# Patient Record
Sex: MALE | Race: WHITE | Employment: OTHER | URBAN - NONMETROPOLITAN AREA
[De-identification: names, ages, dates, MRNs, and addresses within clinical notes are randomized per-mention and may not be internally consistent; named-entity substitution may affect disease eponyms.]

---

## 2017-01-13 RX ORDER — HYDROCODONE BITARTRATE AND ACETAMINOPHEN 7.5; 325 MG/1; MG/1
1 TABLET ORAL
Qty: 150 TABLET | Refills: 0 | Status: SHIPPED | OUTPATIENT
Start: 2017-01-13 | End: 2017-02-08 | Stop reason: SDUPTHER

## 2017-02-10 RX ORDER — HYDROCODONE BITARTRATE AND ACETAMINOPHEN 7.5; 325 MG/1; MG/1
1 TABLET ORAL
Qty: 150 TABLET | Refills: 0 | Status: SHIPPED | OUTPATIENT
Start: 2017-02-12 | End: 2017-03-13 | Stop reason: ALTCHOICE

## 2017-02-12 RX ORDER — MELOXICAM 15 MG/1
TABLET ORAL
Qty: 30 TABLET | Refills: 0 | Status: SHIPPED | OUTPATIENT
Start: 2017-02-12 | End: 2017-03-14 | Stop reason: SDUPTHER

## 2017-02-28 RX ORDER — GABAPENTIN 600 MG/1
TABLET ORAL
Qty: 90 TABLET | Refills: 0 | Status: SHIPPED | OUTPATIENT
Start: 2017-02-28 | End: 2017-04-04 | Stop reason: SDUPTHER

## 2017-03-13 ENCOUNTER — OFFICE VISIT (OUTPATIENT)
Dept: PRIMARY CARE CLINIC | Age: 52
End: 2017-03-13
Payer: MEDICAID

## 2017-03-13 VITALS
BODY MASS INDEX: 29.48 KG/M2 | HEART RATE: 93 BPM | OXYGEN SATURATION: 94 % | HEIGHT: 71 IN | WEIGHT: 210.6 LBS | SYSTOLIC BLOOD PRESSURE: 126 MMHG | DIASTOLIC BLOOD PRESSURE: 78 MMHG

## 2017-03-13 DIAGNOSIS — B18.2 CHRONIC HEPATITIS C WITHOUT HEPATIC COMA (HCC): ICD-10-CM

## 2017-03-13 DIAGNOSIS — G71.00 MD (MUSCULAR DYSTROPHY) (HCC): Primary | ICD-10-CM

## 2017-03-13 DIAGNOSIS — M25.571 ACUTE RIGHT ANKLE PAIN: ICD-10-CM

## 2017-03-13 DIAGNOSIS — M51.37 DDD (DEGENERATIVE DISC DISEASE), LUMBOSACRAL: ICD-10-CM

## 2017-03-13 PROCEDURE — 99214 OFFICE O/P EST MOD 30 MIN: CPT | Performed by: FAMILY MEDICINE

## 2017-03-13 RX ORDER — HYDROCODONE BITARTRATE AND ACETAMINOPHEN 7.5; 325 MG/1; MG/1
1 TABLET ORAL
Qty: 150 TABLET | Refills: 0 | Status: CANCELLED | OUTPATIENT
Start: 2017-03-13

## 2017-03-13 RX ORDER — HYDROCODONE BITARTRATE AND ACETAMINOPHEN 10; 325 MG/1; MG/1
1 TABLET ORAL EVERY 6 HOURS PRN
Qty: 120 TABLET | Refills: 0 | Status: SHIPPED | OUTPATIENT
Start: 2017-03-13 | End: 2017-04-12 | Stop reason: SDUPTHER

## 2017-03-13 ASSESSMENT — ENCOUNTER SYMPTOMS
COUGH: 0
SHORTNESS OF BREATH: 0
BACK PAIN: 1

## 2017-03-15 LAB
EER HCV RNA QNT PCR: NORMAL
HCV RNA QNT REAL-TIME PCR INTERP: NOT DETECTED
HCV RNA, QUANTITATIVE REAL TIME PCR: <1.2 LOG IU
HEPATITIS C RNA PCR QUANT: <15 IU/ML

## 2017-03-21 ENCOUNTER — OFFICE VISIT (OUTPATIENT)
Dept: GASTROENTEROLOGY | Age: 52
End: 2017-03-21
Payer: MEDICAID

## 2017-03-21 ENCOUNTER — TELEPHONE (OUTPATIENT)
Dept: PRIMARY CARE CLINIC | Age: 52
End: 2017-03-21

## 2017-03-21 VITALS
OXYGEN SATURATION: 95 % | BODY MASS INDEX: 30.35 KG/M2 | SYSTOLIC BLOOD PRESSURE: 139 MMHG | HEIGHT: 71 IN | RESPIRATION RATE: 20 BRPM | WEIGHT: 216.8 LBS | DIASTOLIC BLOOD PRESSURE: 70 MMHG | HEART RATE: 87 BPM

## 2017-03-21 DIAGNOSIS — Z86.19 HISTORY OF HEPATITIS C VIRUS INFECTION: Primary | ICD-10-CM

## 2017-03-21 PROCEDURE — 99213 OFFICE O/P EST LOW 20 MIN: CPT | Performed by: NURSE PRACTITIONER

## 2017-03-21 ASSESSMENT — ENCOUNTER SYMPTOMS
SHORTNESS OF BREATH: 1
EYE DISCHARGE: 0
CONSTIPATION: 0
BACK PAIN: 1
VOMITING: 0
ANAL BLEEDING: 0
BLOOD IN STOOL: 0
ABDOMINAL PAIN: 0
ABDOMINAL DISTENTION: 0
WHEEZING: 0
COUGH: 0
SORE THROAT: 0
DIARRHEA: 0
RECTAL PAIN: 0
ALLERGIC/IMMUNOLOGIC NEGATIVE: 1
TROUBLE SWALLOWING: 0
NAUSEA: 0
RHINORRHEA: 0

## 2017-04-05 RX ORDER — GABAPENTIN 600 MG/1
TABLET ORAL
Qty: 90 TABLET | Refills: 0 | Status: SHIPPED | OUTPATIENT
Start: 2017-04-05 | End: 2017-05-10 | Stop reason: SDUPTHER

## 2017-04-12 DIAGNOSIS — M51.37 DDD (DEGENERATIVE DISC DISEASE), LUMBOSACRAL: ICD-10-CM

## 2017-04-17 RX ORDER — HYDROCODONE BITARTRATE AND ACETAMINOPHEN 10; 325 MG/1; MG/1
1 TABLET ORAL EVERY 6 HOURS PRN
Qty: 120 TABLET | Refills: 0 | Status: SHIPPED | OUTPATIENT
Start: 2017-04-17 | End: 2017-05-15 | Stop reason: SDUPTHER

## 2017-05-15 DIAGNOSIS — M51.37 DDD (DEGENERATIVE DISC DISEASE), LUMBOSACRAL: ICD-10-CM

## 2017-05-16 RX ORDER — HYDROCODONE BITARTRATE AND ACETAMINOPHEN 10; 325 MG/1; MG/1
1 TABLET ORAL EVERY 6 HOURS PRN
Qty: 120 TABLET | Refills: 0 | Status: SHIPPED | OUTPATIENT
Start: 2017-05-16 | End: 2017-06-12 | Stop reason: SDUPTHER

## 2017-06-12 DIAGNOSIS — M51.37 DDD (DEGENERATIVE DISC DISEASE), LUMBOSACRAL: ICD-10-CM

## 2017-06-15 RX ORDER — PAROXETINE HYDROCHLORIDE 40 MG/1
TABLET, FILM COATED ORAL
Qty: 30 TABLET | Refills: 10 | Status: SHIPPED | OUTPATIENT
Start: 2017-06-15 | End: 2017-08-15 | Stop reason: ALTCHOICE

## 2017-06-15 RX ORDER — HYDROCODONE BITARTRATE AND ACETAMINOPHEN 10; 325 MG/1; MG/1
1 TABLET ORAL EVERY 6 HOURS PRN
Qty: 120 TABLET | Refills: 0 | Status: SHIPPED | OUTPATIENT
Start: 2017-06-15 | End: 2017-07-11 | Stop reason: SDUPTHER

## 2017-07-11 DIAGNOSIS — M51.37 DDD (DEGENERATIVE DISC DISEASE), LUMBOSACRAL: ICD-10-CM

## 2017-07-13 RX ORDER — HYDROCODONE BITARTRATE AND ACETAMINOPHEN 10; 325 MG/1; MG/1
1 TABLET ORAL EVERY 6 HOURS PRN
Qty: 120 TABLET | Refills: 0 | Status: SHIPPED | OUTPATIENT
Start: 2017-07-14 | End: 2017-08-10 | Stop reason: SDUPTHER

## 2017-08-10 DIAGNOSIS — M51.37 DDD (DEGENERATIVE DISC DISEASE), LUMBOSACRAL: ICD-10-CM

## 2017-08-11 RX ORDER — HYDROCODONE BITARTRATE AND ACETAMINOPHEN 10; 325 MG/1; MG/1
1 TABLET ORAL EVERY 6 HOURS PRN
Qty: 120 TABLET | Refills: 0 | Status: SHIPPED | OUTPATIENT
Start: 2017-08-13 | End: 2017-09-11 | Stop reason: SDUPTHER

## 2017-08-15 ENCOUNTER — OFFICE VISIT (OUTPATIENT)
Dept: PRIMARY CARE CLINIC | Age: 52
End: 2017-08-15
Payer: MEDICARE

## 2017-08-15 VITALS
OXYGEN SATURATION: 95 % | TEMPERATURE: 96.4 F | SYSTOLIC BLOOD PRESSURE: 122 MMHG | HEART RATE: 104 BPM | DIASTOLIC BLOOD PRESSURE: 84 MMHG | HEIGHT: 71 IN | WEIGHT: 207 LBS | BODY MASS INDEX: 28.98 KG/M2

## 2017-08-15 DIAGNOSIS — Z13.220 LIPID SCREENING: ICD-10-CM

## 2017-08-15 DIAGNOSIS — F32.A DEPRESSION, UNSPECIFIED DEPRESSION TYPE: ICD-10-CM

## 2017-08-15 DIAGNOSIS — M51.37 DDD (DEGENERATIVE DISC DISEASE), LUMBOSACRAL: Primary | ICD-10-CM

## 2017-08-15 DIAGNOSIS — G71.00 MD (MUSCULAR DYSTROPHY) (HCC): ICD-10-CM

## 2017-08-15 LAB
ALBUMIN SERPL-MCNC: 4.2 G/DL (ref 3.5–5.2)
ALP BLD-CCNC: 88 U/L (ref 40–130)
ALT SERPL-CCNC: 19 U/L (ref 5–41)
AMPHETAMINE SCREEN, URINE: NORMAL
ANION GAP SERPL CALCULATED.3IONS-SCNC: 15 MMOL/L (ref 7–19)
AST SERPL-CCNC: 19 U/L (ref 5–40)
BARBITURATE SCREEN, URINE: NORMAL
BENZODIAZEPINE SCREEN, URINE: NORMAL
BILIRUB SERPL-MCNC: <0.2 MG/DL (ref 0.2–1.2)
BUN BLDV-MCNC: 20 MG/DL (ref 6–20)
CALCIUM SERPL-MCNC: 9.4 MG/DL (ref 8.6–10)
CHLORIDE BLD-SCNC: 100 MMOL/L (ref 98–111)
CHOLESTEROL, TOTAL: 167 MG/DL (ref 160–199)
CO2: 25 MMOL/L (ref 22–29)
COCAINE METABOLITE SCREEN URINE: NORMAL
CREAT SERPL-MCNC: 0.4 MG/DL (ref 0.5–1.2)
GFR NON-AFRICAN AMERICAN: >60
GLUCOSE BLD-MCNC: 78 MG/DL (ref 74–109)
HDLC SERPL-MCNC: 39 MG/DL (ref 55–121)
LDL CHOLESTEROL CALCULATED: ABNORMAL MG/DL
LDL CHOLESTEROL DIRECT: 92 MG/DL
MDMA URINE: NORMAL
METHADONE SCREEN, URINE: NORMAL
METHAMPHETAMINE, URINE: NORMAL
OPIATE SCREEN URINE: NORMAL
OXYCODONE SCREEN URINE: NORMAL
PHENCYCLIDINE SCREEN URINE: NORMAL
POTASSIUM SERPL-SCNC: 4 MMOL/L (ref 3.5–5)
PROPOXYPHENE SCREEN, URINE: NORMAL
SODIUM BLD-SCNC: 140 MMOL/L (ref 136–145)
THC: NORMAL
TOTAL PROTEIN: 7.6 G/DL (ref 6.6–8.7)
TRICYCLIC ANTIDEPRESSANTS, UR: NORMAL
TRIGL SERPL-MCNC: 481 MG/DL (ref 150–199)

## 2017-08-15 PROCEDURE — 99214 OFFICE O/P EST MOD 30 MIN: CPT | Performed by: FAMILY MEDICINE

## 2017-08-15 PROCEDURE — G8427 DOCREV CUR MEDS BY ELIG CLIN: HCPCS | Performed by: FAMILY MEDICINE

## 2017-08-15 PROCEDURE — 3017F COLORECTAL CA SCREEN DOC REV: CPT | Performed by: FAMILY MEDICINE

## 2017-08-15 PROCEDURE — 1036F TOBACCO NON-USER: CPT | Performed by: FAMILY MEDICINE

## 2017-08-15 PROCEDURE — G8417 CALC BMI ABV UP PARAM F/U: HCPCS | Performed by: FAMILY MEDICINE

## 2017-08-15 PROCEDURE — 80305 DRUG TEST PRSMV DIR OPT OBS: CPT | Performed by: FAMILY MEDICINE

## 2017-08-15 RX ORDER — FLUOXETINE 20 MG/1
20 TABLET, FILM COATED ORAL DAILY
Qty: 30 TABLET | Refills: 11 | Status: SHIPPED | OUTPATIENT
Start: 2017-08-15 | End: 2017-12-28 | Stop reason: DRUGHIGH

## 2017-08-15 ASSESSMENT — ENCOUNTER SYMPTOMS
COUGH: 0
BACK PAIN: 1
SHORTNESS OF BREATH: 0

## 2017-08-21 ENCOUNTER — TELEPHONE (OUTPATIENT)
Dept: PRIMARY CARE CLINIC | Age: 52
End: 2017-08-21

## 2017-08-21 DIAGNOSIS — E78.2 ELEVATED TRIGLYCERIDES WITH HIGH CHOLESTEROL: Primary | ICD-10-CM

## 2017-09-11 DIAGNOSIS — M51.37 DDD (DEGENERATIVE DISC DISEASE), LUMBOSACRAL: ICD-10-CM

## 2017-09-12 RX ORDER — HYDROCODONE BITARTRATE AND ACETAMINOPHEN 10; 325 MG/1; MG/1
1 TABLET ORAL EVERY 6 HOURS PRN
Qty: 100 TABLET | Refills: 0 | Status: SHIPPED | OUTPATIENT
Start: 2017-10-11 | End: 2017-11-09 | Stop reason: SDUPTHER

## 2017-09-12 RX ORDER — HYDROCODONE BITARTRATE AND ACETAMINOPHEN 10; 325 MG/1; MG/1
1 TABLET ORAL EVERY 6 HOURS PRN
Qty: 110 TABLET | Refills: 0 | Status: SHIPPED | OUTPATIENT
Start: 2017-09-12 | End: 2017-10-12

## 2017-10-05 RX ORDER — AMITRIPTYLINE HYDROCHLORIDE 50 MG/1
TABLET, FILM COATED ORAL
Qty: 30 TABLET | Refills: 11 | Status: SHIPPED | OUTPATIENT
Start: 2017-10-05 | End: 2018-10-18 | Stop reason: SDUPTHER

## 2017-10-24 DIAGNOSIS — M54.42 LEFT-SIDED LOW BACK PAIN WITH LEFT-SIDED SCIATICA: ICD-10-CM

## 2017-10-25 RX ORDER — TIZANIDINE 4 MG/1
TABLET ORAL
Qty: 90 TABLET | Refills: 11 | Status: SHIPPED | OUTPATIENT
Start: 2017-10-25 | End: 2018-12-17 | Stop reason: SDUPTHER

## 2017-11-09 NOTE — TELEPHONE ENCOUNTER
patient called left message with request for refill on norco. Last office visit 8-15 with next scheduled appointment 12-12  Dose verified.    Last UDS  8-15    Last fill per chart 10-11  Component Results     Component Collected Lab   Amphetamine Screen, Urine 08/15/2017  1:54 PM Unknown   neg    Comment:   MOP/Norco:Positive   Barbiturate Screen, Urine 08/15/2017  1:54 PM Unknown   neg    Benzodiazepine Screen, Urine 08/15/2017  1:54 PM Unknown   neg    COCAINE METABOLITE SCREEN URINE 08/15/2017  1:54 PM Unknown   neg    THC 08/15/2017  1:54 PM Unknown   neg    MDMA URINE 08/15/2017  1:54 PM Unknown   neg    Methadone Screen, Urine 08/15/2017  1:54 PM Unknown   neg    Opiate Scrn, Ur 08/15/2017  1:54 PM Unknown   neg    Oxycodone Screen, Ur 08/15/2017  1:54 PM Unknown   neg    PCP Scrn, Ur 08/15/2017  1:54 PM Unknown   neg    Propoxyphene Screen, Urine 08/15/2017  1:54 PM Unknown   neg    Tricyclic Antidepressants, Ur 08/15/2017  1:54 PM Unknown   neg    Methamphetamine, Urine 08/15/2017  1:54 PM Unknown   neg    Lab and Collection     POCT Rapid Drug Screen on 8/15/2017

## 2017-11-10 RX ORDER — HYDROCODONE BITARTRATE AND ACETAMINOPHEN 10; 325 MG/1; MG/1
1 TABLET ORAL EVERY 6 HOURS PRN
Qty: 100 TABLET | Refills: 0 | Status: SHIPPED | OUTPATIENT
Start: 2017-11-10 | End: 2017-12-10

## 2017-11-10 NOTE — TELEPHONE ENCOUNTER
ALEJANDRA was reviewed today per office protocol. Report shows No discrepancies. Fill pattern is consistent from single provider(s) at single pharmacy(s). Prescription escribed.  Patient is aware to check within the pharmacy

## 2017-12-12 ENCOUNTER — OFFICE VISIT (OUTPATIENT)
Dept: PRIMARY CARE CLINIC | Age: 52
End: 2017-12-12
Payer: MEDICARE

## 2017-12-12 VITALS
OXYGEN SATURATION: 98 % | HEIGHT: 71 IN | HEART RATE: 93 BPM | TEMPERATURE: 98.7 F | SYSTOLIC BLOOD PRESSURE: 124 MMHG | DIASTOLIC BLOOD PRESSURE: 70 MMHG | BODY MASS INDEX: 29.48 KG/M2 | WEIGHT: 210.6 LBS

## 2017-12-12 DIAGNOSIS — G89.29 CHRONIC LEFT-SIDED LOW BACK PAIN WITH LEFT-SIDED SCIATICA: ICD-10-CM

## 2017-12-12 DIAGNOSIS — Z23 NEED FOR 23-POLYVALENT PNEUMOCOCCAL POLYSACCHARIDE VACCINE: ICD-10-CM

## 2017-12-12 DIAGNOSIS — G89.4 CHRONIC PAIN SYNDROME: Primary | ICD-10-CM

## 2017-12-12 DIAGNOSIS — G71.00 MD (MUSCULAR DYSTROPHY) (HCC): ICD-10-CM

## 2017-12-12 DIAGNOSIS — M54.42 CHRONIC LEFT-SIDED LOW BACK PAIN WITH LEFT-SIDED SCIATICA: ICD-10-CM

## 2017-12-12 DIAGNOSIS — F32.A DEPRESSION, UNSPECIFIED DEPRESSION TYPE: ICD-10-CM

## 2017-12-12 DIAGNOSIS — M51.37 DDD (DEGENERATIVE DISC DISEASE), LUMBOSACRAL: ICD-10-CM

## 2017-12-12 LAB
AMPHETAMINE SCREEN, URINE: NORMAL
BARBITURATE SCREEN, URINE: NORMAL
BENZODIAZEPINE SCREEN, URINE: NORMAL
COCAINE METABOLITE SCREEN URINE: NORMAL
MDMA URINE: NORMAL
METHADONE SCREEN, URINE: NORMAL
METHAMPHETAMINE, URINE: NORMAL
OPIATE SCREEN URINE: NORMAL
OXYCODONE SCREEN URINE: NORMAL
PHENCYCLIDINE SCREEN URINE: NORMAL
PROPOXYPHENE SCREEN, URINE: NORMAL
THC: NORMAL
TRICYCLIC ANTIDEPRESSANTS, UR: NORMAL

## 2017-12-12 PROCEDURE — 3017F COLORECTAL CA SCREEN DOC REV: CPT | Performed by: FAMILY MEDICINE

## 2017-12-12 PROCEDURE — 99213 OFFICE O/P EST LOW 20 MIN: CPT | Performed by: FAMILY MEDICINE

## 2017-12-12 PROCEDURE — G8427 DOCREV CUR MEDS BY ELIG CLIN: HCPCS | Performed by: FAMILY MEDICINE

## 2017-12-12 PROCEDURE — G8484 FLU IMMUNIZE NO ADMIN: HCPCS | Performed by: FAMILY MEDICINE

## 2017-12-12 PROCEDURE — G8417 CALC BMI ABV UP PARAM F/U: HCPCS | Performed by: FAMILY MEDICINE

## 2017-12-12 PROCEDURE — 80305 DRUG TEST PRSMV DIR OPT OBS: CPT | Performed by: FAMILY MEDICINE

## 2017-12-12 PROCEDURE — 1036F TOBACCO NON-USER: CPT | Performed by: FAMILY MEDICINE

## 2017-12-12 PROCEDURE — 90732 PPSV23 VACC 2 YRS+ SUBQ/IM: CPT | Performed by: FAMILY MEDICINE

## 2017-12-12 PROCEDURE — G0009 ADMIN PNEUMOCOCCAL VACCINE: HCPCS | Performed by: FAMILY MEDICINE

## 2017-12-12 RX ORDER — HYDROCODONE BITARTRATE AND ACETAMINOPHEN 10; 325 MG/1; MG/1
1 TABLET ORAL EVERY 6 HOURS PRN
COMMUNITY
End: 2017-12-12 | Stop reason: SDUPTHER

## 2017-12-12 RX ORDER — HYDROCODONE BITARTRATE AND ACETAMINOPHEN 10; 325 MG/1; MG/1
1 TABLET ORAL EVERY 8 HOURS PRN
Qty: 90 TABLET | Refills: 0 | Status: SHIPPED | OUTPATIENT
Start: 2017-12-12 | End: 2018-01-08 | Stop reason: SDUPTHER

## 2017-12-19 RX ORDER — GABAPENTIN 600 MG/1
TABLET ORAL
Qty: 90 TABLET | Refills: 2 | Status: SHIPPED | OUTPATIENT
Start: 2017-12-19 | End: 2018-04-09 | Stop reason: SDUPTHER

## 2017-12-19 NOTE — TELEPHONE ENCOUNTER
Milton sent request for refill on gabapentin-.  Last office visit 12-12 with next scheduled appointment 3-18    Last UDS 12-12  Last fill 5-11

## 2017-12-27 ASSESSMENT — ENCOUNTER SYMPTOMS
COUGH: 0
BACK PAIN: 1
SHORTNESS OF BREATH: 0

## 2017-12-28 DIAGNOSIS — F32.A DEPRESSION, UNSPECIFIED DEPRESSION TYPE: ICD-10-CM

## 2017-12-28 RX ORDER — FLUOXETINE HYDROCHLORIDE 40 MG/1
40 CAPSULE ORAL DAILY
Qty: 30 CAPSULE | Refills: 11 | Status: SHIPPED | OUTPATIENT
Start: 2017-12-28 | End: 2018-06-13 | Stop reason: ALTCHOICE

## 2017-12-28 NOTE — TELEPHONE ENCOUNTER
Patient called today stating that he was told by Dr. Sabrina Henry to double up on his Prozac to make it 40 mg QD and a script would be sent in. Patient went to  script today and it was not there.  E-script for the 40 mg Prozac has been sent to [facility]

## 2018-01-08 DIAGNOSIS — G71.00 MD (MUSCULAR DYSTROPHY) (HCC): ICD-10-CM

## 2018-01-08 DIAGNOSIS — G89.4 CHRONIC PAIN SYNDROME: ICD-10-CM

## 2018-01-08 DIAGNOSIS — M54.42 CHRONIC LEFT-SIDED LOW BACK PAIN WITH LEFT-SIDED SCIATICA: ICD-10-CM

## 2018-01-08 DIAGNOSIS — M51.37 DDD (DEGENERATIVE DISC DISEASE), LUMBOSACRAL: ICD-10-CM

## 2018-01-08 DIAGNOSIS — G89.29 CHRONIC LEFT-SIDED LOW BACK PAIN WITH LEFT-SIDED SCIATICA: ICD-10-CM

## 2018-01-11 RX ORDER — HYDROCODONE BITARTRATE AND ACETAMINOPHEN 10; 325 MG/1; MG/1
1 TABLET ORAL EVERY 8 HOURS PRN
Qty: 90 TABLET | Refills: 0 | Status: SHIPPED | OUTPATIENT
Start: 2018-01-11 | End: 2018-02-09 | Stop reason: SDUPTHER

## 2018-01-11 NOTE — TELEPHONE ENCOUNTER
Can we please pull up at [facility] as his last UDS was negative? If you notice that the patient's UDS is negative and she be positive or there is something positive on the UDS that does not look like should be in her chart, if possible please pull up at [facility]. I know this is a lot of work. If we can just do this for even a quarter of these; I think we can help continue to nail down to just compliant patient's only with the substances    You all l have been awesome  In doing extra work for this. I do appreciate it. I think we've come a long way and in the next year can get down to a respectable clinic when it comes to controlled substances.   When we started this, we were [facility] [place]

## 2018-01-11 NOTE — TELEPHONE ENCOUNTER
ALEJANDRA was reviewed today per office protocol. Report shows No discrepancies. Fill pattern is consistent from single provider(s) at single pharmacy(s).     Presciption Escribed    Review of [facility] shows patient ran out of medications approximately 2-3 days prior to urine drug screen, will continue refill but we will need to do another urine drug screen as patient is at goal of 90 tablets per month

## 2018-02-09 DIAGNOSIS — G89.29 CHRONIC LEFT-SIDED LOW BACK PAIN WITH LEFT-SIDED SCIATICA: ICD-10-CM

## 2018-02-09 DIAGNOSIS — G89.4 CHRONIC PAIN SYNDROME: ICD-10-CM

## 2018-02-09 DIAGNOSIS — M51.37 DDD (DEGENERATIVE DISC DISEASE), LUMBOSACRAL: ICD-10-CM

## 2018-02-09 DIAGNOSIS — M54.42 CHRONIC LEFT-SIDED LOW BACK PAIN WITH LEFT-SIDED SCIATICA: ICD-10-CM

## 2018-02-09 DIAGNOSIS — G71.00 MD (MUSCULAR DYSTROPHY) (HCC): ICD-10-CM

## 2018-02-09 NOTE — TELEPHONE ENCOUNTER
Patient called today for refill on [place]. Last office visit 12/12/17. Next appointment 03/13/18.    Last UDS: 12/12/2017 10:48 AM - Jono Porter MA     Component Results     Component Collected Lab   Amphetamine Screen, Urine 12/12/2017 10:47 AM Unknown   neg    Barbiturate Screen, Urine 12/12/2017 10:47 AM Unknown   neg    Benzodiazepine Screen, Urine 12/12/2017 10:47 AM Unknown   neg    COCAINE METABOLITE SCREEN URINE 12/12/2017 10:47 AM Unknown   neg    THC 12/12/2017 10:47 AM Unknown   neg    MDMA URINE 12/12/2017 10:47 AM Unknown   neg    Methadone Screen, Urine 12/12/2017 10:47 AM Unknown   neg    Opiate Scrn, Ur 12/12/2017 10:47 AM Unknown   neg    Oxycodone Screen, Ur 12/12/2017 10:47 AM Unknown   neg    PCP Scrn, Ur 12/12/2017 10:47 AM Unknown   neg    Propoxyphene Screen, Urine 12/12/2017 10:47 AM Unknown   neg    Tricyclic Antidepressants, Ur 12/12/2017 10:47 AM Unknown   neg    Methamphetamine, Urine 12/12/2017 10:47 AM Unknown   neg

## 2018-02-12 RX ORDER — HYDROCODONE BITARTRATE AND ACETAMINOPHEN 10; 325 MG/1; MG/1
1 TABLET ORAL EVERY 8 HOURS PRN
Qty: 90 TABLET | Refills: 0 | Status: SHIPPED | OUTPATIENT
Start: 2018-02-12 | End: 2018-03-06 | Stop reason: SDUPTHER

## 2018-03-06 DIAGNOSIS — G71.00 MD (MUSCULAR DYSTROPHY) (HCC): ICD-10-CM

## 2018-03-06 DIAGNOSIS — M54.42 CHRONIC LEFT-SIDED LOW BACK PAIN WITH LEFT-SIDED SCIATICA: ICD-10-CM

## 2018-03-06 DIAGNOSIS — G89.4 CHRONIC PAIN SYNDROME: ICD-10-CM

## 2018-03-06 DIAGNOSIS — G89.29 CHRONIC LEFT-SIDED LOW BACK PAIN WITH LEFT-SIDED SCIATICA: ICD-10-CM

## 2018-03-06 DIAGNOSIS — M51.37 DDD (DEGENERATIVE DISC DISEASE), LUMBOSACRAL: ICD-10-CM

## 2018-03-06 NOTE — TELEPHONE ENCOUNTER
patient called left message with request for refill on norco. Last office visit 12-12 with next scheduled appointment 3-13  Dose verified.    Last UDS  12-12    Last fill per 2-12  Component Results     Component Collected Lab   Amphetamine Screen, Urine 12/12/2017 10:47 AM Unknown   neg    Barbiturate Screen, Urine 12/12/2017 10:47 AM Unknown   neg    Benzodiazepine Screen, Urine 12/12/2017 10:47 AM Unknown   neg    COCAINE METABOLITE SCREEN URINE 12/12/2017 10:47 AM Unknown   neg    THC 12/12/2017 10:47 AM Unknown   neg    MDMA URINE 12/12/2017 10:47 AM Unknown   neg    Methadone Screen, Urine 12/12/2017 10:47 AM Unknown   neg    Opiate Scrn, Ur 12/12/2017 10:47 AM Unknown   neg    Oxycodone Screen, Ur 12/12/2017 10:47 AM Unknown   neg    PCP Scrn, Ur 12/12/2017 10:47 AM Unknown   neg    Propoxyphene Screen, Urine 12/12/2017 10:47 AM Unknown   neg    Tricyclic Antidepressants, Ur 12/12/2017 10:47 AM Unknown   neg    Methamphetamine, Urine 12/12/2017 10:47 AM Unknown   neg    Lab and Collection     POCT Rapid Drug Screen on 12/12/2017

## 2018-03-07 RX ORDER — HYDROCODONE BITARTRATE AND ACETAMINOPHEN 10; 325 MG/1; MG/1
1 TABLET ORAL EVERY 8 HOURS PRN
Qty: 90 TABLET | Refills: 0 | Status: SHIPPED | OUTPATIENT
Start: 2018-03-14 | End: 2018-04-10 | Stop reason: SDUPTHER

## 2018-03-13 ENCOUNTER — TELEPHONE (OUTPATIENT)
Dept: PRIMARY CARE CLINIC | Age: 53
End: 2018-03-13

## 2018-03-13 ENCOUNTER — OFFICE VISIT (OUTPATIENT)
Dept: PRIMARY CARE CLINIC | Age: 53
End: 2018-03-13
Payer: MEDICARE

## 2018-03-13 VITALS
SYSTOLIC BLOOD PRESSURE: 128 MMHG | HEART RATE: 99 BPM | WEIGHT: 208.4 LBS | DIASTOLIC BLOOD PRESSURE: 78 MMHG | HEIGHT: 71 IN | TEMPERATURE: 97.8 F | BODY MASS INDEX: 29.18 KG/M2 | OXYGEN SATURATION: 96 %

## 2018-03-13 DIAGNOSIS — G89.29 CHRONIC LEFT-SIDED LOW BACK PAIN WITH LEFT-SIDED SCIATICA: ICD-10-CM

## 2018-03-13 DIAGNOSIS — G89.29 OTHER CHRONIC PAIN: Primary | ICD-10-CM

## 2018-03-13 DIAGNOSIS — B18.2 CHRONIC HEPATITIS C WITHOUT HEPATIC COMA (HCC): ICD-10-CM

## 2018-03-13 DIAGNOSIS — M54.42 CHRONIC LEFT-SIDED LOW BACK PAIN WITH LEFT-SIDED SCIATICA: ICD-10-CM

## 2018-03-13 DIAGNOSIS — G71.00 MD (MUSCULAR DYSTROPHY) (HCC): ICD-10-CM

## 2018-03-13 DIAGNOSIS — M51.37 DDD (DEGENERATIVE DISC DISEASE), LUMBOSACRAL: ICD-10-CM

## 2018-03-13 PROCEDURE — G8417 CALC BMI ABV UP PARAM F/U: HCPCS | Performed by: FAMILY MEDICINE

## 2018-03-13 PROCEDURE — 1036F TOBACCO NON-USER: CPT | Performed by: FAMILY MEDICINE

## 2018-03-13 PROCEDURE — 99214 OFFICE O/P EST MOD 30 MIN: CPT | Performed by: FAMILY MEDICINE

## 2018-03-13 PROCEDURE — 80305 DRUG TEST PRSMV DIR OPT OBS: CPT | Performed by: FAMILY MEDICINE

## 2018-03-13 PROCEDURE — G8427 DOCREV CUR MEDS BY ELIG CLIN: HCPCS | Performed by: FAMILY MEDICINE

## 2018-03-13 PROCEDURE — G0444 DEPRESSION SCREEN ANNUAL: HCPCS | Performed by: FAMILY MEDICINE

## 2018-03-13 PROCEDURE — G8484 FLU IMMUNIZE NO ADMIN: HCPCS | Performed by: FAMILY MEDICINE

## 2018-03-13 PROCEDURE — 3017F COLORECTAL CA SCREEN DOC REV: CPT | Performed by: FAMILY MEDICINE

## 2018-03-13 ASSESSMENT — PATIENT HEALTH QUESTIONNAIRE - PHQ9
5. POOR APPETITE OR OVEREATING: 2
10. IF YOU CHECKED OFF ANY PROBLEMS, HOW DIFFICULT HAVE THESE PROBLEMS MADE IT FOR YOU TO DO YOUR WORK, TAKE CARE OF THINGS AT HOME, OR GET ALONG WITH OTHER PEOPLE: 1
8. MOVING OR SPEAKING SO SLOWLY THAT OTHER PEOPLE COULD HAVE NOTICED. OR THE OPPOSITE, BEING SO FIGETY OR RESTLESS THAT YOU HAVE BEEN MOVING AROUND A LOT MORE THAN USUAL: 0
SUM OF ALL RESPONSES TO PHQ9 QUESTIONS 1 & 2: 5
1. LITTLE INTEREST OR PLEASURE IN DOING THINGS: 2
9. THOUGHTS THAT YOU WOULD BE BETTER OFF DEAD, OR OF HURTING YOURSELF: 0
SUM OF ALL RESPONSES TO PHQ QUESTIONS 1-9: 13
3. TROUBLE FALLING OR STAYING ASLEEP: 3
4. FEELING TIRED OR HAVING LITTLE ENERGY: 3
7. TROUBLE CONCENTRATING ON THINGS, SUCH AS READING THE NEWSPAPER OR WATCHING TELEVISION: 0
2. FEELING DOWN, DEPRESSED OR HOPELESS: 3
6. FEELING BAD ABOUT YOURSELF - OR THAT YOU ARE A FAILURE OR HAVE LET YOURSELF OR YOUR FAMILY DOWN: 0

## 2018-03-13 ASSESSMENT — ENCOUNTER SYMPTOMS
SHORTNESS OF BREATH: 0
BACK PAIN: 1
COUGH: 0

## 2018-03-20 ENCOUNTER — HOSPITAL ENCOUNTER (OUTPATIENT)
Dept: MRI IMAGING | Age: 53
Discharge: HOME | End: 2018-03-20
Payer: MEDICARE

## 2018-03-20 DIAGNOSIS — M54.42 CHRONIC LEFT-SIDED LOW BACK PAIN WITH LEFT-SIDED SCIATICA: ICD-10-CM

## 2018-03-20 DIAGNOSIS — G89.29 OTHER CHRONIC PAIN: ICD-10-CM

## 2018-03-20 DIAGNOSIS — G89.29 CHRONIC LEFT-SIDED LOW BACK PAIN WITH LEFT-SIDED SCIATICA: ICD-10-CM

## 2018-03-20 DIAGNOSIS — M51.37 DDD (DEGENERATIVE DISC DISEASE), LUMBOSACRAL: ICD-10-CM

## 2018-03-20 PROCEDURE — 72148 MRI LUMBAR SPINE W/O DYE: CPT

## 2018-03-27 ENCOUNTER — TELEPHONE (OUTPATIENT)
Dept: PRIMARY CARE CLINIC | Age: 53
End: 2018-03-27

## 2018-03-27 DIAGNOSIS — R93.89 ABNORMAL MRI: Primary | ICD-10-CM

## 2018-03-27 NOTE — TELEPHONE ENCOUNTER
This [facility] called and gave the patients their MRI results. Patient stated understanding. Referral was made.

## 2018-03-28 ENCOUNTER — TELEPHONE (OUTPATIENT)
Dept: NEUROSURGERY | Age: 53
End: 2018-03-28

## 2018-03-29 ENCOUNTER — TELEPHONE (OUTPATIENT)
Dept: NEUROSURGERY | Age: 53
End: 2018-03-29

## 2018-03-29 NOTE — TELEPHONE ENCOUNTER
Neurosurgical pre apt questionnaire     Referring physician? Zeny Mireles    Who is completing questionnaire? PATIENT     Has the pt had any previous spinal/brain surgeries? NO    If yes, Where was the surgery preformed? What was the surgery? Who was the surgeon? When was this surgery? Why is the pt not following up with previous surgeon? Is this a second opinion? Was a MRI preformed? YES    If not, Is there any reason a MRI cannot be performed? Is there metal anywhere in the body? If yes,  Where was the MRI preformed? [facility]   What part of the body? LUMBAR   When was it preformed?     03/20/18      Note:If  was not the facility that performed the study,    the disc will need to be brought appoint. Physical Therapy? YES   If yes, where was PT completed? What is the duration of therapy? Pain Management? NO   If yes, where was pain mgt therapy? Is the patient still under contract with pain mgt? Who is the pain mgt physician? Is the pt currently taking anything for pain control? [address]     Employment Status ? DISABLED   What type of employment? Has the patient missed work due to pain? If unemployed, how long? Are you on disability? Symptoms?         LEFT LOWER SIDE PAIN WHEN WALKING A LOT, CAN MOVE TO RIGHT SIDE

## 2018-04-10 DIAGNOSIS — M54.42 CHRONIC LEFT-SIDED LOW BACK PAIN WITH LEFT-SIDED SCIATICA: ICD-10-CM

## 2018-04-10 DIAGNOSIS — G89.4 CHRONIC PAIN SYNDROME: ICD-10-CM

## 2018-04-10 DIAGNOSIS — M51.37 DDD (DEGENERATIVE DISC DISEASE), LUMBOSACRAL: ICD-10-CM

## 2018-04-10 DIAGNOSIS — G89.29 CHRONIC LEFT-SIDED LOW BACK PAIN WITH LEFT-SIDED SCIATICA: ICD-10-CM

## 2018-04-10 DIAGNOSIS — G71.00 MD (MUSCULAR DYSTROPHY) (HCC): ICD-10-CM

## 2018-04-10 RX ORDER — GABAPENTIN 600 MG/1
TABLET ORAL
Qty: 90 TABLET | Refills: 5 | OUTPATIENT
Start: 2018-04-10 | End: 2018-10-18 | Stop reason: SDUPTHER

## 2018-04-10 RX ORDER — HYDROCODONE BITARTRATE AND ACETAMINOPHEN 10; 325 MG/1; MG/1
1 TABLET ORAL EVERY 8 HOURS PRN
Qty: 90 TABLET | Refills: 0 | Status: SHIPPED | OUTPATIENT
Start: 2018-04-13 | End: 2018-05-10 | Stop reason: SDUPTHER

## 2018-04-11 ENCOUNTER — HOSPITAL ENCOUNTER (OUTPATIENT)
Dept: GENERAL RADIOLOGY | Age: 53
Discharge: HOME | End: 2018-04-11
Payer: MEDICARE

## 2018-04-11 ENCOUNTER — OFFICE VISIT (OUTPATIENT)
Dept: NEUROSURGERY | Age: 53
End: 2018-04-11
Payer: MEDICARE

## 2018-04-11 VITALS
WEIGHT: 210 LBS | BODY MASS INDEX: 29.4 KG/M2 | SYSTOLIC BLOOD PRESSURE: 138 MMHG | DIASTOLIC BLOOD PRESSURE: 87 MMHG | OXYGEN SATURATION: 99 % | HEART RATE: 98 BPM | HEIGHT: 71 IN

## 2018-04-11 DIAGNOSIS — M54.50 CHRONIC LEFT-SIDED LOW BACK PAIN WITHOUT SCIATICA: ICD-10-CM

## 2018-04-11 DIAGNOSIS — M51.36 DDD (DEGENERATIVE DISC DISEASE), LUMBAR: ICD-10-CM

## 2018-04-11 DIAGNOSIS — G89.29 CHRONIC LEFT-SIDED LOW BACK PAIN WITHOUT SCIATICA: Primary | ICD-10-CM

## 2018-04-11 DIAGNOSIS — M54.50 CHRONIC LEFT-SIDED LOW BACK PAIN WITHOUT SCIATICA: Primary | ICD-10-CM

## 2018-04-11 DIAGNOSIS — M48.061 LUMBAR FORAMINAL STENOSIS: ICD-10-CM

## 2018-04-11 DIAGNOSIS — M79.18 LEFT BUTTOCK PAIN: ICD-10-CM

## 2018-04-11 DIAGNOSIS — G89.29 CHRONIC LEFT-SIDED LOW BACK PAIN WITHOUT SCIATICA: ICD-10-CM

## 2018-04-11 PROCEDURE — 1036F TOBACCO NON-USER: CPT | Performed by: NURSE PRACTITIONER

## 2018-04-11 PROCEDURE — G8427 DOCREV CUR MEDS BY ELIG CLIN: HCPCS | Performed by: NURSE PRACTITIONER

## 2018-04-11 PROCEDURE — G8417 CALC BMI ABV UP PARAM F/U: HCPCS | Performed by: NURSE PRACTITIONER

## 2018-04-11 PROCEDURE — 99204 OFFICE O/P NEW MOD 45 MIN: CPT | Performed by: NURSE PRACTITIONER

## 2018-04-11 PROCEDURE — 3017F COLORECTAL CA SCREEN DOC REV: CPT | Performed by: NURSE PRACTITIONER

## 2018-04-11 PROCEDURE — 72110 X-RAY EXAM L-2 SPINE 4/>VWS: CPT

## 2018-04-11 RX ORDER — PAROXETINE HYDROCHLORIDE 40 MG/1
TABLET, FILM COATED ORAL
COMMUNITY
Start: 2018-04-09 | End: 2018-06-13 | Stop reason: ALTCHOICE

## 2018-04-11 ASSESSMENT — ENCOUNTER SYMPTOMS
RESPIRATORY NEGATIVE: 1
EYES NEGATIVE: 1
BACK PAIN: 1
GASTROINTESTINAL NEGATIVE: 1

## 2018-04-13 RX ORDER — MELOXICAM 15 MG/1
TABLET ORAL
Qty: 30 TABLET | Refills: 11 | Status: SHIPPED | OUTPATIENT
Start: 2018-04-13 | End: 2019-04-24 | Stop reason: SDUPTHER

## 2018-05-10 ENCOUNTER — OFFICE VISIT (OUTPATIENT)
Dept: NEUROSURGERY | Age: 53
End: 2018-05-10
Payer: MEDICARE

## 2018-05-10 VITALS
BODY MASS INDEX: 29.12 KG/M2 | SYSTOLIC BLOOD PRESSURE: 132 MMHG | OXYGEN SATURATION: 93 % | HEIGHT: 71 IN | WEIGHT: 208 LBS | DIASTOLIC BLOOD PRESSURE: 85 MMHG | HEART RATE: 81 BPM

## 2018-05-10 DIAGNOSIS — G89.29 CHRONIC LEFT-SIDED LOW BACK PAIN WITH LEFT-SIDED SCIATICA: ICD-10-CM

## 2018-05-10 DIAGNOSIS — M51.37 DDD (DEGENERATIVE DISC DISEASE), LUMBOSACRAL: ICD-10-CM

## 2018-05-10 DIAGNOSIS — M54.42 CHRONIC LEFT-SIDED LOW BACK PAIN WITH LEFT-SIDED SCIATICA: ICD-10-CM

## 2018-05-10 DIAGNOSIS — M51.36 DDD (DEGENERATIVE DISC DISEASE), LUMBAR: ICD-10-CM

## 2018-05-10 DIAGNOSIS — G89.29 CHRONIC LEFT-SIDED LOW BACK PAIN WITHOUT SCIATICA: Primary | ICD-10-CM

## 2018-05-10 DIAGNOSIS — M54.50 CHRONIC LEFT-SIDED LOW BACK PAIN WITHOUT SCIATICA: Primary | ICD-10-CM

## 2018-05-10 DIAGNOSIS — G71.00 MD (MUSCULAR DYSTROPHY) (HCC): ICD-10-CM

## 2018-05-10 DIAGNOSIS — G89.4 CHRONIC PAIN SYNDROME: ICD-10-CM

## 2018-05-10 DIAGNOSIS — M48.061 LUMBAR FORAMINAL STENOSIS: ICD-10-CM

## 2018-05-10 PROCEDURE — G8417 CALC BMI ABV UP PARAM F/U: HCPCS | Performed by: NURSE PRACTITIONER

## 2018-05-10 PROCEDURE — 3017F COLORECTAL CA SCREEN DOC REV: CPT | Performed by: NURSE PRACTITIONER

## 2018-05-10 PROCEDURE — 1036F TOBACCO NON-USER: CPT | Performed by: NURSE PRACTITIONER

## 2018-05-10 PROCEDURE — 99213 OFFICE O/P EST LOW 20 MIN: CPT | Performed by: NURSE PRACTITIONER

## 2018-05-10 PROCEDURE — G8427 DOCREV CUR MEDS BY ELIG CLIN: HCPCS | Performed by: NURSE PRACTITIONER

## 2018-05-11 RX ORDER — HYDROCODONE BITARTRATE AND ACETAMINOPHEN 10; 325 MG/1; MG/1
1 TABLET ORAL EVERY 8 HOURS PRN
Qty: 90 TABLET | Refills: 0 | Status: SHIPPED | OUTPATIENT
Start: 2018-05-13 | End: 2018-06-11 | Stop reason: SDUPTHER

## 2018-06-11 DIAGNOSIS — G89.29 CHRONIC LEFT-SIDED LOW BACK PAIN WITH LEFT-SIDED SCIATICA: ICD-10-CM

## 2018-06-11 DIAGNOSIS — G89.4 CHRONIC PAIN SYNDROME: ICD-10-CM

## 2018-06-11 DIAGNOSIS — G71.00 MD (MUSCULAR DYSTROPHY) (HCC): ICD-10-CM

## 2018-06-11 DIAGNOSIS — M51.37 DDD (DEGENERATIVE DISC DISEASE), LUMBOSACRAL: ICD-10-CM

## 2018-06-11 DIAGNOSIS — M54.42 CHRONIC LEFT-SIDED LOW BACK PAIN WITH LEFT-SIDED SCIATICA: ICD-10-CM

## 2018-06-13 ENCOUNTER — OFFICE VISIT (OUTPATIENT)
Dept: PRIMARY CARE CLINIC | Age: 53
End: 2018-06-13
Payer: MEDICARE

## 2018-06-13 VITALS
BODY MASS INDEX: 29.66 KG/M2 | SYSTOLIC BLOOD PRESSURE: 118 MMHG | OXYGEN SATURATION: 97 % | TEMPERATURE: 98 F | HEIGHT: 70 IN | WEIGHT: 207.2 LBS | HEART RATE: 84 BPM | DIASTOLIC BLOOD PRESSURE: 82 MMHG

## 2018-06-13 DIAGNOSIS — Z23 NEED FOR SHINGLES VACCINE: ICD-10-CM

## 2018-06-13 DIAGNOSIS — R53.83 FATIGUE, UNSPECIFIED TYPE: ICD-10-CM

## 2018-06-13 DIAGNOSIS — G89.4 CHRONIC PAIN SYNDROME: Primary | ICD-10-CM

## 2018-06-13 DIAGNOSIS — M51.37 DDD (DEGENERATIVE DISC DISEASE), LUMBOSACRAL: ICD-10-CM

## 2018-06-13 DIAGNOSIS — F32.A DEPRESSION, UNSPECIFIED DEPRESSION TYPE: ICD-10-CM

## 2018-06-13 DIAGNOSIS — G71.00 MD (MUSCULAR DYSTROPHY) (HCC): ICD-10-CM

## 2018-06-13 DIAGNOSIS — G89.29 CHRONIC LEFT-SIDED LOW BACK PAIN WITH LEFT-SIDED SCIATICA: ICD-10-CM

## 2018-06-13 DIAGNOSIS — M54.42 CHRONIC LEFT-SIDED LOW BACK PAIN WITH LEFT-SIDED SCIATICA: ICD-10-CM

## 2018-06-13 LAB
ALBUMIN SERPL-MCNC: 4.3 G/DL (ref 3.5–5.2)
ALP BLD-CCNC: 102 U/L (ref 40–130)
ALT SERPL-CCNC: 22 U/L (ref 5–41)
AMPHETAMINE SCREEN, URINE: NORMAL
ANION GAP SERPL CALCULATED.3IONS-SCNC: 17 MMOL/L (ref 7–19)
AST SERPL-CCNC: 21 U/L (ref 5–40)
BARBITURATE SCREEN, URINE: NORMAL
BENZODIAZEPINE SCREEN, URINE: NORMAL
BILIRUB SERPL-MCNC: 0.3 MG/DL (ref 0.2–1.2)
BUN BLDV-MCNC: 22 MG/DL (ref 6–20)
CALCIUM SERPL-MCNC: 9.4 MG/DL (ref 8.6–10)
CHLORIDE BLD-SCNC: 99 MMOL/L (ref 98–111)
CO2: 23 MMOL/L (ref 22–29)
COCAINE METABOLITE SCREEN URINE: NORMAL
CREAT SERPL-MCNC: <0.5 MG/DL (ref 0.5–1.2)
GFR NON-AFRICAN AMERICAN: >60
GLUCOSE BLD-MCNC: 90 MG/DL (ref 74–109)
MDMA URINE: NORMAL
METHADONE SCREEN, URINE: NORMAL
METHAMPHETAMINE, URINE: NORMAL
OPIATE SCREEN URINE: NORMAL
OXYCODONE SCREEN URINE: POSITIVE
PHENCYCLIDINE SCREEN URINE: NORMAL
POTASSIUM SERPL-SCNC: 4.4 MMOL/L (ref 3.5–5)
PROPOXYPHENE SCREEN, URINE: NORMAL
SODIUM BLD-SCNC: 139 MMOL/L (ref 136–145)
TESTOSTERONE TOTAL: 297.2 NG/DL (ref 193–740)
THC: NORMAL
TOTAL PROTEIN: 7.6 G/DL (ref 6.6–8.7)
TRICYCLIC ANTIDEPRESSANTS, UR: POSITIVE
TSH SERPL DL<=0.05 MIU/L-ACNC: 2.02 UIU/ML (ref 0.27–4.2)

## 2018-06-13 PROCEDURE — 3017F COLORECTAL CA SCREEN DOC REV: CPT | Performed by: FAMILY MEDICINE

## 2018-06-13 PROCEDURE — 1036F TOBACCO NON-USER: CPT | Performed by: FAMILY MEDICINE

## 2018-06-13 PROCEDURE — G8417 CALC BMI ABV UP PARAM F/U: HCPCS | Performed by: FAMILY MEDICINE

## 2018-06-13 PROCEDURE — 99214 OFFICE O/P EST MOD 30 MIN: CPT | Performed by: FAMILY MEDICINE

## 2018-06-13 PROCEDURE — 80305 DRUG TEST PRSMV DIR OPT OBS: CPT | Performed by: FAMILY MEDICINE

## 2018-06-13 PROCEDURE — G8427 DOCREV CUR MEDS BY ELIG CLIN: HCPCS | Performed by: FAMILY MEDICINE

## 2018-06-13 RX ORDER — HYDROCODONE BITARTRATE AND ACETAMINOPHEN 10; 325 MG/1; MG/1
1 TABLET ORAL EVERY 8 HOURS PRN
Qty: 90 TABLET | Refills: 0 | Status: SHIPPED | OUTPATIENT
Start: 2018-06-13 | End: 2018-07-11 | Stop reason: SDUPTHER

## 2018-06-13 RX ORDER — FLUOXETINE HYDROCHLORIDE 40 MG/1
40 CAPSULE ORAL DAILY
Qty: 30 CAPSULE | Refills: 11 | Status: SHIPPED | OUTPATIENT
Start: 2018-06-13 | End: 2018-12-20

## 2018-06-13 ASSESSMENT — ENCOUNTER SYMPTOMS
SHORTNESS OF BREATH: 0
BACK PAIN: 1
COUGH: 0

## 2018-06-14 ENCOUNTER — TELEPHONE (OUTPATIENT)
Dept: PRIMARY CARE CLINIC | Age: 53
End: 2018-06-14

## 2018-07-11 DIAGNOSIS — G89.29 CHRONIC LEFT-SIDED LOW BACK PAIN WITH LEFT-SIDED SCIATICA: ICD-10-CM

## 2018-07-11 DIAGNOSIS — M54.42 CHRONIC LEFT-SIDED LOW BACK PAIN WITH LEFT-SIDED SCIATICA: ICD-10-CM

## 2018-07-11 DIAGNOSIS — G89.4 CHRONIC PAIN SYNDROME: ICD-10-CM

## 2018-07-11 DIAGNOSIS — M51.37 DDD (DEGENERATIVE DISC DISEASE), LUMBOSACRAL: ICD-10-CM

## 2018-07-11 DIAGNOSIS — G71.00 MD (MUSCULAR DYSTROPHY) (HCC): ICD-10-CM

## 2018-07-11 NOTE — TELEPHONE ENCOUNTER
Alicia Peoples called 07/11/18 with request for refill on Norco. Last office visit 06/13/18/ with next scheduled appointment 09/13/18  Dose verified.    Last UDS  06/13/18    Last fill 06/13/18  Stephanie Sa Report 09/11/18 pending     6/13/2018 11:19 AM - Kim Silver MA     Component Results     Component Collected Lab   Amphetamine Screen, Urine 06/13/2018 11:18 AM Unknown   neg    Comment:   MOP/Norco: Positive   Barbiturate Screen, Urine 06/13/2018 11:18 AM Unknown   neg    Benzodiazepine Screen, Urine 06/13/2018 11:18 AM Unknown   neg    COCAINE METABOLITE SCREEN URINE 06/13/2018 11:18 AM Unknown   neg    THC 06/13/2018 11:18 AM Unknown   neg    MDMA URINE 06/13/2018 11:18 AM Unknown   neg    Methadone Screen, Urine 06/13/2018 11:18 AM Unknown   neg    Opiate Scrn, Ur 06/13/2018 11:18 AM Unknown   neg    Oxycodone Screen, Ur 06/13/2018 11:18 AM Unknown   positive    PCP Scrn, Ur 06/13/2018 11:18 AM Unknown   neg    Propoxyphene Screen, Urine 06/13/2018 11:18 AM Unknown   neg    Tricyclic Antidepressants, Ur 06/13/2018 11:18 AM Unknown   positive    Methamphetamine, Urine 06/13/2018 11:18 AM Unknown   neg    Lab and Collection     POCT Rapid Drug Screen on 6/13/2018

## 2018-07-12 RX ORDER — HYDROCODONE BITARTRATE AND ACETAMINOPHEN 10; 325 MG/1; MG/1
1 TABLET ORAL EVERY 8 HOURS PRN
Qty: 90 TABLET | Refills: 0 | Status: SHIPPED | OUTPATIENT
Start: 2018-07-13 | End: 2018-08-10 | Stop reason: SDUPTHER

## 2018-08-10 DIAGNOSIS — G71.00 MD (MUSCULAR DYSTROPHY) (HCC): ICD-10-CM

## 2018-08-10 DIAGNOSIS — M51.37 DDD (DEGENERATIVE DISC DISEASE), LUMBOSACRAL: ICD-10-CM

## 2018-08-10 DIAGNOSIS — G89.29 CHRONIC LEFT-SIDED LOW BACK PAIN WITH LEFT-SIDED SCIATICA: ICD-10-CM

## 2018-08-10 DIAGNOSIS — G89.4 CHRONIC PAIN SYNDROME: ICD-10-CM

## 2018-08-10 DIAGNOSIS — M54.42 CHRONIC LEFT-SIDED LOW BACK PAIN WITH LEFT-SIDED SCIATICA: ICD-10-CM

## 2018-08-10 RX ORDER — HYDROCODONE BITARTRATE AND ACETAMINOPHEN 10; 325 MG/1; MG/1
1 TABLET ORAL EVERY 8 HOURS PRN
Qty: 90 TABLET | Refills: 0 | Status: SHIPPED | OUTPATIENT
Start: 2018-08-13 | End: 2018-09-12 | Stop reason: SDUPTHER

## 2018-09-12 DIAGNOSIS — G89.4 CHRONIC PAIN SYNDROME: ICD-10-CM

## 2018-09-12 DIAGNOSIS — G71.00 MD (MUSCULAR DYSTROPHY) (HCC): ICD-10-CM

## 2018-09-12 DIAGNOSIS — M51.37 DDD (DEGENERATIVE DISC DISEASE), LUMBOSACRAL: ICD-10-CM

## 2018-09-12 DIAGNOSIS — G89.29 CHRONIC LEFT-SIDED LOW BACK PAIN WITH LEFT-SIDED SCIATICA: ICD-10-CM

## 2018-09-12 DIAGNOSIS — M54.42 CHRONIC LEFT-SIDED LOW BACK PAIN WITH LEFT-SIDED SCIATICA: ICD-10-CM

## 2018-09-13 RX ORDER — HYDROCODONE BITARTRATE AND ACETAMINOPHEN 10; 325 MG/1; MG/1
1 TABLET ORAL EVERY 8 HOURS PRN
Qty: 90 TABLET | Refills: 0 | Status: SHIPPED | OUTPATIENT
Start: 2018-09-13 | End: 2018-10-11 | Stop reason: SDUPTHER

## 2018-09-20 ENCOUNTER — OFFICE VISIT (OUTPATIENT)
Dept: PRIMARY CARE CLINIC | Age: 53
End: 2018-09-20
Payer: MEDICARE

## 2018-09-20 VITALS
WEIGHT: 208 LBS | HEART RATE: 95 BPM | SYSTOLIC BLOOD PRESSURE: 114 MMHG | HEIGHT: 71 IN | DIASTOLIC BLOOD PRESSURE: 78 MMHG | BODY MASS INDEX: 29.12 KG/M2 | OXYGEN SATURATION: 95 % | RESPIRATION RATE: 18 BRPM | TEMPERATURE: 96.8 F

## 2018-09-20 DIAGNOSIS — G71.00 MD (MUSCULAR DYSTROPHY) (HCC): ICD-10-CM

## 2018-09-20 DIAGNOSIS — G89.4 CHRONIC PAIN SYNDROME: Primary | ICD-10-CM

## 2018-09-20 DIAGNOSIS — Z79.899 DRUG THERAPY: ICD-10-CM

## 2018-09-20 DIAGNOSIS — Z86.19 HISTORY OF HEPATITIS C: ICD-10-CM

## 2018-09-20 DIAGNOSIS — M54.50 CHRONIC BILATERAL LOW BACK PAIN WITHOUT SCIATICA: ICD-10-CM

## 2018-09-20 DIAGNOSIS — G89.29 CHRONIC BILATERAL LOW BACK PAIN WITHOUT SCIATICA: ICD-10-CM

## 2018-09-20 DIAGNOSIS — Z23 NEEDS FLU SHOT: ICD-10-CM

## 2018-09-20 DIAGNOSIS — M54.12 CERVICAL RADICULOPATHY: ICD-10-CM

## 2018-09-20 DIAGNOSIS — F32.5 MAJOR DEPRESSIVE DISORDER WITH SINGLE EPISODE, IN FULL REMISSION (HCC): ICD-10-CM

## 2018-09-20 LAB
AMPHETAMINE SCREEN, URINE: NORMAL
BARBITURATE SCREEN, URINE: NORMAL
BENZODIAZEPINE SCREEN, URINE: NORMAL
BUPRENORPHINE URINE: NORMAL
COCAINE METABOLITE SCREEN URINE: NORMAL
GABAPENTIN SCREEN, URINE: NORMAL
METHADONE SCREEN, URINE: NORMAL
METHAMPHETAMINE, URINE: NORMAL
OPIATE SCREEN URINE: POSITIVE
OXYCODONE SCREEN URINE: NORMAL
PHENCYCLIDINE SCREEN URINE: NORMAL
PROPOXYPHENE SCREEN, URINE: NORMAL
THC SCREEN, URINE: NORMAL
TRICYCLIC ANTIDEPRESSANTS, UR: NORMAL

## 2018-09-20 PROCEDURE — 80305 DRUG TEST PRSMV DIR OPT OBS: CPT | Performed by: NURSE PRACTITIONER

## 2018-09-20 PROCEDURE — 1036F TOBACCO NON-USER: CPT | Performed by: NURSE PRACTITIONER

## 2018-09-20 PROCEDURE — G8427 DOCREV CUR MEDS BY ELIG CLIN: HCPCS | Performed by: NURSE PRACTITIONER

## 2018-09-20 PROCEDURE — G8417 CALC BMI ABV UP PARAM F/U: HCPCS | Performed by: NURSE PRACTITIONER

## 2018-09-20 PROCEDURE — 99214 OFFICE O/P EST MOD 30 MIN: CPT | Performed by: NURSE PRACTITIONER

## 2018-09-20 PROCEDURE — 3017F COLORECTAL CA SCREEN DOC REV: CPT | Performed by: NURSE PRACTITIONER

## 2018-09-20 ASSESSMENT — ENCOUNTER SYMPTOMS
RESPIRATORY NEGATIVE: 1
EYES NEGATIVE: 1
BACK PAIN: 1
GASTROINTESTINAL NEGATIVE: 1

## 2018-09-20 NOTE — PROGRESS NOTES
[facility]  [facility]  [address] [place] [address]  Dept: [phone]  Dept Fax: [phone]  Loc: [phone]    Tapan Joshi is a 46 y.o. male who presents today for his medical conditions/complaints as noted below. Tapan Joshi is c/o of Back Pain (chronic)      Chief Complaint   Patient presents with    Back Pain     chronic       HPI:     HPI  Patient here for routine follow up on chronic conditions including chronic back pain, depression, and hx of muscular dystrophy. He is on Norco 10 TID and reports doing well on this dose, but it does not completely alleviate all of his pain. He has been using a back brace from his brother in law and states that it is helping. He would like to have one of his own. Past Medical History:   Diagnosis Date    Anxiety     Chronic back pain     Depression     MD [facility]muscular dyst[facility]) ([facility] [address]     Scoliosis         Past Surgical History:   Procedure Laterality Date    COLONOSCOPY  1/8/16    Dr Rufus Hooker, normal, 10 yr recall    FOOT SURGERY Left        Social History   Substance Use Topics    Smoking status: Never Smoker    Smokeless tobacco: Never Used    Alcohol use No      Comment:  Last drink was 08/07/2015        Current Outpatient Prescriptions   Medication Sig Dispense Refill    Elastic Bandages & Supports (LUMBAR BACK BRACE/SUPPORT PAD) MISC 1 each by Does not apply route daily 1 each 0    HYDROcodone-acetaminophen (NORCO)  MG per tablet Take 1 tablet by mouth every 8 hours as needed for Pain for up to 30 days. . 90 tablet 0    FLUoxetine (PROZAC) 40 MG capsule Take 1 capsule by mouth daily 30 capsule 11    meloxicam (MOBIC) 15 MG tablet TAKE ONE TABLET BY MOUTH ONCE DAILY 30 tablet 11    gabapentin (NEURONTIN) 600 MG tablet TAKE ONE TABLET BY MOUTH THREE TIMES DAILY 90 tablet 5    tiZANidine (ZANAFLEX) 4 MG tablet TAKE ONE TABLET BY MOUTH THREE TIMES DAILY 90 tablet 11    amitriptyline (ELAVIL) 50 MG tablet TAKE ONE TABLET BY MOUTH IN THE EVENING 30 tablet 11     No current facility-administered medications for this visit. Allergies   Allergen Reactions    Shellfish-Derived Products Anaphylaxis       Family History   Problem Relation Age of Onset    Diabetes Mother     Colon Cancer Neg Hx     Colon Polyps Neg Hx     Esophageal Cancer Neg Hx     Liver Cancer Neg Hx     Liver Disease Neg Hx     Rectal Cancer Neg Hx     Stomach Cancer Neg Hx        Subjective:      Review of Systems   Constitutional: Negative. HENT: Negative. Eyes: Negative. Respiratory: Negative. Cardiovascular: Negative. Gastrointestinal: Negative. Endocrine: Negative. Genitourinary: Negative. Musculoskeletal: Positive for back pain (chronic). Skin: Negative. Neurological: Negative. Hematological: Negative. Psychiatric/Behavioral: Negative. Objective:     Physical Exam   Constitutional: He is oriented to person, place, and time. He appears well-developed and well-nourished. No distress. HENT:   Head: Normocephalic and atraumatic. Nose: Nose normal.   Eyes: Conjunctivae are normal. No scleral icterus. Neck: Normal range of motion. Neck supple. Cardiovascular: Normal rate, regular rhythm, normal heart sounds and intact distal pulses. Pulmonary/Chest: Effort normal and breath sounds normal. No respiratory distress. He has no wheezes. He has no rales. He exhibits no tenderness. Abdominal: Soft. He exhibits no distension. There is no tenderness. There is no rebound and no guarding. Musculoskeletal:        Thoracic back: He exhibits decreased range of motion. Lumbar back: He exhibits decreased range of motion. Neurological: He is alert and oriented to person, place, and time. He has normal reflexes. He exhibits abnormal muscle tone. Gait abnormal. Coordination normal.   Decreased strength on right. Right foot drop.   Brace on right leg   Skin: Skin is warm and dry. No rash noted. Psychiatric: He has a normal mood and affect. His behavior is normal. Judgment and thought content normal.   Nursing note and vitals reviewed. /78   Pulse 95   Temp 96.8 °F (36 °C) (Temporal)   Resp 18   Ht 5' 11\" (1.803 m)   Wt 208 lb (94.3 kg)   SpO2 95%   BMI 29.01 kg/m²     Assessment:      Diagnosis Orders   1. Chronic pain syndrome     2. Drug therapy  POCT Rapid Drug Screen   3. Needs flu shot     4. MD (muscular dystrophy) ([facility] [address]  Elastic Bandages & Supports (LUMBAR BACK BRACE/SUPPORT PAD) MISC   5. History of hepatitis C     6. Major depressive disorder with single episode, in full remission ([facility] [address]     7. Chronic bilateral low back pain without sciatica  Elastic Bandages & Supports (LUMBAR BACK BRACE/SUPPORT PAD) MISC   8. Cervical radiculopathy         Results for orders placed or performed in visit on 18   POCT Rapid Drug Screen   Result Value Ref Range    Amphetamine Screen, Urine neg     Barbiturate Screen, Urine neg     Benzodiazepine Screen, Urine neg     Buprenorphine Urine neg     Cocaine Metabolite Screen, Urine neg     Gabapentin Screen, Urine neg     Methamphetamine, Urine neg     Methadone Screen, Urine neg     Opiate Scrn, Ur positive     Oxycodone Screen, Ur neg     PCP Screen, Urine neg     Propoxyphene Screen, Urine neg     THC Screen, Urine neg     Tricyclic Antidepressants, Urine positve        Plan:     I am providing back brace to see if this does help his back pain. UDS was appropriate. Will continue current regimen of Norco.   Depression is well controlled, will continue current regimen. Return in about 3 months (around 2018) for Refill with UDS.     Orders Placed This Encounter   Procedures    POCT Rapid Drug Screen       Orders Placed This Encounter   Medications    Elastic Bandages & Supports (LUMBAR BACK BRACE/SUPPORT PAD) MISC     Si each by Does not apply route daily     Dispense:  1 each Refill:  0        Patient given educational materials - see patient instructions. Discussed use, benefit, and side effects of prescribed medications. All patient questions answered. Pt voiced understanding. Reviewed health maintenance. Instructed to continue current medications, diet and exercise. Patient agreed with treatment plan. Follow up as directed.            Electronically signed by ANTHONY Naqvi on 9/20/2018 at 1:24 PM

## 2018-10-11 DIAGNOSIS — G89.4 CHRONIC PAIN SYNDROME: ICD-10-CM

## 2018-10-11 DIAGNOSIS — M54.42 CHRONIC LEFT-SIDED LOW BACK PAIN WITH LEFT-SIDED SCIATICA: ICD-10-CM

## 2018-10-11 DIAGNOSIS — M51.37 DDD (DEGENERATIVE DISC DISEASE), LUMBOSACRAL: ICD-10-CM

## 2018-10-11 DIAGNOSIS — G89.29 CHRONIC LEFT-SIDED LOW BACK PAIN WITH LEFT-SIDED SCIATICA: ICD-10-CM

## 2018-10-11 DIAGNOSIS — G71.00 MD (MUSCULAR DYSTROPHY) (HCC): ICD-10-CM

## 2018-10-12 RX ORDER — HYDROCODONE BITARTRATE AND ACETAMINOPHEN 10; 325 MG/1; MG/1
1 TABLET ORAL EVERY 8 HOURS PRN
Qty: 90 TABLET | Refills: 0 | Status: SHIPPED | OUTPATIENT
Start: 2018-10-12 | End: 2018-11-14 | Stop reason: SDUPTHER

## 2018-10-18 DIAGNOSIS — G62.9 NEUROPATHY: Primary | ICD-10-CM

## 2018-10-19 RX ORDER — AMITRIPTYLINE HYDROCHLORIDE 50 MG/1
TABLET, FILM COATED ORAL
Qty: 30 TABLET | Refills: 11 | Status: SHIPPED | OUTPATIENT
Start: 2018-10-19

## 2018-10-19 RX ORDER — GABAPENTIN 600 MG/1
TABLET ORAL
Qty: 90 TABLET | Refills: 5 | Status: SHIPPED | OUTPATIENT
Start: 2018-10-19 | End: 2019-04-24 | Stop reason: SDUPTHER

## 2018-11-14 DIAGNOSIS — G71.00 MD (MUSCULAR DYSTROPHY) (HCC): ICD-10-CM

## 2018-11-14 DIAGNOSIS — M51.37 DDD (DEGENERATIVE DISC DISEASE), LUMBOSACRAL: ICD-10-CM

## 2018-11-14 DIAGNOSIS — G89.4 CHRONIC PAIN SYNDROME: ICD-10-CM

## 2018-11-14 DIAGNOSIS — M54.42 CHRONIC LEFT-SIDED LOW BACK PAIN WITH LEFT-SIDED SCIATICA: ICD-10-CM

## 2018-11-14 DIAGNOSIS — G89.29 CHRONIC LEFT-SIDED LOW BACK PAIN WITH LEFT-SIDED SCIATICA: ICD-10-CM

## 2018-11-15 RX ORDER — HYDROCODONE BITARTRATE AND ACETAMINOPHEN 10; 325 MG/1; MG/1
1 TABLET ORAL EVERY 8 HOURS PRN
Qty: 90 TABLET | Refills: 0 | Status: SHIPPED | OUTPATIENT
Start: 2018-11-15 | End: 2018-12-10 | Stop reason: SDUPTHER

## 2018-12-10 DIAGNOSIS — G89.29 CHRONIC LEFT-SIDED LOW BACK PAIN WITH LEFT-SIDED SCIATICA: ICD-10-CM

## 2018-12-10 DIAGNOSIS — G89.4 CHRONIC PAIN SYNDROME: ICD-10-CM

## 2018-12-10 DIAGNOSIS — M54.42 CHRONIC LEFT-SIDED LOW BACK PAIN WITH LEFT-SIDED SCIATICA: ICD-10-CM

## 2018-12-10 DIAGNOSIS — M51.37 DDD (DEGENERATIVE DISC DISEASE), LUMBOSACRAL: ICD-10-CM

## 2018-12-10 DIAGNOSIS — G71.00 MD (MUSCULAR DYSTROPHY) (HCC): ICD-10-CM

## 2018-12-14 RX ORDER — HYDROCODONE BITARTRATE AND ACETAMINOPHEN 10; 325 MG/1; MG/1
1 TABLET ORAL EVERY 8 HOURS PRN
Qty: 90 TABLET | Refills: 0 | Status: SHIPPED | OUTPATIENT
Start: 2018-12-14 | End: 2019-01-13

## 2018-12-17 DIAGNOSIS — M54.42 LEFT-SIDED LOW BACK PAIN WITH LEFT-SIDED SCIATICA, UNSPECIFIED CHRONICITY: ICD-10-CM

## 2018-12-17 RX ORDER — TIZANIDINE 4 MG/1
TABLET ORAL
Qty: 90 TABLET | Refills: 0 | Status: SHIPPED | OUTPATIENT
Start: 2018-12-17 | End: 2019-01-20 | Stop reason: SDUPTHER

## 2018-12-20 ENCOUNTER — OFFICE VISIT (OUTPATIENT)
Dept: PRIMARY CARE CLINIC | Age: 53
End: 2018-12-20
Payer: MEDICARE

## 2018-12-20 VITALS
RESPIRATION RATE: 14 BRPM | SYSTOLIC BLOOD PRESSURE: 132 MMHG | OXYGEN SATURATION: 93 % | HEIGHT: 71 IN | WEIGHT: 215.4 LBS | TEMPERATURE: 97.1 F | BODY MASS INDEX: 30.15 KG/M2 | DIASTOLIC BLOOD PRESSURE: 64 MMHG | HEART RATE: 83 BPM

## 2018-12-20 DIAGNOSIS — F32.A ANXIETY AND DEPRESSION: ICD-10-CM

## 2018-12-20 DIAGNOSIS — Z79.899 DRUG THERAPY: ICD-10-CM

## 2018-12-20 DIAGNOSIS — G89.4 CHRONIC PAIN SYNDROME: Primary | ICD-10-CM

## 2018-12-20 DIAGNOSIS — F41.9 ANXIETY AND DEPRESSION: ICD-10-CM

## 2018-12-20 LAB
AMPHETAMINE SCREEN, URINE: NORMAL
BARBITURATE SCREEN, URINE: NORMAL
BENZODIAZEPINE SCREEN, URINE: NORMAL
BUPRENORPHINE URINE: NORMAL
COCAINE METABOLITE SCREEN URINE: NORMAL
GABAPENTIN SCREEN, URINE: NORMAL
MDMA URINE: NORMAL
METHADONE SCREEN, URINE: NORMAL
METHAMPHETAMINE, URINE: NORMAL
OPIATE SCREEN URINE: NORMAL
OXYCODONE SCREEN URINE: NORMAL
PHENCYCLIDINE SCREEN URINE: NORMAL
PROPOXYPHENE SCREEN, URINE: NORMAL
THC SCREEN, URINE: NORMAL
TRICYCLIC ANTIDEPRESSANTS, UR: NORMAL

## 2018-12-20 PROCEDURE — G8427 DOCREV CUR MEDS BY ELIG CLIN: HCPCS | Performed by: NURSE PRACTITIONER

## 2018-12-20 PROCEDURE — 80305 DRUG TEST PRSMV DIR OPT OBS: CPT | Performed by: NURSE PRACTITIONER

## 2018-12-20 PROCEDURE — G8417 CALC BMI ABV UP PARAM F/U: HCPCS | Performed by: NURSE PRACTITIONER

## 2018-12-20 PROCEDURE — 99213 OFFICE O/P EST LOW 20 MIN: CPT | Performed by: NURSE PRACTITIONER

## 2018-12-20 PROCEDURE — G8484 FLU IMMUNIZE NO ADMIN: HCPCS | Performed by: NURSE PRACTITIONER

## 2018-12-20 PROCEDURE — 3017F COLORECTAL CA SCREEN DOC REV: CPT | Performed by: NURSE PRACTITIONER

## 2018-12-20 PROCEDURE — 1036F TOBACCO NON-USER: CPT | Performed by: NURSE PRACTITIONER

## 2018-12-20 ASSESSMENT — ENCOUNTER SYMPTOMS: BACK PAIN: 1

## 2018-12-20 NOTE — PROGRESS NOTES
[facility]  [facility]  [address] [place], [place] [address] [address]  Dept: [phone]  Dept Fax: [phone]  Loc: [phone]    Debria Dakins is a 46 y.o. male who presents today for his medical conditions/complaints as noted below. Debria Dakins is c/o of Back Pain (3 month FU)      Chief Complaint   Patient presents with    Back Pain     3 month FU       HPI:     HPI  Patient here for follow up on chronic pain. Patient was last seen 9/20 for chronic back pain. Patient states that the pain has been tolerable, but he has not been out of the house much. He has not been using the back brace that was ordered last visit. He actually has not picked it up. Past Medical History:   Diagnosis Date    Anxiety     Chronic back pain     Depression     MD (muscular dystrophy)     Scoliosis         Past Surgical History:   Procedure Laterality Date    COLONOSCOPY  1/8/16    Dr Adolph Longoria, normal, 10 yr recall    FOOT SURGERY Left        Social History   Substance Use Topics    Smoking status: Never Smoker    Smokeless tobacco: Never Used    Alcohol use No      Comment:  Last drink was 08/07/2015        Current Outpatient Prescriptions   Medication Sig Dispense Refill    tiZANidine (ZANAFLEX) 4 MG tablet TAKE ONE TABLET BY MOUTH THREE TIMES DAILY 90 tablet 0    HYDROcodone-acetaminophen (NORCO)  MG per tablet Take 1 tablet by mouth every 8 hours as needed for Pain for up to 30 days. . 90 tablet 0    gabapentin (NEURONTIN) 600 MG tablet TAKE 1 TABLET BY MOUTH THREE TIMES DAILY 90 tablet 5    amitriptyline (ELAVIL) 50 MG tablet TAKE ONE TABLET BY MOUTH IN THE EVENING 30 tablet 11    Elastic Bandages & Supports (LUMBAR BACK BRACE/SUPPORT PAD) MISC 1 each by Does not apply route daily 1 each 0    meloxicam (MOBIC) 15 MG tablet TAKE ONE TABLET BY MOUTH ONCE DAILY 30 tablet 11     No current facility-administered medications for this visit.         Allergies Allergen Reactions    Shellfish-Derived Products Anaphylaxis       Family History   Problem Relation Age of Onset    Diabetes Mother     Colon Cancer Neg Hx     Colon Polyps Neg Hx     Esophageal Cancer Neg Hx     Liver Cancer Neg Hx     Liver Disease Neg Hx     Rectal Cancer Neg Hx     Stomach Cancer Neg Hx                Subjective:      Review of Systems   Constitutional: Negative. HENT: Negative. Eyes: Negative. Respiratory: Negative. Cardiovascular: Negative. Gastrointestinal: Negative. Endocrine: Negative. Genitourinary: Negative. Musculoskeletal: Positive for back pain (chronic). Skin: Negative. Neurological: Negative. Hematological: Negative. Psychiatric/Behavioral: Negative. Objective:     Physical Exam   Constitutional: He is oriented to person, place, and time. He appears well-developed and well-nourished. No distress. HENT:   Head: Normocephalic and atraumatic. Nose: Nose normal.   Eyes: Conjunctivae are normal. No scleral icterus. Neck: Normal range of motion. Neck supple. Cardiovascular: Normal rate, regular rhythm, normal heart sounds and intact distal pulses. Pulmonary/Chest: Effort normal and breath sounds normal. No respiratory distress. He has no wheezes. He has no rales. He exhibits no tenderness. Abdominal: Soft. He exhibits no distension. There is no tenderness. There is no rebound and no guarding. Musculoskeletal:        Thoracic back: He exhibits decreased range of motion. Lumbar back: He exhibits decreased range of motion. Neurological: He is alert and oriented to person, place, and time. He has normal reflexes. He exhibits abnormal muscle tone. Gait abnormal. Coordination normal.   Decreased strength on right. Right foot drop. Brace on right leg   Skin: Skin is warm and dry. No rash noted. Psychiatric: He has a normal mood and affect.  His behavior is normal. Judgment and thought content normal.   Nursing note

## 2018-12-21 ASSESSMENT — ENCOUNTER SYMPTOMS
GASTROINTESTINAL NEGATIVE: 1
EYES NEGATIVE: 1
RESPIRATORY NEGATIVE: 1

## 2019-01-14 ENCOUNTER — TELEPHONE (OUTPATIENT)
Dept: PRIMARY CARE CLINIC | Age: 54
End: 2019-01-14

## 2019-01-14 DIAGNOSIS — G89.4 CHRONIC PAIN SYNDROME: Primary | ICD-10-CM

## 2019-01-14 DIAGNOSIS — G71.00 MUSCULAR DYSTROPHY (HCC): ICD-10-CM

## 2019-01-17 RX ORDER — HYDROCODONE BITARTRATE AND ACETAMINOPHEN 10; 325 MG/1; MG/1
1 TABLET ORAL EVERY 8 HOURS PRN
Qty: 90 TABLET | Refills: 0 | Status: SHIPPED | OUTPATIENT
Start: 2019-01-17 | End: 2019-02-11 | Stop reason: SDUPTHER

## 2019-01-20 DIAGNOSIS — M54.42 LEFT-SIDED LOW BACK PAIN WITH LEFT-SIDED SCIATICA, UNSPECIFIED CHRONICITY: ICD-10-CM

## 2019-01-21 RX ORDER — TIZANIDINE 4 MG/1
TABLET ORAL
Qty: 90 TABLET | Refills: 0 | Status: SHIPPED | OUTPATIENT
Start: 2019-01-21 | End: 2019-02-21 | Stop reason: SDUPTHER

## 2019-02-11 DIAGNOSIS — G71.00 MUSCULAR DYSTROPHY (HCC): ICD-10-CM

## 2019-02-11 DIAGNOSIS — G89.4 CHRONIC PAIN SYNDROME: ICD-10-CM

## 2019-02-13 RX ORDER — HYDROCODONE BITARTRATE AND ACETAMINOPHEN 10; 325 MG/1; MG/1
1 TABLET ORAL EVERY 8 HOURS PRN
Qty: 90 TABLET | Refills: 0 | Status: SHIPPED | OUTPATIENT
Start: 2019-02-13 | End: 2019-03-14 | Stop reason: SDUPTHER

## 2019-02-21 DIAGNOSIS — M54.42 LEFT-SIDED LOW BACK PAIN WITH LEFT-SIDED SCIATICA, UNSPECIFIED CHRONICITY: ICD-10-CM

## 2019-02-21 RX ORDER — TIZANIDINE 4 MG/1
TABLET ORAL
Qty: 90 TABLET | Refills: 0 | Status: SHIPPED | OUTPATIENT
Start: 2019-02-21 | End: 2019-03-26 | Stop reason: SDUPTHER

## 2019-03-14 DIAGNOSIS — G71.00 MUSCULAR DYSTROPHY (HCC): ICD-10-CM

## 2019-03-14 DIAGNOSIS — G89.4 CHRONIC PAIN SYNDROME: ICD-10-CM

## 2019-03-14 RX ORDER — HYDROCODONE BITARTRATE AND ACETAMINOPHEN 10; 325 MG/1; MG/1
1 TABLET ORAL EVERY 8 HOURS PRN
Qty: 90 TABLET | Refills: 0 | Status: SHIPPED | OUTPATIENT
Start: 2019-03-14 | End: 2019-04-15 | Stop reason: SDUPTHER

## 2019-03-26 DIAGNOSIS — M54.42 LEFT-SIDED LOW BACK PAIN WITH LEFT-SIDED SCIATICA, UNSPECIFIED CHRONICITY: ICD-10-CM

## 2019-03-26 RX ORDER — TIZANIDINE 4 MG/1
TABLET ORAL
Qty: 90 TABLET | Refills: 0 | Status: SHIPPED | OUTPATIENT
Start: 2019-03-26 | End: 2019-04-24 | Stop reason: SDUPTHER

## 2019-04-02 ENCOUNTER — OFFICE VISIT (OUTPATIENT)
Dept: PRIMARY CARE CLINIC | Age: 54
End: 2019-04-02
Payer: MEDICARE

## 2019-04-02 VITALS
OXYGEN SATURATION: 96 % | HEART RATE: 64 BPM | TEMPERATURE: 97.6 F | BODY MASS INDEX: 28.98 KG/M2 | SYSTOLIC BLOOD PRESSURE: 130 MMHG | RESPIRATION RATE: 18 BRPM | WEIGHT: 207 LBS | DIASTOLIC BLOOD PRESSURE: 90 MMHG | HEIGHT: 71 IN

## 2019-04-02 DIAGNOSIS — Z71.89 ADVANCE CARE PLANNING: ICD-10-CM

## 2019-04-02 DIAGNOSIS — Z79.899 ENCOUNTER FOR DRUG THERAPY: ICD-10-CM

## 2019-04-02 DIAGNOSIS — Z00.00 ROUTINE GENERAL MEDICAL EXAMINATION AT A HEALTH CARE FACILITY: Primary | ICD-10-CM

## 2019-04-02 PROCEDURE — 99497 ADVNCD CARE PLAN 30 MIN: CPT | Performed by: NURSE PRACTITIONER

## 2019-04-02 PROCEDURE — G0438 PPPS, INITIAL VISIT: HCPCS | Performed by: NURSE PRACTITIONER

## 2019-04-02 PROCEDURE — 80305 DRUG TEST PRSMV DIR OPT OBS: CPT | Performed by: NURSE PRACTITIONER

## 2019-04-02 ASSESSMENT — ANXIETY QUESTIONNAIRES: GAD7 TOTAL SCORE: 0

## 2019-04-02 ASSESSMENT — LIFESTYLE VARIABLES: HOW OFTEN DO YOU HAVE A DRINK CONTAINING ALCOHOL: 0

## 2019-04-02 ASSESSMENT — PATIENT HEALTH QUESTIONNAIRE - PHQ9
SUM OF ALL RESPONSES TO PHQ QUESTIONS 1-9: 0
SUM OF ALL RESPONSES TO PHQ QUESTIONS 1-9: 0

## 2019-04-02 NOTE — PROGRESS NOTES
CareTeam (Including outside providers/suppliers regularly involved in providing care):   Patient Care Team:  Kathy Rodgers MD as PCP - General (Family Medicine)  Kathy Rodgers MD as PCP - S Attributed Provider  ANTHONY De Anda as Advanced Practice Nurse (Family Nurse Practitioner)    Wt Readings from Last 3 Encounters:   04/02/19 207 lb (93.9 kg)   12/20/18 215 lb 6.4 oz (97.7 kg)   09/20/18 208 lb (94.3 kg)     Vitals:    04/02/19 1301 04/02/19 1313   BP: (!) 146/80 (!) 130/90   Pulse: 64    Resp: 18    Temp: 97.6 °F (36.4 °C)    TempSrc: Temporal    SpO2: 96%    Weight: 207 lb (93.9 kg)    Height: 5' 11\" (1.803 m)      Body mass index is 28.87 kg/m². Based upon direct observation of the patient, evaluation of cognition reveals {MEMORY:71756}. {OPTIONAL FOR THIS VISIT TYPE - GENERAL PHYSICAL EXAM (THIS WILL AUTO-DELETE IF NOT HPEA):098827549}    Patient's complete Health Risk Assessment and screening values have been reviewed and are found in Flowsheets. The following problems were reviewed today and where indicated follow up appointments were made and/or referrals ordered. Positive Risk Factor Screenings with Interventions:     Health Habits/Nutrition:  Health Habits/Nutrition  Do you exercise for at least 20 minutes 2-3 times per week?: (!) No  Have you lost any weight without trying in the past 3 months?: No  Do you eat fewer than 2 meals per day?: No  Have you seen a dentist within the past year?: (!) No  Body mass index is 28.87 kg/m².   Health Habits/Nutrition Interventions:  · {Medicare AWV Health Habits/Nutrition Interventions:657523326}    Hearing/Vision:  Hearing/Vision  Do you or your family notice any trouble with your hearing?: No  Do you have difficulty driving, watching TV, or doing any of your daily activities because of your eyesight?: No  Have you had an eye exam within the past year?: (!) No  Hearing/Vision Interventions:  · {Medicare AWV Hearing/Vision Interventions:193548027}    Safety:  Safety  Do you have working smoke detectors?: Yes  Have all throw rugs been removed or fastened?: Yes  Do you have non-slip mats in all bathtubs?: (!) No  Do all of your stairways have a railing or banister?: Yes  Are your doorways, halls and stairs free of clutter?: Yes  Do you always fasten your seatbelt when you are in a car?: Yes  Safety Interventions:  · {Medicare AWV Safety Interventions:594396516}    Personalized Preventive Plan   Current Health Maintenance Status  Immunization History   Administered Date(s) Administered    Hepatitis A 12/09/2015, 06/01/2016    Hepatitis B (Engerix-B) 12/09/2015, 01/09/2016, 06/01/2016    Influenza Vaccine, unspecified formulation 11/28/2017    Pneumococcal Polysaccharide ([id number]) 12/12/2017    Tdap (Boostrix, Adacel) 03/13/2012        Health Maintenance   Topic Date Due    Hepatitis B Vaccine (1 of 3 - Risk 3-dose series) 01/09/1983    Shingles Vaccine (1 of 2) 01/09/2014    Hepatitis A vaccine (3 of 3 - Hep A Twinrix risk 3-dose series) 11/01/2016    Flu vaccine (Season Ended) 09/01/2019    DTaP/Tdap/Td vaccine (2 - Td) 03/13/2022    Lipid screen  08/15/2022    Colon cancer screen colonoscopy  01/08/2026    Pneumococcal 0-64 years at Risk Vaccine  Completed    HIV screen  Addressed     Recommendations for Preventive Services Due: see orders and patient instructions/AVS.  . Recommended screening schedule for the next 5-10 years is provided to the patient in written form: see Patient Instructions/AVS.      Advanced Care Planning: Discussed the patients choices for care and treatment in case of a health event that adversely affects decision-making abilities. Also discussed the patients long-term treatment options. Reviewed the process of designating a Health Care Surrogate as defined by the [facility].  Reviewed the [facility] Will Directive process and the kinds of life-sustaining treatments the patient would like to receive should they become terminally ill or permanently unconscious. Explained the state requirement to complete the forms in the presence of two eligible witnesses OR in the presence of a . Patient was asked to provide a copy of the signed forms to the practice office.   Time spent (minutes): ***

## 2019-04-02 NOTE — PROGRESS NOTES
Medicare Annual Wellness Visit  Name: Tom Mackenzie Date: 2019   MRN: [id number] Sex: Male   Age: 53 y.o. Ethnicity: Non-/Non    : 1965 Race: Millicent Lenz is here for Medicare AWV and Drug / Alcohol Assessment    Screenings for behavioral, psychosocial and functional/safety risks, and cognitive dysfunction are all negative except as indicated below. These results, as well as other patient data from the [address] form, are documented in Flowsheets linked to this Encounter. Allergies   Allergen Reactions    Shellfish-Derived Products Anaphylaxis       Prior to Visit Medications    Medication Sig Taking? Authorizing Provider   tiZANidine (ZANAFLEX) 4 MG tablet TAKE 1 TABLET BY MOUTH THREE TIMES DAILY Yes Cherry Taylor MD   HYDROcodone-acetaminophen (NORCO)  MG per tablet Take 1 tablet by mouth every 8 hours as needed for Pain for up to 30 days.  Intended supply: 30 days Yes ANTHONY Campoverde   gabapentin (NEURONTIN) 600 MG tablet TAKE 1 TABLET BY MOUTH THREE TIMES DAILY Yes Cherry Taylor MD   amitriptyline (ELAVIL) 50 MG tablet TAKE ONE TABLET BY MOUTH IN THE EVENING Yes Cherry Taylor MD   Elastic Bandages & Supports (LUMBAR BACK BRACE/SUPPORT PAD) MISC 1 each by Does not apply route daily Yes ANTHONY Campoverde   meloxicam (MOBIC) 15 MG tablet TAKE ONE TABLET BY MOUTH ONCE DAILY Yes Cherry Taylor MD       Past Medical History:   Diagnosis Date    Anxiety     Chronic back pain     Depression     MD (muscular dystrophy)     Scoliosis      Past Surgical History:   Procedure Laterality Date    COLONOSCOPY  16    Dr Thakur Spine, normal, 10 yr recall    FOOT SURGERY Left        Family History   Problem Relation Age of Onset    Diabetes Mother     Colon Cancer Neg Hx     Colon Polyps Neg Hx     Esophageal Cancer Neg Hx     Liver Cancer Neg Hx     Liver Disease Neg Hx     Rectal Cancer Neg Hx     Stomach Cancer Neg Hx CareTeam (Including outside providers/suppliers regularly involved in providing care):   Patient Care Team:  Ramiro Corrales MD as PCP - General (Family Medicine)  Ramiro Corrales MD as PCP - S Attributed Provider  ANTHONY Gerard as Advanced Practice Nurse (Family Nurse Practitioner)    Wt Readings from Last 3 Encounters:   04/02/19 207 lb (93.9 kg)   12/20/18 215 lb 6.4 oz (97.7 kg)   09/20/18 208 lb (94.3 kg)     Vitals:    04/02/19 1301 04/02/19 1313   BP: (!) 146/80 (!) 130/90   Pulse: 64    Resp: 18    Temp: 97.6 °F (36.4 °C)    TempSrc: Temporal    SpO2: 96%    Weight: 207 lb (93.9 kg)    Height: 5' 11\" (1.803 m)      Body mass index is 28.87 kg/m². Based upon direct observation of the patient, evaluation of cognition reveals recent and remote memory intact. Patient's complete Health Risk Assessment and screening values have been reviewed and are found in Flowsheets. The following problems were reviewed today and where indicated follow up appointments were made and/or referrals ordered. Positive Risk Factor Screenings with Interventions:     Health Habits/Nutrition:  Health Habits/Nutrition  Do you exercise for at least 20 minutes 2-3 times per week?: (!) No  Have you lost any weight without trying in the past 3 months?: No  Do you eat fewer than 2 meals per day?: No  Have you seen a dentist within the past year?: (!) No  Body mass index is 28.87 kg/m².   Health Habits/Nutrition Interventions:  · Inadequate physical activity:  educational materials provided to promote increased physical activity    Hearing/Vision:  Hearing/Vision  Do you or your family notice any trouble with your hearing?: No  Do you have difficulty driving, watching TV, or doing any of your daily activities because of your eyesight?: No  Have you had an eye exam within the past year?: (!) No  Hearing/Vision Interventions:  · Vision concerns:  patient encouraged to make appointment with his/her eye specialist, he has noticed some changes - he is to make appointment as discussed    Safety:  Safety  Do you have working smoke detectors?: Yes  Have all throw rugs been removed or fastened?: Yes  Do you have non-slip mats in all bathtubs?: (!) No  Do all of your stairways have a railing or banister?: Yes  Are your doorways, halls and stairs free of clutter?: Yes  Do you always fasten your seatbelt when you are in a car?: Yes  Safety Interventions:  · Home safety tips provided    Personalized Preventive Plan   Current Health Maintenance Status  Immunization History   Administered Date(s) Administered    Hepatitis A 12/09/2015, 06/01/2016    Hepatitis B (Engerix-B) 12/09/2015, 01/09/2016, 06/01/2016    Influenza Vaccine, unspecified formulation 11/28/2017    Pneumococcal Polysaccharide ([id number]) 12/12/2017    Tdap (Boostrix, Adacel) 03/13/2012        Health Maintenance   Topic Date Due    Hepatitis B Vaccine (1 of 3 - Risk 3-dose series) 01/09/1983    Shingles Vaccine (1 of 2) 01/09/2014    Hepatitis A vaccine (3 of 3 - Hep A Twinrix risk 3-dose series) 11/01/2016    Flu vaccine (Season Ended) 09/01/2019    DTaP/Tdap/Td vaccine (2 - Td) 03/13/2022    Lipid screen  08/15/2022    Colon cancer screen colonoscopy  01/08/2026    Pneumococcal 0-64 years at Risk Vaccine  Completed    HIV screen  Addressed     Recommendations for Preventive Services Due: see orders and patient instructions/AVS.  . Recommended screening schedule for the next 5-10 years is provided to the patient in written form: see Patient Instructions/AVS.      Advanced Care Planning: Discussed the patients choices for care and treatment in case of a health event that adversely affects decision-making abilities. Also discussed the patients long-term treatment options. Reviewed the process of designating a Health Care Surrogate as defined by the [facility].  Reviewed the [facility] Will Directive process and the kinds of life-sustaining treatments the patient would like to receive should they become terminally ill or permanently unconscious. Explained the state requirement to complete the forms in the presence of two eligible witnesses OR in the presence of a . Patient was asked to provide a copy of the signed forms to the practice office.   Time spent (minutes): 15

## 2019-04-12 DIAGNOSIS — G89.4 CHRONIC PAIN SYNDROME: ICD-10-CM

## 2019-04-12 DIAGNOSIS — G71.00 MUSCULAR DYSTROPHY (HCC): ICD-10-CM

## 2019-04-12 NOTE — TELEPHONE ENCOUNTER
Karina Him called requesting a refill of the below medication which has been pended for you:     Requested Prescriptions     Pending Prescriptions Disp Refills    HYDROcodone-acetaminophen (NORCO)  MG per tablet 90 tablet 0     Sig: Take 1 tablet by mouth every 8 hours as needed for Pain for up to 30 days.  Intended supply: 30 days       Last Appointment Date: 4/2/2019  Next Appointment Date: 7/2/2019    Allergies   Allergen Reactions    Shellfish-Derived Products Anaphylaxis         Amphetamine Screen, Urine 04/02/2019  2:11 PM Unknown   Neg    Comment:   MOP - Norco POS   Barbiturate Screen, Urine 04/02/2019  2:11 PM Unknown   Neg    Benzodiazepine Screen, Urine 04/02/2019  2:11 PM Unknown   Neg    Buprenorphine Urine 04/02/2019  2:11 PM Unknown   Neg    Cocaine Metabolite Screen, Urine 04/02/2019  2:11 PM Unknown   Neg    Gabapentin Screen, Urine 04/02/2019  2:11 PM Unknown   Neg    MDMA, Urine 04/02/2019  2:11 PM Unknown   Neg    Methamphetamine, Urine 04/02/2019  2:11 PM Unknown   Neg    Methadone Screen, Urine 04/02/2019  2:11 PM Unknown   Neg    Opiate Scrn, Ur 04/02/2019  2:11 PM Unknown   Neg    Oxycodone Screen, Ur 04/02/2019  2:11 PM Unknown   Neg    PCP Screen, Urine 04/02/2019  2:11 PM Unknown   Neg    Propoxyphene Screen, Urine 04/02/2019  2:11 PM Unknown   Neg    THC Screen, Urine 04/02/2019  2:11 PM Unknown   Neg    Tricyclic Antidepressants, Urine 04/02/2019  2:11 PM Unknown   Neg    Lab and Collection

## 2019-04-15 RX ORDER — HYDROCODONE BITARTRATE AND ACETAMINOPHEN 10; 325 MG/1; MG/1
1 TABLET ORAL EVERY 8 HOURS PRN
Qty: 90 TABLET | Refills: 0 | Status: SHIPPED | OUTPATIENT
Start: 2019-04-15 | End: 2019-05-13 | Stop reason: SDUPTHER

## 2019-05-13 DIAGNOSIS — G89.4 CHRONIC PAIN SYNDROME: ICD-10-CM

## 2019-05-13 DIAGNOSIS — G71.00 MUSCULAR DYSTROPHY (HCC): ICD-10-CM

## 2019-05-14 RX ORDER — HYDROCODONE BITARTRATE AND ACETAMINOPHEN 10; 325 MG/1; MG/1
1 TABLET ORAL EVERY 8 HOURS PRN
Qty: 90 TABLET | Refills: 0 | Status: SHIPPED | OUTPATIENT
Start: 2019-05-16 | End: 2019-06-14 | Stop reason: SDUPTHER

## 2019-06-13 DIAGNOSIS — G71.00 MUSCULAR DYSTROPHY (HCC): Primary | ICD-10-CM

## 2019-06-13 DIAGNOSIS — G89.4 CHRONIC PAIN SYNDROME: ICD-10-CM

## 2019-06-13 NOTE — TELEPHONE ENCOUNTER
Beverley Houston called to request a refill on his medication. Last office visit : 4/2/2019   Next office visit : 7/2/2019     Last UDS:   Amphetamines   Date Value Ref Range Status   06/12/2013 NEGATIVE  Final     Amphetamine Screen, Urine   Date Value Ref Range Status   04/02/2019 Neg  Final     Comment:     MOP - Norco POS     Barbiturates   Date Value Ref Range Status   06/12/2013 NEGATIVE  Final     Barbiturate Screen, Urine   Date Value Ref Range Status   04/02/2019 Neg  Final     Benzodiazepines   Date Value Ref Range Status   06/12/2013 NEGATIVE  Final     Benzodiazepine Screen, Urine   Date Value Ref Range Status   04/02/2019 Neg  Final     Buprenorphine Urine   Date Value Ref Range Status   04/02/2019 Neg  Final     Cocaine Metabolite Screen, Urine   Date Value Ref Range Status   04/02/2019 Neg  Final     Gabapentin Screen, Urine   Date Value Ref Range Status   04/02/2019 Neg  Final     MDMA, Urine   Date Value Ref Range Status   04/02/2019 Neg  Final     Methamphetamine, Urine   Date Value Ref Range Status   04/02/2019 Neg  Final     Opiate Scrn, Ur   Date Value Ref Range Status   04/02/2019 Neg  Final     Oxycodone Screen, Ur   Date Value Ref Range Status   04/02/2019 Neg  Final     PCP Screen, Urine   Date Value Ref Range Status   04/02/2019 Neg  Final     Propoxyphene Screen, Urine   Date Value Ref Range Status   04/02/2019 Neg  Final     THC Screen, Urine   Date Value Ref Range Status   04/02/2019 Neg  Final     Tricyclic Antidepressants, Urine   Date Value Ref Range Status   04/02/2019 Neg  Final       Last Riley:2/11/2019               Medication Contract: not on file   Last Fill: 05/15/2019    Requested Prescriptions     Pending Prescriptions Disp Refills    HYDROcodone-acetaminophen (NORCO)  MG per tablet 90 tablet 0     Sig: Take 1 tablet by mouth every 8 hours as needed for Pain for up to 30 days. Intended supply: 30 days         Please approve or refuse this medication.    [facility]

## 2019-06-14 RX ORDER — HYDROCODONE BITARTRATE AND ACETAMINOPHEN 10; 325 MG/1; MG/1
1 TABLET ORAL EVERY 8 HOURS PRN
Qty: 90 TABLET | Refills: 0 | Status: SHIPPED | OUTPATIENT
Start: 2019-06-14 | End: 2019-07-14

## 2019-07-01 DIAGNOSIS — F32.A DEPRESSION, UNSPECIFIED DEPRESSION TYPE: ICD-10-CM

## 2019-07-02 RX ORDER — FLUOXETINE HYDROCHLORIDE 40 MG/1
CAPSULE ORAL
Qty: 30 CAPSULE | Refills: 11 | Status: SHIPPED | OUTPATIENT
Start: 2019-07-02 | End: 2019-11-13 | Stop reason: SDUPTHER

## 2019-07-09 ENCOUNTER — OFFICE VISIT (OUTPATIENT)
Dept: PRIMARY CARE CLINIC | Age: 54
End: 2019-07-09
Payer: MEDICARE

## 2019-07-09 VITALS
HEART RATE: 90 BPM | SYSTOLIC BLOOD PRESSURE: 128 MMHG | BODY MASS INDEX: 30.24 KG/M2 | DIASTOLIC BLOOD PRESSURE: 88 MMHG | OXYGEN SATURATION: 98 % | HEIGHT: 71 IN | TEMPERATURE: 98 F | WEIGHT: 216 LBS

## 2019-07-09 DIAGNOSIS — G62.9 NEUROPATHY: ICD-10-CM

## 2019-07-09 DIAGNOSIS — F32.5 MAJOR DEPRESSIVE DISORDER WITH SINGLE EPISODE, IN FULL REMISSION (HCC): ICD-10-CM

## 2019-07-09 DIAGNOSIS — M54.42 LEFT-SIDED LOW BACK PAIN WITH LEFT-SIDED SCIATICA, UNSPECIFIED CHRONICITY: ICD-10-CM

## 2019-07-09 DIAGNOSIS — Z79.899 DRUG THERAPY: ICD-10-CM

## 2019-07-09 DIAGNOSIS — G71.00 MUSCULAR DYSTROPHY (HCC): ICD-10-CM

## 2019-07-09 DIAGNOSIS — B18.2 CHRONIC HEPATITIS C WITHOUT HEPATIC COMA (HCC): ICD-10-CM

## 2019-07-09 DIAGNOSIS — K21.9 GERD WITHOUT ESOPHAGITIS: ICD-10-CM

## 2019-07-09 DIAGNOSIS — G89.4 CHRONIC PAIN SYNDROME: Primary | ICD-10-CM

## 2019-07-09 PROCEDURE — 99214 OFFICE O/P EST MOD 30 MIN: CPT | Performed by: NURSE PRACTITIONER

## 2019-07-09 PROCEDURE — 80305 DRUG TEST PRSMV DIR OPT OBS: CPT | Performed by: NURSE PRACTITIONER

## 2019-07-09 PROCEDURE — G8427 DOCREV CUR MEDS BY ELIG CLIN: HCPCS | Performed by: NURSE PRACTITIONER

## 2019-07-09 PROCEDURE — G8417 CALC BMI ABV UP PARAM F/U: HCPCS | Performed by: NURSE PRACTITIONER

## 2019-07-09 PROCEDURE — 3017F COLORECTAL CA SCREEN DOC REV: CPT | Performed by: NURSE PRACTITIONER

## 2019-07-09 PROCEDURE — 1036F TOBACCO NON-USER: CPT | Performed by: NURSE PRACTITIONER

## 2019-07-09 NOTE — PROGRESS NOTES
[facility] PRIMARY CARE  [address]  [address] [place] [address]  Dept: [phone]  Dept Fax: [phone]  Loc: [phone]    Nhi Desir is a 53 y.o. male who presents today for his medical conditions/complaints as noted below. hNi Desir is c/o of Chest Pain (happens most often at night when going to bed - bought acid reducer thinking it could be heart burn but does not seem to help ); 3 Month Follow-Up; and Cough (Pt. reports dry cough for last few months )      Chief Complaint   Patient presents with    Chest Pain     happens most often at night when going to bed - bought acid reducer thinking it could be heart burn but does not seem to help     3 Month Follow-Up    Cough     Pt. reports dry cough for last few months        HPI:     HPI   Patient here for follow up on chronic conditions including chronic pain, depression, and hx of muscular dystrophy. Patient was last seen in in April for routine follow up. He has been having increased chest discomfort that has been intermittent. He thinks that he chest pain can be related to heart burn. He does report that the symptoms do go up into the throat. Patient is also having a chronic cough that has been ongoing for the last few months. Patient has recently seen Neurosurgery and pain management in [place]. He did have lumbar injection while at [place]. He has been sleeping in the recliner and reports that it has helped some as well.       Past Medical History:   Diagnosis Date    Anxiety     Chronic back pain     Depression     MD (muscular dystrophy) ([facility] [address]     Scoliosis         Past Surgical History:   Procedure Laterality Date    COLONOSCOPY  1/8/16    Dr Grayson Barton, normal, 10 yr recall    FOOT SURGERY Left        Social History     Tobacco Use    Smoking status: Never Smoker    Smokeless tobacco: Never Used   Substance Use Topics    Alcohol use: No     Comment:  Last drink was 08/07/2015

## 2019-07-11 ASSESSMENT — ENCOUNTER SYMPTOMS
COUGH: 1
EYES NEGATIVE: 1
GASTROINTESTINAL NEGATIVE: 1

## 2019-07-15 DIAGNOSIS — G71.00 MD (MUSCULAR DYSTROPHY) (HCC): Primary | ICD-10-CM

## 2019-07-15 RX ORDER — HYDROCODONE BITARTRATE AND ACETAMINOPHEN 10; 325 MG/1; MG/1
TABLET ORAL
COMMUNITY
Start: 2019-06-16 | End: 2019-07-15 | Stop reason: SDUPTHER

## 2019-07-15 NOTE — TELEPHONE ENCOUNTER
Kolton Lane called to request a refill on his medication.       Last office visit : Visit date not found   Next office visit : Visit date not found     Last UDS:   Amphetamines   Date Value Ref Range Status   06/12/2013 NEGATIVE  Final     Amphetamine Screen, Urine   Date Value Ref Range Status   07/09/2019 neg  Final     Barbiturates   Date Value Ref Range Status   06/12/2013 NEGATIVE  Final     Barbiturate Screen, Urine   Date Value Ref Range Status   07/09/2019 neg  Final     Benzodiazepines   Date Value Ref Range Status   06/12/2013 NEGATIVE  Final     Benzodiazepine Screen, Urine   Date Value Ref Range Status   07/09/2019 neg  Final     Buprenorphine Urine   Date Value Ref Range Status   07/09/2019 neg  Final     Cocaine Metabolite Screen, Urine   Date Value Ref Range Status   07/09/2019 neg  Final     Gabapentin Screen, Urine   Date Value Ref Range Status   07/09/2019 neg  Final     MDMA, Urine   Date Value Ref Range Status   07/09/2019 neg  Final     Methamphetamine, Urine   Date Value Ref Range Status   07/09/2019 neg  Final     Opiate Scrn, Ur   Date Value Ref Range Status   07/09/2019 pos  Final     Comment:     appropriate     Oxycodone Screen, Ur   Date Value Ref Range Status   07/09/2019 neg  Final     PCP Screen, Urine   Date Value Ref Range Status   07/09/2019 neg  Final     Propoxyphene Screen, Urine   Date Value Ref Range Status   07/09/2019 neg  Final     THC Screen, Urine   Date Value Ref Range Status   07/09/2019 neg  Final     Tricyclic Antidepressants, Urine   Date Value Ref Range Status   07/09/2019 neg  Final       Last Ayan Alvarado: 02/11/2019  Medication Contract: 07/09/2019  Last Fill: 06/16/2019    Requested Prescriptions     Pending Prescriptions Disp Refills    HYDROcodone-acetaminophen (NORCO)  MG per tablet 90 tablet 0     Sig: TAKE 1 TABLET BY MOUTH EVERY 8 HOURS AS NEEDED FOR PAIN FOR UP TO 30 DAYS REDUCE DOSES TAKEN AS PAIN BECOMES MANAGEABLE         Please approve or refuse

## 2019-07-16 DIAGNOSIS — F32.A DEPRESSION, UNSPECIFIED DEPRESSION TYPE: ICD-10-CM

## 2019-07-16 RX ORDER — HYDROCODONE BITARTRATE AND ACETAMINOPHEN 10; 325 MG/1; MG/1
TABLET ORAL
Qty: 90 TABLET | Refills: 0 | Status: SHIPPED | OUTPATIENT
Start: 2019-07-16 | End: 2019-08-15 | Stop reason: SDUPTHER

## 2019-08-15 DIAGNOSIS — G71.00 MD (MUSCULAR DYSTROPHY) (HCC): ICD-10-CM

## 2019-08-15 RX ORDER — HYDROCODONE BITARTRATE AND ACETAMINOPHEN 10; 325 MG/1; MG/1
TABLET ORAL
Qty: 90 TABLET | Refills: 0 | Status: SHIPPED | OUTPATIENT
Start: 2019-08-15 | End: 2019-09-17 | Stop reason: SDUPTHER

## 2019-09-16 DIAGNOSIS — G71.00 MD (MUSCULAR DYSTROPHY) (HCC): ICD-10-CM

## 2019-09-17 RX ORDER — HYDROCODONE BITARTRATE AND ACETAMINOPHEN 10; 325 MG/1; MG/1
TABLET ORAL
Qty: 90 TABLET | Refills: 0 | Status: SHIPPED | OUTPATIENT
Start: 2019-09-17 | End: 2019-10-15 | Stop reason: SDUPTHER

## 2019-10-15 DIAGNOSIS — G71.00 MD (MUSCULAR DYSTROPHY) (HCC): ICD-10-CM

## 2019-10-15 RX ORDER — HYDROCODONE BITARTRATE AND ACETAMINOPHEN 10; 325 MG/1; MG/1
TABLET ORAL
Qty: 90 TABLET | Refills: 0 | Status: SHIPPED | OUTPATIENT
Start: 2019-10-15 | End: 2019-11-13 | Stop reason: SDUPTHER

## 2019-11-13 ENCOUNTER — OFFICE VISIT (OUTPATIENT)
Dept: PRIMARY CARE CLINIC | Age: 54
End: 2019-11-13
Payer: MEDICARE

## 2019-11-13 VITALS
OXYGEN SATURATION: 98 % | SYSTOLIC BLOOD PRESSURE: 136 MMHG | BODY MASS INDEX: 26.74 KG/M2 | HEIGHT: 71 IN | HEART RATE: 68 BPM | WEIGHT: 191 LBS | DIASTOLIC BLOOD PRESSURE: 84 MMHG | RESPIRATION RATE: 20 BRPM | TEMPERATURE: 98.7 F

## 2019-11-13 DIAGNOSIS — Z13.29 SCREENING FOR THYROID DISORDER: ICD-10-CM

## 2019-11-13 DIAGNOSIS — F32.A DEPRESSION, UNSPECIFIED DEPRESSION TYPE: ICD-10-CM

## 2019-11-13 DIAGNOSIS — Z86.19 HISTORY OF HEPATITIS C: ICD-10-CM

## 2019-11-13 DIAGNOSIS — Z79.899 MEDICATION MANAGEMENT: Primary | ICD-10-CM

## 2019-11-13 DIAGNOSIS — Z13.220 SCREENING FOR CHOLESTEROL LEVEL: ICD-10-CM

## 2019-11-13 DIAGNOSIS — G71.00 MD (MUSCULAR DYSTROPHY) (HCC): ICD-10-CM

## 2019-11-13 LAB
AMPHETAMINE SCREEN, URINE: NORMAL
BARBITURATE SCREEN, URINE: NORMAL
BENZODIAZEPINE SCREEN, URINE: NORMAL
BUPRENORPHINE URINE: NORMAL
COCAINE METABOLITE SCREEN URINE: NORMAL
GABAPENTIN SCREEN, URINE: NORMAL
MDMA URINE: NORMAL
METHADONE SCREEN, URINE: NORMAL
METHAMPHETAMINE, URINE: NORMAL
OPIATE SCREEN URINE: POSITIVE
OXYCODONE SCREEN URINE: NORMAL
PHENCYCLIDINE SCREEN URINE: NORMAL
PROPOXYPHENE SCREEN, URINE: NORMAL
THC SCREEN, URINE: NORMAL
TRICYCLIC ANTIDEPRESSANTS, UR: NORMAL

## 2019-11-13 PROCEDURE — 80305 DRUG TEST PRSMV DIR OPT OBS: CPT | Performed by: NURSE PRACTITIONER

## 2019-11-13 PROCEDURE — 99213 OFFICE O/P EST LOW 20 MIN: CPT | Performed by: NURSE PRACTITIONER

## 2019-11-13 RX ORDER — HYDROCODONE BITARTRATE AND ACETAMINOPHEN 10; 325 MG/1; MG/1
TABLET ORAL
Qty: 90 TABLET | Refills: 0 | Status: SHIPPED | OUTPATIENT
Start: 2019-11-13 | End: 2019-12-11 | Stop reason: SDUPTHER

## 2019-11-13 RX ORDER — FLUOXETINE HYDROCHLORIDE 40 MG/1
CAPSULE ORAL
Qty: 30 CAPSULE | Refills: 11 | Status: SHIPPED | OUTPATIENT
Start: 2019-11-13 | End: 2020-08-10 | Stop reason: SDUPTHER

## 2019-11-13 RX ORDER — HYDROCODONE BITARTRATE AND ACETAMINOPHEN 10; 325 MG/1; MG/1
TABLET ORAL
Qty: 90 TABLET | Refills: 0 | Status: SHIPPED | OUTPATIENT
Start: 2019-11-13 | End: 2019-11-13

## 2019-11-13 ASSESSMENT — ENCOUNTER SYMPTOMS
COUGH: 0
TROUBLE SWALLOWING: 0
NAUSEA: 0
GASTROINTESTINAL NEGATIVE: 1
SINUS PRESSURE: 0
ALLERGIC/IMMUNOLOGIC NEGATIVE: 1
SORE THROAT: 0
BLOOD IN STOOL: 0
EYES NEGATIVE: 1
WHEEZING: 0
SHORTNESS OF BREATH: 0
DIARRHEA: 0
EYE DISCHARGE: 0
VOMITING: 0
RESPIRATORY NEGATIVE: 1

## 2019-12-11 DIAGNOSIS — G71.00 MD (MUSCULAR DYSTROPHY) (HCC): ICD-10-CM

## 2019-12-11 RX ORDER — HYDROCODONE BITARTRATE AND ACETAMINOPHEN 10; 325 MG/1; MG/1
TABLET ORAL
Qty: 90 TABLET | Refills: 0 | Status: SHIPPED | OUTPATIENT
Start: 2019-12-11 | End: 2020-01-14 | Stop reason: SDUPTHER

## 2020-01-13 NOTE — TELEPHONE ENCOUNTER
Robbi Greenberg called to request a refill on his medication. Last office visit : 11/13/2019   Next office visit : 2/18/2020     Last UDS:   Amphetamines   Date Value Ref Range Status   06/12/2013 NEGATIVE  Final     Amphetamine Screen, Urine   Date Value Ref Range Status   11/13/2019 -  Final     Barbiturates   Date Value Ref Range Status   06/12/2013 NEGATIVE  Final     Barbiturate Screen, Urine   Date Value Ref Range Status   11/13/2019 -  Final     Benzodiazepines   Date Value Ref Range Status   06/12/2013 NEGATIVE  Final     Benzodiazepine Screen, Urine   Date Value Ref Range Status   11/13/2019 -  Final     Buprenorphine Urine   Date Value Ref Range Status   11/13/2019 -  Final     Cocaine Metabolite Screen, Urine   Date Value Ref Range Status   11/13/2019 -  Final     Gabapentin Screen, Urine   Date Value Ref Range Status   11/13/2019 na  Final     MDMA, Urine   Date Value Ref Range Status   11/13/2019 -  Final     Methamphetamine, Urine   Date Value Ref Range Status   11/13/2019 -  Final     Opiate Scrn, Ur   Date Value Ref Range Status   11/13/2019 positive  Final     Oxycodone Screen, Ur   Date Value Ref Range Status   11/13/2019 -  Final     PCP Screen, Urine   Date Value Ref Range Status   11/13/2019 --  Final     Propoxyphene Screen, Urine   Date Value Ref Range Status   11/13/2019 -  Final     THC Screen, Urine   Date Value Ref Range Status   11/13/2019 -  Final     Tricyclic Antidepressants, Urine   Date Value Ref Range Status   11/13/2019 -  Final       Last [place]: 12-11  Medication Contract: 7-9-2019   Last Fill: 12-13    Requested Prescriptions     Pending Prescriptions Disp Refills    HYDROcodone-acetaminophen (NORCO)  MG per tablet 90 tablet 0     Sig: TAKE 1 TABLET BY MOUTH EVERY 8 HOURS AS NEEDED FOR PAIN FOR UP TO 30 DAYS REDUCE DOSES TAKEN AS PAIN BECOMES MANAGEABLE                 Please approve or refuse this medication.    Rosa Gregory LPN

## 2020-01-14 RX ORDER — HYDROCODONE BITARTRATE AND ACETAMINOPHEN 10; 325 MG/1; MG/1
TABLET ORAL
Qty: 90 TABLET | Refills: 0 | Status: SHIPPED | OUTPATIENT
Start: 2020-01-14 | End: 2020-02-13 | Stop reason: SDUPTHER

## 2020-02-13 RX ORDER — HYDROCODONE BITARTRATE AND ACETAMINOPHEN 10; 325 MG/1; MG/1
TABLET ORAL
Qty: 90 TABLET | Refills: 0 | Status: SHIPPED | OUTPATIENT
Start: 2020-02-13 | End: 2020-03-12 | Stop reason: SDUPTHER

## 2020-02-13 NOTE — TELEPHONE ENCOUNTER
Bud Akins called to request a refill on his medication. Last office visit : 11/13/2019   Next office visit : 2/18/2020     Last UDS:   Amphetamines   Date Value Ref Range Status   06/12/2013 NEGATIVE  Final     Amphetamine Screen, Urine   Date Value Ref Range Status   11/13/2019 -  Final     Barbiturates   Date Value Ref Range Status   06/12/2013 NEGATIVE  Final     Barbiturate Screen, Urine   Date Value Ref Range Status   11/13/2019 -  Final     Benzodiazepines   Date Value Ref Range Status   06/12/2013 NEGATIVE  Final     Benzodiazepine Screen, Urine   Date Value Ref Range Status   11/13/2019 -  Final     Buprenorphine Urine   Date Value Ref Range Status   11/13/2019 -  Final     Cocaine Metabolite Screen, Urine   Date Value Ref Range Status   11/13/2019 -  Final     Gabapentin Screen, Urine   Date Value Ref Range Status   11/13/2019 na  Final     MDMA, Urine   Date Value Ref Range Status   11/13/2019 -  Final     Methamphetamine, Urine   Date Value Ref Range Status   11/13/2019 -  Final     Opiate Scrn, Ur   Date Value Ref Range Status   11/13/2019 positive  Final     Oxycodone Screen, Ur   Date Value Ref Range Status   11/13/2019 -  Final     PCP Screen, Urine   Date Value Ref Range Status   11/13/2019 --  Final     Propoxyphene Screen, Urine   Date Value Ref Range Status   11/13/2019 -  Final     THC Screen, Urine   Date Value Ref Range Status   11/13/2019 -  Final     Tricyclic Antidepressants, Urine   Date Value Ref Range Status   11/13/2019 -  Final       Last Kamila Hollingsworthfrances: 12/11/2019  Medication Contract: 07/09/2019   Last Fill: 01/14/2020     Requested Prescriptions     Pending Prescriptions Disp Refills    HYDROcodone-acetaminophen (NORCO)  MG per tablet 90 tablet 0     Sig: TAKE 1 TABLET BY MOUTH EVERY 8 HOURS AS NEEDED FOR PAIN FOR UP TO 30 DAYS REDUCE DOSES TAKEN AS PAIN BECOMES MANAGEABLE         Please approve or refuse this medication.    Olena Ramirez I do not know this patient,  I have not seen him

## 2020-02-18 ENCOUNTER — OFFICE VISIT (OUTPATIENT)
Dept: PRIMARY CARE CLINIC | Age: 55
End: 2020-02-18
Payer: MEDICARE

## 2020-02-18 VITALS
WEIGHT: 183 LBS | BODY MASS INDEX: 25.62 KG/M2 | SYSTOLIC BLOOD PRESSURE: 132 MMHG | OXYGEN SATURATION: 97 % | DIASTOLIC BLOOD PRESSURE: 84 MMHG | HEART RATE: 81 BPM | RESPIRATION RATE: 18 BRPM | HEIGHT: 71 IN | TEMPERATURE: 98.3 F

## 2020-02-18 PROCEDURE — G0439 PPPS, SUBSEQ VISIT: HCPCS | Performed by: NURSE PRACTITIONER

## 2020-02-18 PROCEDURE — 3017F COLORECTAL CA SCREEN DOC REV: CPT | Performed by: NURSE PRACTITIONER

## 2020-02-18 PROCEDURE — 80305 DRUG TEST PRSMV DIR OPT OBS: CPT | Performed by: NURSE PRACTITIONER

## 2020-02-18 PROCEDURE — G8484 FLU IMMUNIZE NO ADMIN: HCPCS | Performed by: NURSE PRACTITIONER

## 2020-02-18 PROCEDURE — 99497 ADVNCD CARE PLAN 30 MIN: CPT | Performed by: NURSE PRACTITIONER

## 2020-02-18 RX ORDER — KETOCONAZOLE 20 MG/ML
SHAMPOO TOPICAL
Qty: 120 ML | Refills: 1 | Status: SHIPPED | OUTPATIENT
Start: 2020-02-18 | End: 2021-09-23

## 2020-02-18 RX ORDER — GABAPENTIN 600 MG/1
TABLET ORAL
Qty: 90 TABLET | Refills: 0 | Status: SHIPPED | OUTPATIENT
Start: 2020-02-18 | End: 2020-04-16

## 2020-02-18 RX ORDER — TIZANIDINE 4 MG/1
TABLET ORAL
Qty: 90 TABLET | Refills: 3 | Status: SHIPPED | OUTPATIENT
Start: 2020-02-18 | End: 2020-08-14

## 2020-02-18 ASSESSMENT — PATIENT HEALTH QUESTIONNAIRE - PHQ9
SUM OF ALL RESPONSES TO PHQ QUESTIONS 1-9: 0
SUM OF ALL RESPONSES TO PHQ QUESTIONS 1-9: 0

## 2020-02-18 NOTE — PATIENT INSTRUCTIONS
We are committed to providing you with the best care possible. In order to help us achieve these goals please remember to bring all medications, herbal products, and over the counter supplements with you to each visit. If your provider has ordered testing for you, please be sure to follow up with our office if you have not received results within 7 days after the testing took place. *If you receive a survey after visiting one of our offices, please take time to share your experience concerning your physician office visit. These surveys are confidential and no health information about you is shared. We are eager to improve for you and we are counting on your feedback to help make that happen. Advance Directives: Care Instructions  Your Care Instructions  An advance directive is a legal way to state your wishes at the end of your life. It tells your family and your doctor what to do if you can no longer say what you want. There are two main types of advance directives. You can change them any time that your wishes change. · A living will tells your family and your doctor your wishes about life support and other treatment. · A durable power of  for health care lets you name a person to make treatment decisions for you when you can't speak for yourself. This person is called a health care agent. If you do not have an advance directive, decisions about your medical care may be made by a doctor or a  who doesn't know you. It may help to think of an advance directive as a gift to the people who care for you. If you have one, they won't have to make tough decisions by themselves. Follow-up care is a key part of your treatment and safety. Be sure to make and go to all appointments, and call your doctor if you are having problems. It's also a good idea to know your test results and keep a list of the medicines you take. How can you care for yourself at home?   · Discuss your wishes with your loved ones and your doctor. This way, there are no surprises. · Many states have a unique form. Or you might use a universal form that has been approved by many states. This kind of form can sometimes be completed and stored online. Your electronic copy will then be available wherever you have a connection to the Internet. In most cases, doctors will respect your wishes even if you have a form from a different state. · You don't need a  to do an advance directive. But you may want to get legal advice. · Think about these questions when you prepare an advance directive:  ? Who do you want to make decisions about your medical care if you are not able to? Many people choose a family member or close friend. ? Do you know enough about life support methods that might be used? If not, talk to your doctor so you understand. ? What are you most afraid of that might happen? You might be afraid of having pain, losing your independence, or being kept alive by machines. ? Where would you prefer to die? Choices include your home, a hospital, or a nursing home. ? Would you like to have information about hospice care to support you and your family? ? Do you want to donate organs when you die? ? Do you want certain Christianity practices performed before you die? If so, put your wishes in the advance directive. · Read your advance directive every year, and make changes as needed. When should you call for help? Be sure to contact your doctor if you have any questions. Where can you learn more? Go to [email or web address] and sign in to your Qwick[employer] account. Enter R264 in the [facility] box to learn more about \"Advance Directives: Care Instructions. \"     If you do not have an account, please click on the \"Sign Up Now\" link. Current as of: April 1, 2019  Content Version: 12.3  © 7373-4526 Healthwise, Incorporated. Care instructions adapted under license by [facility] ([facility]).  If you have questions about a medical condition or this instruction, always ask your healthcare professional. [address] any warranty or liability for your use of this information. Learning About Living Perroy  What is a living will? A living will is a legal form you use to write down the kind of care you want at the end of your life. It is used by the health professionals who will treat you if you aren't able to decide for yourself. If you put your wishes in writing, your loved ones and others will know what kind of care you want. They won't need to guess. This can ease your mind and be helpful to others. A living will is not the same as an estate or property will. An estate will explains what you want to happen with your money and property after you die. Is a living will a legal document? A living will is a legal document. Each state has its own laws about living stewart. If you move to another state, make sure that your living will is legal in the state where you now live. Or you might use a universal form that has been approved by many states. This kind of form can sometimes be completed and stored online. Your electronic copy will then be available wherever you have a connection to the Internet. In most cases, doctors will respect your wishes even if you have a form from a different state. · You don't need an  to complete a living will. But legal advice can be helpful if your state's laws are unclear, your health history is complicated, or your family can't agree on what should be in your living will. · You can change your living will at any time. Some people find that their wishes about end-of-life care change as their health changes. · In addition to making a living will, think about completing a medical power of  form. This form lets you name the person you want to make end-of-life treatment decisions for you (your \"health care agent\") if you're not able to.

## 2020-02-18 NOTE — PROGRESS NOTES
Medicare Annual Wellness Visit  Name: Adriana Wilburn Date: 2020   MRN: [id number] Sex: Male   Age: 63 y.o. Ethnicity: Non-/Non    : 1965 Race: Zak Parra is here for Medicare AWV (pt c/o neck pain when trying ROM manuvers states feels grinding and can hear it in his head. Pt has not been getting muscle relaxers or gabapentin for the last few months so this could be contributing to the pain. ) and Skin Lesion (Pt has been getting red marks on face started 2 yeras ago thought it was caused by shaving however, they are popping up and staying there whether he shaves or not, no changes to anything he washes with. )    Screenings for behavioral, psychosocial and functional/safety risks, and cognitive dysfunction are all negative except as indicated below. These results, as well as other patient data from the [address] form, are documented in Flowsheets linked to this Encounter. Allergies   Allergen Reactions    Shellfish-Derived Products Anaphylaxis         Prior to Visit Medications    Medication Sig Taking? Authorizing Provider   tiZANidine (ZANAFLEX) 4 MG tablet TAKE 1 TABLET BY MOUTH THREE TIMES DAILY Yes Rachel Foil, APRN   gabapentin (NEURONTIN) 600 MG tablet TAKE 1 TABLET BY MOUTH THREE TIMES DAILY Yes Rachel Foil APRN   ketoconazole (NIZORAL) 2 % shampoo Apply topically daily as needed.  Yes Rachel Foil, APRN   HYDROcodone-acetaminophen (NORCO)  MG per tablet TAKE 1 TABLET BY MOUTH EVERY 8 HOURS AS NEEDED FOR PAIN FOR UP TO 30 DAYS REDUCE DOSES TAKEN AS PAIN BECOMES MANAGEABLE Yes Rachel Foil, APRN   FLUoxetine (PROZAC) 40 MG capsule TAKE 1 CAPSULE BY MOUTH ONCE DAILY Yes ANTHONY Mayes - NP   meloxicam (MOBIC) 15 MG tablet TAKE 1 TABLET BY MOUTH ONCE DAILY Yes Paul Mario MD   amitriptyline (ELAVIL) 50 MG tablet TAKE ONE TABLET BY MOUTH IN THE EVENING Yes Paul Mario MD   Elastic Bandages & Supports (LUMBAR BACK activity    Hearing/Vision:  No exam data present  Hearing/Vision  Do you or your family notice any trouble with your hearing?: No  Do you have difficulty driving, watching TV, or doing any of your daily activities because of your eyesight?: (!) Yes(blurred vision )  Have you had an eye exam within the past year?: (!) No  Hearing/Vision Interventions:  · Vision concerns:  patient encouraged to make appointment with his/her eye specialist    Personalized Preventive Plan   Current Health Maintenance Status  Immunization History   Administered Date(s) Administered    Hepatitis A 12/09/2015, 06/01/2016    Hepatitis B (Engerix-B) 12/09/2015, 01/09/2016, 06/01/2016    Influenza Vaccine, unspecified formulation 11/28/2017    Pneumococcal Polysaccharide (Ejzfopedl75) 12/12/2017    Tdap (Boostrix, Adacel) 03/13/2012        Health Maintenance   Topic Date Due    Shingles Vaccine (1 of 2) 01/09/2014    Hepatitis A vaccine (3 of 3 - Hep A Twinrix risk 3-dose series) 11/01/2016    Flu vaccine (1) 09/01/2019    DTaP/Tdap/Td vaccine (2 - Td) 03/13/2022    Lipid screen  08/15/2022    Colon cancer screen colonoscopy  01/08/2026    Hepatitis B vaccine  Completed    Pneumococcal 0-64 years Vaccine  Completed    HIV screen  Addressed    Hib vaccine  Aged Out    Meningococcal (ACWY) vaccine  Aged Out     Recommendations for [place] Due: see orders and patient instructions/AVS.  . Recommended screening schedule for the next 5-10 years is provided to the patient in written form: see Patient Instructions/AVS.    Ching Rouse was seen today for medicare awv and skin lesion.     Diagnoses and all orders for this visit:    Routine general medical examination at a health care facility    Left-sided low back pain with left-sided sciatica, unspecified chronicity  -     tiZANidine (ZANAFLEX) 4 MG tablet; TAKE 1 TABLET BY MOUTH THREE TIMES DAILY    Neuropathy  -     gabapentin (NEURONTIN) 600 MG tablet; TAKE 1 TABLET BY MOUTH

## 2020-03-12 RX ORDER — HYDROCODONE BITARTRATE AND ACETAMINOPHEN 10; 325 MG/1; MG/1
TABLET ORAL
Qty: 90 TABLET | Refills: 0 | Status: SHIPPED | OUTPATIENT
Start: 2020-03-12 | End: 2020-04-10 | Stop reason: SDUPTHER

## 2020-03-12 NOTE — TELEPHONE ENCOUNTER
Adilson Alegre called to request a refill on his medication. Last office visit : 2/18/2020   Next office visit : 6/18/2020     Last UDS:   Amphetamines   Date Value Ref Range Status   06/12/2013 NEGATIVE  Final     Amphetamine Screen, Urine   Date Value Ref Range Status   02/18/2020 -  Final     Barbiturates   Date Value Ref Range Status   06/12/2013 NEGATIVE  Final     Barbiturate Screen, Urine   Date Value Ref Range Status   02/18/2020 -  Final     Benzodiazepines   Date Value Ref Range Status   06/12/2013 NEGATIVE  Final     Benzodiazepine Screen, Urine   Date Value Ref Range Status   02/18/2020 -  Final     Buprenorphine Urine   Date Value Ref Range Status   02/18/2020 -  Final     Cocaine Metabolite Screen, Urine   Date Value Ref Range Status   02/18/2020 -  Final     Gabapentin Screen, Urine   Date Value Ref Range Status   02/18/2020 -  Final     MDMA, Urine   Date Value Ref Range Status   02/18/2020 -  Final     Methamphetamine, Urine   Date Value Ref Range Status   02/18/2020 -  Final     Opiate Scrn, Ur   Date Value Ref Range Status   02/18/2020 positive  Final     Comment:     Norco      Oxycodone Screen, Ur   Date Value Ref Range Status   02/18/2020 -  Final     PCP Screen, Urine   Date Value Ref Range Status   02/18/2020 -  Final     Propoxyphene Screen, Urine   Date Value Ref Range Status   02/18/2020 -  Final     THC Screen, Urine   Date Value Ref Range Status   02/18/2020 -  Final     Tricyclic Antidepressants, Urine   Date Value Ref Range Status   02/18/2020 -  Final       Last Marlise Callander: 12-11-19  Medication Contract: 07-09-19  Last Fill: 02-13-20    Requested Prescriptions     Pending Prescriptions Disp Refills    HYDROcodone-acetaminophen (NORCO)  MG per tablet 90 tablet 0     Sig: TAKE 1 TABLET BY MOUTH EVERY 8 HOURS AS NEEDED FOR PAIN FOR UP TO 30 DAYS REDUCE DOSES TAKEN AS PAIN BECOMES MANAGEABLE         Please approve or refuse this medication.    Jeremiah José MA

## 2020-04-10 RX ORDER — HYDROCODONE BITARTRATE AND ACETAMINOPHEN 10; 325 MG/1; MG/1
TABLET ORAL
Qty: 90 TABLET | Refills: 0 | Status: SHIPPED | OUTPATIENT
Start: 2020-04-10 | End: 2020-05-12 | Stop reason: SDUPTHER

## 2020-04-16 NOTE — TELEPHONE ENCOUNTER
Christi Man called to request a refill on his medication. Last office visit : 2/18/2020   Next office visit : 6/18/2020     Last UDS:   Amphetamines   Date Value Ref Range Status   06/12/2013 NEGATIVE  Final     Amphetamine Screen, Urine   Date Value Ref Range Status   02/18/2020 -  Final     Barbiturates   Date Value Ref Range Status   06/12/2013 NEGATIVE  Final     Barbiturate Screen, Urine   Date Value Ref Range Status   02/18/2020 -  Final     Benzodiazepines   Date Value Ref Range Status   06/12/2013 NEGATIVE  Final     Benzodiazepine Screen, Urine   Date Value Ref Range Status   02/18/2020 -  Final     Buprenorphine Urine   Date Value Ref Range Status   02/18/2020 -  Final     Cocaine Metabolite Screen, Urine   Date Value Ref Range Status   02/18/2020 -  Final     Gabapentin Screen, Urine   Date Value Ref Range Status   02/18/2020 -  Final     MDMA, Urine   Date Value Ref Range Status   02/18/2020 -  Final     Methamphetamine, Urine   Date Value Ref Range Status   02/18/2020 -  Final     Opiate Scrn, Ur   Date Value Ref Range Status   02/18/2020 positive  Final     Comment:     Norco      Oxycodone Screen, Ur   Date Value Ref Range Status   02/18/2020 -  Final     PCP Screen, Urine   Date Value Ref Range Status   02/18/2020 -  Final     Propoxyphene Screen, Urine   Date Value Ref Range Status   02/18/2020 -  Final     THC Screen, Urine   Date Value Ref Range Status   02/18/2020 -  Final     Tricyclic Antidepressants, Urine   Date Value Ref Range Status   02/18/2020 -  Final       Last Render Viktoria: 12-11  Medication Contract: 7-2019   Last Fill: 2-18    Requested Prescriptions     Pending Prescriptions Disp Refills    gabapentin (NEURONTIN) 600 MG tablet [Pharmacy Med Name: Gabapentin 600 MG Oral Tablet] 90 tablet 0     Sig: TAKE 1 TABLET BY MOUTH THREE TIMES DAILY                 Please approve or refuse this medication.    Carl Rodriguez LPN

## 2020-04-17 RX ORDER — GABAPENTIN 600 MG/1
TABLET ORAL
Qty: 90 TABLET | Refills: 1 | OUTPATIENT
Start: 2020-04-17 | End: 2020-07-08 | Stop reason: SDUPTHER

## 2020-05-12 RX ORDER — HYDROCODONE BITARTRATE AND ACETAMINOPHEN 10; 325 MG/1; MG/1
TABLET ORAL
Qty: 90 TABLET | Refills: 0 | Status: SHIPPED | OUTPATIENT
Start: 2020-05-12 | End: 2020-06-10 | Stop reason: SDUPTHER

## 2020-06-03 ENCOUNTER — TELEPHONE (OUTPATIENT)
Dept: PRIMARY CARE CLINIC | Age: 55
End: 2020-06-03

## 2020-06-03 NOTE — TELEPHONE ENCOUNTER
Pt doesn't have internet  Wants to come in  Already has an appt  And wants to keep it.   Please call pt back

## 2020-06-05 RX ORDER — MELOXICAM 15 MG/1
TABLET ORAL
Qty: 30 TABLET | Refills: 11 | Status: SHIPPED | OUTPATIENT
Start: 2020-06-05 | End: 2021-06-15

## 2020-06-10 RX ORDER — HYDROCODONE BITARTRATE AND ACETAMINOPHEN 10; 325 MG/1; MG/1
TABLET ORAL
Qty: 90 TABLET | Refills: 0 | Status: SHIPPED | OUTPATIENT
Start: 2020-06-10 | End: 2020-07-08

## 2020-06-10 NOTE — TELEPHONE ENCOUNTER
Jacinta Quispe called to request a refill on his medication. Last office visit : 2/18/2020   Next office visit : 6/18/2020     Last UDS:   Amphetamines   Date Value Ref Range Status   06/12/2013 NEGATIVE  Final     Amphetamine Screen, Urine   Date Value Ref Range Status   02/18/2020 -  Final     Barbiturates   Date Value Ref Range Status   06/12/2013 NEGATIVE  Final     Barbiturate Screen, Urine   Date Value Ref Range Status   02/18/2020 -  Final     Benzodiazepines   Date Value Ref Range Status   06/12/2013 NEGATIVE  Final     Benzodiazepine Screen, Urine   Date Value Ref Range Status   02/18/2020 -  Final     Buprenorphine Urine   Date Value Ref Range Status   02/18/2020 -  Final     Cocaine Metabolite Screen, Urine   Date Value Ref Range Status   02/18/2020 -  Final     Gabapentin Screen, Urine   Date Value Ref Range Status   02/18/2020 -  Final     MDMA, Urine   Date Value Ref Range Status   02/18/2020 -  Final     Methamphetamine, Urine   Date Value Ref Range Status   02/18/2020 -  Final     Opiate Scrn, Ur   Date Value Ref Range Status   02/18/2020 positive  Final     Comment:     Norco      Oxycodone Screen, Ur   Date Value Ref Range Status   02/18/2020 -  Final     PCP Screen, Urine   Date Value Ref Range Status   02/18/2020 -  Final     Propoxyphene Screen, Urine   Date Value Ref Range Status   02/18/2020 -  Final     THC Screen, Urine   Date Value Ref Range Status   02/18/2020 -  Final     Tricyclic Antidepressants, Urine   Date Value Ref Range Status   02/18/2020 -  Final       Last Estefanitimi Carrel: 05-11-20  Medication Contract: 07-19-20   Last Fill: 05-12-20    Requested Prescriptions     Pending Prescriptions Disp Refills    HYDROcodone-acetaminophen (NORCO)  MG per tablet 90 tablet 0     Sig: TAKE 1 TABLET BY MOUTH EVERY 8 HOURS AS NEEDED FOR PAIN FOR UP TO 30 DAYS REDUCE DOSES TAKEN AS PAIN BECOMES MANAGEABLE         Please approve or refuse this medication.    Orlando Thorpe MA

## 2020-06-18 ENCOUNTER — VIRTUAL VISIT (OUTPATIENT)
Dept: PRIMARY CARE CLINIC | Age: 55
End: 2020-06-18
Payer: MEDICARE

## 2020-06-18 PROCEDURE — 99443 PR PHYS/QHP TELEPHONE EVALUATION 21-30 MIN: CPT | Performed by: NURSE PRACTITIONER

## 2020-06-18 RX ORDER — PREDNISONE 10 MG/1
10 TABLET ORAL DAILY
Qty: 7 TABLET | Refills: 0 | Status: SHIPPED | OUTPATIENT
Start: 2020-06-18 | End: 2020-07-20 | Stop reason: SDUPTHER

## 2020-06-18 ASSESSMENT — ENCOUNTER SYMPTOMS
EYES NEGATIVE: 1
GASTROINTESTINAL NEGATIVE: 1
RESPIRATORY NEGATIVE: 1
BACK PAIN: 1

## 2020-06-18 NOTE — PROGRESS NOTES
LEFT (3 VIEWS); Future    5. Counseling, unspecified      6. Back pain of lumbar region with sciatica    - diclofenac sodium (VOLTAREN) 1 % GEL; Apply 2 g topically 4 times daily  Dispense: 2 Tube; Refill: 1  - predniSONE (DELTASONE) 10 MG tablet; Take 1 tablet by mouth daily for 7 days  Dispense: 7 tablet; Refill: 0         I am checking xrays. Will treat based on results. Sending in prednisone for sciatic pain. Diclofenac gel as well for the pain    I affirm this is a Patient Initiated Episode with an Established Patient who has not had a related appointment within my department in the past 7 days or scheduled within the next 24 hours. Total Time: minutes: 21-30 minutes      Note: not billable if this call serves to triage the patient into an appointment for the relevant concern      Jay Storey to the emergency declaration under the [facility] waiver authority and the [employer] Act, this Virtual  Visit was conducted, with patient's consent, to reduce the patient's risk of exposure to COVID-19 and provide continuity of care for an established patient. Services were provided through a telephone discussion  to substitute for in-person clinic visit. Parties involved during telephone call include myself, ANTHONY Harrington and patient listed.

## 2020-06-19 ENCOUNTER — HOSPITAL ENCOUNTER (OUTPATIENT)
Dept: GENERAL RADIOLOGY | Age: 55
Discharge: HOME | End: 2020-06-19
Payer: MEDICARE

## 2020-06-19 DIAGNOSIS — R73.09 ELEVATED GLUCOSE: ICD-10-CM

## 2020-06-19 DIAGNOSIS — E78.2 MIXED HYPERLIPIDEMIA: ICD-10-CM

## 2020-06-19 DIAGNOSIS — Z13.29 SCREENING FOR THYROID DISORDER: ICD-10-CM

## 2020-06-19 DIAGNOSIS — G71.00 MUSCULAR DYSTROPHY (HCC): ICD-10-CM

## 2020-06-19 LAB
ALBUMIN SERPL-MCNC: 4.6 G/DL (ref 3.5–5.2)
ALP BLD-CCNC: 88 U/L (ref 40–130)
ALT SERPL-CCNC: 18 U/L (ref 5–41)
ANION GAP SERPL CALCULATED.3IONS-SCNC: 12 MMOL/L (ref 7–19)
AST SERPL-CCNC: 18 U/L (ref 5–40)
BASOPHILS ABSOLUTE: 0 K/UL (ref 0–0.2)
BASOPHILS RELATIVE PERCENT: 0.5 % (ref 0–1)
BILIRUB SERPL-MCNC: 0.3 MG/DL (ref 0.2–1.2)
BUN BLDV-MCNC: 12 MG/DL (ref 6–20)
CALCIUM SERPL-MCNC: 9.2 MG/DL (ref 8.6–10)
CHLORIDE BLD-SCNC: 99 MMOL/L (ref 98–111)
CHOLESTEROL, FASTING: 173 MG/DL (ref 160–199)
CO2: 28 MMOL/L (ref 22–29)
CREAT SERPL-MCNC: <0.5 MG/DL (ref 0.5–1.2)
EOSINOPHILS ABSOLUTE: 0.1 K/UL (ref 0–0.6)
EOSINOPHILS RELATIVE PERCENT: 1 % (ref 0–5)
GFR NON-AFRICAN AMERICAN: >60
GLUCOSE BLD-MCNC: 77 MG/DL (ref 74–109)
HBA1C MFR BLD: 5.4 % (ref 4–6)
HCT VFR BLD CALC: 43.9 % (ref 42–52)
HDLC SERPL-MCNC: 59 MG/DL (ref 55–121)
HEMOGLOBIN: 14.7 G/DL (ref 14–18)
IMMATURE GRANULOCYTES #: 0 K/UL
LDL CHOLESTEROL CALCULATED: 99 MG/DL
LYMPHOCYTES ABSOLUTE: 3.3 K/UL (ref 1.1–4.5)
LYMPHOCYTES RELATIVE PERCENT: 38.5 % (ref 20–40)
MCH RBC QN AUTO: 32.1 PG (ref 27–31)
MCHC RBC AUTO-ENTMCNC: 33.5 G/DL (ref 33–37)
MCV RBC AUTO: 95.9 FL (ref 80–94)
MONOCYTES ABSOLUTE: 0.8 K/UL (ref 0–0.9)
MONOCYTES RELATIVE PERCENT: 9.2 % (ref 0–10)
NEUTROPHILS ABSOLUTE: 4.4 K/UL (ref 1.5–7.5)
NEUTROPHILS RELATIVE PERCENT: 50.6 % (ref 50–65)
PDW BLD-RTO: 12 % (ref 11.5–14.5)
PLATELET # BLD: 303 K/UL (ref 130–400)
PMV BLD AUTO: 9.3 FL (ref 9.4–12.4)
POTASSIUM SERPL-SCNC: 4 MMOL/L (ref 3.5–5)
RBC # BLD: 4.58 M/UL (ref 4.7–6.1)
SODIUM BLD-SCNC: 139 MMOL/L (ref 136–145)
TOTAL PROTEIN: 7.1 G/DL (ref 6.6–8.7)
TRIGLYCERIDE, FASTING: 75 MG/DL (ref 0–149)
TSH REFLEX FT4: 1.2 UIU/ML (ref 0.35–5.5)
WBC # BLD: 8.6 K/UL (ref 4.8–10.8)

## 2020-06-19 PROCEDURE — 73130 X-RAY EXAM OF HAND: CPT

## 2020-06-19 PROCEDURE — 73562 X-RAY EXAM OF KNEE 3: CPT

## 2020-06-23 ENCOUNTER — TELEPHONE (OUTPATIENT)
Dept: PRIMARY CARE CLINIC | Age: 55
End: 2020-06-23

## 2020-07-08 RX ORDER — GABAPENTIN 600 MG/1
600 TABLET ORAL 3 TIMES DAILY
Qty: 90 TABLET | Refills: 1 | Status: SHIPPED | OUTPATIENT
Start: 2020-07-08 | End: 2020-08-11 | Stop reason: SDUPTHER

## 2020-07-08 RX ORDER — HYDROCODONE BITARTRATE AND ACETAMINOPHEN 10; 325 MG/1; MG/1
TABLET ORAL
Qty: 90 TABLET | Refills: 0 | Status: SHIPPED | OUTPATIENT
Start: 2020-07-10 | End: 2020-08-11 | Stop reason: SDUPTHER

## 2020-07-08 NOTE — TELEPHONE ENCOUNTER
Alie Yip called to request a refill on his medication.       Last office visit : 6/18/2020   Next office visit : 9/24/2020     Last Andres President: 07/08/2020  Medication Contract: 2019  Last Fill: 06/11/2020 (norco)                  05/28/2020 (gabapentin)    Last UDS:   Amphetamines   Date Value Ref Range Status   06/12/2013 NEGATIVE  Final     Amphetamine Screen, Urine   Date Value Ref Range Status   02/18/2020 -  Final     Barbiturates   Date Value Ref Range Status   06/12/2013 NEGATIVE  Final     Barbiturate Screen, Urine   Date Value Ref Range Status   02/18/2020 -  Final     Benzodiazepines   Date Value Ref Range Status   06/12/2013 NEGATIVE  Final     Benzodiazepine Screen, Urine   Date Value Ref Range Status   02/18/2020 -  Final     Buprenorphine Urine   Date Value Ref Range Status   02/18/2020 -  Final     Cocaine Metabolite Screen, Urine   Date Value Ref Range Status   02/18/2020 -  Final     Gabapentin Screen, Urine   Date Value Ref Range Status   02/18/2020 -  Final     MDMA, Urine   Date Value Ref Range Status   02/18/2020 -  Final     Methamphetamine, Urine   Date Value Ref Range Status   02/18/2020 -  Final     Opiate Scrn, Ur   Date Value Ref Range Status   02/18/2020 positive  Final     Comment:     Norco      Oxycodone Screen, Ur   Date Value Ref Range Status   02/18/2020 -  Final     PCP Screen, Urine   Date Value Ref Range Status   02/18/2020 -  Final     Propoxyphene Screen, Urine   Date Value Ref Range Status   02/18/2020 -  Final     THC Screen, Urine   Date Value Ref Range Status   02/18/2020 -  Final     Tricyclic Antidepressants, Urine   Date Value Ref Range Status   02/18/2020 -  Final                   Requested Prescriptions     Pending Prescriptions Disp Refills    HYDROcodone-acetaminophen (NORCO)  MG per tablet 90 tablet 0     Sig: TAKE 1 TABLET BY MOUTH EVERY 8 HOURS AS NEEDED FOR PAIN FOR UP TO 30 DAYS REDUCE DOSES TAKEN AS PAIN BECOMES MANAGEABLE    gabapentin (NEURONTIN)

## 2020-07-17 NOTE — TELEPHONE ENCOUNTER
Patient called with request for refill on prednisone.    Patient just seen 6-18  Has MD and chronic pain

## 2020-07-20 RX ORDER — PREDNISONE 10 MG/1
10 TABLET ORAL DAILY
Qty: 7 TABLET | Refills: 0 | Status: SHIPPED | OUTPATIENT
Start: 2020-07-20 | End: 2020-09-15 | Stop reason: SDUPTHER

## 2020-08-10 RX ORDER — FLUOXETINE HYDROCHLORIDE 40 MG/1
CAPSULE ORAL
Qty: 90 CAPSULE | Refills: 1 | Status: SHIPPED | OUTPATIENT
Start: 2020-08-10 | End: 2021-02-09 | Stop reason: SDUPTHER

## 2020-08-10 NOTE — TELEPHONE ENCOUNTER
Janie Dias called to request a refill on his medication.       Last office visit : 6/18/2020   Next office visit : 9/24/2020     Last UDS:   Amphetamines   Date Value Ref Range Status   06/12/2013 NEGATIVE  Final     Amphetamine Screen, Urine   Date Value Ref Range Status   02/18/2020 -  Final     Barbiturates   Date Value Ref Range Status   06/12/2013 NEGATIVE  Final     Barbiturate Screen, Urine   Date Value Ref Range Status   02/18/2020 -  Final     Benzodiazepines   Date Value Ref Range Status   06/12/2013 NEGATIVE  Final     Benzodiazepine Screen, Urine   Date Value Ref Range Status   02/18/2020 -  Final     Buprenorphine Urine   Date Value Ref Range Status   02/18/2020 -  Final     Cocaine Metabolite Screen, Urine   Date Value Ref Range Status   02/18/2020 -  Final     Gabapentin Screen, Urine   Date Value Ref Range Status   02/18/2020 -  Final     MDMA, Urine   Date Value Ref Range Status   02/18/2020 -  Final     Methamphetamine, Urine   Date Value Ref Range Status   02/18/2020 -  Final     Opiate Scrn, Ur   Date Value Ref Range Status   02/18/2020 positive  Final     Comment:     Norco      Oxycodone Screen, Ur   Date Value Ref Range Status   02/18/2020 -  Final     PCP Screen, Urine   Date Value Ref Range Status   02/18/2020 -  Final     Propoxyphene Screen, Urine   Date Value Ref Range Status   02/18/2020 -  Final     THC Screen, Urine   Date Value Ref Range Status   02/18/2020 -  Final     Tricyclic Antidepressants, Urine   Date Value Ref Range Status   02/18/2020 -  Final       Last Marco A Connor: 7-8  Medication Contract: 7-2019   Last Fill: 7-10    Requested Prescriptions     Pending Prescriptions Disp Refills    HYDROcodone-acetaminophen (NORCO)  MG per tablet 90 tablet 0     Sig: TAKE 1 TABLET BY MOUTH EVERY 8 HOURS AS NEEDED FOR PAIN FOR UP TO 30 DAYS REDUCE DOSES TAKEN AS PAIN BECOMES MANAGEABLE     Signed Prescriptions Disp Refills    FLUoxetine (PROZAC) 40 MG capsule 90 capsule 1     Sig: TAKE 1 CAPSULE BY MOUTH ONCE DAILY     Authorizing Provider: Maikel Pettit     Ordering User: Jim Barthel                 Please approve or refuse this medication.    Byron Roman LPN

## 2020-08-11 RX ORDER — GABAPENTIN 600 MG/1
600 TABLET ORAL 3 TIMES DAILY
Qty: 90 TABLET | Refills: 1 | Status: SHIPPED | OUTPATIENT
Start: 2020-08-11 | End: 2020-09-15 | Stop reason: SDUPTHER

## 2020-08-11 RX ORDER — HYDROCODONE BITARTRATE AND ACETAMINOPHEN 10; 325 MG/1; MG/1
TABLET ORAL
Qty: 90 TABLET | Refills: 0 | Status: SHIPPED | OUTPATIENT
Start: 2020-08-11 | End: 2020-09-09 | Stop reason: SDUPTHER

## 2020-08-11 NOTE — TELEPHONE ENCOUNTER
Reagan Tierneybens called to request a refill on his medication. Last office visit : 6/18/2020   Next office visit : 9/24/2020     Last UDS:   Amphetamines   Date Value Ref Range Status   06/12/2013 NEGATIVE  Final     Amphetamine Screen, Urine   Date Value Ref Range Status   02/18/2020 -  Final     Barbiturates   Date Value Ref Range Status   06/12/2013 NEGATIVE  Final     Barbiturate Screen, Urine   Date Value Ref Range Status   02/18/2020 -  Final     Benzodiazepines   Date Value Ref Range Status   06/12/2013 NEGATIVE  Final     Benzodiazepine Screen, Urine   Date Value Ref Range Status   02/18/2020 -  Final     Buprenorphine Urine   Date Value Ref Range Status   02/18/2020 -  Final     Cocaine Metabolite Screen, Urine   Date Value Ref Range Status   02/18/2020 -  Final     Gabapentin Screen, Urine   Date Value Ref Range Status   02/18/2020 -  Final     MDMA, Urine   Date Value Ref Range Status   02/18/2020 -  Final     Methamphetamine, Urine   Date Value Ref Range Status   02/18/2020 -  Final     Opiate Scrn, Ur   Date Value Ref Range Status   02/18/2020 positive  Final     Comment:     Norco      Oxycodone Screen, Ur   Date Value Ref Range Status   02/18/2020 -  Final     PCP Screen, Urine   Date Value Ref Range Status   02/18/2020 -  Final     Propoxyphene Screen, Urine   Date Value Ref Range Status   02/18/2020 -  Final     THC Screen, Urine   Date Value Ref Range Status   02/18/2020 -  Final     Tricyclic Antidepressants, Urine   Date Value Ref Range Status   02/18/2020 -  Final       Last Cuauhtemoc Benites: 07-08-20  Medication Contract: 07-09-19   Last Fill: 07-08-20      Requested Prescriptions     Pending Prescriptions Disp Refills    gabapentin (NEURONTIN) 600 MG tablet 90 tablet 1     Sig: Take 1 tablet by mouth 3 times daily for 30 days. Please approve or refuse this medication.    Marcelle Gongora MA

## 2020-08-14 RX ORDER — TIZANIDINE 4 MG/1
TABLET ORAL
Qty: 90 TABLET | Refills: 0 | Status: SHIPPED | OUTPATIENT
Start: 2020-08-14 | End: 2020-09-15 | Stop reason: SDUPTHER

## 2020-09-09 RX ORDER — HYDROCODONE BITARTRATE AND ACETAMINOPHEN 10; 325 MG/1; MG/1
TABLET ORAL
Qty: 90 TABLET | Refills: 0 | Status: SHIPPED | OUTPATIENT
Start: 2020-09-10 | End: 2020-10-08 | Stop reason: SDUPTHER

## 2020-09-09 NOTE — TELEPHONE ENCOUNTER
Reagan Walker called to request a refill on his medication. Last office visit : 6/18/2020   Next office visit : 9/24/2020     Last UDS:   Amphetamines   Date Value Ref Range Status   06/12/2013 NEGATIVE  Final     Amphetamine Screen, Urine   Date Value Ref Range Status   02/18/2020 -  Final     Barbiturates   Date Value Ref Range Status   06/12/2013 NEGATIVE  Final     Barbiturate Screen, Urine   Date Value Ref Range Status   02/18/2020 -  Final     Benzodiazepines   Date Value Ref Range Status   06/12/2013 NEGATIVE  Final     Benzodiazepine Screen, Urine   Date Value Ref Range Status   02/18/2020 -  Final     Buprenorphine Urine   Date Value Ref Range Status   02/18/2020 -  Final     Cocaine Metabolite Screen, Urine   Date Value Ref Range Status   02/18/2020 -  Final     Gabapentin Screen, Urine   Date Value Ref Range Status   02/18/2020 -  Final     MDMA, Urine   Date Value Ref Range Status   02/18/2020 -  Final     Methamphetamine, Urine   Date Value Ref Range Status   02/18/2020 -  Final     Opiate Scrn, Ur   Date Value Ref Range Status   02/18/2020 positive  Final     Comment:     Norco      Oxycodone Screen, Ur   Date Value Ref Range Status   02/18/2020 -  Final     PCP Screen, Urine   Date Value Ref Range Status   02/18/2020 -  Final     Propoxyphene Screen, Urine   Date Value Ref Range Status   02/18/2020 -  Final     THC Screen, Urine   Date Value Ref Range Status   02/18/2020 -  Final     Tricyclic Antidepressants, Urine   Date Value Ref Range Status   02/18/2020 -  Final       Last Cuauhtemoc Benites: 7-8  Medication Contract: 7-2019   Last Fill: 8-11    Requested Prescriptions     Pending Prescriptions Disp Refills    HYDROcodone-acetaminophen (NORCO)  MG per tablet 90 tablet 0     Sig: TAKE 1 TABLET BY MOUTH EVERY 8 HOURS AS NEEDED FOR PAIN FOR UP TO 30 DAYS REDUCE DOSES TAKEN AS PAIN BECOMES MANAGEABLE                 Please approve or refuse this medication.    Freda Linares LPN

## 2020-09-15 RX ORDER — PREDNISONE 10 MG/1
10 TABLET ORAL DAILY
Qty: 7 TABLET | Refills: 0 | Status: SHIPPED | OUTPATIENT
Start: 2020-09-15 | End: 2020-11-05 | Stop reason: SDUPTHER

## 2020-09-15 RX ORDER — TIZANIDINE 4 MG/1
TABLET ORAL
Qty: 90 TABLET | Refills: 0 | Status: SHIPPED | OUTPATIENT
Start: 2020-09-15 | End: 2020-11-01

## 2020-09-15 RX ORDER — GABAPENTIN 600 MG/1
600 TABLET ORAL 3 TIMES DAILY
Qty: 90 TABLET | Refills: 1 | Status: SHIPPED | OUTPATIENT
Start: 2020-09-15 | End: 2020-11-24 | Stop reason: SDUPTHER

## 2020-09-15 NOTE — TELEPHONE ENCOUNTER
Abram Abbasi called to request a refill on his medication.       Last office visit : 6/18/2020   Next office visit : 9/24/2020     Last UDS:   Amphetamines   Date Value Ref Range Status   06/12/2013 NEGATIVE  Final     Amphetamine Screen, Urine   Date Value Ref Range Status   02/18/2020 -  Final     Barbiturates   Date Value Ref Range Status   06/12/2013 NEGATIVE  Final     Barbiturate Screen, Urine   Date Value Ref Range Status   02/18/2020 -  Final     Benzodiazepines   Date Value Ref Range Status   06/12/2013 NEGATIVE  Final     Benzodiazepine Screen, Urine   Date Value Ref Range Status   02/18/2020 -  Final     Buprenorphine Urine   Date Value Ref Range Status   02/18/2020 -  Final     Cocaine Metabolite Screen, Urine   Date Value Ref Range Status   02/18/2020 -  Final     Gabapentin Screen, Urine   Date Value Ref Range Status   02/18/2020 -  Final     MDMA, Urine   Date Value Ref Range Status   02/18/2020 -  Final     Methamphetamine, Urine   Date Value Ref Range Status   02/18/2020 -  Final     Opiate Scrn, Ur   Date Value Ref Range Status   02/18/2020 positive  Final     Comment:     Norco      Oxycodone Screen, Ur   Date Value Ref Range Status   02/18/2020 -  Final     PCP Screen, Urine   Date Value Ref Range Status   02/18/2020 -  Final     Propoxyphene Screen, Urine   Date Value Ref Range Status   02/18/2020 -  Final     THC Screen, Urine   Date Value Ref Range Status   02/18/2020 -  Final     Tricyclic Antidepressants, Urine   Date Value Ref Range Status   02/18/2020 -  Final       Last Kary Tim: 07-  Medication Contract: 07-09-19   Last Fill: 08-11-20    Requested Prescriptions     Pending Prescriptions Disp Refills    tiZANidine (ZANAFLEX) 4 MG tablet 90 tablet 0     Sig: TAKE 1 TABLET BY MOUTH THREE TIMES DAILY    predniSONE (DELTASONE) 10 MG tablet 7 tablet 0     Sig: Take 1 tablet by mouth daily for 7 days    gabapentin (NEURONTIN) 600 MG tablet 90 tablet 1     Sig: Take 1 tablet by mouth 3 times daily for 30 days. Please approve or refuse this medication.    Pascual Rojas MA

## 2020-10-07 NOTE — TELEPHONE ENCOUNTER
Altaf Batres called to request a refill on his medication. Last office visit : 9/30/2020   Next office visit : 10/21/2020     Last UDS:   Amphetamines   Date Value Ref Range Status   06/12/2013 NEGATIVE  Final     Amphetamine Screen, Urine   Date Value Ref Range Status   02/18/2020 -  Final     Barbiturates   Date Value Ref Range Status   06/12/2013 NEGATIVE  Final     Barbiturate Screen, Urine   Date Value Ref Range Status   02/18/2020 -  Final     Benzodiazepines   Date Value Ref Range Status   06/12/2013 NEGATIVE  Final     Benzodiazepine Screen, Urine   Date Value Ref Range Status   02/18/2020 -  Final     Buprenorphine Urine   Date Value Ref Range Status   02/18/2020 -  Final     Cocaine Metabolite Screen, Urine   Date Value Ref Range Status   02/18/2020 -  Final     Gabapentin Screen, Urine   Date Value Ref Range Status   02/18/2020 -  Final     MDMA, Urine   Date Value Ref Range Status   02/18/2020 -  Final     Methamphetamine, Urine   Date Value Ref Range Status   02/18/2020 -  Final     Opiate Scrn, Ur   Date Value Ref Range Status   02/18/2020 positive  Final     Comment:     Norco      Oxycodone Screen, Ur   Date Value Ref Range Status   02/18/2020 -  Final     PCP Screen, Urine   Date Value Ref Range Status   02/18/2020 -  Final     Propoxyphene Screen, Urine   Date Value Ref Range Status   02/18/2020 -  Final     THC Screen, Urine   Date Value Ref Range Status   02/18/2020 -  Final     Tricyclic Antidepressants, Urine   Date Value Ref Range Status   02/18/2020 -  Final       Last Riley:07/08/2020  Medication Contract:07/09/2019   Last Fill:09/10/2020     Requested Prescriptions     Pending Prescriptions Disp Refills    HYDROcodone-acetaminophen (NORCO)  MG per tablet 90 tablet 0     Sig: TAKE 1 TABLET BY MOUTH EVERY 8 HOURS AS NEEDED FOR PAIN FOR UP TO 30 DAYS REDUCE DOSES TAKEN AS PAIN BECOMES MANAGEABLE         Please approve or refuse this medication.    León Longo

## 2020-10-08 RX ORDER — HYDROCODONE BITARTRATE AND ACETAMINOPHEN 10; 325 MG/1; MG/1
TABLET ORAL
Qty: 90 TABLET | Refills: 0 | Status: SHIPPED | OUTPATIENT
Start: 2020-10-08 | End: 2020-11-10 | Stop reason: SDUPTHER

## 2020-10-21 ENCOUNTER — OFFICE VISIT (OUTPATIENT)
Dept: PRIMARY CARE CLINIC | Age: 55
End: 2020-10-21
Payer: MEDICARE

## 2020-10-21 VITALS
BODY MASS INDEX: 26.04 KG/M2 | SYSTOLIC BLOOD PRESSURE: 120 MMHG | WEIGHT: 186 LBS | HEART RATE: 67 BPM | TEMPERATURE: 98.2 F | DIASTOLIC BLOOD PRESSURE: 74 MMHG | HEIGHT: 71 IN | OXYGEN SATURATION: 98 %

## 2020-10-21 PROCEDURE — G0008 ADMIN INFLUENZA VIRUS VAC: HCPCS | Performed by: NURSE PRACTITIONER

## 2020-10-21 PROCEDURE — G8482 FLU IMMUNIZE ORDER/ADMIN: HCPCS | Performed by: NURSE PRACTITIONER

## 2020-10-21 PROCEDURE — G8427 DOCREV CUR MEDS BY ELIG CLIN: HCPCS | Performed by: NURSE PRACTITIONER

## 2020-10-21 PROCEDURE — 90686 IIV4 VACC NO PRSV 0.5 ML IM: CPT | Performed by: NURSE PRACTITIONER

## 2020-10-21 PROCEDURE — 99214 OFFICE O/P EST MOD 30 MIN: CPT | Performed by: NURSE PRACTITIONER

## 2020-10-21 PROCEDURE — 3017F COLORECTAL CA SCREEN DOC REV: CPT | Performed by: NURSE PRACTITIONER

## 2020-10-21 PROCEDURE — 1036F TOBACCO NON-USER: CPT | Performed by: NURSE PRACTITIONER

## 2020-10-21 PROCEDURE — G8419 CALC BMI OUT NRM PARAM NOF/U: HCPCS | Performed by: NURSE PRACTITIONER

## 2020-10-21 PROCEDURE — 80305 DRUG TEST PRSMV DIR OPT OBS: CPT | Performed by: NURSE PRACTITIONER

## 2020-10-21 RX ORDER — BUSPIRONE HYDROCHLORIDE 5 MG/1
5 TABLET ORAL 2 TIMES DAILY PRN
Qty: 60 TABLET | Refills: 1 | Status: SHIPPED | OUTPATIENT
Start: 2020-10-21 | End: 2020-12-02 | Stop reason: SDUPTHER

## 2020-10-21 RX ORDER — TRIAMCINOLONE ACETONIDE 0.25 MG/G
CREAM TOPICAL
Qty: 80 G | Refills: 1 | Status: SHIPPED | OUTPATIENT
Start: 2020-10-21

## 2020-10-21 ASSESSMENT — ENCOUNTER SYMPTOMS
EYES NEGATIVE: 1
RESPIRATORY NEGATIVE: 1
GASTROINTESTINAL NEGATIVE: 1

## 2020-10-21 NOTE — PROGRESS NOTES
[address] [facility]  [address] [address]  [address] [address]  Dept: [phone]  Dept Fax: [phone]  Loc: [phone]    Sirena Ponce is a 63 y.o. male who presents today for his medical conditions/complaints as noted below. Sirena Ponce is c/o of 3 Month Follow-Up; Chronic Pain; and Muscular Dystrophy       Chief Complaint   Patient presents with    3 Month Follow-Up    Chronic Pain    Muscular Dystrophy        HPI:     HPI   Patient is here for follow up on chronic conditions including muscular dystrophy, chronic pain, and anxiety/depression. He has been taking his meds as prescribed. He did see ortho for knee pain and had joint injection. He has also noticed a red irritated spot on his forehead as well. He is also having increased anxiety as well. He reports even road kill on the side of the road makes him sad and anxious. Past Medical History:   Diagnosis Date    Anxiety     Chronic back pain     Depression     MD (muscular dystrophy) ([facility] [address]     Scoliosis         Past Surgical History:   Procedure Laterality Date    COLONOSCOPY  1/8/16    Dr Melyssa Werner, normal, 10 yr recall    FOOT SURGERY Left        Social History     Tobacco Use    Smoking status: Never Smoker    Smokeless tobacco: Never Used   Substance Use Topics    Alcohol use: No     Comment:  Last drink was 08/07/2015        Current Outpatient Medications   Medication Sig Dispense Refill    triamcinolone (KENALOG) 0.025 % cream Apply topically 2 times daily.  80 g 1    busPIRone (BUSPAR) 5 MG tablet Take 1 tablet by mouth 2 times daily as needed (anxiety) 60 tablet 1    HYDROcodone-acetaminophen (NORCO)  MG per tablet TAKE 1 TABLET BY MOUTH EVERY 8 HOURS AS NEEDED FOR PAIN FOR UP TO 30 DAYS REDUCE DOSES TAKEN AS PAIN BECOMES MANAGEABLE 90 tablet 0    tiZANidine (ZANAFLEX) 4 MG tablet TAKE 1 TABLET BY MOUTH THREE TIMES DAILY 90 tablet 0    FLUoxetine (PROZAC) 40 MG distress. Breath sounds: Normal breath sounds. No wheezing or rales. Chest:      Chest wall: No tenderness. Abdominal:      General: There is no distension. Palpations: Abdomen is soft. Tenderness: There is no abdominal tenderness. There is no guarding or rebound. Musculoskeletal:      Thoracic back: He exhibits decreased range of motion. Lumbar back: He exhibits decreased range of motion. Skin:     General: Skin is warm and dry. Findings: No rash. Neurological:      Mental Status: He is alert and oriented to person, place, and time. Motor: Abnormal muscle tone present. Coordination: Coordination normal.      Gait: Gait abnormal.      Deep Tendon Reflexes: Reflexes are normal and symmetric. Comments: Decreased strength on right. Right foot drop. Brace on right leg   Psychiatric:         Behavior: Behavior normal.         Thought Content: Thought content normal.         Judgment: Judgment normal.         /74   Pulse 67   Temp 98.2 °F (36.8 °C) (Temporal)   Ht 5' 11\" (1.803 m)   Wt 186 lb (84.4 kg)   SpO2 98%   BMI 25.94 kg/m²     Assessment:      Diagnosis Orders   1. MD (muscular dystrophy) ([facility] [address]     2. Flu vaccine need  INFLUENZA, QUADV, 3 YRS AND OLDER, IM PF, PREFILL SYR OR SDV, 0.5ML (AFLURIA QUADV, PF)   3. Therapeutic drug monitoring  INFLUENZA, QUADV, 3 YRS AND OLDER, IM PF, PREFILL SYR OR SDV, 0.5ML (AFLURIA QUADV, PF)    POCT Rapid Drug Screen   4. Neuropathy     5. Depression, unspecified depression type     6.  Atopic dermatitis, unspecified type         Results for orders placed or performed in visit on 10/21/20   POCT Rapid Drug Screen   Result Value Ref Range    Alcohol, Urine neg     Amphetamine Screen, Urine neg     Barbiturate Screen, Urine neg     Benzodiazepine Screen, Urine neg     Buprenorphine Urine neg     Cocaine Metabolite Screen, Urine neg     FENTANYL SCREEN, URINE neg     Gabapentin Screen, Urine neg     MDMA, Urine neg Methadone Screen, Urine neg     Methamphetamine, Urine neg     Opiate Scrn, Ur pos     Oxycodone Screen, Ur pos     PCP Screen, Urine neg     Propoxyphene Screen, Urine neg     Synthetic Cannabinoids (K2) Screen, Urine neg     THC Screen, Urine neg     Tramadol Scrn, Ur neg     Tricyclic Antidepressants, Urine neg        Plan:     Flu vaccine in office. UDS was appropriate. We will continue current medication regimen. I am starting patient on BuSpar 5 mg twice daily as needed for anxiety. He is aware that he can take once daily and then use the second dose. the day as needed. Kenalog cream for atopic dermatitis on his scalp. Return in about 6 weeks (around 12/2/2020) for Office Visit, Medicare AWV, Medication Follow Up. Orders Placed This Encounter   Procedures    INFLUENZA, QUADV, 3 YRS AND OLDER, IM PF, PREFILL SYR OR SDV, 0.5ML (AFLURIA QUADV, PF)    POCT Rapid Drug Screen       Orders Placed This Encounter   Medications    triamcinolone (KENALOG) 0.025 % cream     Sig: Apply topically 2 times daily. Dispense:  80 g     Refill:  1    busPIRone (BUSPAR) 5 MG tablet     Sig: Take 1 tablet by mouth 2 times daily as needed (anxiety)     Dispense:  60 tablet     Refill:  1        Patient offered educational handouts and has had all questions answered. Patient voices understanding and agrees to plans along with risks and benefits of plan. Patient is instructed to continue prior meds, diet, and exercise plans as instructed. Patient agrees to follow up as instructed and sooner if needed. Patient agrees to go to ER if condition becomes emergent. EMR Dragon/transcription disclaimer: Some of this encounter note is an electronic transcription/translation of spoken language to printed text. The electronic translation of spoken language may permit erroneous, or at times, nonsensical words or phrases to be inadvertently transcribed.  Although I have reviewed the note for such errors, some may still exist.    Electronically signed by ANTHONY Villasenor on 10/22/2020 at 8:14 AM

## 2020-10-21 NOTE — PROGRESS NOTES
After obtaining consent, and per orders of Griffin Rittman  injection of Afluria was given in the left deltoid by Cem Garcia LPN  Pt tolerated well and had no reactions.

## 2020-11-05 RX ORDER — PREDNISONE 10 MG/1
10 TABLET ORAL DAILY
Qty: 7 TABLET | Refills: 0 | Status: SHIPPED | OUTPATIENT
Start: 2020-11-05 | End: 2020-11-12

## 2020-11-05 NOTE — TELEPHONE ENCOUNTER
Pt called and requested a refill on Prednisone it looks like this was short term, and when I said that to pt he said that he has a back problem and that is why he receives refills on this. I informed pt I would send message to provider to make sure refilling this is appropriate and if there was any problems we would contact him back by telephone and he VU.

## 2020-11-09 NOTE — TELEPHONE ENCOUNTER
Arben Murphy called to request a refill on his medication. Last office visit : 10/21/2020   Next office visit : 12/2/2020     Last UDS:   Amphetamines   Date Value Ref Range Status   06/12/2013 NEGATIVE  Final     Amphetamine Screen, Urine   Date Value Ref Range Status   10/21/2020 neg  Final     Barbiturates   Date Value Ref Range Status   06/12/2013 NEGATIVE  Final     Barbiturate Screen, Urine   Date Value Ref Range Status   10/21/2020 neg  Final     Benzodiazepines   Date Value Ref Range Status   06/12/2013 NEGATIVE  Final     Benzodiazepine Screen, Urine   Date Value Ref Range Status   10/21/2020 neg  Final     Buprenorphine Urine   Date Value Ref Range Status   10/21/2020 neg  Final     Cocaine Metabolite Screen, Urine   Date Value Ref Range Status   10/21/2020 neg  Final     Gabapentin Screen, Urine   Date Value Ref Range Status   10/21/2020 neg  Final     MDMA, Urine   Date Value Ref Range Status   10/21/2020 neg  Final     Methamphetamine, Urine   Date Value Ref Range Status   10/21/2020 neg  Final     Opiate Scrn, Ur   Date Value Ref Range Status   10/21/2020 pos  Final     Oxycodone Screen, Ur   Date Value Ref Range Status   10/21/2020 pos  Final     PCP Screen, Urine   Date Value Ref Range Status   10/21/2020 neg  Final     Propoxyphene Screen, Urine   Date Value Ref Range Status   10/21/2020 neg  Final     THC Screen, Urine   Date Value Ref Range Status   10/21/2020 neg  Final     Tricyclic Antidepressants, Urine   Date Value Ref Range Status   10/21/2020 neg  Final       Last Ciro Clutter: 11-09-20  Medication Contract:  07-09-19                Last Fill: 10-08-20    Requested Prescriptions     Pending Prescriptions Disp Refills    HYDROcodone-acetaminophen (NORCO)  MG per tablet 90 tablet 0     Sig: TAKE 1 TABLET BY MOUTH EVERY 8 HOURS AS NEEDED FOR PAIN FOR UP TO 30 DAYS REDUCE DOSES TAKEN AS PAIN BECOMES MANAGEABLE         Please approve or refuse this medication.    Forrest Leyva, MA

## 2020-11-10 RX ORDER — HYDROCODONE BITARTRATE AND ACETAMINOPHEN 10; 325 MG/1; MG/1
TABLET ORAL
Qty: 90 TABLET | Refills: 0 | Status: SHIPPED | OUTPATIENT
Start: 2020-11-10 | End: 2020-12-07 | Stop reason: SDUPTHER

## 2020-11-23 NOTE — TELEPHONE ENCOUNTER
Vikki Melo called to request a refill on his medication. Last office visit : 10/21/2020   Next office visit : 12/2/2020     Last UDS:   Amphetamines   Date Value Ref Range Status   06/12/2013 NEGATIVE  Final     Amphetamine Screen, Urine   Date Value Ref Range Status   10/21/2020 neg  Final     Barbiturates   Date Value Ref Range Status   06/12/2013 NEGATIVE  Final     Barbiturate Screen, Urine   Date Value Ref Range Status   10/21/2020 neg  Final     Benzodiazepines   Date Value Ref Range Status   06/12/2013 NEGATIVE  Final     Benzodiazepine Screen, Urine   Date Value Ref Range Status   10/21/2020 neg  Final     Buprenorphine Urine   Date Value Ref Range Status   10/21/2020 neg  Final     Cocaine Metabolite Screen, Urine   Date Value Ref Range Status   10/21/2020 neg  Final     Gabapentin Screen, Urine   Date Value Ref Range Status   10/21/2020 neg  Final     MDMA, Urine   Date Value Ref Range Status   10/21/2020 neg  Final     Methamphetamine, Urine   Date Value Ref Range Status   10/21/2020 neg  Final     Opiate Scrn, Ur   Date Value Ref Range Status   10/21/2020 pos  Final     Oxycodone Screen, Ur   Date Value Ref Range Status   10/21/2020 pos  Final     PCP Screen, Urine   Date Value Ref Range Status   10/21/2020 neg  Final     Propoxyphene Screen, Urine   Date Value Ref Range Status   10/21/2020 neg  Final     THC Screen, Urine   Date Value Ref Range Status   10/21/2020 neg  Final     Tricyclic Antidepressants, Urine   Date Value Ref Range Status   10/21/2020 neg  Final       Last Julisa Embs: 11/9/2020  Medication Contract: 7/9/2019   Last Fill: 10/15/2020     Requested Prescriptions     Pending Prescriptions Disp Refills    gabapentin (NEURONTIN) 600 MG tablet 90 tablet 1     Sig: Take 1 tablet by mouth 3 times daily for 30 days. Please approve or refuse this medication.    Forrest Gupta

## 2020-11-24 RX ORDER — GABAPENTIN 600 MG/1
600 TABLET ORAL 3 TIMES DAILY
Qty: 90 TABLET | Refills: 1 | Status: SHIPPED | OUTPATIENT
Start: 2020-11-24 | End: 2021-03-05 | Stop reason: SDUPTHER

## 2020-12-02 ENCOUNTER — HOSPITAL ENCOUNTER (OUTPATIENT)
Dept: GENERAL RADIOLOGY | Age: 55
Discharge: HOME | End: 2020-12-02
Payer: MEDICARE

## 2020-12-02 ENCOUNTER — OFFICE VISIT (OUTPATIENT)
Dept: PRIMARY CARE CLINIC | Age: 55
End: 2020-12-02
Payer: MEDICARE

## 2020-12-02 VITALS
HEIGHT: 71 IN | TEMPERATURE: 97.6 F | OXYGEN SATURATION: 98 % | RESPIRATION RATE: 18 BRPM | BODY MASS INDEX: 27.44 KG/M2 | DIASTOLIC BLOOD PRESSURE: 76 MMHG | HEART RATE: 60 BPM | SYSTOLIC BLOOD PRESSURE: 116 MMHG | WEIGHT: 196 LBS

## 2020-12-02 PROBLEM — M25.561 CHRONIC PAIN OF RIGHT KNEE: Status: ACTIVE | Noted: 2020-12-02

## 2020-12-02 PROBLEM — G62.9 NEUROPATHY: Status: ACTIVE | Noted: 2020-12-02

## 2020-12-02 PROBLEM — G89.29 CHRONIC PAIN OF RIGHT KNEE: Status: ACTIVE | Noted: 2020-12-02

## 2020-12-02 PROCEDURE — 73562 X-RAY EXAM OF KNEE 3: CPT

## 2020-12-02 PROCEDURE — 99214 OFFICE O/P EST MOD 30 MIN: CPT | Performed by: NURSE PRACTITIONER

## 2020-12-02 PROCEDURE — 3017F COLORECTAL CA SCREEN DOC REV: CPT | Performed by: NURSE PRACTITIONER

## 2020-12-02 PROCEDURE — 20610 DRAIN/INJ JOINT/BURSA W/O US: CPT | Performed by: NURSE PRACTITIONER

## 2020-12-02 PROCEDURE — G8482 FLU IMMUNIZE ORDER/ADMIN: HCPCS | Performed by: NURSE PRACTITIONER

## 2020-12-02 PROCEDURE — G8427 DOCREV CUR MEDS BY ELIG CLIN: HCPCS | Performed by: NURSE PRACTITIONER

## 2020-12-02 PROCEDURE — G8419 CALC BMI OUT NRM PARAM NOF/U: HCPCS | Performed by: NURSE PRACTITIONER

## 2020-12-02 PROCEDURE — 1036F TOBACCO NON-USER: CPT | Performed by: NURSE PRACTITIONER

## 2020-12-02 RX ORDER — BUSPIRONE HYDROCHLORIDE 5 MG/1
5 TABLET ORAL 2 TIMES DAILY PRN
Qty: 60 TABLET | Refills: 5 | Status: SHIPPED | OUTPATIENT
Start: 2020-12-02 | End: 2021-01-01

## 2020-12-02 ASSESSMENT — ENCOUNTER SYMPTOMS
EYES NEGATIVE: 1
RESPIRATORY NEGATIVE: 1
BACK PAIN: 1
GASTROINTESTINAL NEGATIVE: 1

## 2020-12-04 NOTE — TELEPHONE ENCOUNTER
Heron Linda called to request a refill on his medication. Last office visit : 12/2/2020   Next office visit : 2/18/2021     Last UDS:   Amphetamines   Date Value Ref Range Status   06/12/2013 NEGATIVE  Final     Amphetamine Screen, Urine   Date Value Ref Range Status   10/21/2020 neg  Final     Barbiturates   Date Value Ref Range Status   06/12/2013 NEGATIVE  Final     Barbiturate Screen, Urine   Date Value Ref Range Status   10/21/2020 neg  Final     Benzodiazepines   Date Value Ref Range Status   06/12/2013 NEGATIVE  Final     Benzodiazepine Screen, Urine   Date Value Ref Range Status   10/21/2020 neg  Final     Buprenorphine Urine   Date Value Ref Range Status   10/21/2020 neg  Final     Cocaine Metabolite Screen, Urine   Date Value Ref Range Status   10/21/2020 neg  Final     Gabapentin Screen, Urine   Date Value Ref Range Status   10/21/2020 neg  Final     MDMA, Urine   Date Value Ref Range Status   10/21/2020 neg  Final     Methamphetamine, Urine   Date Value Ref Range Status   10/21/2020 neg  Final     Opiate Scrn, Ur   Date Value Ref Range Status   10/21/2020 pos  Final     Oxycodone Screen, Ur   Date Value Ref Range Status   10/21/2020 pos  Final     PCP Screen, Urine   Date Value Ref Range Status   10/21/2020 neg  Final     Propoxyphene Screen, Urine   Date Value Ref Range Status   10/21/2020 neg  Final     THC Screen, Urine   Date Value Ref Range Status   10/21/2020 neg  Final     Tricyclic Antidepressants, Urine   Date Value Ref Range Status   10/21/2020 neg  Final       Last Dortha Lacy: 07-08-20  Medication Contract: 07-09-19    Last Fill: 11-10-20      Requested Prescriptions     Pending Prescriptions Disp Refills    HYDROcodone-acetaminophen (NORCO)  MG per tablet 90 tablet 0     Sig: TAKE 1 TABLET BY MOUTH EVERY 8 HOURS AS NEEDED FOR PAIN FOR UP TO 30 DAYS REDUCE DOSES TAKEN AS PAIN BECOMES MANAGEABLE         Please approve or refuse this medication.    Edie Dixon MA

## 2020-12-07 RX ORDER — HYDROCODONE BITARTRATE AND ACETAMINOPHEN 10; 325 MG/1; MG/1
TABLET ORAL
Qty: 90 TABLET | Refills: 0 | Status: SHIPPED | OUTPATIENT
Start: 2020-12-07 | End: 2021-01-04 | Stop reason: SDUPTHER

## 2020-12-11 RX ORDER — TIZANIDINE 4 MG/1
TABLET ORAL
Qty: 90 TABLET | Refills: 0 | Status: SHIPPED | OUTPATIENT
Start: 2020-12-11 | End: 2021-01-13

## 2021-01-04 DIAGNOSIS — G71.00 MD (MUSCULAR DYSTROPHY) (HCC): ICD-10-CM

## 2021-01-04 RX ORDER — HYDROCODONE BITARTRATE AND ACETAMINOPHEN 10; 325 MG/1; MG/1
TABLET ORAL
Qty: 90 TABLET | Refills: 0 | Status: SHIPPED | OUTPATIENT
Start: 2021-01-04 | End: 2021-01-12 | Stop reason: SDUPTHER

## 2021-01-04 NOTE — TELEPHONE ENCOUNTER
Molly Decker called to request a refill on his medication. Last office visit : Visit date not found   Next office visit : Visit date not found     Last UDS:   Amphetamines   Date Value Ref Range Status   06/12/2013 NEGATIVE  Final     Amphetamine Screen, Urine   Date Value Ref Range Status   10/21/2020 neg  Final     Barbiturates   Date Value Ref Range Status   06/12/2013 NEGATIVE  Final     Barbiturate Screen, Urine   Date Value Ref Range Status   10/21/2020 neg  Final     Benzodiazepines   Date Value Ref Range Status   06/12/2013 NEGATIVE  Final     Benzodiazepine Screen, Urine   Date Value Ref Range Status   10/21/2020 neg  Final     Buprenorphine Urine   Date Value Ref Range Status   10/21/2020 neg  Final     Cocaine Metabolite Screen, Urine   Date Value Ref Range Status   10/21/2020 neg  Final     Gabapentin Screen, Urine   Date Value Ref Range Status   10/21/2020 neg  Final     MDMA, Urine   Date Value Ref Range Status   10/21/2020 neg  Final     Methamphetamine, Urine   Date Value Ref Range Status   10/21/2020 neg  Final     Opiate Scrn, Ur   Date Value Ref Range Status   10/21/2020 pos  Final     Oxycodone Screen, Ur   Date Value Ref Range Status   10/21/2020 pos  Final     PCP Screen, Urine   Date Value Ref Range Status   10/21/2020 neg  Final     Propoxyphene Screen, Urine   Date Value Ref Range Status   10/21/2020 neg  Final     THC Screen, Urine   Date Value Ref Range Status   10/21/2020 neg  Final     Tricyclic Antidepressants, Urine   Date Value Ref Range Status   10/21/2020 neg  Final       Last Genevie Lux: 11/9/20  Medication Contract: needs update at next visit   Last Fill: 12/7/20    Requested Prescriptions     Pending Prescriptions Disp Refills    HYDROcodone-acetaminophen (NORCO)  MG per tablet 90 tablet 0     Sig: TAKE 1 TABLET BY MOUTH EVERY 8 HOURS AS NEEDED FOR PAIN FOR UP TO 30 DAYS REDUCE DOSES TAKEN AS PAIN BECOMES MANAGEABLE         Please approve or refuse this medication. Dorothy Valdivia

## 2021-01-12 DIAGNOSIS — G71.00 MD (MUSCULAR DYSTROPHY) (HCC): ICD-10-CM

## 2021-01-12 DIAGNOSIS — M54.42 LEFT-SIDED LOW BACK PAIN WITH LEFT-SIDED SCIATICA, UNSPECIFIED CHRONICITY: ICD-10-CM

## 2021-01-12 RX ORDER — HYDROCODONE BITARTRATE AND ACETAMINOPHEN 10; 325 MG/1; MG/1
TABLET ORAL
Qty: 90 TABLET | Refills: 0 | Status: SHIPPED | OUTPATIENT
Start: 2021-01-12 | End: 2021-02-09 | Stop reason: SDUPTHER

## 2021-01-12 NOTE — TELEPHONE ENCOUNTER
Pt called and stated that the pharmacy put his script on hold due to them not getting any ([address]) in there shipment.  Pt would like his script sent to [facility]. I called [facility] and cancelled prescription sent to them, and have now reordered to go to [facility]

## 2021-01-13 RX ORDER — TIZANIDINE 4 MG/1
TABLET ORAL
Qty: 90 TABLET | Refills: 0 | Status: SHIPPED | OUTPATIENT
Start: 2021-01-13 | End: 2021-02-18

## 2021-02-09 DIAGNOSIS — G71.00 MD (MUSCULAR DYSTROPHY) (HCC): ICD-10-CM

## 2021-02-09 DIAGNOSIS — F32.A DEPRESSION, UNSPECIFIED DEPRESSION TYPE: ICD-10-CM

## 2021-02-09 RX ORDER — HYDROCODONE BITARTRATE AND ACETAMINOPHEN 10; 325 MG/1; MG/1
TABLET ORAL
Qty: 90 TABLET | Refills: 0 | Status: SHIPPED | OUTPATIENT
Start: 2021-02-09 | End: 2021-03-05 | Stop reason: SDUPTHER

## 2021-02-09 RX ORDER — FLUOXETINE HYDROCHLORIDE 40 MG/1
CAPSULE ORAL
Qty: 90 CAPSULE | Refills: 1 | Status: SHIPPED | OUTPATIENT
Start: 2021-02-09 | End: 2021-08-03 | Stop reason: SDUPTHER

## 2021-02-09 NOTE — TELEPHONE ENCOUNTER
[facility] family practice  encounter:    [facility] [address]  [address] [place] [address]  [phone]      SUBJECTIVE:  Chief Complaint   Patient presents with   • Cough     x2 weeks. patient also states she has head congestion and also states shes had migraine about 7 times over the last 2 weeks.      She is a 26 year old female who presented to my office for coughing which is mainly at night when she lays down and SOB with coughing and some wheezing as well. This been going on for 2 weeks. She was on Augmentin and did not help. She tried different cough syrup without help. She has been having some head congestion. No sneezing or runny nose. Denies any sore throat. There has been some sick contacts.    PAST HISTORIES:  I have reviewed the past medical history, medications, allergies and social history listed in the medical record as well as the nursing notes for this encounter.    MEDICATIONS:   Current Outpatient Prescriptions   Medication Sig Dispense Refill   • norgestrel-ethinyl estradiol (LO/OVRAL) 0.3-30 MG-MCG per tablet Take 1 tablet by mouth daily.     • amoxicillin-clavulanate (AUGMENTIN) 875-125 MG per tablet Take 1 tablet by mouth every 12 hours. 20 tablet 0   • sumatriptan (IMITREX) 50 MG tablet TAKE 1 TABLET BY MOUTH AT ONSET OF MIGRAINE HEADACHE(MAY REPEAT IN 2 HOURS IF HEADACHE PERSISTS) 9 tablet 1   • azithromycin (ZITHROMAX) 250 MG tablet Take 2 tabs PO on the first day then 1 tab po daily for 4 days 6 tablet 0   • methylPREDNISolone (MEDROL, JACKIE,) 4 MG tablet follow package directions 21 tablet 0   • cetirizine (ZYRTEC) 10 MG tablet Take 1 tablet by mouth every morning. 30 tablet 0   • fluticasone (FLONASE) 50 MCG/ACT nasal spray Spray 2 sprays in each nostril daily. 1 Bottle 3     No current facility-administered medications for this visit.        ALLERGIES:   Allergies as of  Vivian Carroll called to request a refill on his medication. Last office visit : 12/2/2020   Next office visit : 2/18/2021     Last UDS:   Amphetamines   Date Value Ref Range Status   06/12/2013 NEGATIVE  Final     Amphetamine Screen, Urine   Date Value Ref Range Status   10/21/2020 neg  Final     Barbiturates   Date Value Ref Range Status   06/12/2013 NEGATIVE  Final     Barbiturate Screen, Urine   Date Value Ref Range Status   10/21/2020 neg  Final     Benzodiazepines   Date Value Ref Range Status   06/12/2013 NEGATIVE  Final     Benzodiazepine Screen, Urine   Date Value Ref Range Status   10/21/2020 neg  Final     Buprenorphine Urine   Date Value Ref Range Status   10/21/2020 neg  Final     Cocaine Metabolite Screen, Urine   Date Value Ref Range Status   10/21/2020 neg  Final     Gabapentin Screen, Urine   Date Value Ref Range Status   10/21/2020 neg  Final     MDMA, Urine   Date Value Ref Range Status   10/21/2020 neg  Final     Methamphetamine, Urine   Date Value Ref Range Status   10/21/2020 neg  Final     Opiate Scrn, Ur   Date Value Ref Range Status   10/21/2020 pos  Final     Oxycodone Screen, Ur   Date Value Ref Range Status   10/21/2020 pos  Final     PCP Screen, Urine   Date Value Ref Range Status   10/21/2020 neg  Final     Propoxyphene Screen, Urine   Date Value Ref Range Status   10/21/2020 neg  Final     THC Screen, Urine   Date Value Ref Range Status   10/21/2020 neg  Final     Tricyclic Antidepressants, Urine   Date Value Ref Range Status   10/21/2020 neg  Final       Last [facility]: 11-9  Medication Contract: 7-9-19                 Last Fill: 1-12    Requested Prescriptions     Pending Prescriptions Disp Refills    HYDROcodone-acetaminophen (NORCO)  MG per tablet 90 tablet 0     Sig: TAKE 1 TABLET BY MOUTH EVERY 8 HOURS AS NEEDED FOR PAIN FOR UP TO 30 DAYS REDUCE DOSES TAKEN AS PAIN BECOMES MANAGEABLE         Please approve or refuse this medication.    Steven Saldana LPN 09/08/2017   • (No Known Allergies)       REVIEW OF SYSTEMS:   A 12 point review of systems is negative other than that mentioned above.    OBJECTIVE:  Visit Vitals  /74 (BP Location: E, Patient Position: Sitting, Cuff Size: Regular)   Pulse 108   Temp 98.2 °F (36.8 °C) (Tympanic)   Ht 5' 9\" (1.753 m)   Wt 61.6 kg   LMP  (LMP Unknown)   SpO2 98%   BMI 20.07 kg/m²     General: Patient is sitting up in the chair in no acute distress.  HENT: Head is atraumatic, normocephalic. Tympanic membranes are visualized and intact. nasal mucosa is swollen and pale with clear discharge, no septal deviation. Oral mucosa is moist. Teeth and gums normal, no exudates.   Eyes: PERRLA. Conjunctivae normal. No discharge.    Neck: Supple, trachea in midline. No goiter.  Cardiovascular: Heart is regular rate and rhythm, no murmurs. No edema.  Respiratory: Breathing is normal. Scattered fine crackles. No wheezing.  GI:  Abdomen is soft, nontender. No hepatosplenomegaly.  Lymphatic: No lymphadenopathy.  Musculoskeletal: Good range of motion in all major joints. Gait is normal.  Skin: Warm and dry. No significant rash or masses noted.  Psych: Alert and oriented. Normal mood and affect.    INVESTIGATIONS:   cxr is negative     Assessment:  1. Acute bronchiolitis due to unspecified organism    2. Seasonal allergic rhinitis due to other allergic trigger        Plan:  She was advised to quit smoking.  Orders Placed This Encounter   • azithromycin (ZITHROMAX) 250 MG tablet   • methylPREDNISolone (MEDROL, JACKIE,) 4 MG tablet   • cetirizine (ZYRTEC) 10 MG tablet         Return in about 1 year (around 9/8/2018), or if symptoms worsen or fail to improve.    There are no Patient Instructions on file for this visit.    The patient indicates understanding of these issues and agrees with the plan, all Her questions were answered to Her satisfaction.

## 2021-02-17 DIAGNOSIS — M54.42 LEFT-SIDED LOW BACK PAIN WITH LEFT-SIDED SCIATICA, UNSPECIFIED CHRONICITY: ICD-10-CM

## 2021-02-18 RX ORDER — TIZANIDINE 4 MG/1
TABLET ORAL
Qty: 90 TABLET | Refills: 0 | Status: SHIPPED | OUTPATIENT
Start: 2021-02-18 | End: 2021-03-31

## 2021-03-05 DIAGNOSIS — G71.00 MD (MUSCULAR DYSTROPHY) (HCC): ICD-10-CM

## 2021-03-05 DIAGNOSIS — G62.9 NEUROPATHY: ICD-10-CM

## 2021-03-05 RX ORDER — GABAPENTIN 600 MG/1
600 TABLET ORAL 3 TIMES DAILY
Qty: 90 TABLET | Refills: 1 | Status: SHIPPED | OUTPATIENT
Start: 2021-03-05 | End: 2021-05-11 | Stop reason: SDUPTHER

## 2021-03-05 RX ORDER — HYDROCODONE BITARTRATE AND ACETAMINOPHEN 10; 325 MG/1; MG/1
TABLET ORAL
Qty: 90 TABLET | Refills: 0 | Status: SHIPPED | OUTPATIENT
Start: 2021-03-05 | End: 2021-04-08 | Stop reason: SDUPTHER

## 2021-03-05 NOTE — TELEPHONE ENCOUNTER
Mei Daigle called to request a refill on his medication. Last office visit : 2/18/2021   Next office visit : 3/12/2021     Last UDS:   Amphetamines   Date Value Ref Range Status   06/12/2013 NEGATIVE  Final     Amphetamine Screen, Urine   Date Value Ref Range Status   10/21/2020 neg  Final     Barbiturates   Date Value Ref Range Status   06/12/2013 NEGATIVE  Final     Barbiturate Screen, Urine   Date Value Ref Range Status   10/21/2020 neg  Final     Benzodiazepines   Date Value Ref Range Status   06/12/2013 NEGATIVE  Final     Benzodiazepine Screen, Urine   Date Value Ref Range Status   10/21/2020 neg  Final     Buprenorphine Urine   Date Value Ref Range Status   10/21/2020 neg  Final     Cocaine Metabolite Screen, Urine   Date Value Ref Range Status   10/21/2020 neg  Final     Gabapentin Screen, Urine   Date Value Ref Range Status   10/21/2020 neg  Final     MDMA, Urine   Date Value Ref Range Status   10/21/2020 neg  Final     Methamphetamine, Urine   Date Value Ref Range Status   10/21/2020 neg  Final     Opiate Scrn, Ur   Date Value Ref Range Status   10/21/2020 pos  Final     Oxycodone Screen, Ur   Date Value Ref Range Status   10/21/2020 pos  Final     PCP Screen, Urine   Date Value Ref Range Status   10/21/2020 neg  Final     Propoxyphene Screen, Urine   Date Value Ref Range Status   10/21/2020 neg  Final     THC Screen, Urine   Date Value Ref Range Status   10/21/2020 neg  Final     Tricyclic Antidepressants, Urine   Date Value Ref Range Status   10/21/2020 neg  Final       Last Lia Noeon: 3/5/2021  Medication Contract: 7/9/2019   Last Fill: 2/9/2021    Requested Prescriptions     Pending Prescriptions Disp Refills    HYDROcodone-acetaminophen (NORCO)  MG per tablet 90 tablet 0     Sig: TAKE 1 TABLET BY MOUTH EVERY 8 HOURS AS NEEDED FOR PAIN FOR UP TO 30 DAYS REDUCE DOSES TAKEN AS PAIN BECOMES MANAGEABLE         Please approve or refuse this medication.    Chanell Esteves

## 2021-03-12 ENCOUNTER — VIRTUAL VISIT (OUTPATIENT)
Dept: PRIMARY CARE CLINIC | Age: 56
End: 2021-03-12
Payer: MEDICARE

## 2021-03-12 DIAGNOSIS — Z00.00 ROUTINE GENERAL MEDICAL EXAMINATION AT A HEALTH CARE FACILITY: Primary | ICD-10-CM

## 2021-03-12 DIAGNOSIS — Z71.89 ADVANCE CARE PLANNING: ICD-10-CM

## 2021-03-12 PROCEDURE — 99497 ADVNCD CARE PLAN 30 MIN: CPT | Performed by: NURSE PRACTITIONER

## 2021-03-12 PROCEDURE — 3017F COLORECTAL CA SCREEN DOC REV: CPT | Performed by: NURSE PRACTITIONER

## 2021-03-12 PROCEDURE — G0439 PPPS, SUBSEQ VISIT: HCPCS | Performed by: NURSE PRACTITIONER

## 2021-03-12 PROCEDURE — G8482 FLU IMMUNIZE ORDER/ADMIN: HCPCS | Performed by: NURSE PRACTITIONER

## 2021-03-12 ASSESSMENT — PATIENT HEALTH QUESTIONNAIRE - PHQ9
2. FEELING DOWN, DEPRESSED OR HOPELESS: 1
SUM OF ALL RESPONSES TO PHQ9 QUESTIONS 1 & 2: 2

## 2021-03-12 ASSESSMENT — LIFESTYLE VARIABLES
HAS A RELATIVE, FRIEND, DOCTOR, OR ANOTHER HEALTH PROFESSIONAL EXPRESSED CONCERN ABOUT YOUR DRINKING OR SUGGESTED YOU CUT DOWN: 0
HAVE YOU OR SOMEONE ELSE BEEN INJURED AS A RESULT OF YOUR DRINKING: 0
HOW OFTEN DURING THE LAST YEAR HAVE YOU NEEDED AN ALCOHOLIC DRINK FIRST THING IN THE MORNING TO GET YOURSELF GOING AFTER A NIGHT OF HEAVY DRINKING: 0
HOW MANY STANDARD DRINKS CONTAINING ALCOHOL DO YOU HAVE ON A TYPICAL DAY: 0
AUDIT-C TOTAL SCORE: 1
HOW OFTEN DURING THE LAST YEAR HAVE YOU HAD A FEELING OF GUILT OR REMORSE AFTER DRINKING: 0

## 2021-03-12 NOTE — PATIENT INSTRUCTIONS
Learning About Living Perroy  What is a living will? A living will, also called a declaration, is a legal form. It tells your family and your doctor your wishes when you can't speak for yourself. It's used by the health professionals who will treat you as you near the end of your life or if you get seriously hurt or ill. If you put your wishes in writing, your loved ones and others will know what kind of care you want. They won't need to guess. This can ease your mind and be helpful to others. And you can change or cancel your living will at any time. A living will is not the same as an estate or property will. An estate will explains what you want to happen with your money and property after you die. How do you use it? A living will is used to describe the kinds of treatment or life support you want as you near the end of your life or if you get seriously hurt or ill. Keep these facts in mind about living stewart. · Your living will is used only if you can't speak or make decisions for yourself. Most often, one or more doctors must certify that you can't speak or decide for yourself before your living will takes effect. · If you get better and can speak for yourself again, you can accept or refuse any treatment. It doesn't matter what you said in your living will. · Some states may limit your right to refuse treatment in certain cases. For example, you may need to clearly state in your living will that you don't want artificial hydration and nutrition, such as being fed through a tube. Is a living will a legal document? A living will is a legal document. Each state has its own laws about living stewart. And a living will may be called something else in your state. Here are some things to know about living stewart. · You don't need an  to complete a living will. But legal advice can be helpful if your state's laws are unclear.  It can also help if your health history is complicated or your family can't agree on what should be in your living will. · You can change your living will at any time. Some people find that their wishes about end-of-life care change as their health changes. If you make big changes to your living will, complete a new form. · If you move to another state, make sure that your living will is legal in the state where you now live. In most cases, doctors will respect your wishes even if you have a form from a different state. · You might use a universal form that has been approved by many states. This kind of form can sometimes be filled out and stored online. Your digital copy will then be available wherever you have a connection to the internet. The doctors and nurses who need to treat you can find it right away. · Your state may offer an online registry. This is another place where you can store your living will online. · It's a good idea to get your living will notarized. This means using a person called a  to watch two people sign, or witness, your living will. What should you know when you create a living will? Here are some questions to ask yourself as you make your living will:  · Do you know enough about life support methods that might be used? If not, talk to your doctor so you know what might be done if you can't breathe on your own, your heart stops, or you can't swallow. · What things would you still want to be able to do after you receive life-support methods? Would you want to be able to walk? To speak? To eat on your own? To live without the help of machines? · Do you want certain Roman Catholic practices performed if you become very ill? · If you have a choice, where do you want to be cared for? In your home? At a hospital or nursing home? · If you have a choice at the end of your life, where would you prefer to die? At home? In a hospital or nursing home? Somewhere else? · Would you prefer to be buried or cremated?   · Do you want your organs to be donated after you die? What should you do with your living will? · Make sure that your family members and your health care agent have copies of your living will (also called a declaration). · Give your doctor a copy of your living will. Ask him or her to keep it as part of your medical record. If you have more than one doctor, make sure that each one has a copy. · Put a copy of your living will where it can be easily found. For example, some people may put a copy on their refrigerator door. If you are using a digital copy, be sure your doctor, family members, and health care agent know how to find and access it. Where can you learn more? Go to https://The [email or web address] org and sign in to your [employer] account. Enter H768 in the Wedge Buster box to learn more about \"Learning About Living Perromelanie. \"     If you do not have an account, please click on the \"Sign Up Now\" link. Current as of: July 17, 2020               Content Version: 12.8  © 7489-9364 Healthwise, [employer]. Care instructions adapted under license by [facility] ([facility]). If you have questions about a medical condition or this instruction, always ask your healthcare professional. [place] [address] any warranty or liability for your use of this information. Personalized Preventive Plan for Jonathan [place] - 3/12/2021  Medicare offers a range of preventive health benefits. Some of the tests and screenings are paid in full while other may be subject to a deductible, co-insurance, and/or copay. Some of these benefits include a comprehensive review of your medical history including lifestyle, illnesses that may run in your family, and various assessments and screenings as appropriate. After reviewing your medical record and screening and assessments performed today your provider may have ordered immunizations, labs, imaging, and/or referrals for you.   A list of these orders (if applicable) as well as your Preventive Care list are included within your After Visit Summary for your review. Other Preventive Recommendations:    · A preventive eye exam performed by an eye specialist is recommended every 1-2 years to screen for glaucoma; cataracts, macular degeneration, and other eye disorders. · A preventive dental visit is recommended every 6 months. · Try to get at least 150 minutes of exercise per week or 10,000 steps per day on a pedometer . · Order or download the FREE \"Exercise & Physical Activity: Your Everyday Guide\" from [employer]. Call 3-265.510.2114 or search [employer] online. · You need 2038-8647 mg of calcium and 2655-3314 IU of vitamin D per day. It is possible to meet your calcium requirement with diet alone, but a vitamin D supplement is usually necessary to meet this goal.  · When exposed to the sun, use a sunscreen that protects against both UVA and UVB radiation with an SPF of 30 or greater. Reapply every 2 to 3 hours or after sweating, drying off with a towel, or swimming. · Always wear a seat belt when traveling in a car. Always wear a helmet when riding a bicycle or motorcycle.

## 2021-03-12 NOTE — PROGRESS NOTES
Family History   Problem Relation Age of Onset    Diabetes Mother     Colon Cancer Neg Hx     Colon Polyps Neg Hx     Esophageal Cancer Neg Hx     Liver Cancer Neg Hx     Liver Disease Neg Hx     Rectal Cancer Neg Hx     Stomach Cancer Neg Hx        CareTeam (Including outside providers/suppliers regularly involved in providing care):   Patient Care Team:  ANTHONY Harris as PCP - General (Nurse Practitioner Family)  ANTHONY Harris as PCP - [facility] Empaneled Provider  ANTHONY Brooks as Advanced Practice Nurse (Family Nurse Practitioner)    Wt Readings from Last 3 Encounters:   12/02/20 196 lb (88.9 kg)   10/21/20 186 lb (84.4 kg)   02/18/20 183 lb (83 kg)      Patient-Reported Vitals 3/12/2021   Patient-Reported Weight 190 lb   Patient-Reported Height 5'11\"      There is no height or weight on file to calculate BMI. Based upon direct observation of the patient, evaluation of cognition reveals recent and remote memory intact. Patient's complete Health Risk Assessment and screening values have been reviewed and are found in Flowsheets. The following problems were reviewed today and where indicated follow up appointments were made and/or referrals ordered. Positive Risk Factor Screenings with Interventions:     Fall Risk:  2 or more falls in past year?: (!) yes  Fall with injury in past year?: no  Fall Risk Interventions:    · Home safety tips provided        General Health and ACP:  General  In general, how would you say your health is?: Fair  In the past 7 days, have you experienced any of the following?  New or Increased Pain, New or Increased Fatigue, Loneliness, Social Isolation, Stress or Anger?: None of These  Do you get the social and emotional support that you need?: Yes  Do you have a Living Will?: Yes  Advance Directives     Power of [address] [place] [address] Will ACP-Advance Directive ACP-Power of     Not on File Not on File Not on File Not on [facility] Interventions:  · No Living Will: Advance Care Planning addressed with patient today    Health Habits/Nutrition:  Health Habits/Nutrition  Do you exercise for at least 20 minutes 2-3 times per week?: (!) No  Have you lost any weight without trying in the past 3 months?: No  Do you eat only one meal per day?: No  Have you seen the dentist within the past year?: (!) No     Health Habits/Nutrition Interventions:  · Inadequate physical activity:  educational materials provided to promote increased physical activity    Hearing/Vision:  No exam data present  Hearing/Vision  Do you or your family notice any trouble with your hearing that hasn't been managed with hearing aids?: No  Do you have difficulty driving, watching TV, or doing any of your daily activities because of your eyesight?: No  Have you had an eye exam within the past year?: (!) No  Hearing/Vision Interventions:  · Vision concerns:  patient encouraged to make appointment with his/her eye specialist    Safety:  Safety  Do you have working smoke detectors?: Yes  Have all throw rugs been removed or fastened?: Yes  Do you have non-slip mats or surfaces in all bathtubs/showers?: (!) No  Do all of your stairways have a railing or banister?: Yes  Are your doorways, halls and stairs free of clutter?: (!) No  Do you always fasten your seatbelt when you are in a car?: Yes  Safety Interventions:  · Home safety tips provided     Personalized Preventive Plan   Current Health Maintenance Status  Immunization History   Administered Date(s) Administered    Hepatitis A 12/09/2015, 06/01/2016    Hepatitis B (Engerix-B) 12/09/2015, 01/09/2016, 06/01/2016    Influenza Vaccine, unspecified formulation 11/28/2017    Influenza, Quadv, IM, PF (6 mo and older Fluzone, Flulaval, Fluarix, and 3 yrs and older Afluria) 10/21/2020    Pneumococcal Polysaccharide ([id number]) 12/12/2017    Tdap (Boostrix, Adacel) 03/13/2012        Health Maintenance   Topic Date Due    Shingles Vaccine (1 of 2) Never done    DTaP/Tdap/Td vaccine (2 - Td) 03/13/2022    Lipid screen  06/19/2025    Colon cancer screen colonoscopy  01/08/2026    Hepatitis B vaccine  Completed    Flu vaccine  Completed    HIV screen  Addressed    Hepatitis A vaccine  Aged Out    Hib vaccine  Aged Out    Meningococcal (ACWY) vaccine  Aged Out    Pneumococcal 0-64 years Vaccine  Aged Out     Recommendations for [employer] Due: see orders and patient instructions/AVS.  . Recommended screening schedule for the next 5-10 years is provided to the patient in written form: see Patient Instructions/AVS.    There are no diagnoses linked to this encounter. Marc Pearl is a 61 y.o. male being evaluated by a Virtual Visit (phone) encounter to address concerns as mentioned above. A caregiver was present when appropriate. Due to this being a TeleHealth encounter (During SPPOT-37 public health emergency), evaluation of the following organ systems was limited: Vitals/Constitutional/EENT/Resp/CV/GI//MS/Neuro/Skin/Heme-Lymph-Imm. Pursuant to the emergency declaration under the [address] [place] [address], [address] [place] [address] authority and the [employer] Act, this Virtual Visit was conducted with patient's (and/or legal guardian's) consent, to reduce the patient's risk of exposure to COVID-19 and provide necessary medical care. The patient (and/or legal guardian) has also been advised to contact this office for worsening conditions or problems, and seek emergency medical treatment and/or call 911 if deemed necessary. Patient identification was verified at the start of the visit: Yes    Services were provided through phone to substitute for in-person clinic visit. Patient and provider were located at their individual homes. --ANTHONY Balderas on 3/12/2021 at 9:56 AM    An electronic signature was used to authenticate this note.     Advance Care Planning   Advanced Care Planning: Discussed the patients choices for care and treatment in case of a health event that adversely affects decision-making abilities. Also discussed the patients long-term treatment options. Reviewed the process of designating a Health Care Surrogate as defined by the [facility]. Reviewed the [facility] Will Directive process and the kinds of life-sustaining treatments the patient would like to receive should they become terminally ill or permanently unconscious. Explained the state requirement to complete the forms in the presence of two eligible witnesses OR in the presence of a . Patient was asked to provide a copy of the signed forms to the practice office.   Time spent (minutes): 30

## 2021-03-30 DIAGNOSIS — M54.42 LEFT-SIDED LOW BACK PAIN WITH LEFT-SIDED SCIATICA, UNSPECIFIED CHRONICITY: ICD-10-CM

## 2021-03-31 RX ORDER — TIZANIDINE 4 MG/1
TABLET ORAL
Qty: 90 TABLET | Refills: 0 | Status: SHIPPED | OUTPATIENT
Start: 2021-03-31 | End: 2021-05-04

## 2021-04-08 DIAGNOSIS — G71.00 MD (MUSCULAR DYSTROPHY) (HCC): ICD-10-CM

## 2021-04-08 RX ORDER — HYDROCODONE BITARTRATE AND ACETAMINOPHEN 10; 325 MG/1; MG/1
TABLET ORAL
Qty: 90 TABLET | Refills: 0 | Status: SHIPPED | OUTPATIENT
Start: 2021-04-08 | End: 2021-05-11 | Stop reason: SDUPTHER

## 2021-04-08 NOTE — TELEPHONE ENCOUNTER
Saira Cui called to request a refill on his medication. Last office visit : 3/12/2021   Next office visit : 4/13/2021     Last UDS:   Amphetamines   Date Value Ref Range Status   06/12/2013 NEGATIVE  Final     Amphetamine Screen, Urine   Date Value Ref Range Status   10/21/2020 neg  Final     Barbiturates   Date Value Ref Range Status   06/12/2013 NEGATIVE  Final     Barbiturate Screen, Urine   Date Value Ref Range Status   10/21/2020 neg  Final     Benzodiazepines   Date Value Ref Range Status   06/12/2013 NEGATIVE  Final     Benzodiazepine Screen, Urine   Date Value Ref Range Status   10/21/2020 neg  Final     Buprenorphine Urine   Date Value Ref Range Status   10/21/2020 neg  Final     Cocaine Metabolite Screen, Urine   Date Value Ref Range Status   10/21/2020 neg  Final     Gabapentin Screen, Urine   Date Value Ref Range Status   10/21/2020 neg  Final     MDMA, Urine   Date Value Ref Range Status   10/21/2020 neg  Final     Methamphetamine, Urine   Date Value Ref Range Status   10/21/2020 neg  Final     Opiate Scrn, Ur   Date Value Ref Range Status   10/21/2020 pos  Final     Oxycodone Screen, Ur   Date Value Ref Range Status   10/21/2020 pos  Final     PCP Screen, Urine   Date Value Ref Range Status   10/21/2020 neg  Final     Propoxyphene Screen, Urine   Date Value Ref Range Status   10/21/2020 neg  Final     THC Screen, Urine   Date Value Ref Range Status   10/21/2020 neg  Final     Tricyclic Antidepressants, Urine   Date Value Ref Range Status   10/21/2020 neg  Final       Last Negrita Viera: 3/8/2021  Medication Contract: 7/9/2019   Last Fill: 3/5/2021    Requested Prescriptions     Pending Prescriptions Disp Refills    HYDROcodone-acetaminophen (NORCO)  MG per tablet 90 tablet 0     Sig: TAKE 1 TABLET BY MOUTH EVERY 8 HOURS AS NEEDED FOR PAIN FOR UP TO 30 DAYS REDUCE DOSES TAKEN AS PAIN BECOMES MANAGEABLE         Please approve or refuse this medication.    Toshia Whitehead

## 2021-04-08 NOTE — TELEPHONE ENCOUNTER
UDS and ALEJANDRA consistent w/ prescribed controlled substances. There are no signs of any abuse, misuse, or usage of the controlled substance in a way that is not directed.

## 2021-04-13 ENCOUNTER — OFFICE VISIT (OUTPATIENT)
Dept: PRIMARY CARE CLINIC | Age: 56
End: 2021-04-13
Payer: MEDICARE

## 2021-04-13 VITALS
SYSTOLIC BLOOD PRESSURE: 116 MMHG | TEMPERATURE: 97.3 F | OXYGEN SATURATION: 98 % | BODY MASS INDEX: 27.86 KG/M2 | HEIGHT: 71 IN | WEIGHT: 199 LBS | DIASTOLIC BLOOD PRESSURE: 76 MMHG | HEART RATE: 94 BPM | RESPIRATION RATE: 18 BRPM

## 2021-04-13 DIAGNOSIS — R10.11 RUQ PAIN: ICD-10-CM

## 2021-04-13 DIAGNOSIS — Z79.899 DRUG THERAPY: ICD-10-CM

## 2021-04-13 DIAGNOSIS — G71.00 MD (MUSCULAR DYSTROPHY) (HCC): Primary | ICD-10-CM

## 2021-04-13 DIAGNOSIS — G62.9 NEUROPATHY: ICD-10-CM

## 2021-04-13 DIAGNOSIS — F41.9 ANXIETY: ICD-10-CM

## 2021-04-13 PROCEDURE — 1036F TOBACCO NON-USER: CPT | Performed by: NURSE PRACTITIONER

## 2021-04-13 PROCEDURE — G8419 CALC BMI OUT NRM PARAM NOF/U: HCPCS | Performed by: NURSE PRACTITIONER

## 2021-04-13 PROCEDURE — 99214 OFFICE O/P EST MOD 30 MIN: CPT | Performed by: NURSE PRACTITIONER

## 2021-04-13 PROCEDURE — G8427 DOCREV CUR MEDS BY ELIG CLIN: HCPCS | Performed by: NURSE PRACTITIONER

## 2021-04-13 PROCEDURE — 3017F COLORECTAL CA SCREEN DOC REV: CPT | Performed by: NURSE PRACTITIONER

## 2021-04-13 PROCEDURE — 80305 DRUG TEST PRSMV DIR OPT OBS: CPT | Performed by: NURSE PRACTITIONER

## 2021-04-13 NOTE — PROGRESS NOTES
[address] [facility]  [address] [place] [address] [place] [address]  Dept: [phone]  Dept Fax: [phone]  Loc: [phone]    Jimena Ramirez is a 61 y.o. male who presents today for his medical conditions/complaints as noted below. Jimena Ramirez is c/o of Chronic Pain (UDS and updated med contract due RAMA chronic knee pain follow up States R knee is having a constant tingling sensation ) and Abdominal Pain (RUQ pain that shoots up into breast at times, pt still has gallbladder, pt states he has had nausea and vomitting spells as well pt states he eats a lot of spicy foods but that does not make much difference with the pain. )      Chief Complaint   Patient presents with    Chronic Pain     UDS and updated med contract due RAMA chronic knee pain follow up States R knee is having a constant tingling sensation     Abdominal Pain     RUQ pain that shoots up into breast at times, pt still has gallbladder, pt states he has had nausea and vomitting spells as well pt states he eats a lot of spicy foods but that does not make much difference with the pain. HPI:     HPI  Patient is here for follow up on chronic conditions including chronic pain related to the muscle dystrophy, anxiety, and neuropathy. He has been taking his meds and they have been working well. He has noticed however some bilateral knee pain with ambulation. He reports that after he sits for a while the pain tends to be worse due to stiffening. Patient is also complaining of RUQ pain that is radiating up into the right shoulder some. He has noticed some increased nausea with the symptoms as well.       Past Medical History:   Diagnosis Date    Anxiety     Chronic back pain     Depression     MD (muscular dystrophy) ([facility]     Neuropathy 12/2/2020    Scoliosis         Past Surgical History:   Procedure Laterality Date    COLONOSCOPY  1/8/16    Dr Med Sim, normal, 10 yr recall    FOOT SURGERY Left        Social History     Tobacco Use    Smoking status: Never Smoker    Smokeless tobacco: Never Used   Substance Use Topics    Alcohol use: No     Comment:  Last drink was 08/07/2015        Current Outpatient Medications   Medication Sig Dispense Refill    HYDROcodone-acetaminophen (NORCO)  MG per tablet TAKE 1 TABLET BY MOUTH EVERY 8 HOURS AS NEEDED FOR PAIN FOR UP TO 30 DAYS REDUCE DOSES TAKEN AS PAIN BECOMES MANAGEABLE 90 tablet 0    tiZANidine (ZANAFLEX) 4 MG tablet TAKE 1 TABLET BY MOUTH THREE TIMES DAILY 90 tablet 0    FLUoxetine (PROZAC) 40 MG capsule TAKE 1 CAPSULE BY MOUTH ONCE DAILY 90 capsule 1    triamcinolone (KENALOG) 0.025 % cream Apply topically 2 times daily. 80 g 1    diclofenac sodium (VOLTAREN) 1 % GEL Apply 2 g topically 4 times daily 2 Tube 1    meloxicam (MOBIC) 15 MG tablet TAKE 1 TABLET BY MOUTH ONCE DAILY 30 tablet 11    ketoconazole (NIZORAL) 2 % shampoo Apply topically daily as needed. 120 mL 1    amitriptyline (ELAVIL) 50 MG tablet TAKE ONE TABLET BY MOUTH IN THE EVENING 30 tablet 11    gabapentin (NEURONTIN) 600 MG tablet Take 1 tablet by mouth 3 times daily for 30 days. 90 tablet 1     No current facility-administered medications for this visit. Allergies   Allergen Reactions    Shellfish-Derived Products Anaphylaxis       Family History   Problem Relation Age of Onset    Diabetes Mother     Colon Cancer Neg Hx     Colon Polyps Neg Hx     Esophageal Cancer Neg Hx     Liver Cancer Neg Hx     Liver Disease Neg Hx     Rectal Cancer Neg Hx     Stomach Cancer Neg Hx            Subjective:      Review of Systems   Constitutional: Negative. HENT: Negative. Eyes: Negative. Respiratory: Negative. Cardiovascular: Negative. Gastrointestinal: Positive for abdominal pain (RUQ). Endocrine: Negative. Genitourinary: Negative. Musculoskeletal: Positive for arthralgias and back pain. Skin: Negative. Neurological: Negative. Hematological: Negative. Psychiatric/Behavioral: Negative. Objective:     Physical Exam  Vitals signs and nursing note reviewed. Constitutional:       General: He is not in acute distress. Appearance: He is well-developed. HENT:      Head: Normocephalic and atraumatic. Nose: Nose normal.   Eyes:      General: No scleral icterus. Conjunctiva/sclera: Conjunctivae normal.   Neck:      Musculoskeletal: Normal range of motion and neck supple. Cardiovascular:      Rate and Rhythm: Normal rate and regular rhythm. Heart sounds: Normal heart sounds. Pulmonary:      Effort: Pulmonary effort is normal. No respiratory distress. Breath sounds: Normal breath sounds. No wheezing or rales. Chest:      Chest wall: No tenderness. Abdominal:      General: There is no distension. Palpations: Abdomen is soft. Tenderness: There is no abdominal tenderness. There is no guarding or rebound. Musculoskeletal:      Thoracic back: He exhibits decreased range of motion. Lumbar back: He exhibits decreased range of motion. Skin:     General: Skin is warm and dry. Findings: No rash. Neurological:      Mental Status: He is alert and oriented to person, place, and time. Motor: Abnormal muscle tone present. Coordination: Coordination normal.      Gait: Gait abnormal.      Deep Tendon Reflexes: Reflexes are normal and symmetric. Comments: Decreased strength on right. Right foot drop. Brace on right leg   Psychiatric:         Behavior: Behavior normal.         Thought Content: Thought content normal.         Judgment: Judgment normal.         /76   Pulse 94   Temp 97.3 °F (36.3 °C) (Temporal)   Resp 18   Ht 5' 11\" (1.803 m)   Wt 199 lb (90.3 kg)   SpO2 98%   BMI 27.75 kg/m²     Assessment:      Diagnosis Orders   1. [facility]     2. Drug therapy  POCT Rapid Drug Screen   3. Neuropathy     4. Anxiety     5.  RUQ pain  US GALLBLADDER RUQ       Results for orders placed or performed in visit on 04/13/21   POCT Rapid Drug Screen   Result Value Ref Range    Alcohol, Urine -     Amphetamine Screen, Urine -     Barbiturate Screen, Urine -     Benzodiazepine Screen, Urine -     Buprenorphine Urine -     Cocaine Metabolite Screen, Urine -     FENTANYL SCREEN, URINE -     Gabapentin Screen, Urine -     MDMA, Urine -     Methadone Screen, Urine -     Methamphetamine, Urine -     Opiate Scrn, Ur +     Oxycodone Screen, Ur -     PCP Screen, Urine -     Propoxyphene Screen, Urine -     Synthetic Cannabinoids (K2) Screen, Urine -     THC Screen, Urine -     Tramadol Scrn, Ur -     Tricyclic Antidepressants, Urine -        Plan: Will continue current regimen. US RUQ due to pain and discomfort. If negative will plan to check HIDA scan. Return in about 3 months (around 7/13/2021) for Office Visit, Refill with UDS. Orders Placed This Encounter   Procedures    US GALLBLADDER RUQ     Standing Status:   Future     Standing Expiration Date:   4/13/2022    POCT Rapid Drug Screen       No orders of the defined types were placed in this encounter. Patient offered educational handouts and has had all questions answered. Patient voices understanding and agrees to plans along with risks and benefits of plan. Patient is instructed to continue prior meds, diet, and exercise plans as instructed. Patient agrees to follow up as instructed and sooner if needed. Patient agrees to go to ER if condition becomes emergent. EMR Dragon/transcription disclaimer: Some of this encounter note is an electronic transcription/translation of spoken language to printed text. The electronic translation of spoken language may permit erroneous, or at times, nonsensical words or phrases to be inadvertently transcribed.  Although I have reviewed the note for such errors, some may still exist.    Electronically signed by ANTHONY Sorenson on 4/14/2021 at 7:09 AM

## 2021-04-14 ASSESSMENT — ENCOUNTER SYMPTOMS
ABDOMINAL PAIN: 1
BACK PAIN: 1
RESPIRATORY NEGATIVE: 1
EYES NEGATIVE: 1

## 2021-04-15 ENCOUNTER — HOSPITAL ENCOUNTER (OUTPATIENT)
Dept: ULTRASOUND IMAGING | Age: 56
Discharge: HOME | End: 2021-04-15
Payer: MEDICARE

## 2021-04-15 DIAGNOSIS — R10.11 RUQ PAIN: ICD-10-CM

## 2021-04-15 PROCEDURE — 76705 ECHO EXAM OF ABDOMEN: CPT

## 2021-04-16 ENCOUNTER — TELEPHONE (OUTPATIENT)
Dept: PRIMARY CARE CLINIC | Age: 56
End: 2021-04-16

## 2021-04-16 DIAGNOSIS — K82.8 SLUDGE IN GALLBLADDER: Primary | ICD-10-CM

## 2021-04-21 ENCOUNTER — OFFICE VISIT (OUTPATIENT)
Dept: SURGERY | Age: 56
End: 2021-04-21
Payer: MEDICARE

## 2021-04-21 VITALS
BODY MASS INDEX: 28.19 KG/M2 | WEIGHT: 201.4 LBS | TEMPERATURE: 97.3 F | SYSTOLIC BLOOD PRESSURE: 128 MMHG | HEIGHT: 71 IN | DIASTOLIC BLOOD PRESSURE: 80 MMHG

## 2021-04-21 DIAGNOSIS — K81.1 CHRONIC CHOLECYSTITIS: Primary | ICD-10-CM

## 2021-04-21 PROCEDURE — G8428 CUR MEDS NOT DOCUMENT: HCPCS | Performed by: PHYSICIAN ASSISTANT

## 2021-04-21 PROCEDURE — 99203 OFFICE O/P NEW LOW 30 MIN: CPT | Performed by: PHYSICIAN ASSISTANT

## 2021-04-21 PROCEDURE — 1036F TOBACCO NON-USER: CPT | Performed by: PHYSICIAN ASSISTANT

## 2021-04-21 PROCEDURE — 3017F COLORECTAL CA SCREEN DOC REV: CPT | Performed by: PHYSICIAN ASSISTANT

## 2021-04-21 PROCEDURE — G8419 CALC BMI OUT NRM PARAM NOF/U: HCPCS | Performed by: PHYSICIAN ASSISTANT

## 2021-04-21 RX ORDER — BUSPIRONE HYDROCHLORIDE 5 MG/1
TABLET ORAL
COMMUNITY
Start: 2021-03-25 | End: 2021-07-20

## 2021-04-21 NOTE — LETTER
Preop Orders:     Patient: Selina Morning  : 1965    Hospital:  [facility] or [id number] [facility]    Admitting Physician:   or Demario Menendez    Diagnosis: acalculous cholecystitis                      Procedure: laparoscopic or robotic cholecystectomy with icg    Anesthesia: General    Admission:Outpatient    Date:   Next available    Labs:per anesthesia    Pre-Op Meds:  Kefzol 2grm IV    Latex Allergy: no    Beta Blocker: no    Medication Instructions: No plavix for 2 weeks prior to procedure; no asprin for 3 days prior to procedure    This has been electronically signed by Monique Garcias M.D.

## 2021-04-21 NOTE — PROGRESS NOTES
Belching, bloating ruq pain not relieived with pain medication. HISTORY OF PRESENT ILLNESS:  Sher Bruno comes today to discuss possible gallbladder surgery. He reports some belching and bloating with right upper quadrant pain that is not relieved with pain medication. Recent ultrasound has shown internal echoes and sludge/debris. We discussed the pathophysiology of gallbladder disease and the risk benefits and alternatives of surgery. Sher Bruno wishes to proceed. Patient Active Problem List    Diagnosis Date Noted    Chronic pain of right knee 12/02/2020    Neuropathy 12/02/2020    Major depressive disorder with single episode, in full remission ([facility] 09/20/2018    MD (muscular dystrophy) ([facility] 06/13/2018    History of hepatitis C virus infection 03/21/2017    History of hepatitis C 06/29/2016    History of hepatitis A vaccination 06/29/2016    History of hepatitis B vaccination 06/29/2016    Transaminitis 08/06/2015    Hepatitis C antibody test positive 08/06/2015     Current Outpatient Medications   Medication Sig Dispense Refill    HYDROcodone-acetaminophen (NORCO)  MG per tablet TAKE 1 TABLET BY MOUTH EVERY 8 HOURS AS NEEDED FOR PAIN FOR UP TO 30 DAYS REDUCE DOSES TAKEN AS PAIN BECOMES MANAGEABLE 90 tablet 0    tiZANidine (ZANAFLEX) 4 MG tablet TAKE 1 TABLET BY MOUTH THREE TIMES DAILY 90 tablet 0    FLUoxetine (PROZAC) 40 MG capsule TAKE 1 CAPSULE BY MOUTH ONCE DAILY 90 capsule 1    triamcinolone (KENALOG) 0.025 % cream Apply topically 2 times daily.  80 g 1    diclofenac sodium (VOLTAREN) 1 % GEL Apply 2 g topically 4 times daily 2 Tube 1    meloxicam (MOBIC) 15 MG tablet TAKE 1 TABLET BY MOUTH ONCE DAILY 30 tablet 11    amitriptyline (ELAVIL) 50 MG tablet TAKE ONE TABLET BY MOUTH IN THE EVENING 30 tablet 11    busPIRone (BUSPAR) 5 MG tablet TAKE 1 TABLET BY MOUTH TWICE DAILY AS NEEDED FOR ANXIETY      gabapentin (NEURONTIN) 600 MG tablet Take 1 tablet by mouth 3 times daily for normal.      IMPRESSION:  Acalculous cholecystitis      Diagnosis Date    Anxiety     Chronic back pain     Depression     MD [facility] ([facility]     Neuropathy 12/2/2020    Scoliosis      PLAN:  The risks, benefits, and options robotic were discussed with the patient. He  is willing to proceed with surgery.

## 2021-04-23 ENCOUNTER — HOSPITAL ENCOUNTER (OUTPATIENT)
Dept: NON INVASIVE DIAGNOSTICS | Age: 56
Discharge: HOME | End: 2021-04-23
Payer: MEDICARE

## 2021-04-23 ENCOUNTER — OFFICE VISIT (OUTPATIENT)
Age: 56
End: 2021-04-23

## 2021-04-23 ENCOUNTER — ANESTHESIA EVENT (OUTPATIENT)
Dept: OPERATING ROOM | Age: 56
End: 2021-04-23

## 2021-04-23 VITALS — HEART RATE: 110 BPM | OXYGEN SATURATION: 100 % | TEMPERATURE: 97.2 F

## 2021-04-23 DIAGNOSIS — Z11.59 SCREENING FOR VIRAL DISEASE: Primary | ICD-10-CM

## 2021-04-23 DIAGNOSIS — Z01.818 PRE-OP TESTING: ICD-10-CM

## 2021-04-23 LAB
ALBUMIN SERPL-MCNC: 4.5 G/DL (ref 3.5–5.2)
ALP BLD-CCNC: 117 U/L (ref 40–130)
ALT SERPL-CCNC: 12 U/L (ref 5–41)
ANION GAP SERPL CALCULATED.3IONS-SCNC: 10 MMOL/L (ref 7–19)
AST SERPL-CCNC: 18 U/L (ref 5–40)
BILIRUB SERPL-MCNC: <0.2 MG/DL (ref 0.2–1.2)
BUN BLDV-MCNC: 15 MG/DL (ref 6–20)
CALCIUM SERPL-MCNC: 9.1 MG/DL (ref 8.6–10)
CHLORIDE BLD-SCNC: 102 MMOL/L (ref 98–111)
CO2: 27 MMOL/L (ref 22–29)
CREAT SERPL-MCNC: 0.4 MG/DL (ref 0.5–1.2)
GFR AFRICAN AMERICAN: >59
GFR NON-AFRICAN AMERICAN: >60
GLUCOSE BLD-MCNC: 86 MG/DL (ref 74–109)
POTASSIUM SERPL-SCNC: 4.7 MMOL/L (ref 3.5–5)
SARS-COV-2, PCR: NORMAL
SODIUM BLD-SCNC: 139 MMOL/L (ref 136–145)
TOTAL PROTEIN: 7.6 G/DL (ref 6.6–8.7)

## 2021-04-23 PROCEDURE — 93005 ELECTROCARDIOGRAM TRACING: CPT | Performed by: ANESTHESIOLOGY

## 2021-04-23 PROCEDURE — 99999 PR OFFICE/OUTPT VISIT,PROCEDURE ONLY: CPT | Performed by: NURSE PRACTITIONER

## 2021-04-24 LAB
EKG P AXIS: 11 DEGREES
EKG P-R INTERVAL: 144 MS
EKG Q-T INTERVAL: 434 MS
EKG QRS DURATION: 92 MS
EKG QTC CALCULATION (BAZETT): 437 MS
EKG T AXIS: 36 DEGREES

## 2021-04-24 PROCEDURE — 93010 ELECTROCARDIOGRAM REPORT: CPT | Performed by: INTERNAL MEDICINE

## 2021-04-27 ENCOUNTER — HOSPITAL ENCOUNTER (OUTPATIENT)
Age: 56
Setting detail: OUTPATIENT SURGERY
Discharge: HOME | End: 2021-04-27
Attending: SURGERY | Admitting: SURGERY
Payer: MEDICARE

## 2021-04-27 ENCOUNTER — ANESTHESIA (OUTPATIENT)
Dept: OPERATING ROOM | Age: 56
End: 2021-04-27

## 2021-04-27 ENCOUNTER — HOSPITAL ENCOUNTER (OUTPATIENT)
Age: 56
Setting detail: SPECIMEN
Discharge: HOME | End: 2021-04-27
Payer: MEDICARE

## 2021-04-27 VITALS
WEIGHT: 201 LBS | DIASTOLIC BLOOD PRESSURE: 89 MMHG | BODY MASS INDEX: 28.14 KG/M2 | RESPIRATION RATE: 20 BRPM | TEMPERATURE: 97.2 F | HEART RATE: 65 BPM | SYSTOLIC BLOOD PRESSURE: 135 MMHG | HEIGHT: 71 IN | OXYGEN SATURATION: 94 %

## 2021-04-27 VITALS
SYSTOLIC BLOOD PRESSURE: 107 MMHG | TEMPERATURE: 98 F | OXYGEN SATURATION: 99 % | DIASTOLIC BLOOD PRESSURE: 72 MMHG | RESPIRATION RATE: 21 BRPM

## 2021-04-27 DIAGNOSIS — Z01.818 PRE-OP TESTING: Primary | ICD-10-CM

## 2021-04-27 PROBLEM — K82.8 SLUDGE IN GALLBLADDER: Status: ACTIVE | Noted: 2021-04-27

## 2021-04-27 PROBLEM — K81.1 CHRONIC CHOLECYSTITIS: Status: ACTIVE | Noted: 2021-04-27

## 2021-04-27 PROCEDURE — G8916 PT W IV AB GIVEN ON TIME: HCPCS

## 2021-04-27 PROCEDURE — G8907 PT DOC NO EVENTS ON DISCHARG: HCPCS

## 2021-04-27 PROCEDURE — 88304 TISSUE EXAM BY PATHOLOGIST: CPT

## 2021-04-27 PROCEDURE — 47562 LAPAROSCOPIC CHOLECYSTECTOMY: CPT

## 2021-04-27 PROCEDURE — 47562 LAPAROSCOPIC CHOLECYSTECTOMY: CPT | Performed by: SURGERY

## 2021-04-27 RX ORDER — METOCLOPRAMIDE HYDROCHLORIDE 5 MG/ML
10 INJECTION INTRAMUSCULAR; INTRAVENOUS
Status: DISCONTINUED | OUTPATIENT
Start: 2021-04-27 | End: 2021-04-27 | Stop reason: HOSPADM

## 2021-04-27 RX ORDER — BUPIVACAINE HYDROCHLORIDE 2.5 MG/ML
INJECTION, SOLUTION EPIDURAL; INFILTRATION; INTRACAUDAL PRN
Status: DISCONTINUED | OUTPATIENT
Start: 2021-04-27 | End: 2021-04-27 | Stop reason: ALTCHOICE

## 2021-04-27 RX ORDER — MEPERIDINE HYDROCHLORIDE 25 MG/ML
12.5 INJECTION INTRAMUSCULAR; INTRAVENOUS; SUBCUTANEOUS EVERY 5 MIN PRN
Status: DISCONTINUED | OUTPATIENT
Start: 2021-04-27 | End: 2021-04-27 | Stop reason: HOSPADM

## 2021-04-27 RX ORDER — ONDANSETRON 2 MG/ML
INJECTION INTRAMUSCULAR; INTRAVENOUS PRN
Status: DISCONTINUED | OUTPATIENT
Start: 2021-04-27 | End: 2021-04-27 | Stop reason: SDUPTHER

## 2021-04-27 RX ORDER — HEPARIN SODIUM 5000 [USP'U]/ML
5000 INJECTION, SOLUTION INTRAVENOUS; SUBCUTANEOUS ONCE
Status: COMPLETED | OUTPATIENT
Start: 2021-04-27 | End: 2021-04-27

## 2021-04-27 RX ORDER — PROMETHAZINE HYDROCHLORIDE 25 MG/ML
6.25 INJECTION, SOLUTION INTRAMUSCULAR; INTRAVENOUS
Status: DISCONTINUED | OUTPATIENT
Start: 2021-04-27 | End: 2021-04-27 | Stop reason: HOSPADM

## 2021-04-27 RX ORDER — HYDRALAZINE HYDROCHLORIDE 20 MG/ML
5 INJECTION INTRAMUSCULAR; INTRAVENOUS EVERY 10 MIN PRN
Status: DISCONTINUED | OUTPATIENT
Start: 2021-04-27 | End: 2021-04-27 | Stop reason: HOSPADM

## 2021-04-27 RX ORDER — METOCLOPRAMIDE HYDROCHLORIDE 5 MG/ML
10 INJECTION INTRAMUSCULAR; INTRAVENOUS ONCE
Status: COMPLETED | OUTPATIENT
Start: 2021-04-27 | End: 2021-04-27

## 2021-04-27 RX ORDER — ENALAPRILAT 2.5 MG/2ML
1.25 INJECTION INTRAVENOUS
Status: DISCONTINUED | OUTPATIENT
Start: 2021-04-27 | End: 2021-04-27 | Stop reason: HOSPADM

## 2021-04-27 RX ORDER — DEXAMETHASONE SODIUM PHOSPHATE 10 MG/ML
INJECTION, SOLUTION INTRAMUSCULAR; INTRAVENOUS PRN
Status: DISCONTINUED | OUTPATIENT
Start: 2021-04-27 | End: 2021-04-27 | Stop reason: SDUPTHER

## 2021-04-27 RX ORDER — SODIUM CHLORIDE, SODIUM LACTATE, POTASSIUM CHLORIDE, CALCIUM CHLORIDE 600; 310; 30; 20 MG/100ML; MG/100ML; MG/100ML; MG/100ML
INJECTION, SOLUTION INTRAVENOUS CONTINUOUS
Status: DISCONTINUED | OUTPATIENT
Start: 2021-04-27 | End: 2021-04-27 | Stop reason: HOSPADM

## 2021-04-27 RX ORDER — EPHEDRINE SULFATE 50 MG/ML
INJECTION, SOLUTION INTRAVENOUS PRN
Status: DISCONTINUED | OUTPATIENT
Start: 2021-04-27 | End: 2021-04-27 | Stop reason: SDUPTHER

## 2021-04-27 RX ORDER — SCOLOPAMINE TRANSDERMAL SYSTEM 1 MG/1
1 PATCH, EXTENDED RELEASE TRANSDERMAL
Status: DISCONTINUED | OUTPATIENT
Start: 2021-04-27 | End: 2021-04-27 | Stop reason: HOSPADM

## 2021-04-27 RX ORDER — LIDOCAINE HYDROCHLORIDE 20 MG/ML
INJECTION, SOLUTION EPIDURAL; INFILTRATION; INTRACAUDAL; PERINEURAL PRN
Status: DISCONTINUED | OUTPATIENT
Start: 2021-04-27 | End: 2021-04-27 | Stop reason: SDUPTHER

## 2021-04-27 RX ORDER — MORPHINE SULFATE 10 MG/ML
4 INJECTION, SOLUTION INTRAMUSCULAR; INTRAVENOUS EVERY 5 MIN PRN
Status: DISCONTINUED | OUTPATIENT
Start: 2021-04-27 | End: 2021-04-27 | Stop reason: HOSPADM

## 2021-04-27 RX ORDER — ROCURONIUM BROMIDE 10 MG/ML
INJECTION, SOLUTION INTRAVENOUS PRN
Status: DISCONTINUED | OUTPATIENT
Start: 2021-04-27 | End: 2021-04-27 | Stop reason: SDUPTHER

## 2021-04-27 RX ORDER — HYDROCODONE BITARTRATE AND ACETAMINOPHEN 7.5; 325 MG/1; MG/1
2 TABLET ORAL EVERY 6 HOURS PRN
Status: DISCONTINUED | OUTPATIENT
Start: 2021-04-27 | End: 2021-04-27 | Stop reason: HOSPADM

## 2021-04-27 RX ORDER — HYDROMORPHONE HCL 110MG/55ML
0.5 PATIENT CONTROLLED ANALGESIA SYRINGE INTRAVENOUS EVERY 5 MIN PRN
Status: DISCONTINUED | OUTPATIENT
Start: 2021-04-27 | End: 2021-04-27 | Stop reason: HOSPADM

## 2021-04-27 RX ORDER — DIPHENHYDRAMINE HYDROCHLORIDE 50 MG/ML
12.5 INJECTION INTRAMUSCULAR; INTRAVENOUS
Status: DISCONTINUED | OUTPATIENT
Start: 2021-04-27 | End: 2021-04-27 | Stop reason: HOSPADM

## 2021-04-27 RX ORDER — MIDAZOLAM HYDROCHLORIDE 1 MG/ML
INJECTION INTRAMUSCULAR; INTRAVENOUS PRN
Status: DISCONTINUED | OUTPATIENT
Start: 2021-04-27 | End: 2021-04-27 | Stop reason: SDUPTHER

## 2021-04-27 RX ORDER — PROPOFOL 10 MG/ML
INJECTION, EMULSION INTRAVENOUS PRN
Status: DISCONTINUED | OUTPATIENT
Start: 2021-04-27 | End: 2021-04-27 | Stop reason: SDUPTHER

## 2021-04-27 RX ORDER — LABETALOL HYDROCHLORIDE 5 MG/ML
5 INJECTION, SOLUTION INTRAVENOUS EVERY 10 MIN PRN
Status: DISCONTINUED | OUTPATIENT
Start: 2021-04-27 | End: 2021-04-27 | Stop reason: HOSPADM

## 2021-04-27 RX ORDER — MORPHINE SULFATE 10 MG/ML
2 INJECTION, SOLUTION INTRAMUSCULAR; INTRAVENOUS EVERY 5 MIN PRN
Status: DISCONTINUED | OUTPATIENT
Start: 2021-04-27 | End: 2021-04-27 | Stop reason: HOSPADM

## 2021-04-27 RX ORDER — HYDROMORPHONE HCL 110MG/55ML
0.25 PATIENT CONTROLLED ANALGESIA SYRINGE INTRAVENOUS EVERY 5 MIN PRN
Status: DISCONTINUED | OUTPATIENT
Start: 2021-04-27 | End: 2021-04-27 | Stop reason: HOSPADM

## 2021-04-27 RX ORDER — FENTANYL CITRATE 50 UG/ML
INJECTION, SOLUTION INTRAMUSCULAR; INTRAVENOUS PRN
Status: DISCONTINUED | OUTPATIENT
Start: 2021-04-27 | End: 2021-04-27 | Stop reason: SDUPTHER

## 2021-04-27 RX ADMIN — PROPOFOL 200 MG: 10 INJECTION, EMULSION INTRAVENOUS at 09:26

## 2021-04-27 RX ADMIN — LIDOCAINE HYDROCHLORIDE 100 MG: 20 INJECTION, SOLUTION EPIDURAL; INFILTRATION; INTRACAUDAL; PERINEURAL at 09:26

## 2021-04-27 RX ADMIN — ROCURONIUM BROMIDE 30 MG: 10 INJECTION, SOLUTION INTRAVENOUS at 09:26

## 2021-04-27 RX ADMIN — EPHEDRINE SULFATE 10 MG: 50 INJECTION, SOLUTION INTRAVENOUS at 09:54

## 2021-04-27 RX ADMIN — FENTANYL CITRATE 100 MCG: 50 INJECTION, SOLUTION INTRAMUSCULAR; INTRAVENOUS at 09:26

## 2021-04-27 RX ADMIN — MIDAZOLAM HYDROCHLORIDE 2 MG: 1 INJECTION INTRAMUSCULAR; INTRAVENOUS at 09:18

## 2021-04-27 RX ADMIN — ONDANSETRON 4 MG: 2 INJECTION INTRAMUSCULAR; INTRAVENOUS at 10:04

## 2021-04-27 RX ADMIN — SODIUM CHLORIDE, SODIUM LACTATE, POTASSIUM CHLORIDE, CALCIUM CHLORIDE: 600; 310; 30; 20 INJECTION, SOLUTION INTRAVENOUS at 09:57

## 2021-04-27 RX ADMIN — EPHEDRINE SULFATE 10 MG: 50 INJECTION, SOLUTION INTRAVENOUS at 09:45

## 2021-04-27 RX ADMIN — EPHEDRINE SULFATE 10 MG: 50 INJECTION, SOLUTION INTRAVENOUS at 09:36

## 2021-04-27 RX ADMIN — DEXAMETHASONE SODIUM PHOSPHATE 8 MG: 10 INJECTION, SOLUTION INTRAMUSCULAR; INTRAVENOUS at 09:42

## 2021-04-27 RX ADMIN — HEPARIN SODIUM 5000 UNITS: 5000 INJECTION, SOLUTION INTRAVENOUS; SUBCUTANEOUS at 08:58

## 2021-04-27 RX ADMIN — METOCLOPRAMIDE HYDROCHLORIDE 10 MG: 5 INJECTION INTRAMUSCULAR; INTRAVENOUS at 08:59

## 2021-04-27 RX ADMIN — SODIUM CHLORIDE, SODIUM LACTATE, POTASSIUM CHLORIDE, CALCIUM CHLORIDE: 600; 310; 30; 20 INJECTION, SOLUTION INTRAVENOUS at 08:51

## 2021-04-27 RX ADMIN — HYDROCODONE BITARTRATE AND ACETAMINOPHEN 2 TABLET: 7.5; 325 TABLET ORAL at 10:59

## 2021-04-27 ASSESSMENT — PAIN SCALES - GENERAL
PAINLEVEL_OUTOF10: 7
PAINLEVEL_OUTOF10: 6

## 2021-04-27 ASSESSMENT — ENCOUNTER SYMPTOMS
ABDOMINAL DISTENTION: 0
EYE REDNESS: 0
NAUSEA: 0
EYE PAIN: 0
BACK PAIN: 1
VOMITING: 0
ABDOMINAL PAIN: 1
SHORTNESS OF BREATH: 0
CHEST TIGHTNESS: 0
COUGH: 0
DIARRHEA: 0
COLOR CHANGE: 0
CONSTIPATION: 0
SORE THROAT: 0

## 2021-04-27 ASSESSMENT — PAIN DESCRIPTION - DESCRIPTORS: DESCRIPTORS: SHARP

## 2021-04-27 NOTE — H&P
[facility] Surgery History & Physical    Chief Complaint:  abd pain    SUBJECTIVE:  Mr. Radha Hand is a 61 y.o. male who presents today after evaluation with the PA in the office for cholecystectomy. He had initially presented with months of right upper and epigastric pain. The pain is severe and without radiation. It has been intermittent but last night it started and has not stopped since. He notes no nausea or vomiting. He does not feel it is worsened by greasy or fatty meals. He  Underwent sonogram and was found to have stones/sludge/debris in his galbladder and presents today for cholecystectomy.        Past Medical History:   Diagnosis Date    Anxiety     Chronic back pain     Depression     MD (muscular dystrophy) ([facility]     Neuropathy 12/2/2020    Scoliosis      Past Surgical History:   Procedure Laterality Date    COLONOSCOPY  1/8/16    Dr Carlin Brunson, normal, 10 yr recall    FOOT SURGERY Left      Current Facility-Administered Medications   Medication Dose Route Frequency Provider Last Rate Last Admin    scopolamine (TRANSDERM-SCOP) transdermal patch 1 patch  1 patch Transdermal 60 Min Pre-Op ANTHONY Richards CRNA   1 patch at 04/27/21 1182    lactated ringers infusion   Intravenous Continuous ANTHONY Shah CRNA 100 mL/hr at 04/27/21 0851 New Bag at 04/27/21 0851    ceFAZolin (ANCEF) 2,000 mg in dextrose 5 % 100 mL IVPB  2,000 mg Intravenous Once Oksana Herrera DO         Allergies: Shellfish-derived products    Family History   Problem Relation Age of Onset    Diabetes Mother     Colon Cancer Neg Hx     Colon Polyps Neg Hx     Esophageal Cancer Neg Hx     Liver Cancer Neg Hx     Liver Disease Neg Hx     Rectal Cancer Neg Hx     Stomach Cancer Neg Hx        Social History     Tobacco Use    Smoking status: Never Smoker    Smokeless tobacco: Never Used   Substance Use Topics    Alcohol use: Yes     Comment: occas       Review of Systems   Constitutional: Positive for appetite change and fatigue. Negative for fever and unexpected weight change. HENT: Negative for hearing loss, nosebleeds and sore throat. Eyes: Negative for pain, redness and visual disturbance. Respiratory: Negative for cough, chest tightness and shortness of breath. Cardiovascular: Negative for chest pain, palpitations and leg swelling. Gastrointestinal: Positive for abdominal pain. Negative for abdominal distention, constipation, diarrhea, nausea and vomiting. Endocrine: Negative for cold intolerance, heat intolerance and polydipsia. Genitourinary: Negative for difficulty urinating, frequency and urgency. Musculoskeletal: Positive for arthralgias, back pain, gait problem, joint swelling, myalgias and neck pain. Skin: Negative for color change, rash and wound. Allergic/Immunologic: Negative for environmental allergies and food allergies. Neurological: Negative for seizures, light-headedness and headaches. Hematological: Negative for adenopathy. Does not bruise/bleed easily. Psychiatric/Behavioral: Negative for confusion, sleep disturbance and suicidal ideas. OBJECTIVE:  BP (!) 169/103   Pulse 66   Temp 97.9 °F (36.6 °C) (Temporal)   Resp 20   Ht 5' 11\" (1.803 m)   Wt 201 lb (91.2 kg)   SpO2 97%   BMI 28.03 kg/m²   CONSTITUTIONAL: Alert, appropriate, no acute distress  SKIN: warm, dry with no rashes or lesions  HEENT: NCAT, Non icteric, PERR. Trachea Midline. CHEST/LUNGS: CTA bilaterally. Normal respiratory effort. CARDIOVASCULAR: RRR, No edema. ABDOMEN: soft, ND, Non-TTP, +BS  NEUROLOGIC: CN II-XI grossly intact, no motor or sensory deficits   MUSCULOSKELETAL: No clubbing or cyanosis. PSYCHIATRIC:  Normal Mood and Affect. Alert and Oriented. ASSESSMENT:    Sludge in gallbladder    Chronic cholecystitis    PLAN:  The risks, benefits, and alternatives were discussed with the pt. He is willing to accept the risks and proceed with a laparoscopic cholecystectomy.  The surgical risks included but not limited to bleeding, infection, perforation, recurrence, risk of needing further surgery, etc. The anesthetic risks included heart attacks, strokes, pneumonia, phlebitis, etc.  He is willing to accept all risks and proceed with surgery. No guarantees have been given.       Muriel Barba DO   Electronically Signed on 4/27/2021 at 9:11 AM

## 2021-04-27 NOTE — ANESTHESIA POSTPROCEDURE EVALUATION
Department of Anesthesiology  Postprocedure Note    Patient: Davey Foster  MRN: [id number]  YOB: 1965  Date of evaluation: 4/27/2021  Time:  10:24 AM     Procedure Summary     Date: 04/27/21 Room / Location: [facility] OR [address] [place] [address]    Anesthesia Start: 6673 Anesthesia Stop:     Procedure: LAPAROSCOPIC CHOLECYSTECTOMY (N/A Abdomen) Diagnosis: (ACALCULOUS CHOLECYSTITIS)    Surgeons: Holly Hoang DO Responsible Provider: ANTHONY Duong CRNA    Anesthesia Type: general ASA Status: 3          Anesthesia Type: No value filed. Manuel Phase I: Manuel Score: 7    Manuel Phase II:      Last vitals: Reviewed and per EMR flowsheets.        Anesthesia Post Evaluation    Patient location during evaluation: PACU  Patient participation: complete - patient participated  Level of consciousness: awake  Airway patency: patent  Nausea & Vomiting: no nausea and no vomiting  Complications: no  Cardiovascular status: hemodynamically stable  Respiratory status: acceptable, spontaneous ventilation and face mask  Hydration status: euvolemic

## 2021-04-27 NOTE — OP NOTE
Operative Note      Patient: Bishop Stephens  YOB: 1965  MRN: [id number]    Date of Procedure: 4/27/2021    Pre-Op Diagnosis: ACALCULOUS CHOLECYSTITIS    Post-Op Diagnosis: Same       Procedure(s):  LAPAROSCOPIC CHOLECYSTECTOMY    Surgeon(s):  Edward Duke DO    Assistant:   * No surgical staff found *    Anesthesia: General    Estimated Blood Loss (mL): Minimal    Complications: None    Specimens:   ID Type Source Tests Collected by Time Destination   A : GALLBLADDER Tissue Gallbladder SURGICAL PATHOLOGY Edward Duke DO 3/49/7827 0947        Implants:  * No implants in log *      Drains: * No LDAs found *    Detailed Description of Procedure:   Mr. Vick Olmstead was taken to the main operating room and placed on the operating table supine. 5,000 units of heparin were administered prior to entering the operating room. After the induction of adequate general endotracheal anesthesia the abdomen was prepped in the usual sterile fashion. Time out performed identifing the correct patient, preoperative antibiotics, and necessary equipment. Local anesthestic was administered to the inferior portion of the umbilicus, and incision was made. Disection was performed to the fasica and a 10 mm port was inserted. The laparoscope was advanced and inspection undertaken. There was no evidence of trauma from the trocar insertion. The liver was normal in appearance. The  gallbladder was not initially seen. Working ports were placed, a 5 mm in the epigastrium, followed by two 5 mm ports along the right costal margin. Working instruments were advanced and the fundus of the gallbladder was grasped and elevated. The neck was grabbed, placed on stretch and the triangle of Calot opened. The neck of the gallbladder was dissected with blunt and electorcautery to visualize the cystic duct. This was cleared of  surrounding tissue.  Adjacent to this the cystic artery was identified and  also cleared of surrounding tissue. The triangle of Calot was completely  dissected and an excellent critical view of safety obtained. The cystic duct was doubly clipped, proximally and distally and divided. The cystic artery was divided in a similar fashion. The gallbladder was then released from the undersurface of the liver using cautery. Once free, it was placed it in an Endocatch retrieval bag and removed through the umbilical incision without problem. The gallbladder fossa was without bleeding. The cystic duct and cystic artery stumps were well seen with clips across each and no evidence of bleeding or bile leak. The working ports were removed under vision with no bleeding noted. The  pneumoperitoneum was released and the umbilical port removed. Fascia at the umbilicus was reapproximated with 0 Vicryl suture. Skin  incisions were closed with interrupted 4-0 Monocryl subcuticular suture  followed by Dermabond dressing. Sponge, needle, instrument count correct on 2 occasions. Estimated intraoperative blood loss, minimal.    Mr. Layne Tamayo tolerated his surgery well and he was taken to PACU in  satisfactory condition.         ________________________________  Twila Gilbert DO          Electronically signed by Twila Gilbert DO on 8/70/5858 at 10:02 AM

## 2021-04-27 NOTE — ANESTHESIA PRE PROCEDURE
Department of Anesthesiology  Preprocedure Note       Name:  Esteban El   Age:  61 y.o.  :  1965                                          MRN:  [id number]         Date:  2021      Surgeon: Laura Alonso):  Denita Navarro DO    Procedure: Procedure(s):  LAPAROSCOPIC CHOLECYSTECTOMY    Medications prior to admission:   Prior to Admission medications    Medication Sig Start Date End Date Taking? Authorizing Provider   busPIRone (BUSPAR) 5 MG tablet TAKE 1 TABLET BY MOUTH TWICE DAILY AS NEEDED FOR ANXIETY 3/25/21   Historical Provider, MD   HYDROcodone-acetaminophen ([facility])  MG per tablet TAKE 1 TABLET BY MOUTH EVERY 8 HOURS AS NEEDED FOR PAIN FOR UP TO 30 DAYS REDUCE DOSES TAKEN AS PAIN BECOMES MANAGEABLE 21  ANTHONY Fontanez - NP   tiZANidine (ZANAFLEX) 4 MG tablet TAKE 1 TABLET BY MOUTH THREE TIMES DAILY 3/31/21   ANTHONY Welch   gabapentin (NEURONTIN) 600 MG tablet Take 1 tablet by mouth 3 times daily for 30 days. 3/5/21 4/4/21  ANTHONY Welch   FLUoxetine (PROZAC) 40 MG capsule TAKE 1 CAPSULE BY MOUTH ONCE DAILY 21   ANTHONY Welch   triamcinolone (KENALOG) 0.025 % cream Apply topically 2 times daily. 10/21/20   ANTHONY Welch   diclofenac sodium (VOLTAREN) 1 % GEL Apply 2 g topically 4 times daily 20   ANTHONY Welch   meloxicam (MOBIC) 15 MG tablet TAKE 1 TABLET BY MOUTH ONCE DAILY 20   ANTHONY Welch   ketoconazole (NIZORAL) 2 % shampoo Apply topically daily as needed.   Patient not taking: Reported on 2021   ANTHONY Welch   amitriptyline (ELAVIL) 50 MG tablet TAKE ONE TABLET BY MOUTH IN THE EVENING 10/19/18   Elda Robles MD       Current medications:    Current Facility-Administered Medications   Medication Dose Route Frequency Provider Last Rate Last Admin    scopolamine (TRANSDERM-SCOP) transdermal patch 1 patch  1 patch Transdermal 60 Min Pre-Op ANTHONY Nava - CRNA        lactated ringers infusion Intravenous Continuous ANTHONY Serrano CRNA        ceFAZolin (ANCEF) 2,000 mg in dextrose 5 % 100 mL IVPB  2,000 mg Intravenous Once Oksana Herrera DO           Allergies:     Allergies   Allergen Reactions    Shellfish-Derived Products Anaphylaxis       Problem List:    Patient Active Problem List   Diagnosis Code    Transaminitis R74.01    Hepatitis C antibody test positive R76.8    History of hepatitis C Z86.19    History of hepatitis A vaccination Z92.29    History of hepatitis B vaccination Z92.29    History of hepatitis C virus infection Z86.19    MD (muscular dystrophy) ([facility] G71.00    Major depressive disorder with single episode[facility] F32.5    Chronic pain of right knee M25.561, G89.29    Neuropathy G62.9       Past Medical History:        Diagnosis Date    Anxiety     Chronic back pain     Depression     MD (muscular dystrophy) ([facility]     Neuropathy 12/2/2020    Scoliosis        Past Surgical History:        Procedure Laterality Date    COLONOSCOPY  1/8/16    Dr Keven Sheriff, normal, 10 yr recall    FOOT SURGERY Left        Social History:    Social History     Tobacco Use    Smoking status: Never Smoker    Smokeless tobacco: Never Used   Substance Use Topics    Alcohol use: Yes     Comment: occas                                Counseling given: Not Answered      Vital Signs (Current):   Vitals:    04/27/21 0838   BP: (!) 169/103   Pulse: 66   Resp: 20   Temp: 97.9 °F (36.6 °C)   TempSrc: Temporal   SpO2: 97%   Weight: 201 lb (91.2 kg)   Height: 5' 11\" (1.803 m)                                              BP Readings from Last 3 Encounters:   04/27/21 (!) 169/103   04/21/21 128/80   04/13/21 116/76       NPO Status: Time of last liquid consumption: 1700                        Time of last solid consumption: 1700                        Date of last liquid consumption: 04/26/21                        Date of last solid food consumption: 04/26/21    BMI:   Wt Readings from Last 3 Encounters:   04/27/21 201 lb (91.2 kg)   04/21/21 201 lb 6.4 oz (91.4 kg)   04/13/21 199 lb (90.3 kg)     Body mass index is 28.03 kg/m². CBC:   Lab Results   Component Value Date    WBC 8.6 06/19/2020    RBC 4.58 06/19/2020    HGB 14.7 06/19/2020    HCT 43.9 06/19/2020    HCT 39.9 05/10/2012    MCV 95.9 06/19/2020    RDW 12.0 06/19/2020     06/19/2020     05/10/2012       CMP:   Lab Results   Component Value Date     04/23/2021     05/10/2012    K 4.7 04/23/2021    K 4.5 05/10/2012     04/23/2021     05/10/2012    CO2 27 04/23/2021    BUN 15 04/23/2021    CREATININE 0.4 04/23/2021    CREATININE 0.5 05/10/2012    GFRAA >59 04/23/2021    LABGLOM >60 04/23/2021    GLUCOSE 86 04/23/2021    PROT 7.6 04/23/2021    PROT 6.9 01/17/2013    CALCIUM 9.1 04/23/2021    BILITOT <0.2 04/23/2021    ALKPHOS 117 04/23/2021    ALKPHOS 67 05/10/2012    AST 18 04/23/2021    ALT 12 04/23/2021       POC Tests: No results for input(s): POCGLU, POCNA, POCK, POCCL, POCBUN, POCHEMO, POCHCT in the last 72 hours.     Coags:   Lab Results   Component Value Date    PROTIME 12.3 01/14/2016    PROTIME 10.80 10/20/2011    INR 0.94 01/14/2016       HCG (If Applicable): No results found for: PREGTESTUR, PREGSERUM, HCG, HCGQUANT     ABGs: No results found for: PHART, PO2ART, KYL5BIP, OZA3QHC, BEART, W6DKFHZM     Type & Screen (If Applicable):  No results found for: LABABO, LABRH    Drug/Infectious Status (If Applicable):  No results found for: HIV, HEPCAB    COVID-19 Screening (If Applicable):   Lab Results   Component Value Date    COVID19 NEG 04/23/2021           Anesthesia Evaluation  Patient summary reviewed and Nursing notes reviewed  Airway: Mallampati: I  TM distance: >3 FB   Neck ROM: full  Mouth opening: > = 3 FB Dental:          Pulmonary:Negative Pulmonary ROS                              Cardiovascular:Negative CV ROS             Beta Blocker:  Not on Beta Blocker Neuro/Psych:   (+) depression/anxiety              ROS comment: Muscular dystrophy GI/Hepatic/Renal:            ROS comment: H/O hepatitis C.   Endo/Other: Negative Endo/Other ROS             Pt had no PAT visit       Abdominal:           Vascular: negative vascular ROS. Anesthesia Plan      general     ASA 3       Induction: intravenous. MIPS: Postoperative opioids intended and Prophylactic antiemetics administered. Anesthetic plan and risks discussed with patient. Use of blood products discussed with patient whom.                    ANTHONY Anders - CRNA   4/27/2021

## 2021-05-03 DIAGNOSIS — M54.42 LEFT-SIDED LOW BACK PAIN WITH LEFT-SIDED SCIATICA, UNSPECIFIED CHRONICITY: ICD-10-CM

## 2021-05-04 RX ORDER — TIZANIDINE 4 MG/1
TABLET ORAL
Qty: 90 TABLET | Refills: 0 | Status: SHIPPED | OUTPATIENT
Start: 2021-05-04 | End: 2021-06-07 | Stop reason: SDUPTHER

## 2021-05-10 DIAGNOSIS — G71.00 MD (MUSCULAR DYSTROPHY) (HCC): ICD-10-CM

## 2021-05-10 DIAGNOSIS — G62.9 NEUROPATHY: ICD-10-CM

## 2021-05-10 NOTE — TELEPHONE ENCOUNTER
Jimena James called to request a refill on his medication. Last office visit : 4/13/2021   Next office visit : 7/13/2021     Last UDS:   Amphetamines   Date Value Ref Range Status   06/12/2013 NEGATIVE  Final     Amphetamine Screen, Urine   Date Value Ref Range Status   04/13/2021 -  Final     Barbiturates   Date Value Ref Range Status   06/12/2013 NEGATIVE  Final     Barbiturate Screen, Urine   Date Value Ref Range Status   04/13/2021 -  Final     Benzodiazepines   Date Value Ref Range Status   06/12/2013 NEGATIVE  Final     Benzodiazepine Screen, Urine   Date Value Ref Range Status   04/13/2021 -  Final     Buprenorphine Urine   Date Value Ref Range Status   04/13/2021 -  Final     Cocaine Metabolite Screen, Urine   Date Value Ref Range Status   04/13/2021 -  Final     Gabapentin Screen, Urine   Date Value Ref Range Status   04/13/2021 -  Final     MDMA, Urine   Date Value Ref Range Status   04/13/2021 -  Final     Methamphetamine, Urine   Date Value Ref Range Status   04/13/2021 -  Final     Opiate Scrn, Ur   Date Value Ref Range Status   04/13/2021 +  Final     Comment:     Norco appropriate      Oxycodone Screen, Ur   Date Value Ref Range Status   04/13/2021 -  Final     PCP Screen, Urine   Date Value Ref Range Status   04/13/2021 -  Final     Propoxyphene Screen, Urine   Date Value Ref Range Status   04/13/2021 -  Final     THC Screen, Urine   Date Value Ref Range Status   04/13/2021 -  Final     Tricyclic Antidepressants, Urine   Date Value Ref Range Status   04/13/2021 -  Final       Last Makenzie Drier: 3/8/2021  Medication Contract: 4/16/2021  Last Fill: 4/8/2021 & 4/5/2021    Requested Prescriptions     Pending Prescriptions Disp Refills    gabapentin (NEURONTIN) 600 MG tablet 90 tablet 1     Sig: Take 1 tablet by mouth 3 times daily for 30 days.     HYDROcodone-acetaminophen (NORCO)  MG per tablet 90 tablet 0     Sig: TAKE 1 TABLET BY MOUTH EVERY 8 HOURS AS NEEDED FOR PAIN FOR UP TO 30 DAYS REDUCE DOSES TAKEN AS PAIN BECOMES MANAGEABLE         Please approve or refuse this medication.    Niles Reyes

## 2021-05-11 ENCOUNTER — OFFICE VISIT (OUTPATIENT)
Dept: SURGERY | Age: 56
End: 2021-05-11

## 2021-05-11 VITALS
WEIGHT: 200.2 LBS | BODY MASS INDEX: 28.03 KG/M2 | HEIGHT: 71 IN | SYSTOLIC BLOOD PRESSURE: 130 MMHG | DIASTOLIC BLOOD PRESSURE: 70 MMHG | TEMPERATURE: 97.8 F

## 2021-05-11 DIAGNOSIS — Z09 POSTOPERATIVE EXAMINATION: Primary | ICD-10-CM

## 2021-05-11 PROCEDURE — 99024 POSTOP FOLLOW-UP VISIT: CPT | Performed by: SURGERY

## 2021-05-11 RX ORDER — HYDROCODONE BITARTRATE AND ACETAMINOPHEN 10; 325 MG/1; MG/1
TABLET ORAL
Qty: 90 TABLET | Refills: 0 | Status: SHIPPED | OUTPATIENT
Start: 2021-05-11 | End: 2021-06-08 | Stop reason: SDUPTHER

## 2021-05-11 RX ORDER — GABAPENTIN 600 MG/1
600 TABLET ORAL 3 TIMES DAILY
Qty: 90 TABLET | Refills: 1 | Status: SHIPPED | OUTPATIENT
Start: 2021-05-11 | End: 2021-07-20

## 2021-05-11 NOTE — PROGRESS NOTES
Postop Progress Note    Subjective    Nicole Hodgson presents to the office for postop follow up. Objective    Vitals:    05/11/21 1328   BP: 130/70   Temp: 97.8 °F (36.6 °C)     General: alert, cooperative and no distress  Incision: healing well    Assessment  Doing well postoperatively. Plan  1. Continue any current medications  2. Wound care discussed  3. Pt is to increase activities as tolerated  4. Usual diet  5.  Follow up: as needed    Electronically signed by Tiffanie Romero DO on 5/17/6912 at 2:16 PM

## 2021-05-20 ENCOUNTER — TELEPHONE (OUTPATIENT)
Dept: SURGERY | Age: 56
End: 2021-05-20

## 2021-05-23 PROCEDURE — 99284 EMERGENCY DEPT VISIT MOD MDM: CPT

## 2021-05-23 PROCEDURE — 96374 THER/PROPH/DIAG INJ IV PUSH: CPT

## 2021-05-23 ASSESSMENT — PAIN DESCRIPTION - PAIN TYPE: TYPE: ACUTE PAIN

## 2021-05-23 ASSESSMENT — PAIN SCALES - GENERAL: PAINLEVEL_OUTOF10: 5

## 2021-05-23 ASSESSMENT — PAIN DESCRIPTION - ORIENTATION: ORIENTATION: UPPER

## 2021-05-23 ASSESSMENT — PAIN DESCRIPTION - FREQUENCY: FREQUENCY: CONTINUOUS

## 2021-05-23 ASSESSMENT — PAIN DESCRIPTION - DESCRIPTORS: DESCRIPTORS: STABBING

## 2021-05-23 ASSESSMENT — PAIN DESCRIPTION - LOCATION: LOCATION: ABDOMEN

## 2021-05-24 ENCOUNTER — APPOINTMENT (OUTPATIENT)
Dept: CT IMAGING | Age: 56
End: 2021-05-24
Payer: MEDICARE

## 2021-05-24 ENCOUNTER — HOSPITAL ENCOUNTER (EMERGENCY)
Age: 56
Discharge: HOME | End: 2021-05-24
Attending: EMERGENCY MEDICINE
Payer: MEDICARE

## 2021-05-24 VITALS
HEIGHT: 71 IN | WEIGHT: 200 LBS | TEMPERATURE: 98.4 F | HEART RATE: 69 BPM | DIASTOLIC BLOOD PRESSURE: 95 MMHG | OXYGEN SATURATION: 93 % | SYSTOLIC BLOOD PRESSURE: 157 MMHG | BODY MASS INDEX: 28 KG/M2 | RESPIRATION RATE: 20 BRPM

## 2021-05-24 DIAGNOSIS — K29.90 GASTRITIS AND DUODENITIS: Primary | ICD-10-CM

## 2021-05-24 LAB
ALBUMIN SERPL-MCNC: 4.4 G/DL (ref 3.5–5.2)
ALP BLD-CCNC: 146 U/L (ref 40–130)
ALT SERPL-CCNC: 22 U/L (ref 5–41)
ANION GAP SERPL CALCULATED.3IONS-SCNC: 6 MMOL/L (ref 7–19)
AST SERPL-CCNC: 26 U/L (ref 5–40)
BASOPHILS ABSOLUTE: 0 K/UL (ref 0–0.2)
BASOPHILS RELATIVE PERCENT: 0.5 % (ref 0–1)
BILIRUB SERPL-MCNC: <0.2 MG/DL (ref 0.2–1.2)
BILIRUBIN URINE: NEGATIVE
BLOOD, URINE: NEGATIVE
BUN BLDV-MCNC: 11 MG/DL (ref 6–20)
CALCIUM SERPL-MCNC: 9.8 MG/DL (ref 8.6–10)
CHLORIDE BLD-SCNC: 99 MMOL/L (ref 98–111)
CLARITY: CLEAR
CO2: 31 MMOL/L (ref 22–29)
COLOR: YELLOW
CREAT SERPL-MCNC: 0.4 MG/DL (ref 0.5–1.2)
EOSINOPHILS ABSOLUTE: 0.2 K/UL (ref 0–0.6)
EOSINOPHILS RELATIVE PERCENT: 2.4 % (ref 0–5)
GFR AFRICAN AMERICAN: >59
GFR NON-AFRICAN AMERICAN: >60
GLUCOSE BLD-MCNC: 112 MG/DL (ref 74–109)
GLUCOSE URINE: NEGATIVE MG/DL
HCT VFR BLD CALC: 43.8 % (ref 42–52)
HEMOGLOBIN: 13.6 G/DL (ref 14–18)
IMMATURE GRANULOCYTES #: 0 K/UL
KETONES, URINE: NEGATIVE MG/DL
LEUKOCYTE ESTERASE, URINE: NEGATIVE
LIPASE: 19 U/L (ref 13–60)
LYMPHOCYTES ABSOLUTE: 2.3 K/UL (ref 1.1–4.5)
LYMPHOCYTES RELATIVE PERCENT: 26.8 % (ref 20–40)
MCH RBC QN AUTO: 29.9 PG (ref 27–31)
MCHC RBC AUTO-ENTMCNC: 31.1 G/DL (ref 33–37)
MCV RBC AUTO: 96.3 FL (ref 80–94)
MONOCYTES ABSOLUTE: 0.9 K/UL (ref 0–0.9)
MONOCYTES RELATIVE PERCENT: 10.2 % (ref 0–10)
NEUTROPHILS ABSOLUTE: 5.1 K/UL (ref 1.5–7.5)
NEUTROPHILS RELATIVE PERCENT: 59.9 % (ref 50–65)
NITRITE, URINE: NEGATIVE
PDW BLD-RTO: 13.5 % (ref 11.5–14.5)
PH UA: 7.5 (ref 5–8)
PLATELET # BLD: 357 K/UL (ref 130–400)
PMV BLD AUTO: 9.1 FL (ref 9.4–12.4)
POTASSIUM SERPL-SCNC: 3.8 MMOL/L (ref 3.5–5)
PROTEIN UA: NEGATIVE MG/DL
RBC # BLD: 4.55 M/UL (ref 4.7–6.1)
SODIUM BLD-SCNC: 136 MMOL/L (ref 136–145)
SPECIFIC GRAVITY UA: 1.01 (ref 1–1.03)
TOTAL PROTEIN: 8 G/DL (ref 6.6–8.7)
UROBILINOGEN, URINE: 0.2 E.U./DL
WBC # BLD: 8.5 K/UL (ref 4.8–10.8)

## 2021-05-24 PROCEDURE — 85025 COMPLETE CBC W/AUTO DIFF WBC: CPT

## 2021-05-24 PROCEDURE — 74176 CT ABD & PELVIS W/O CONTRAST: CPT

## 2021-05-24 PROCEDURE — 6360000002 HC RX W HCPCS

## 2021-05-24 PROCEDURE — 93005 ELECTROCARDIOGRAM TRACING: CPT

## 2021-05-24 PROCEDURE — 80053 COMPREHEN METABOLIC PANEL: CPT

## 2021-05-24 PROCEDURE — 81003 URINALYSIS AUTO W/O SCOPE: CPT

## 2021-05-24 PROCEDURE — 83690 ASSAY OF LIPASE: CPT

## 2021-05-24 PROCEDURE — 36415 COLL VENOUS BLD VENIPUNCTURE: CPT

## 2021-05-24 RX ORDER — MORPHINE SULFATE 4 MG/ML
INJECTION, SOLUTION INTRAMUSCULAR; INTRAVENOUS
Status: COMPLETED
Start: 2021-05-24 | End: 2021-05-24

## 2021-05-24 RX ORDER — MORPHINE SULFATE 4 MG/ML
4 INJECTION, SOLUTION INTRAMUSCULAR; INTRAVENOUS ONCE
Status: COMPLETED | OUTPATIENT
Start: 2021-05-24 | End: 2021-05-24

## 2021-05-24 RX ORDER — ONDANSETRON 4 MG/1
4 TABLET, ORALLY DISINTEGRATING ORAL EVERY 8 HOURS PRN
Qty: 15 TABLET | Refills: 0 | Status: SHIPPED | OUTPATIENT
Start: 2021-05-24 | End: 2021-09-23

## 2021-05-24 RX ORDER — AMOXICILLIN AND CLAVULANATE POTASSIUM 500; 125 MG/1; MG/1
1 TABLET, FILM COATED ORAL 3 TIMES DAILY
Qty: 21 TABLET | Refills: 0 | Status: SHIPPED | OUTPATIENT
Start: 2021-05-24 | End: 2021-05-28 | Stop reason: ALTCHOICE

## 2021-05-24 RX ORDER — OXYCODONE AND ACETAMINOPHEN 7.5; 325 MG/1; MG/1
1 TABLET ORAL EVERY 4 HOURS PRN
Qty: 12 TABLET | Refills: 0 | Status: SHIPPED | OUTPATIENT
Start: 2021-05-24 | End: 2021-05-31

## 2021-05-24 RX ADMIN — MORPHINE SULFATE 4 MG: 4 INJECTION, SOLUTION INTRAMUSCULAR; INTRAVENOUS at 02:57

## 2021-05-24 ASSESSMENT — PAIN SCALES - GENERAL: PAINLEVEL_OUTOF10: 8

## 2021-05-24 NOTE — ED PROVIDER NOTES
[facility]  eMERGENCY dEPARTMENT eNCOUnter      Pt Name: Esteban El  MRN: [id number]  [place] 1965  Date of evaluation: 5/23/2021  Provider: Peyton John MD    CHIEF COMPLAINT       Chief Complaint   Patient presents with    Abdominal Pain     gallbladder removed April 27th, epigastric pain         HISTORY OF PRESENT ILLNESS   (Location/Symptom, Timing/Onset,Context/Setting, Quality, Duration, Modifying Factors, Severity)  Note limiting factors. Esteban El is a 61 y.o. male who presents to the emergency department for evaluation regarding upper abdominal pain. Patient reports that the symptoms been ongoing for about the past 1 to 2 days. He describes the pain is fairly sharp in nature, located in the upper abdomen and some in the right upper quadrant. States he is about 1 month status post cholecystectomy. Overall he has been doing fairly well postoperatively. Over about the past 1 day he has had some nausea and a couple of episodes of vomiting. States he is not had any blood in the stool. No prior history of similar symptoms after his surgery. Does not really seem to be related to eating. HPI    NursingNotes were reviewed. REVIEW OF SYSTEMS    (2-9 systems for level 4, 10 or more for level 5)     Review of Systems   Constitutional: Negative for chills and fever. Respiratory: Negative for shortness of breath. Cardiovascular: Negative for chest pain. Gastrointestinal: Positive for abdominal pain, nausea and vomiting. Negative for abdominal distention, blood in stool and diarrhea. Neurological: Negative for dizziness and syncope. All other systems reviewed and are negative.            PAST MEDICALHISTORY     Past Medical History:   Diagnosis Date    Anxiety     Chronic back pain     Depression     [facility]     Neuropathy 12/2/2020    Scoliosis          SURGICAL HISTORY       Past Surgical History:   Procedure Laterality Date    CHOLECYSTECTOMY, LAPAROSCOPIC N/A 4/27/2021    LAPAROSCOPIC CHOLECYSTECTOMY performed by Noe Hernandez,  at [address]  1/8/16    Dr Angela Travis, normal, 10 yr recall    FOOT SURGERY Left          CURRENT MEDICATIONS     Discharge Medication List as of 5/24/2021  7:38 AM      CONTINUE these medications which have NOT CHANGED    Details   gabapentin (NEURONTIN) 600 MG tablet Take 1 tablet by mouth 3 times daily for 30 days. , Disp-90 tablet, R-1Normal      HYDROcodone-acetaminophen (NORCO)  MG per tablet TAKE 1 TABLET BY MOUTH EVERY 8 HOURS AS NEEDED FOR PAIN FOR UP TO 30 DAYS REDUCE DOSES TAKEN AS PAIN BECOMES MANAGEABLE, Disp-90 tablet, R-0Normal      tiZANidine (ZANAFLEX) 4 MG tablet TAKE 1 TABLET BY MOUTH THREE TIMES DAILY, Disp-90 tablet, R-0Normal      busPIRone (BUSPAR) 5 MG tablet TAKE 1 TABLET BY MOUTH TWICE DAILY AS NEEDED FOR ANXIETYHistorical Med      FLUoxetine (PROZAC) 40 MG capsule TAKE 1 CAPSULE BY MOUTH ONCE DAILY, Disp-90 capsule, R-1Please consider 90 day supplies to promote better adherenceNormal      triamcinolone (KENALOG) 0.025 % cream Apply topically 2 times daily. , Disp-80 g,R-1, Normal      diclofenac sodium (VOLTAREN) 1 % GEL Apply 2 g topically 4 times daily, Topical, 4 TIMES DAILY Starting Thu 6/18/2020, Disp-2 Tube, R-1, Normal      meloxicam (MOBIC) 15 MG tablet TAKE 1 TABLET BY MOUTH ONCE DAILY, Disp-30 tablet, R-11Please consider 90 day supplies to promote better adherenceNormal      ketoconazole (NIZORAL) 2 % shampoo Apply topically daily as needed. , Disp-120 mL, R-1, Normal      amitriptyline (ELAVIL) 50 MG tablet TAKE ONE TABLET BY MOUTH IN THE EVENING, Disp-30 tablet, R-11Please consider 90 day supplies to promote better adherenceNormal             ALLERGIES     Shellfish-derived products    FAMILY HISTORY       Family History   Problem Relation Age of Onset    Diabetes Mother     Colon Cancer Neg Hx     Colon Polyps Neg Hx     Esophageal Cancer Neg Hx     Liver Cancer Neg m) 200 lb (90.7 kg)       Physical Exam  Vitals and nursing note reviewed. HENT:      Head: Atraumatic. Mouth/Throat:      Mouth: Mucous membranes are moist. Mucous membranes are not dry. Pharynx: No posterior oropharyngeal erythema. Eyes:      General: No scleral icterus. Pupils: Pupils are equal, round, and reactive to light. Neck:      Trachea: No tracheal deviation. Cardiovascular:      Rate and Rhythm: Normal rate and regular rhythm. Heart sounds: Normal heart sounds. No murmur heard. Pulmonary:      Effort: Pulmonary effort is normal. No respiratory distress. Breath sounds: Normal breath sounds. No stridor. Abdominal:      General: There is no distension. Palpations: Abdomen is soft. Tenderness: There is abdominal tenderness in the right upper quadrant and epigastric area. There is no guarding. Skin:     Coloration: Skin is not pale. Findings: No rash. Neurological:      Mental Status: He is alert and oriented to person, place, and time. Psychiatric:         Behavior: Behavior is cooperative. DIAGNOSTIC RESULTS     EKG: All EKG's areinterpreted by the Emergency Department Physician who either signs or Co-signs this chart in the absence of a cardiologist.    Normal sinus rhythm at 75, no acute ST change identified. RADIOLOGY:  Non-plain film images such as CT, Ultrasound and MRI are read by the radiologist. Plain radiographic images are visualized and preliminarily interpreted bythe emergency physician with the below findings:    CT ABDOMEN & PELVIS Without Contrast:    Wall thickening of the gastric antrum, duodenal bulb, and proximal duodenum is concerning for gastritis/duodenitis. Patchy densities in the right lower lobe may represent atelectasis versus pneumonia. Left basilar atelectasis. Calcified granulomas in the right lower lobe. Calcified right hilar lymph nodes. Partially visualized.     Possible nonspecific 3 mm nodule along the right major fissure. Evaluation of the stomach is limited by under distention. Prior cholecystectomy. Calcified granulomas in the spleen. Small splenule. Nonobstructing right renal stones. No hydronephrosis or ureteral stone. No bowel obstruction. Normal appendix. Chronic appearing mild superior endplate depression of the L1 vertebral body. Mild prominence of the bladder wall is nonspecific. Please correlate with urinalysis if concerned for cystitis. CT ABDOMEN PELVIS WO CONTRAST Additional Contrast? None   Final Result   Impression:   1. Wall thickening of the gastric antrum and proximal duodenum with   surrounding inflammatory stranding. Leading differential is   gastritis/duodenitis. 2.  Nonobstructing RIGHT lower pole renal calculi measuring up to 8   mm. 3.  RIGHT greater than LEFT consolidation groundglass opacity in the   lung bases. Differential includes pneumonia, aspiration, and   atelectasis. Findings in agreement with the emergent findings from the initial   [facility] preliminary report. Signed by Dr Frida Flaherty on 5/24/2021 7:10 AM               LABS:  Labs Reviewed   CBC WITH AUTO DIFFERENTIAL - Abnormal; Notable for the following components:       Result Value    RBC 4.55 (*)     Hemoglobin 13.6 (*)     MCV 96.3 (*)     MCHC 31.1 (*)     MPV 9.1 (*)     Monocytes % 10.2 (*)     All other components within normal limits   COMPREHENSIVE METABOLIC PANEL - Abnormal; Notable for the following components:    CO2 31 (*)     Anion Gap 6 (*)     Glucose 112 (*)     CREATININE 0.4 (*)     Alkaline Phosphatase 146 (*)     All other components within normal limits   LIPASE   URINE RT REFLEX TO CULTURE       All other labs were within normal range or not returned as of this dictation.     EMERGENCY DEPARTMENT COURSE and DIFFERENTIAL DIAGNOSIS/MDM:   Vitals:    Vitals:    05/23/21 2317 05/24/21 0113 05/24/21 0214   BP: (!) 174/106 (!) 165/94 (!) 157/95   Pulse: 86 72

## 2021-05-24 NOTE — ED NOTES
Patient placed on cardiac monitor, continuous pulse oximeter, and NIBP monitor. Monitor alarms on.          Coco Uriarte RN  05/24/21 0870

## 2021-05-24 NOTE — ED TRIAGE NOTES
Pt states he had his gallbladder removed the end of April. Ever since he has had diffuse epigastric pain, pain radiating towards right side of abdomen. Pt states, \"It comes and goes, but lizett tried everything to make the pain go away and nothing helps. \"

## 2021-05-27 ENCOUNTER — TELEPHONE (OUTPATIENT)
Dept: SURGERY | Age: 56
End: 2021-05-27

## 2021-05-27 DIAGNOSIS — K29.90 GASTRITIS AND DUODENITIS: Primary | ICD-10-CM

## 2021-05-27 DIAGNOSIS — K81.1 CHRONIC CHOLECYSTITIS: ICD-10-CM

## 2021-05-27 RX ORDER — PANTOPRAZOLE SODIUM 40 MG/1
40 TABLET, DELAYED RELEASE ORAL
Qty: 60 TABLET | Refills: 0 | Status: SHIPPED | OUTPATIENT
Start: 2021-05-27 | End: 2021-08-09 | Stop reason: SDUPTHER

## 2021-05-27 NOTE — TELEPHONE ENCOUNTER
Patient called in and stated that he was still in pain. He stated that he did go to the ER and they did blood work and a CT and told him he just had an upset stomach. Patient would like for you to review the imaging and call him back with recommendations on how to get some pain relief or if he needs to come in call him with an appt.    Thank you  Ph [phone]

## 2021-05-28 ENCOUNTER — OFFICE VISIT (OUTPATIENT)
Dept: GASTROENTEROLOGY | Age: 56
End: 2021-05-28
Payer: MEDICARE

## 2021-05-28 VITALS
HEART RATE: 98 BPM | HEIGHT: 71 IN | WEIGHT: 198 LBS | DIASTOLIC BLOOD PRESSURE: 66 MMHG | SYSTOLIC BLOOD PRESSURE: 122 MMHG | OXYGEN SATURATION: 95 % | BODY MASS INDEX: 27.72 KG/M2

## 2021-05-28 DIAGNOSIS — R10.10 UPPER ABDOMINAL PAIN: Primary | ICD-10-CM

## 2021-05-28 DIAGNOSIS — R19.7 DIARRHEA, UNSPECIFIED TYPE: ICD-10-CM

## 2021-05-28 DIAGNOSIS — Z90.49 S/P CHOLECYSTECTOMY: ICD-10-CM

## 2021-05-28 DIAGNOSIS — R11.0 NAUSEA: ICD-10-CM

## 2021-05-28 LAB
EKG P AXIS: 3 DEGREES
EKG P-R INTERVAL: 144 MS
EKG Q-T INTERVAL: 412 MS
EKG QRS DURATION: 100 MS
EKG QTC CALCULATION (BAZETT): 438 MS
EKG T AXIS: 24 DEGREES

## 2021-05-28 PROCEDURE — G8427 DOCREV CUR MEDS BY ELIG CLIN: HCPCS | Performed by: NURSE PRACTITIONER

## 2021-05-28 PROCEDURE — 99203 OFFICE O/P NEW LOW 30 MIN: CPT | Performed by: NURSE PRACTITIONER

## 2021-05-28 PROCEDURE — 4004F PT TOBACCO SCREEN RCVD TLK: CPT | Performed by: NURSE PRACTITIONER

## 2021-05-28 PROCEDURE — 3017F COLORECTAL CA SCREEN DOC REV: CPT | Performed by: NURSE PRACTITIONER

## 2021-05-28 PROCEDURE — G8419 CALC BMI OUT NRM PARAM NOF/U: HCPCS | Performed by: NURSE PRACTITIONER

## 2021-05-28 RX ORDER — AMOXICILLIN AND CLAVULANATE POTASSIUM 500; 125 MG/1; MG/1
1 TABLET, FILM COATED ORAL 3 TIMES DAILY
Status: ON HOLD | COMMUNITY
End: 2021-06-30 | Stop reason: ALTCHOICE

## 2021-05-28 ASSESSMENT — ENCOUNTER SYMPTOMS
BACK PAIN: 1
CONSTIPATION: 0
NAUSEA: 0
ANAL BLEEDING: 0
TROUBLE SWALLOWING: 0
ABDOMINAL DISTENTION: 0
SHORTNESS OF BREATH: 1
VOICE CHANGE: 0
ABDOMINAL PAIN: 1
RECTAL PAIN: 0
DIARRHEA: 1
COUGH: 0
SORE THROAT: 0
VOMITING: 0
BLOOD IN STOOL: 0

## 2021-05-28 NOTE — PROGRESS NOTES
 Alcohol use: Yes     Comment: occas    Drug use: No    Sexual activity: None   Other Topics Concern    None   Social History Narrative    None     Social Determinants of Health     Financial Resource Strain:     Difficulty of Paying Living Expenses:    Food Insecurity:     Worried About Running Out of Food in the Last Year:     [address] in the Last Year:    Transportation Needs:     Lack of Transportation (Medical):  Lack of Transportation (Non-Medical):    Physical Activity:     Days of Exercise per Week:     Minutes of Exercise per Session:    Stress:     Feeling of Stress :    Social Connections:     Frequency of Communication with Friends and Family:     Frequency of Social Gatherings with Friends and Family:     Attends [employer] Services:     Active Member of Clubs or Organizations:     Attends Club or Organization Meetings:     Marital Status:    Intimate Partner Violence:     Fear of Current or Ex-Partner:     Emotionally Abused:     Physically Abused:     Sexually Abused: Allergies   Allergen Reactions    Shellfish-Derived Products Anaphylaxis       Current Outpatient Medications   Medication Sig Dispense Refill    amoxicillin-clavulanate (AUGMENTIN) 500-125 MG per tablet Take 1 tablet by mouth 3 times daily      pantoprazole (PROTONIX) 40 MG tablet Take 1 tablet by mouth 2 times daily (before meals) 60 tablet 0    ondansetron (ZOFRAN ODT) 4 MG disintegrating tablet Take 1 tablet by mouth every 8 hours as needed for Nausea or Vomiting 15 tablet 0    oxyCODONE-acetaminophen (PERCOCET) 7.5-325 MG per tablet Take 1 tablet by mouth every 4 hours as needed for Pain for up to 7 days. 12 tablet 0    gabapentin (NEURONTIN) 600 MG tablet Take 1 tablet by mouth 3 times daily for 30 days.  90 tablet 1    HYDROcodone-acetaminophen (NORCO)  MG per tablet TAKE 1 TABLET BY MOUTH EVERY 8 HOURS AS NEEDED FOR PAIN FOR UP TO 30 DAYS REDUCE DOSES TAKEN AS PAIN BECOMES MANAGEABLE 90 tablet 0    tiZANidine (ZANAFLEX) 4 MG tablet TAKE 1 TABLET BY MOUTH THREE TIMES DAILY 90 tablet 0    busPIRone (BUSPAR) 5 MG tablet TAKE 1 TABLET BY MOUTH TWICE DAILY AS NEEDED FOR ANXIETY      FLUoxetine (PROZAC) 40 MG capsule TAKE 1 CAPSULE BY MOUTH ONCE DAILY 90 capsule 1    triamcinolone (KENALOG) 0.025 % cream Apply topically 2 times daily. 80 g 1    diclofenac sodium (VOLTAREN) 1 % GEL Apply 2 g topically 4 times daily 2 Tube 1    meloxicam (MOBIC) 15 MG tablet TAKE 1 TABLET BY MOUTH ONCE DAILY 30 tablet 11    ketoconazole (NIZORAL) 2 % shampoo Apply topically daily as needed. 120 mL 1    amitriptyline (ELAVIL) 50 MG tablet TAKE ONE TABLET BY MOUTH IN THE EVENING 30 tablet 11     No current facility-administered medications for this visit. Review of Systems   Constitutional: Negative for appetite change, fatigue, fever and unexpected weight change. HENT: Negative for sore throat, trouble swallowing and voice change. Respiratory: Positive for shortness of breath (at times). Negative for cough. Cardiovascular: Negative for chest pain, palpitations and leg swelling. Gastrointestinal: Positive for abdominal pain and diarrhea. Negative for abdominal distention, anal bleeding, blood in stool, constipation, nausea, rectal pain and vomiting. Genitourinary: Negative for hematuria. Musculoskeletal: Positive for back pain. Negative for arthralgias and neck pain. Neurological: Negative for dizziness, weakness, light-headedness and headaches. Psychiatric/Behavioral: Negative for dysphoric mood and sleep disturbance. The patient is nervous/anxious. All other systems reviewed and are negative. Objective:     Physical Exam  Vitals and nursing note reviewed. Constitutional:       Appearance: He is well-developed. Comments: /66   Pulse 98   Ht 5' 11\" (1.803 m)   Wt 198 lb (89.8 kg)   SpO2 95%   BMI 27.62 kg/m²    Eyes:      General: No scleral icterus. Conjunctiva/sclera: Conjunctivae normal.      Pupils: Pupils are equal, round, and reactive to light. Neck:      Thyroid: No thyromegaly. Cardiovascular:      Rate and Rhythm: Normal rate and regular rhythm. Heart sounds: Normal heart sounds. No murmur heard. No friction rub. No gallop. Pulmonary:      Effort: Pulmonary effort is normal. No respiratory distress. Breath sounds: Normal breath sounds. Abdominal:      General: Bowel sounds are normal. There is no distension. Palpations: Abdomen is soft. Tenderness: There is abdominal tenderness (upper abd with palpation). There is no rebound. Musculoskeletal:         General: No deformity. Normal range of motion. Cervical back: Normal range of motion and neck supple. Skin:     Coloration: Skin is not pale. Neurological:      Mental Status: He is alert and oriented to person, place, and time. Cranial Nerves: No cranial nerve deficit. Psychiatric:         Judgment: Judgment normal.           Assessment:       Diagnosis Orders   1. Upper abdominal pain  ESOPHAGOSCOPY / EGD   2. Nausea  ESOPHAGOSCOPY / EGD   3. Diarrhea, unspecified type  COLONOSCOPY W/ OR W/O BIOPSY   4. S/P cholecystectomy           Plan:      1. Start protonix that was prescribed by Dr Marsha Joe  2. Schedule outpatient endoscopy-r/o h pylori. Patient advised no Aspirin, Fish Oil, Vit E or NSAIDs 5 (five) days before procedure. Follow-up Visit: per Dr. Rizwana Auguste  Pt Education:   Risks, benefits, and alternatives to endoscopy were discussed. Patient voices understanding of risks of, but not limited to, perforation, bleeding, and infection. The risk of perforation is increased with esophageal dilatation. All questions answered to patient's satisfaction. Patient is agreable to proceed. 3. Schedule outpatient colonoscopy with random colon bx. Patient advised no Aspirin, Fish Oil, Vit E or NSAIDs 5 (five) days before procedure.   Follow-up Visit: per Dr Rizwana Auguste Pt education:  Risks, benefits, and alternatives to colonoscopy were discussed. Risks of colonoscopy include, but are not limited to, perforation, bleeding, and infection. We discussed that the risk for perforation is 1-3 in 5,000  at the time of colonoscopy;   and 1-2% risk of bleeding post-polypectomy. All questions answered to the satisfaction of the patient. Pt is agreeable to proceed.

## 2021-05-30 ASSESSMENT — ENCOUNTER SYMPTOMS
DIARRHEA: 0
ABDOMINAL DISTENTION: 0
ABDOMINAL PAIN: 1
BLOOD IN STOOL: 0
VOMITING: 1
SHORTNESS OF BREATH: 0
NAUSEA: 1

## 2021-06-07 ENCOUNTER — OFFICE VISIT (OUTPATIENT)
Age: 56
End: 2021-06-07

## 2021-06-07 VITALS — TEMPERATURE: 97.6 F | HEART RATE: 64 BPM | OXYGEN SATURATION: 95 %

## 2021-06-07 DIAGNOSIS — Z11.59 SCREENING FOR VIRAL DISEASE: Primary | ICD-10-CM

## 2021-06-07 DIAGNOSIS — G71.00 MD (MUSCULAR DYSTROPHY) (HCC): ICD-10-CM

## 2021-06-07 DIAGNOSIS — M54.42 LEFT-SIDED LOW BACK PAIN WITH LEFT-SIDED SCIATICA, UNSPECIFIED CHRONICITY: ICD-10-CM

## 2021-06-07 LAB — SARS-COV-2, PCR: NOT DETECTED

## 2021-06-07 PROCEDURE — 99999 PR OFFICE/OUTPT VISIT,PROCEDURE ONLY: CPT | Performed by: NURSE PRACTITIONER

## 2021-06-08 RX ORDER — HYDROCODONE BITARTRATE AND ACETAMINOPHEN 10; 325 MG/1; MG/1
TABLET ORAL
Qty: 90 TABLET | Refills: 0 | Status: SHIPPED | OUTPATIENT
Start: 2021-06-08 | End: 2021-07-07 | Stop reason: SDUPTHER

## 2021-06-08 RX ORDER — TIZANIDINE 4 MG/1
TABLET ORAL
Qty: 90 TABLET | Refills: 2 | Status: SHIPPED | OUTPATIENT
Start: 2021-06-08 | End: 2021-09-21

## 2021-06-15 RX ORDER — MELOXICAM 15 MG/1
TABLET ORAL
Qty: 90 TABLET | Refills: 0 | Status: SHIPPED | OUTPATIENT
Start: 2021-06-15 | End: 2021-09-01

## 2021-06-28 ENCOUNTER — OFFICE VISIT (OUTPATIENT)
Age: 56
End: 2021-06-28

## 2021-06-28 DIAGNOSIS — Z11.59 SCREENING FOR VIRAL DISEASE: Primary | ICD-10-CM

## 2021-06-28 PROCEDURE — 99999 PR OFFICE/OUTPT VISIT,PROCEDURE ONLY: CPT | Performed by: NURSE PRACTITIONER

## 2021-06-29 LAB — SARS-COV-2, PCR: NOT DETECTED

## 2021-06-30 ENCOUNTER — ANESTHESIA EVENT (OUTPATIENT)
Dept: OPERATING ROOM | Age: 56
End: 2021-06-30

## 2021-06-30 ENCOUNTER — HOSPITAL ENCOUNTER (OUTPATIENT)
Age: 56
Setting detail: OUTPATIENT SURGERY
Discharge: HOME | End: 2021-06-30
Attending: INTERNAL MEDICINE | Admitting: INTERNAL MEDICINE
Payer: MEDICARE

## 2021-06-30 ENCOUNTER — HOSPITAL ENCOUNTER (OUTPATIENT)
Age: 56
Setting detail: SPECIMEN
Discharge: HOME | End: 2021-06-30
Payer: MEDICARE

## 2021-06-30 ENCOUNTER — APPOINTMENT (OUTPATIENT)
Dept: OPERATING ROOM | Age: 56
End: 2021-06-30

## 2021-06-30 ENCOUNTER — ANESTHESIA (OUTPATIENT)
Dept: OPERATING ROOM | Age: 56
End: 2021-06-30

## 2021-06-30 VITALS
SYSTOLIC BLOOD PRESSURE: 138 MMHG | RESPIRATION RATE: 16 BRPM | DIASTOLIC BLOOD PRESSURE: 95 MMHG | TEMPERATURE: 97.8 F | BODY MASS INDEX: 28 KG/M2 | OXYGEN SATURATION: 96 % | HEART RATE: 80 BPM | HEIGHT: 71 IN | WEIGHT: 200 LBS

## 2021-06-30 VITALS — SYSTOLIC BLOOD PRESSURE: 110 MMHG | OXYGEN SATURATION: 97 % | DIASTOLIC BLOOD PRESSURE: 73 MMHG

## 2021-06-30 PROCEDURE — 45380 COLONOSCOPY AND BIOPSY: CPT | Performed by: INTERNAL MEDICINE

## 2021-06-30 PROCEDURE — 43239 EGD BIOPSY SINGLE/MULTIPLE: CPT | Performed by: INTERNAL MEDICINE

## 2021-06-30 PROCEDURE — G8918 PT W/O PREOP ORDER IV AB PRO: HCPCS

## 2021-06-30 PROCEDURE — G8907 PT DOC NO EVENTS ON DISCHARG: HCPCS

## 2021-06-30 PROCEDURE — 45380 COLONOSCOPY AND BIOPSY: CPT

## 2021-06-30 PROCEDURE — 88305 TISSUE EXAM BY PATHOLOGIST: CPT

## 2021-06-30 PROCEDURE — 43239 EGD BIOPSY SINGLE/MULTIPLE: CPT

## 2021-06-30 RX ORDER — PROPOFOL 10 MG/ML
INJECTION, EMULSION INTRAVENOUS PRN
Status: DISCONTINUED | OUTPATIENT
Start: 2021-06-30 | End: 2021-06-30 | Stop reason: SDUPTHER

## 2021-06-30 RX ORDER — SODIUM CHLORIDE 9 MG/ML
INJECTION, SOLUTION INTRAVENOUS CONTINUOUS
Status: DISCONTINUED | OUTPATIENT
Start: 2021-06-30 | End: 2021-06-30 | Stop reason: HOSPADM

## 2021-06-30 RX ORDER — LIDOCAINE HYDROCHLORIDE 10 MG/ML
INJECTION, SOLUTION INFILTRATION; PERINEURAL PRN
Status: DISCONTINUED | OUTPATIENT
Start: 2021-06-30 | End: 2021-06-30 | Stop reason: SDUPTHER

## 2021-06-30 RX ADMIN — PROPOFOL 300 MG: 10 INJECTION, EMULSION INTRAVENOUS at 10:07

## 2021-06-30 RX ADMIN — SODIUM CHLORIDE: 9 INJECTION, SOLUTION INTRAVENOUS at 09:40

## 2021-06-30 RX ADMIN — LIDOCAINE HYDROCHLORIDE 40 MG: 10 INJECTION, SOLUTION INFILTRATION; PERINEURAL at 10:07

## 2021-06-30 NOTE — OP NOTE
Patient: Eddie Jacobsen : 1965  Med Rec#: [id number] Acc#: [id number]   Primary Care Provider ANTHONY Todd    Date of Procedure:  2021    Endoscopist: Marisa Contreras MD, MD    Referring Provider: ANTHONY Todd,     Operation Performed: Colonoscopy up to the cecum with random colon biopsies    Indications: For both EGD and colonoscopy exams today:    1. Upper abdominal pain     2. Nausea     3. Diarrhea, unspecified type     4. S/P cholecystectomy       Anesthesia:  Sedation was administered by anesthesia who monitored the patient during the procedure. I met with Eddie Jacobsen prior to procedure. We discussed the procedure itself, and I have discussed the risks of endoscopy (including-- but not limited to-- pain, discomfort, bleeding potentially requiring second endoscopic procedure and/or blood transfusion, organ perforation requiring operative repair, damage to organs near the colon, infection, aspiration, cardiopulmonary/allergic reaction), benefits, indications to endoscopy. Additionally, we discussed options other than colonoscopy. The patient expressed understanding. All questions answered. The patient decided to proceed with the procedure. Signed informed consent was placed on the chart. Blood Loss: minimal    Withdrawal time: More than 6 minutes  Bowel Prep: Fair  with thick  semisolid stool scattered in segments throughout the colon obscuring the underlying mucosa. Small lesions including polyps may have been missed. Complications: no immediate complications    DESCRIPTION OF PROCEDURE:     A time out was performed. After written informed consent was obtained, the patient was placed in the left lateral position. The perianal area was inspected, and a digital rectal exam was performed. A rectal exam was performed: normal tone, no palpable lesions.  At this point, a forward viewing Olympus colonoscope was inserted into the anus and carefully advanced to the

## 2021-06-30 NOTE — OP NOTE
Endoscopic Procedure Note    Patient: Jignesh Conti : 1965  [facility] Rec#: [id number] Acc#: [id number]     Primary Care Provider ANTHONY Agosto    Endoscopist: Ollie Sherman MD, MD    Date of Procedure:  2021    Procedure:   1. EGD with cold biopsies    Indications: For both EGD and colonoscopy exams today:    1. Upper abdominal pain     2. Nausea     3. Diarrhea, unspecified type     4. S/P cholecystectomy     5. Abnormal CT scan of the abdomen    Anesthesia:  Sedation was administered by anesthesia who monitored the patient during the procedure. Estimated Blood Loss: minimal    Procedure:   After reviewing the patient's chart and obtaining informed consent, the patient was placed in the left lateral decubitus position. A forward-viewing Olympus endoscope was lubricated and inserted through the mouth into the oropharynx. Under direct visualization, the upper esophagus was intubated. The scope was advanced to the level of the third portion of duodenum. Scope was slowly withdrawn with careful inspection of the mucosal surfaces. The scope was retroflexed for inspection of the gastric fundus and incisura. Findings and maneuvers are listed in impression below. The patient tolerated the procedure well. The scope was removed. There were no immediate complications. Findings/IMPRESSION:  Esophagus: normal and EG junction at 42 cm was normal as well. NO erosions or ulcers or nodules or strictures or webs or rings or mass lesions or extrinsic compression or diverticula noted. There is no obvious hiatal hernia present. Stomach:  Normal. NO ulcers or masses or gastric outlet obstruction or retained food or fluid. Rugae were normal and lumen distended well with insufflation. Retroflexed views otherwise revealed a normal GE junction, fundus and cardia as well. Duodenum: Normal including the major duodenal papilla where good outflow of clear yellow-colored bile was noted.  Random biopsies were taken to check for Celiac disease and other causes of villous atrophy. RECOMMENDATIONS:    1. Await path results, the patient will be contacted in 7-10 days with biopsy results. 2.  May continue using over-the-counter antidiarrheals such as Pepto-Bismol or Imodium every 4 hours as needed  3. May continue use of antiemetics such as Zofran 2 to 4 mg every 4 hours as needed    The results were discussed with the patient and family. A copy of the images obtained were given to the patient.      Vikash Bernardo MD, MD  6/30/2021  10:07 AM

## 2021-06-30 NOTE — ANESTHESIA PRE PROCEDURE
Department of Anesthesiology  Preprocedure Note       Name:  Darryle Pare   Age:  61 y.o.  :  1965                                          MRN:  [id number]         Date:  2021      Surgeon: [place]):  Dayan Ashton MD    Procedure: Procedure(s):  EGD BIOPSY  COLONOSCOPY DIAGNOSTIC    Medications prior to admission:   Prior to Admission medications    Medication Sig Start Date End Date Taking? Authorizing Provider   meloxicam (MOBIC) 15 MG tablet Take 1 tablet by mouth once daily 6/15/21   ANTHONY Vasquez   tiZANidine (ZANAFLEX) 4 MG tablet Take 1 tablet by mouth three times daily 21   ANTHONY Vasquez   HYDROcodone-acetaminophen ([address])  MG per tablet TAKE 1 TABLET BY MOUTH EVERY 8 HOURS AS NEEDED FOR PAIN FOR UP TO 30 DAYS REDUCE DOSES TAKEN AS PAIN BECOMES MANAGEABLE 21  ANTHONY Vasquez   amoxicillin-clavulanate (AUGMENTIN) 500-125 MG per tablet Take 1 tablet by mouth 3 times daily    Historical Provider, MD   pantoprazole (PROTONIX) 40 MG tablet Take 1 tablet by mouth 2 times daily (before meals)    Oksana Herrera DO   ondansetron (ZOFRAN ODT) 4 MG disintegrating tablet Take 1 tablet by mouth every 8 hours as needed for Nausea or Vomiting 21   Beny Sterling MD   gabapentin (NEURONTIN) 600 MG tablet Take 1 tablet by mouth 3 times daily for 30 days. 5/11/21 6/10/21  ANTHONY Vasquez   busPIRone (BUSPAR) 5 MG tablet TAKE 1 TABLET BY MOUTH TWICE DAILY AS NEEDED FOR ANXIETY 3/25/21   Historical Provider, MD   FLUoxetine (PROZAC) 40 MG capsule TAKE 1 CAPSULE BY MOUTH ONCE DAILY 21   ANTHONY Vasquez   triamcinolone (KENALOG) 0.025 % cream Apply topically 2 times daily. 10/21/20   ANTHONY Vasquez   diclofenac sodium (VOLTAREN) 1 % GEL Apply 2 g topically 4 times daily 20   ANTHONY Vasquez   ketoconazole (NIZORAL) 2 % shampoo Apply topically daily as needed.  20   ANTHONY Vasquez   amitriptyline (ELAVIL) 50 MG tablet TAKE ONE TABLET BY MOUTH IN THE EVENING 10/19/18   Hany Haas MD       Current medications:    Current Facility-Administered Medications   Medication Dose Route Frequency Provider Last Rate Last Admin    0.9 % sodium chloride infusion   Intravenous Continuous Carolina Rivera MD           Allergies: Allergies   Allergen Reactions    Shellfish-Derived Products Anaphylaxis       Problem List:    Patient Active Problem List   Diagnosis Code    Transaminitis R74.01    Hepatitis C antibody test positive R76.8    History of hepatitis C Z86.19    History of hepatitis A vaccination Z92.29    History of hepatitis B vaccination Z92.29    History of hepatitis C virus infection Z86.19    MD (muscular dystrophy) ([facility] G71.00    Major depressive disorder with single episode, in full remission ([facility] F32.5    Chronic pain of right knee M25.561, G89.29    Neuropathy G62.9    Sludge in gallbladder K82.8    Chronic cholecystitis K81.1    Gastritis and duodenitis K29.90    Upper abdominal pain R10.10    Nausea R11.0    Diarrhea R19.7    S/P cholecystectomy Z90.49       Past Medical History:        Diagnosis Date    Anxiety     Chronic back pain     Depression     MD (muscular dystrophy) ([facility]     Neuropathy 12/2/2020    Scoliosis        Past Surgical History:        Procedure Laterality Date    CHOLECYSTECTOMY, LAPAROSCOPIC N/A 4/27/2021    LAPAROSCOPIC CHOLECYSTECTOMY performed by Thompson Councilman, DO at [address] [place] [address] COLONOSCOPY  1/8/16    Dr Med Sim, normal, 10 yr recall    FOOT SURGERY Left        Social History:    Social History     Tobacco Use    Smoking status: Current Some Day Smoker     Packs/day: 0.50     Types: Cigarettes    Smokeless tobacco: Never Used   Substance Use Topics    Alcohol use: Yes     Comment: occas                                Ready to quit: Not Answered  Counseling given: Not Answered      Vital Signs (Current): There were no vitals filed for this visit. Applicable):   Lab Results   Component Value Date    COVID19 Not Detected 06/28/2021           Anesthesia Evaluation  Patient summary reviewed and Nursing notes reviewed  Airway: Mallampati: II  TM distance: >3 FB   Neck ROM: full  Mouth opening: > = 3 FB Dental: normal exam         Pulmonary:Negative Pulmonary ROS and normal exam                               Cardiovascular:Negative CV ROS             Beta Blocker:  Dose within 24 Hrs         Neuro/Psych:   (+) neuromuscular disease:, psychiatric history:            GI/Hepatic/Renal:   (+) hepatitis: C, liver disease:,           Endo/Other: Negative Endo/Other ROS                    Abdominal:             Vascular: negative vascular ROS. Other Findings:             Anesthesia Plan      general and TIVA     ASA 2       Induction: intravenous. Anesthetic plan and risks discussed with patient. Plan discussed with CRNA.                   ANTHONY Richards - CRNA   6/30/2021

## 2021-06-30 NOTE — H&P
Patient Name: Corrie Gaytan  : 1965  MRN: [id number]  DATE: 21    Allergies: Allergies   Allergen Reactions    Shellfish-Derived Products Anaphylaxis        ENDOSCOPY  History and Physical    Procedure:    [x] Diagnostic Colonoscopy       [] Screening Colonoscopy  [x] EGD      [] ERCP      [] EUS       [] Other    [x] Previous office notes/History and Physical reviewed from the patients chart. Please see EMR for further details of HPI. I have examined the patient's status immediately prior to the procedure and:      Indications/HPI: For both EGD and colonoscopy exams today:    1. Upper abdominal pain     2. Nausea     3. Diarrhea, unspecified type     4. S/P cholecystectomy         []Abdominal Pain   []Cancer- GI/Lung     []Fhx of colon CA/polyps  []History of Polyps  []Barretts            []Melena  []Abnormal Imaging              []Dysphagia              []Persistent Pneumonia   []Anemia                            []Food Impaction        []History of Polyps  [] GI Bleed             []Pulmonary nodule/Mass   []Change in bowel habits []Heartburn/Reflux  []Rectal Bleed (BRBPR)  []Chest Pain - Non Cardiac []Heme (+) Stool []Ulcers  []Constipation  []Hemoptysis  []Varices  []Diarrhea  []Hypoxemia    []Nausea/Vomiting   []Screening   []Crohns/Colitis  []Other:     Anesthesia:   [x] MAC [] Moderate Sedation   [] General   [] None     ROS: 12 pt Review of Symptoms was negative unless mentioned above    Medications:   Prior to Admission medications    Medication Sig Start Date End Date Taking?  Authorizing Provider   meloxicam (MOBIC) 15 MG tablet Take 1 tablet by mouth once daily 6/15/21  Yes ANTHONY Price   tiZANidine (ZANAFLEX) 4 MG tablet Take 1 tablet by mouth three times daily 21  Yes ANTHONY Price   HYDROcodone-acetaminophen ([address])  MG per tablet TAKE 1 TABLET BY MOUTH EVERY 8 HOURS AS NEEDED FOR PAIN FOR UP TO 30 DAYS REDUCE DOSES TAKEN AS PAIN BECOMES MANAGEABLE 21 °F (36.6 °C)   SpO2: 95%        Physical Exam:  Cardiac:  [x]WNL  []Comments:  Pulmonary:  [x]WNL   []Comments:  Neuro/Mental Status:  [x]WNL  []Comments:  Abdominal:  [x]WNL    []Comments:  Other:   []WNL  []Comments:    Informed Consent:  The risks and benefits of the procedure have been discussed with either the patient or if they cannot consent, their representative. Assessment:  Patient examined and appropriate for planned sedation and procedure. Plan:  Proceed with planned sedation and procedure as above.          Ollie Sherman MD

## 2021-07-06 DIAGNOSIS — R10.10 UPPER ABDOMINAL PAIN: Primary | ICD-10-CM

## 2021-07-06 DIAGNOSIS — R19.7 DIARRHEA, UNSPECIFIED TYPE: ICD-10-CM

## 2021-07-06 DIAGNOSIS — G71.00 MD (MUSCULAR DYSTROPHY) (HCC): ICD-10-CM

## 2021-07-06 DIAGNOSIS — R93.3 ABNORMAL ENDOSCOPY OF UPPER GASTROINTESTINAL TRACT: ICD-10-CM

## 2021-07-06 DIAGNOSIS — R11.0 NAUSEA: ICD-10-CM

## 2021-07-06 NOTE — TELEPHONE ENCOUNTER
Osmany Everett called to request a refill on his medication. Last office visit : 4/13/2021   Next office visit : 7/13/2021     Last UDS:   Amphetamines   Date Value Ref Range Status   06/12/2013 NEGATIVE  Final     Amphetamine Screen, Urine   Date Value Ref Range Status   04/13/2021 -  Final     Barbiturates   Date Value Ref Range Status   06/12/2013 NEGATIVE  Final     Barbiturate Screen, Urine   Date Value Ref Range Status   04/13/2021 -  Final     Benzodiazepines   Date Value Ref Range Status   06/12/2013 NEGATIVE  Final     Benzodiazepine Screen, Urine   Date Value Ref Range Status   04/13/2021 -  Final     Buprenorphine Urine   Date Value Ref Range Status   04/13/2021 -  Final     Cocaine Metabolite Screen, Urine   Date Value Ref Range Status   04/13/2021 -  Final     Gabapentin Screen, Urine   Date Value Ref Range Status   04/13/2021 -  Final     MDMA, Urine   Date Value Ref Range Status   04/13/2021 -  Final     Methamphetamine, Urine   Date Value Ref Range Status   04/13/2021 -  Final     Opiate Scrn, Ur   Date Value Ref Range Status   04/13/2021 +  Final     Comment:     Norco appropriate      Oxycodone Screen, Ur   Date Value Ref Range Status   04/13/2021 -  Final     PCP Screen, Urine   Date Value Ref Range Status   04/13/2021 -  Final     Propoxyphene Screen, Urine   Date Value Ref Range Status   04/13/2021 -  Final     THC Screen, Urine   Date Value Ref Range Status   04/13/2021 -  Final     Tricyclic Antidepressants, Urine   Date Value Ref Range Status   04/13/2021 -  Final               Last Violet Rear: 3/8/2021   Medication Contract: 4/16/2021    Last Fill: 6/8/2021    Requested Prescriptions     Pending Prescriptions Disp Refills    HYDROcodone-acetaminophen (NORCO)  MG per tablet 90 tablet 0     Sig: TAKE 1 TABLET BY MOUTH EVERY 8 HOURS AS NEEDED FOR PAIN FOR UP TO 30 DAYS REDUCE DOSES TAKEN AS PAIN BECOMES MANAGEABLE         Please approve or refuse this medication.    Yuri Jean-Baptiste

## 2021-07-07 RX ORDER — HYDROCODONE BITARTRATE AND ACETAMINOPHEN 10; 325 MG/1; MG/1
TABLET ORAL
Qty: 90 TABLET | Refills: 0 | Status: SHIPPED | OUTPATIENT
Start: 2021-07-07 | End: 2021-07-09 | Stop reason: SDUPTHER

## 2021-07-08 DIAGNOSIS — G71.00 MD (MUSCULAR DYSTROPHY) (HCC): ICD-10-CM

## 2021-07-08 NOTE — TELEPHONE ENCOUNTER
Orlin Bello called to request a refill on his medication. This was requested on 7/6/21 and approved and sent to [address] [place] [address]. They are out of this medication. Patient requesting it to go to [facility].      Last office visit : 4/13/2021   Next office visit : 7/13/2021     Last UDS:   Amphetamines   Date Value Ref Range Status   06/12/2013 NEGATIVE  Final     Amphetamine Screen, Urine   Date Value Ref Range Status   04/13/2021 -  Final     Barbiturates   Date Value Ref Range Status   06/12/2013 NEGATIVE  Final     Barbiturate Screen, Urine   Date Value Ref Range Status   04/13/2021 -  Final     Benzodiazepines   Date Value Ref Range Status   06/12/2013 NEGATIVE  Final     Benzodiazepine Screen, Urine   Date Value Ref Range Status   04/13/2021 -  Final     Buprenorphine Urine   Date Value Ref Range Status   04/13/2021 -  Final     Cocaine Metabolite Screen, Urine   Date Value Ref Range Status   04/13/2021 -  Final     Gabapentin Screen, Urine   Date Value Ref Range Status   04/13/2021 -  Final     MDMA, Urine   Date Value Ref Range Status   04/13/2021 -  Final     Methamphetamine, Urine   Date Value Ref Range Status   04/13/2021 -  Final     Opiate Scrn, Ur   Date Value Ref Range Status   04/13/2021 +  Final     Comment:     Norco appropriate      Oxycodone Screen, Ur   Date Value Ref Range Status   04/13/2021 -  Final     PCP Screen, Urine   Date Value Ref Range Status   04/13/2021 -  Final     Propoxyphene Screen, Urine   Date Value Ref Range Status   04/13/2021 -  Final     THC Screen, Urine   Date Value Ref Range Status   04/13/2021 -  Final     Tricyclic Antidepressants, Urine   Date Value Ref Range Status   04/13/2021 -  Final               Last David Gallagher: 3/8/21  Medication Contract: 4/16/21   Last Fill: 6/8/21    Requested Prescriptions     Pending Prescriptions Disp Refills    HYDROcodone-acetaminophen (NORCO)  MG per tablet 90 tablet 0     Sig: TAKE 1 TABLET BY MOUTH EVERY 8 HOURS AS NEEDED FOR PAIN FOR UP TO 30 DAYS REDUCE DOSES TAKEN AS PAIN BECOMES MANAGEABLE         Please approve or refuse this medication.    Nu Lopez MA

## 2021-07-09 ENCOUNTER — TELEPHONE (OUTPATIENT)
Dept: PRIMARY CARE CLINIC | Age: 56
End: 2021-07-09

## 2021-07-09 DIAGNOSIS — R19.7 DIARRHEA, UNSPECIFIED TYPE: ICD-10-CM

## 2021-07-09 DIAGNOSIS — R93.3 ABNORMAL ENDOSCOPY OF UPPER GASTROINTESTINAL TRACT: ICD-10-CM

## 2021-07-09 DIAGNOSIS — R10.10 UPPER ABDOMINAL PAIN: ICD-10-CM

## 2021-07-09 DIAGNOSIS — R11.0 NAUSEA: ICD-10-CM

## 2021-07-09 RX ORDER — HYDROCODONE BITARTRATE AND ACETAMINOPHEN 10; 325 MG/1; MG/1
TABLET ORAL
Qty: 90 TABLET | Refills: 0 | Status: SHIPPED | OUTPATIENT
Start: 2021-07-09 | End: 2021-08-03 | Stop reason: SDUPTHER

## 2021-07-09 NOTE — TELEPHONE ENCOUNTER
patient called wanting to know if his norco was sent into [facility] pharmacy, Dr. Lalita Granados still hasn't accepted this medication. Patient has been notified that he might not get to it today.

## 2021-07-12 LAB
GLIADIN ANTIBODIES IGA: 4 UNITS (ref 0–19)
TISSUE TRANSGLUTAMINASE IGA: <2 U/ML (ref 0–3)

## 2021-07-13 ENCOUNTER — OFFICE VISIT (OUTPATIENT)
Dept: PRIMARY CARE CLINIC | Age: 56
End: 2021-07-13
Payer: MEDICARE

## 2021-07-13 VITALS
SYSTOLIC BLOOD PRESSURE: 124 MMHG | DIASTOLIC BLOOD PRESSURE: 84 MMHG | RESPIRATION RATE: 18 BRPM | HEIGHT: 71 IN | BODY MASS INDEX: 27.58 KG/M2 | OXYGEN SATURATION: 95 % | HEART RATE: 74 BPM | WEIGHT: 197 LBS | TEMPERATURE: 97.8 F

## 2021-07-13 DIAGNOSIS — Z79.899 DRUG THERAPY: ICD-10-CM

## 2021-07-13 DIAGNOSIS — F41.9 ANXIETY: ICD-10-CM

## 2021-07-13 DIAGNOSIS — G71.00 MD (MUSCULAR DYSTROPHY) (HCC): Primary | ICD-10-CM

## 2021-07-13 DIAGNOSIS — E78.2 MIXED HYPERLIPIDEMIA: ICD-10-CM

## 2021-07-13 DIAGNOSIS — R73.09 ELEVATED GLUCOSE: ICD-10-CM

## 2021-07-13 PROCEDURE — 99214 OFFICE O/P EST MOD 30 MIN: CPT | Performed by: NURSE PRACTITIONER

## 2021-07-13 PROCEDURE — 80305 DRUG TEST PRSMV DIR OPT OBS: CPT | Performed by: NURSE PRACTITIONER

## 2021-07-13 PROCEDURE — G8419 CALC BMI OUT NRM PARAM NOF/U: HCPCS | Performed by: NURSE PRACTITIONER

## 2021-07-13 PROCEDURE — 3017F COLORECTAL CA SCREEN DOC REV: CPT | Performed by: NURSE PRACTITIONER

## 2021-07-13 PROCEDURE — G8427 DOCREV CUR MEDS BY ELIG CLIN: HCPCS | Performed by: NURSE PRACTITIONER

## 2021-07-13 PROCEDURE — 4004F PT TOBACCO SCREEN RCVD TLK: CPT | Performed by: NURSE PRACTITIONER

## 2021-07-13 SDOH — ECONOMIC STABILITY: FOOD INSECURITY: WITHIN THE PAST 12 MONTHS, THE FOOD YOU BOUGHT JUST DIDN'T LAST AND YOU DIDN'T HAVE MONEY TO GET MORE.: NEVER TRUE

## 2021-07-13 SDOH — ECONOMIC STABILITY: FOOD INSECURITY: WITHIN THE PAST 12 MONTHS, YOU WORRIED THAT YOUR FOOD WOULD RUN OUT BEFORE YOU GOT MONEY TO BUY MORE.: NEVER TRUE

## 2021-07-13 ASSESSMENT — SOCIAL DETERMINANTS OF HEALTH (SDOH): HOW HARD IS IT FOR YOU TO PAY FOR THE VERY BASICS LIKE FOOD, HOUSING, MEDICAL CARE, AND HEATING?: NOT HARD AT ALL

## 2021-07-13 NOTE — PROGRESS NOTES
MG tablet Take 1 tablet by mouth three times daily 90 tablet 2    pantoprazole (PROTONIX) 40 MG tablet Take 1 tablet by mouth 2 times daily (before meals) 60 tablet 0    ondansetron (ZOFRAN ODT) 4 MG disintegrating tablet Take 1 tablet by mouth every 8 hours as needed for Nausea or Vomiting 15 tablet 0    busPIRone (BUSPAR) 5 MG tablet TAKE 1 TABLET BY MOUTH TWICE DAILY AS NEEDED FOR ANXIETY      FLUoxetine (PROZAC) 40 MG capsule TAKE 1 CAPSULE BY MOUTH ONCE DAILY 90 capsule 1    triamcinolone (KENALOG) 0.025 % cream Apply topically 2 times daily. 80 g 1    diclofenac sodium (VOLTAREN) 1 % GEL Apply 2 g topically 4 times daily 2 Tube 1    ketoconazole (NIZORAL) 2 % shampoo Apply topically daily as needed. 120 mL 1    amitriptyline (ELAVIL) 50 MG tablet TAKE ONE TABLET BY MOUTH IN THE EVENING 30 tablet 11    gabapentin (NEURONTIN) 600 MG tablet Take 1 tablet by mouth 3 times daily for 30 days. 90 tablet 1     No current facility-administered medications for this visit. Allergies   Allergen Reactions    Shellfish-Derived Products Anaphylaxis       Family History   Problem Relation Age of Onset    Diabetes Mother     Colon Cancer Neg Hx     Colon Polyps Neg Hx     Esophageal Cancer Neg Hx     Liver Cancer Neg Hx     Liver Disease Neg Hx     Rectal Cancer Neg Hx     Stomach Cancer Neg Hx                Subjective:      Review of Systems   Constitutional: Negative. HENT: Negative. Eyes: Negative. Respiratory: Negative. Cardiovascular: Negative. Gastrointestinal: Negative. Endocrine: Negative. Genitourinary: Negative. Musculoskeletal: Positive for arthralgias and back pain (chronic). Skin: Negative. Neurological: Negative. Hematological: Negative. Psychiatric/Behavioral: Negative. Objective:     Physical Exam  Vitals and nursing note reviewed. Constitutional:       Appearance: Normal appearance. HENT:      Head: Normocephalic and atraumatic.       Right Ear: Hearing, tympanic membrane, ear canal and external ear normal.      Left Ear: Hearing, tympanic membrane, ear canal and external ear normal.      Nose: Nose normal.      Mouth/Throat:      Lips: Pink. Mouth: Mucous membranes are moist.      Pharynx: Oropharynx is clear. Eyes:      General: Lids are normal.      Extraocular Movements: Extraocular movements intact. Conjunctiva/sclera: Conjunctivae normal.      Pupils: Pupils are equal, round, and reactive to light. Neck:      Thyroid: No thyromegaly. Cardiovascular:      Rate and Rhythm: Normal rate and regular rhythm. Pulses: Normal pulses. Dorsalis pedis pulses are 2+ on the right side and 2+ on the left side. Posterior tibial pulses are 2+ on the right side and 2+ on the left side. Heart sounds: Normal heart sounds. Pulmonary:      Effort: Pulmonary effort is normal.      Breath sounds: Normal breath sounds and air entry. Abdominal:      General: Bowel sounds are normal.      Palpations: Abdomen is soft. Musculoskeletal:      Cervical back: Full passive range of motion without pain, normal range of motion and neck supple. Thoracic back: Deformity present. No tenderness. Decreased range of motion. Lumbar back: Deformity present. No tenderness. Decreased range of motion. Lymphadenopathy:      Cervical: No cervical adenopathy. Skin:     General: Skin is warm and dry. Capillary Refill: Capillary refill takes less than 2 seconds. Neurological:      General: No focal deficit present. Mental Status: He is alert and oriented to person, place, and time. Mental status is at baseline. Coordination: Coordination is intact. Psychiatric:         Mood and Affect: Mood normal.         Speech: Speech normal.         Behavior: Behavior normal.         Thought Content:  Thought content normal.         Cognition and Memory: Cognition and memory normal.         Judgment: Judgment normal.         BP 124/84   Pulse 74   Temp 97.8 °F (36.6 °C) (Temporal)   Resp 18   Ht 5' 11\" (1.803 m)   Wt 197 lb (89.4 kg)   SpO2 95%   BMI 27.48 kg/m²     Assessment:      Diagnosis Orders   1. MD (muscular dystrophy) ([facility]  CBC    Comprehensive Metabolic Panel   2. Drug therapy  POCT Rapid Drug Screen   3. Anxiety  CBC    Comprehensive Metabolic Panel   4. Mixed hyperlipidemia  Lipid, Fasting   5. Elevated glucose  Hemoglobin A1C       Results for orders placed or performed in visit on 07/13/21   POCT Rapid Drug Screen   Result Value Ref Range    Alcohol, Urine -     Amphetamine Screen, Urine -     Barbiturate Screen, Urine -     Benzodiazepine Screen, Urine -     Buprenorphine Urine -     Cocaine Metabolite Screen, Urine -     FENTANYL SCREEN, URINE -     Gabapentin Screen, Urine -     MDMA, Urine -     Methadone Screen, Urine -     Methamphetamine, Urine -     Opiate Scrn, Ur +     Oxycodone Screen, Ur -     PCP Screen, Urine -     Propoxyphene Screen, Urine -     Synthetic Cannabinoids (K2) Screen, Urine -     THC Screen, Urine -     Tramadol Scrn, Ur -     Tricyclic Antidepressants, Urine -        Plan:     UDS was appropriate. Will continue current medication regimen. Checking labs -we will call with these results. More than 50% of the time was spent counseling and coordinating care for a total time of 31 mins face to face. Return in about 4 months (around 11/13/2021) for Refill with UDS. Orders Placed This Encounter   Procedures    CBC     Standing Status:   Future     Standing Expiration Date:   7/13/2022    Comprehensive Metabolic Panel     Standing Status:   Future     Standing Expiration Date:   7/13/2022    Lipid, Fasting     Standing Status:   Future     Standing Expiration Date:   7/13/2022    Hemoglobin A1C     Standing Status:   Future     Standing Expiration Date:   7/13/2022    POCT Rapid Drug Screen       No orders of the defined types were placed in this encounter.            Patient offered educational handouts and has had all questions answered. Patient voices understanding and agrees to plans along with risks and benefits of plan. Patient is instructed to continue prior meds, diet, and exercise plans as instructed. Patient agrees to follow up as instructed and sooner if needed. Patient agrees to go to ER if condition becomes emergent. EMR Dragon/transcription disclaimer: Some of this encounter note is an electronic transcription/translation of spoken language to printed text. The electronic translation of spoken language may permit erroneous, or at times, nonsensical words or phrases to be inadvertently transcribed.  Although I have reviewed the note for such errors, some may still exist.    Electronically signed by ANTHONY Todd on 7/13/2021 at 2:23 PM

## 2021-07-19 DIAGNOSIS — G62.9 NEUROPATHY: ICD-10-CM

## 2021-07-19 ASSESSMENT — ENCOUNTER SYMPTOMS
EYES NEGATIVE: 1
BACK PAIN: 1
RESPIRATORY NEGATIVE: 1
GASTROINTESTINAL NEGATIVE: 1

## 2021-07-19 NOTE — TELEPHONE ENCOUNTER
Arben Murphy called to request a refill on his medication.       Last office visit : 7/13/2021   Next office visit : 11/16/2021     Last UDS:   Amphetamines   Date Value Ref Range Status   06/12/2013 NEGATIVE  Final     Amphetamine Screen, Urine   Date Value Ref Range Status   07/13/2021 -  Final     Barbiturates   Date Value Ref Range Status   06/12/2013 NEGATIVE  Final     Barbiturate Screen, Urine   Date Value Ref Range Status   07/13/2021 -  Final     Benzodiazepines   Date Value Ref Range Status   06/12/2013 NEGATIVE  Final     Benzodiazepine Screen, Urine   Date Value Ref Range Status   07/13/2021 -  Final     Buprenorphine Urine   Date Value Ref Range Status   07/13/2021 -  Final     Cocaine Metabolite Screen, Urine   Date Value Ref Range Status   07/13/2021 -  Final     Gabapentin Screen, Urine   Date Value Ref Range Status   07/13/2021 -  Final     MDMA, Urine   Date Value Ref Range Status   07/13/2021 -  Final     Methamphetamine, Urine   Date Value Ref Range Status   07/13/2021 -  Final     Opiate Scrn, Ur   Date Value Ref Range Status   07/13/2021 +  Final     Oxycodone Screen, Ur   Date Value Ref Range Status   07/13/2021 -  Final     PCP Screen, Urine   Date Value Ref Range Status   07/13/2021 -  Final     Propoxyphene Screen, Urine   Date Value Ref Range Status   07/13/2021 -  Final     THC Screen, Urine   Date Value Ref Range Status   07/13/2021 -  Final     Tricyclic Antidepressants, Urine   Date Value Ref Range Status   07/13/2021 -  Final       Last Ciro Clutter: 4/16/2021  Medication Contract: 4/16/2021  Last Fill: 6/11/2021    Requested Prescriptions     Pending Prescriptions Disp Refills    busPIRone (BUSPAR) 5 MG tablet [Pharmacy Med Name: busPIRone HCl 5 MG Oral Tablet] 60 tablet 0     Sig: Take 1 tablet by mouth twice daily as needed for anxiety    gabapentin (NEURONTIN) 600 MG tablet [Pharmacy Med Name: Gabapentin 600 MG Oral Tablet] 90 tablet 0     Sig: TAKE 1 TABLET BY MOUTH THREE TIMES DAILY         Please approve or refuse this medication.    Radha Collins

## 2021-07-20 RX ORDER — BUSPIRONE HYDROCHLORIDE 5 MG/1
TABLET ORAL
Qty: 60 TABLET | Refills: 0 | Status: SHIPPED | OUTPATIENT
Start: 2021-07-20 | End: 2021-08-16

## 2021-07-20 RX ORDER — GABAPENTIN 600 MG/1
TABLET ORAL
Qty: 90 TABLET | Refills: 3 | Status: SHIPPED | OUTPATIENT
Start: 2021-07-20 | End: 2021-09-23

## 2021-07-28 ENCOUNTER — TELEPHONE (OUTPATIENT)
Dept: GASTROENTEROLOGY | Age: 56
End: 2021-07-28

## 2021-07-28 ENCOUNTER — OFFICE VISIT (OUTPATIENT)
Dept: GASTROENTEROLOGY | Age: 56
End: 2021-07-28
Payer: MEDICARE

## 2021-07-28 VITALS
DIASTOLIC BLOOD PRESSURE: 80 MMHG | BODY MASS INDEX: 25.9 KG/M2 | SYSTOLIC BLOOD PRESSURE: 125 MMHG | WEIGHT: 185 LBS | OXYGEN SATURATION: 99 % | HEART RATE: 109 BPM | HEIGHT: 71 IN

## 2021-07-28 DIAGNOSIS — R10.10 UPPER ABDOMINAL PAIN: Primary | ICD-10-CM

## 2021-07-28 PROCEDURE — G8428 CUR MEDS NOT DOCUMENT: HCPCS | Performed by: NURSE PRACTITIONER

## 2021-07-28 PROCEDURE — 4004F PT TOBACCO SCREEN RCVD TLK: CPT | Performed by: NURSE PRACTITIONER

## 2021-07-28 PROCEDURE — 3017F COLORECTAL CA SCREEN DOC REV: CPT | Performed by: NURSE PRACTITIONER

## 2021-07-28 PROCEDURE — 99214 OFFICE O/P EST MOD 30 MIN: CPT | Performed by: NURSE PRACTITIONER

## 2021-07-28 PROCEDURE — G8419 CALC BMI OUT NRM PARAM NOF/U: HCPCS | Performed by: NURSE PRACTITIONER

## 2021-07-28 ASSESSMENT — ENCOUNTER SYMPTOMS
ABDOMINAL DISTENTION: 0
NAUSEA: 0
VOICE CHANGE: 0
RECTAL PAIN: 0
DIARRHEA: 1
SORE THROAT: 0
BLOOD IN STOOL: 0
CONSTIPATION: 0
ABDOMINAL PAIN: 1
BACK PAIN: 0
TROUBLE SWALLOWING: 0
COUGH: 0
ANAL BLEEDING: 1
SHORTNESS OF BREATH: 1
VOMITING: 0

## 2021-07-28 NOTE — TELEPHONE ENCOUNTER
Heidy Griffith, please phone the following prescriptions into his pharmacy and notify him of this pre-med regimen prior to the CTA:  1)  Prednisone 50mg  Take 1 tablet p.o. 24, 12, and 1 hour prior to the procedure  Disp #3 with no refills  2) benadryl 50mg  Take 1 tablet p.o. with the last prednisone dose (1 hour before the procedure). Disp #1 with no refills (may have to get this OTC).

## 2021-07-28 NOTE — PROGRESS NOTES
Subjective:      Amelia Stewart is a63 y.o. male  Chief Complaint   Patient presents with    Follow-up       HPI  PCP: ANTHONY Saleh  Referring Provider:Dr Sharif Ferguson MD  Pt made an appt s/p EGD/colonoscopy as advised by Dr Divya Brooks. To discuss results. Denies post-procedure complications. Results of scopes in history, I reviewed this and we discussed results. Pt reports he is still having abd pain. RUQ and IAN region. This all started after his GB was removed. Pain is severe. And debilitating. Cant sleep at night due to the pain. Laying on his right side exacerbates the pain. The pains is constant since around the first week of May 2021. Pain is always worse after eating. But is there even if he hasn't eaten. Had CT 5/2021 in ED for eval of this pain which revealed gastritis/duodenitis, otherwise negative from GI standpoint. He reports he took PPI BID as prescribed by Dr Rocio Beauchamp and this didn't help his pain. Pt reports the antibiotics and pain meds prescribed by the ED is the only thing that helped his pain. He has 1 stool a day, sometimes diarrhea other times has a little form. This more loose stool started after his GB was removed. Family HX:    Pt denies family hx of colon polyps, colon CA, inflammatory bowel dx, gastric CA and esophageal CA.     Past Medical History:   Diagnosis Date    Anxiety     Chronic back pain     Depression     MD (muscular dystrophy) ([facility] [address]     Neuropathy 12/2/2020    Scoliosis           Past Surgical History:   Procedure Laterality Date    CHOLECYSTECTOMY, LAPAROSCOPIC N/A 04/27/2021    LAPAROSCOPIC CHOLECYSTECTOMY performed by Harry Bonilla DO at [address] [place] [address] COLONOSCOPY  01/08/2016    Dr Tiffanie Chan, normal, 10 yr recall    COLONOSCOPY N/A 06/30/2021    Dr Shanika Zelaya hemorrhids, RANDOM COLON BX NEGATIVE, Sub Prep Fair, 3 year recall    FOOT SURGERY Left     UPPER GASTROINTESTINAL ENDOSCOPY N/A 06/30/2021    Dr Sharif Ferguson, duodenal villous blunting otherwise normal EGD; serum celiac panel NEG for celiac sprue       Social History     Socioeconomic History    Marital status: Single     Spouse name: None    Number of children: None    Years of education: None    Highest education level: None   Occupational History    None   Tobacco Use    Smoking status: Current Some Day Smoker     Packs/day: 0.50     Types: Cigarettes    Smokeless tobacco: Never Used   Vaping Use    Vaping Use: Never used   Substance and Sexual Activity    Alcohol use: Yes     Comment: occas    Drug use: No    Sexual activity: None   Other Topics Concern    None   Social History Narrative    None     Social Determinants of Health     Financial Resource Strain: Low Risk     Difficulty of Paying Living Expenses: Not hard at all   Food Insecurity: No Food Insecurity    Worried About Running Out of Food in the Last Year: Never true    Rebecca of Food in the Last Year: Never true   Transportation Needs:     Lack of Transportation (Medical):  Lack of Transportation (Non-Medical):    Physical Activity:     Days of Exercise per Week:     Minutes of Exercise per Session:    Stress:     Feeling of Stress :    Social Connections:     Frequency of Communication with Friends and Family:     Frequency of Social Gatherings with Friends and Family:     Attends Rastafari Services:     Active Member of Clubs or Organizations:     Attends Club or Organization Meetings:     Marital Status:    Intimate Partner Violence:     Fear of Current or Ex-Partner:     Emotionally Abused:     Physically Abused:     Sexually Abused:         Allergies   Allergen Reactions    Shellfish-Derived Products Anaphylaxis       Current Outpatient Medications   Medication Sig Dispense Refill    busPIRone (BUSPAR) 5 MG tablet Take 1 tablet by mouth twice daily as needed for anxiety 60 tablet 0    gabapentin (NEURONTIN) 600 MG tablet TAKE 1 TABLET BY MOUTH THREE TIMES DAILY 90 tablet 3  HYDROcodone-acetaminophen (NORCO)  MG per tablet TAKE 1 TABLET BY MOUTH EVERY 8 HOURS AS NEEDED FOR PAIN FOR UP TO 30 DAYS REDUCE DOSES TAKEN AS PAIN BECOMES MANAGEABLE 90 tablet 0    meloxicam (MOBIC) 15 MG tablet Take 1 tablet by mouth once daily 90 tablet 0    tiZANidine (ZANAFLEX) 4 MG tablet Take 1 tablet by mouth three times daily 90 tablet 2    pantoprazole (PROTONIX) 40 MG tablet Take 1 tablet by mouth 2 times daily (before meals) 60 tablet 0    ondansetron (ZOFRAN ODT) 4 MG disintegrating tablet Take 1 tablet by mouth every 8 hours as needed for Nausea or Vomiting 15 tablet 0    FLUoxetine (PROZAC) 40 MG capsule TAKE 1 CAPSULE BY MOUTH ONCE DAILY 90 capsule 1    triamcinolone (KENALOG) 0.025 % cream Apply topically 2 times daily. 80 g 1    diclofenac sodium (VOLTAREN) 1 % GEL Apply 2 g topically 4 times daily 2 Tube 1    ketoconazole (NIZORAL) 2 % shampoo Apply topically daily as needed. 120 mL 1    amitriptyline (ELAVIL) 50 MG tablet TAKE ONE TABLET BY MOUTH IN THE EVENING 30 tablet 11     No current facility-administered medications for this visit. Review of Systems   Constitutional: Positive for activity change and fatigue. Negative for chills and fever. HENT: Negative for sore throat, trouble swallowing and voice change. Respiratory: Positive for shortness of breath. Negative for cough. Cardiovascular: Negative for chest pain, palpitations and leg swelling. Gastrointestinal: Positive for abdominal pain, anal bleeding (brb at times, not often) and diarrhea (at times). Negative for abdominal distention, blood in stool, constipation, nausea, rectal pain and vomiting. Genitourinary: Negative for hematuria. Musculoskeletal: Negative for arthralgias, back pain and neck pain. Neurological: Negative for dizziness, weakness, light-headedness and headaches. Psychiatric/Behavioral: Positive for dysphoric mood. Negative for sleep disturbance.  The patient is nervous/anxious. All other systems reviewed and are negative. Objective:     Physical Exam  Vitals and nursing note reviewed. Constitutional:       Appearance: He is well-developed. Comments: /80   Pulse 109   Ht 5' 11\" (1.803 m)   Wt 185 lb (83.9 kg)   SpO2 99%   BMI 25.80 kg/m²    Eyes:      General: No scleral icterus. Conjunctiva/sclera: Conjunctivae normal.      Pupils: Pupils are equal, round, and reactive to light. Neck:      Thyroid: No thyromegaly. Cardiovascular:      Rate and Rhythm: Normal rate and regular rhythm. Heart sounds: Normal heart sounds. No murmur heard. No friction rub. No gallop. Pulmonary:      Effort: Pulmonary effort is normal. No respiratory distress. Breath sounds: Normal breath sounds. Abdominal:      General: Bowel sounds are normal. There is no distension. Palpations: Abdomen is soft. Tenderness: There is abdominal tenderness. There is no rebound. Comments: Pain here   Musculoskeletal:         General: No deformity. Normal range of motion. Cervical back: Normal range of motion and neck supple. Skin:     Coloration: Skin is not pale. Neurological:      Mental Status: He is alert and oriented to person, place, and time. Cranial Nerves: No cranial nerve deficit. Psychiatric:         Judgment: Judgment normal.           Assessment:       Diagnosis Orders   1. Upper abdominal pain  CTA ABDOMEN PELVIS W CONTRAST         Plan:      1. Ill get a CTA. With premed prior to procedure. Will call him with results. Advised pt to f/u with his PCP for non-GI abnormalities noted on CT and also his surgeon since the pain started after his GB was removed.

## 2021-07-30 ENCOUNTER — OFFICE VISIT (OUTPATIENT)
Dept: PRIMARY CARE CLINIC | Age: 56
End: 2021-07-30
Payer: MEDICARE

## 2021-07-30 ENCOUNTER — HOSPITAL ENCOUNTER (OUTPATIENT)
Dept: CT IMAGING | Age: 56
Discharge: HOME | End: 2021-07-30
Payer: MEDICARE

## 2021-07-30 VITALS
HEART RATE: 101 BPM | HEIGHT: 71 IN | RESPIRATION RATE: 18 BRPM | TEMPERATURE: 96.4 F | WEIGHT: 185.2 LBS | OXYGEN SATURATION: 97 % | DIASTOLIC BLOOD PRESSURE: 87 MMHG | BODY MASS INDEX: 25.93 KG/M2 | SYSTOLIC BLOOD PRESSURE: 132 MMHG

## 2021-07-30 DIAGNOSIS — R07.9 RIGHT-SIDED CHEST PAIN: ICD-10-CM

## 2021-07-30 DIAGNOSIS — R07.89 CHEST TIGHTNESS: Primary | ICD-10-CM

## 2021-07-30 DIAGNOSIS — R07.89 CHEST TIGHTNESS: ICD-10-CM

## 2021-07-30 DIAGNOSIS — R10.11 ABDOMINAL PAIN, RUQ (RIGHT UPPER QUADRANT): ICD-10-CM

## 2021-07-30 LAB
ALBUMIN SERPL-MCNC: 4.4 G/DL (ref 3.5–5.2)
ALP BLD-CCNC: 117 U/L (ref 40–130)
ALT SERPL-CCNC: 18 U/L (ref 5–41)
ANION GAP SERPL CALCULATED.3IONS-SCNC: 7 MMOL/L (ref 7–19)
AST SERPL-CCNC: 19 U/L (ref 5–40)
BILIRUB SERPL-MCNC: 0.3 MG/DL (ref 0.2–1.2)
BUN BLDV-MCNC: 15 MG/DL (ref 6–20)
CALCIUM SERPL-MCNC: 9.8 MG/DL (ref 8.6–10)
CHLORIDE BLD-SCNC: 100 MMOL/L (ref 98–111)
CO2: 31 MMOL/L (ref 22–29)
CREAT SERPL-MCNC: 0.5 MG/DL (ref 0.5–1.2)
GFR AFRICAN AMERICAN: >59
GFR NON-AFRICAN AMERICAN: >60
GLUCOSE BLD-MCNC: 97 MG/DL (ref 74–109)
HBA1C MFR BLD: 5.5 % (ref 4–6)
HCT VFR BLD CALC: 40.6 % (ref 42–52)
HEMOGLOBIN: 12.9 G/DL (ref 14–18)
LIPASE: 23 U/L (ref 13–60)
MCH RBC QN AUTO: 29.7 PG (ref 27–31)
MCHC RBC AUTO-ENTMCNC: 31.8 G/DL (ref 33–37)
MCV RBC AUTO: 93.3 FL (ref 80–94)
PDW BLD-RTO: 14.5 % (ref 11.5–14.5)
PLATELET # BLD: 430 K/UL (ref 130–400)
PMV BLD AUTO: 9.5 FL (ref 9.4–12.4)
POTASSIUM SERPL-SCNC: 4.6 MMOL/L (ref 3.5–5)
RBC # BLD: 4.35 M/UL (ref 4.7–6.1)
SODIUM BLD-SCNC: 138 MMOL/L (ref 136–145)
TOTAL PROTEIN: 8.2 G/DL (ref 6.6–8.7)
WBC # BLD: 7.9 K/UL (ref 4.8–10.8)

## 2021-07-30 PROCEDURE — 99214 OFFICE O/P EST MOD 30 MIN: CPT | Performed by: NURSE PRACTITIONER

## 2021-07-30 PROCEDURE — 4004F PT TOBACCO SCREEN RCVD TLK: CPT | Performed by: NURSE PRACTITIONER

## 2021-07-30 PROCEDURE — G8427 DOCREV CUR MEDS BY ELIG CLIN: HCPCS | Performed by: NURSE PRACTITIONER

## 2021-07-30 PROCEDURE — 3017F COLORECTAL CA SCREEN DOC REV: CPT | Performed by: NURSE PRACTITIONER

## 2021-07-30 PROCEDURE — G8419 CALC BMI OUT NRM PARAM NOF/U: HCPCS | Performed by: NURSE PRACTITIONER

## 2021-07-30 PROCEDURE — 71250 CT THORAX DX C-: CPT

## 2021-07-30 RX ORDER — PREDNISONE 10 MG/1
10 TABLET ORAL DAILY
Qty: 10 TABLET | Refills: 0 | Status: SHIPPED | OUTPATIENT
Start: 2021-07-30 | End: 2021-08-09 | Stop reason: ALTCHOICE

## 2021-07-30 RX ORDER — AMOXICILLIN AND CLAVULANATE POTASSIUM 875; 125 MG/1; MG/1
1 TABLET, FILM COATED ORAL 2 TIMES DAILY
Qty: 20 TABLET | Refills: 0 | Status: SHIPPED | OUTPATIENT
Start: 2021-07-30 | End: 2021-08-09 | Stop reason: ALTCHOICE

## 2021-07-30 NOTE — PROGRESS NOTES
[address] [facility]  [address] [facility] [phone] [place] [address]  Dept: [phone]  Dept Fax: [phone]  Loc: [phone]    Duyen Lewis is a 61 y.o. male who presents today for his medical conditions/complaints as noted below. Duyen Lewis is c/o of Abdominal Pain (Patient reports having gallbladder sx having chest pain, cant lay on right side. 10/10 pain 24 hours.)      Chief Complaint   Patient presents with    Abdominal Pain     Patient reports having gallbladder sx having chest pain, cant lay on right side. 10/10 pain 24 hours. HPI:     HPI   Patient here with complaints of some severe upper right quad pain. He had this same thing about 2 months ago. During that time her ended up having his GB removed thinking this was the cause of the pain. Since then it has reoccurred. He is unable to lay on the right side due to the pain and pressure. He was also treated for pneumonia back in May when this pain originally started.      Past Medical History:   Diagnosis Date    Anxiety     Chronic back pain     Depression     MD (muscular dystrophy) ([facility] [address]     Neuropathy 12/2/2020    Scoliosis         Past Surgical History:   Procedure Laterality Date    CHOLECYSTECTOMY, LAPAROSCOPIC N/A 04/27/2021    LAPAROSCOPIC CHOLECYSTECTOMY performed by Neelam Valdes DO at [address] [place] [address] COLONOSCOPY  01/08/2016    Dr William Joshua, normal, 10 yr recall    COLONOSCOPY N/A 06/30/2021    Dr Arabella Cristobal hemorrhids, RANDOM COLON BX NEGATIVE, Sub Prep Fair, 3 year recall    FOOT SURGERY Left     UPPER GASTROINTESTINAL ENDOSCOPY N/A 06/30/2021    Dr Scott Hoff, duodenal villous blunting otherwise normal EGD; serum celiac panel NEG for celiac sprue       Social History     Tobacco Use    Smoking status: Current Some Day Smoker     Packs/day: 0.50     Types: Cigarettes    Smokeless tobacco: Never Used   Substance Use Topics    Alcohol use: Yes     Comment: occas Current Outpatient Medications   Medication Sig Dispense Refill    diclofenac (VOLTAREN) 50 MG EC tablet Take 1 tablet by mouth 3 times daily (with meals) 60 tablet 0    predniSONE (DELTASONE) 10 MG tablet Take 1 tablet by mouth daily for 10 days 10 tablet 0    amoxicillin-clavulanate (AUGMENTIN) 875-125 MG per tablet Take 1 tablet by mouth 2 times daily for 10 days 20 tablet 0    busPIRone (BUSPAR) 5 MG tablet Take 1 tablet by mouth twice daily as needed for anxiety 60 tablet 0    gabapentin (NEURONTIN) 600 MG tablet TAKE 1 TABLET BY MOUTH THREE TIMES DAILY 90 tablet 3    meloxicam (MOBIC) 15 MG tablet Take 1 tablet by mouth once daily 90 tablet 0    tiZANidine (ZANAFLEX) 4 MG tablet Take 1 tablet by mouth three times daily 90 tablet 2    pantoprazole (PROTONIX) 40 MG tablet Take 1 tablet by mouth 2 times daily (before meals) 60 tablet 0    ondansetron (ZOFRAN ODT) 4 MG disintegrating tablet Take 1 tablet by mouth every 8 hours as needed for Nausea or Vomiting 15 tablet 0    FLUoxetine (PROZAC) 40 MG capsule TAKE 1 CAPSULE BY MOUTH ONCE DAILY 90 capsule 1    triamcinolone (KENALOG) 0.025 % cream Apply topically 2 times daily. 80 g 1    diclofenac sodium (VOLTAREN) 1 % GEL Apply 2 g topically 4 times daily 2 Tube 1    ketoconazole (NIZORAL) 2 % shampoo Apply topically daily as needed. 120 mL 1    amitriptyline (ELAVIL) 50 MG tablet TAKE ONE TABLET BY MOUTH IN THE EVENING 30 tablet 11     No current facility-administered medications for this visit. Allergies   Allergen Reactions    Shellfish-Derived Products Anaphylaxis       Family History   Problem Relation Age of Onset    Diabetes Mother     Colon Cancer Neg Hx     Colon Polyps Neg Hx     Esophageal Cancer Neg Hx     Liver Cancer Neg Hx     Liver Disease Neg Hx     Rectal Cancer Neg Hx     Stomach Cancer Neg Hx                Subjective:      Review of Systems   Constitutional: Negative. HENT: Negative. Eyes: Negative. Respiratory: Positive for chest tightness (Right lower chest). Cardiovascular: Negative. Gastrointestinal: Positive for abdominal pain (RUQ). Endocrine: Negative. Genitourinary: Negative. Musculoskeletal: Negative. Skin: Negative. Neurological: Negative. Hematological: Negative. Psychiatric/Behavioral: Negative. Objective:     Physical Exam  Vitals and nursing note reviewed. Constitutional:       Appearance: Normal appearance. HENT:      Head: Normocephalic and atraumatic. Right Ear: Hearing, tympanic membrane, ear canal and external ear normal.      Left Ear: Hearing, tympanic membrane, ear canal and external ear normal.      Nose: Nose normal.      Mouth/Throat:      Lips: Pink. Mouth: Mucous membranes are moist.      Pharynx: Oropharynx is clear. Eyes:      General: Lids are normal.      Extraocular Movements: Extraocular movements intact. Conjunctiva/sclera: Conjunctivae normal.      Pupils: Pupils are equal, round, and reactive to light. Neck:      Thyroid: No thyromegaly. Cardiovascular:      Rate and Rhythm: Normal rate and regular rhythm. Pulses: Normal pulses. Dorsalis pedis pulses are 2+ on the right side and 2+ on the left side. Posterior tibial pulses are 2+ on the right side and 2+ on the left side. Heart sounds: Normal heart sounds. Pulmonary:      Effort: Pulmonary effort is normal.      Breath sounds: Normal air entry. Examination of the right-lower field reveals decreased breath sounds. Decreased breath sounds present. Abdominal:      General: Bowel sounds are normal.      Palpations: Abdomen is soft. Musculoskeletal:      Cervical back: Full passive range of motion without pain, normal range of motion and neck supple. Thoracic back: Deformity present. No tenderness. Decreased range of motion. Lumbar back: Deformity present. No tenderness. Decreased range of motion.    Lymphadenopathy: Cervical: No cervical adenopathy. Skin:     General: Skin is warm and dry. Capillary Refill: Capillary refill takes less than 2 seconds. Neurological:      General: No focal deficit present. Mental Status: He is alert and oriented to person, place, and time. Mental status is at baseline. Coordination: Coordination is intact. Psychiatric:         Mood and Affect: Mood normal.         Speech: Speech normal.         Behavior: Behavior normal.         Thought Content: Thought content normal.         Cognition and Memory: Cognition and memory normal.         Judgment: Judgment normal.         /87   Pulse 101   Temp 96.4 °F (35.8 °C) (Temporal)   Resp 18   Ht 5' 11\" (1.803 m)   Wt 185 lb 3.2 oz (84 kg)   SpO2 97%   BMI 25.83 kg/m²     Assessment:      Diagnosis Orders   1. Chest tightness  CBC    Comprehensive Metabolic Panel    Lipase    Hemoglobin A1C    CT CHEST WO CONTRAST   2. Right-sided chest pain  CBC    Comprehensive Metabolic Panel    Lipase    Hemoglobin A1C   3. Abdominal pain, RUQ (right upper quadrant)  CBC    Comprehensive Metabolic Panel    Lipase    Hemoglobin A1C       No results found for this visit on 07/30/21. Plan:     I am checking labs today. Stat CT of chest due to pain and discomfort. Further orders once results have been obtained. More than 50% of the time was spent counseling and coordinating care for a total time of 35 mins face to face. Return if symptoms worsen or fail to improve.     Orders Placed This Encounter   Procedures    CT CHEST WO CONTRAST     Standing Status:   Future     Number of Occurrences:   1     Standing Expiration Date:   7/30/2022    CBC     Standing Status:   Future     Number of Occurrences:   1     Standing Expiration Date:   7/30/2022    Comprehensive Metabolic Panel     Standing Status:   Future     Number of Occurrences:   1     Standing Expiration Date:   7/30/2022    Lipase     Standing Status:   Future     Number of Occurrences:   1     Standing Expiration Date:   7/30/2022    Hemoglobin A1C     Standing Status:   Future     Standing Expiration Date:   7/30/2022       Orders Placed This Encounter   Medications    diclofenac (VOLTAREN) 50 MG EC tablet     Sig: Take 1 tablet by mouth 3 times daily (with meals)     Dispense:  60 tablet     Refill:  0    predniSONE (DELTASONE) 10 MG tablet     Sig: Take 1 tablet by mouth daily for 10 days     Dispense:  10 tablet     Refill:  0    amoxicillin-clavulanate (AUGMENTIN) 875-125 MG per tablet     Sig: Take 1 tablet by mouth 2 times daily for 10 days     Dispense:  20 tablet     Refill:  0            Patient offered educational handouts and has had all questions answered. Patient voices understanding and agrees to plans along with risks and benefits of plan. Patient is instructed to continue prior meds, diet, and exercise plans as instructed. Patient agrees to follow up as instructed and sooner if needed. Patient agrees to go to ER if condition becomes emergent. EMR Dragon/transcription disclaimer: Some of this encounter note is an electronic transcription/translation of spoken language to printed text. The electronic translation of spoken language may permit erroneous, or at times, nonsensical words or phrases to be inadvertently transcribed.  Although I have reviewed the note for such errors, some may still exist.    Electronically signed by ANTHONY Carrillo on 8/3/2021 at 8:35 AM

## 2021-08-03 DIAGNOSIS — G71.00 MD (MUSCULAR DYSTROPHY) (HCC): ICD-10-CM

## 2021-08-03 DIAGNOSIS — F32.A DEPRESSION, UNSPECIFIED DEPRESSION TYPE: ICD-10-CM

## 2021-08-03 RX ORDER — FLUOXETINE HYDROCHLORIDE 40 MG/1
CAPSULE ORAL
Qty: 90 CAPSULE | Refills: 1 | Status: SHIPPED | OUTPATIENT
Start: 2021-08-03 | End: 2021-10-15 | Stop reason: SDUPTHER

## 2021-08-03 RX ORDER — HYDROCODONE BITARTRATE AND ACETAMINOPHEN 10; 325 MG/1; MG/1
TABLET ORAL
Qty: 90 TABLET | Refills: 0 | Status: SHIPPED | OUTPATIENT
Start: 2021-08-03 | End: 2021-09-01 | Stop reason: SDUPTHER

## 2021-08-03 ASSESSMENT — ENCOUNTER SYMPTOMS
CHEST TIGHTNESS: 1
ABDOMINAL PAIN: 1
EYES NEGATIVE: 1

## 2021-08-03 NOTE — TELEPHONE ENCOUNTER
Michelle Barros called to request a refill on his medication.       Last office visit : 7/30/2021   Next office visit : 11/16/2021     Last UDS:   Amphetamines   Date Value Ref Range Status   06/12/2013 NEGATIVE  Final     Amphetamine Screen, Urine   Date Value Ref Range Status   07/13/2021 -  Final     Barbiturates   Date Value Ref Range Status   06/12/2013 NEGATIVE  Final     Barbiturate Screen, Urine   Date Value Ref Range Status   07/13/2021 -  Final     Benzodiazepines   Date Value Ref Range Status   06/12/2013 NEGATIVE  Final     Benzodiazepine Screen, Urine   Date Value Ref Range Status   07/13/2021 -  Final     Buprenorphine Urine   Date Value Ref Range Status   07/13/2021 -  Final     Cocaine Metabolite Screen, Urine   Date Value Ref Range Status   07/13/2021 -  Final     Gabapentin Screen, Urine   Date Value Ref Range Status   07/13/2021 -  Final     MDMA, Urine   Date Value Ref Range Status   07/13/2021 -  Final     Methamphetamine, Urine   Date Value Ref Range Status   07/13/2021 -  Final     Opiate Scrn, Ur   Date Value Ref Range Status   07/13/2021 +  Final     Oxycodone Screen, Ur   Date Value Ref Range Status   07/13/2021 -  Final     PCP Screen, Urine   Date Value Ref Range Status   07/13/2021 -  Final     Propoxyphene Screen, Urine   Date Value Ref Range Status   07/13/2021 -  Final     THC Screen, Urine   Date Value Ref Range Status   07/13/2021 -  Final     Tricyclic Antidepressants, Urine   Date Value Ref Range Status   07/13/2021 -  Final       Last Silvia Evelin: 8/3/2021  Medication Contract: 4/16/2021   Last Fill: 7/9/2021    Requested Prescriptions     Pending Prescriptions Disp Refills    HYDROcodone-acetaminophen (NORCO)  MG per tablet 90 tablet 0     Sig: TAKE 1 TABLET BY MOUTH EVERY 8 HOURS AS NEEDED FOR PAIN FOR UP TO 30 DAYS REDUCE DOSES TAKEN AS PAIN BECOMES MANAGEABLE     Signed Prescriptions Disp Refills    FLUoxetine (PROZAC) 40 MG capsule 90 capsule 1     Sig: TAKE 1 CAPSULE BY MOUTH ONCE DAILY     Authorizing Provider: Lizandro Winston     Ordering User: Christian Foss         Please approve or refuse this medication.    Ora Sox

## 2021-08-04 ENCOUNTER — TELEPHONE (OUTPATIENT)
Dept: GASTROENTEROLOGY | Age: 56
End: 2021-08-04

## 2021-08-04 ENCOUNTER — HOSPITAL ENCOUNTER (OUTPATIENT)
Dept: CT IMAGING | Age: 56
Discharge: HOME | End: 2021-08-04
Payer: MEDICARE

## 2021-08-04 DIAGNOSIS — R10.10 UPPER ABDOMINAL PAIN: ICD-10-CM

## 2021-08-04 PROCEDURE — 6360000004 HC RX CONTRAST MEDICATION: Performed by: NURSE PRACTITIONER

## 2021-08-04 PROCEDURE — 74174 CTA ABD&PLVS W/CONTRAST: CPT

## 2021-08-04 RX ADMIN — IOPAMIDOL 75 ML: 755 INJECTION, SOLUTION INTRAVENOUS at 11:18

## 2021-08-04 NOTE — TELEPHONE ENCOUNTER
Dr Obed Manriquez, can you review the CTA on this pt. I got it because of his c/o continued upper abd pain. Recent EGD unrevealing. Do you want to repeat EGD to evaluate this area?

## 2021-08-05 NOTE — TELEPHONE ENCOUNTER
Agree with you that this patient definitely needs an EGD ASAP due to new onset abnormal findings pertaining to his duodenum-likely ulcer and gastritis.  Thanks

## 2021-08-05 NOTE — TELEPHONE ENCOUNTER
Nidia please call the pt and let him know I have reviewed his CT results. 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 2) kidney stones right kidney, they are not obstructing urine flow. The largest is 9mm in size. He can f/u with PCP if this is a new finding or just make her aware he has this   3) wall thickening/possible ulcer in his small intestine. He needs another EGD to evaluate this area. Get him in quickly. Make sure he is avoiding all NSAIDs. His med list states he is on protonix BID. Continue this til the EGD.  thanks

## 2021-08-09 ENCOUNTER — OFFICE VISIT (OUTPATIENT)
Age: 56
End: 2021-08-09

## 2021-08-09 ENCOUNTER — OFFICE VISIT (OUTPATIENT)
Dept: PRIMARY CARE CLINIC | Age: 56
End: 2021-08-09
Payer: MEDICARE

## 2021-08-09 VITALS
OXYGEN SATURATION: 95 % | BODY MASS INDEX: 26.18 KG/M2 | HEIGHT: 71 IN | DIASTOLIC BLOOD PRESSURE: 70 MMHG | SYSTOLIC BLOOD PRESSURE: 100 MMHG | TEMPERATURE: 96 F | HEART RATE: 86 BPM | WEIGHT: 187 LBS

## 2021-08-09 DIAGNOSIS — R19.7 DIARRHEA, UNSPECIFIED TYPE: ICD-10-CM

## 2021-08-09 DIAGNOSIS — R10.10 UPPER ABDOMINAL PAIN: Primary | ICD-10-CM

## 2021-08-09 DIAGNOSIS — Z11.59 SCREENING FOR VIRAL DISEASE: Primary | ICD-10-CM

## 2021-08-09 DIAGNOSIS — K29.90 GASTRITIS AND DUODENITIS: ICD-10-CM

## 2021-08-09 DIAGNOSIS — R11.0 NAUSEA: ICD-10-CM

## 2021-08-09 PROBLEM — M47.816 LUMBAR SPONDYLOSIS: Status: ACTIVE | Noted: 2019-07-07

## 2021-08-09 PROBLEM — M54.50 LOW BACK PAIN: Status: ACTIVE | Noted: 2019-07-07

## 2021-08-09 LAB — SARS-COV-2, PCR: NOT DETECTED

## 2021-08-09 PROCEDURE — 1036F TOBACCO NON-USER: CPT | Performed by: NURSE PRACTITIONER

## 2021-08-09 PROCEDURE — 99999 PR OFFICE/OUTPT VISIT,PROCEDURE ONLY: CPT | Performed by: NURSE PRACTITIONER

## 2021-08-09 PROCEDURE — 3017F COLORECTAL CA SCREEN DOC REV: CPT | Performed by: NURSE PRACTITIONER

## 2021-08-09 PROCEDURE — G8419 CALC BMI OUT NRM PARAM NOF/U: HCPCS | Performed by: NURSE PRACTITIONER

## 2021-08-09 PROCEDURE — 99214 OFFICE O/P EST MOD 30 MIN: CPT | Performed by: NURSE PRACTITIONER

## 2021-08-09 PROCEDURE — G8427 DOCREV CUR MEDS BY ELIG CLIN: HCPCS | Performed by: NURSE PRACTITIONER

## 2021-08-09 RX ORDER — PANTOPRAZOLE SODIUM 40 MG/1
40 TABLET, DELAYED RELEASE ORAL
Qty: 60 TABLET | Refills: 0 | Status: SHIPPED | OUTPATIENT
Start: 2021-08-09 | End: 2021-09-20

## 2021-08-09 ASSESSMENT — ENCOUNTER SYMPTOMS
DIARRHEA: 1
CHEST TIGHTNESS: 0
ABDOMINAL PAIN: 1
BLOOD IN STOOL: 1
VOMITING: 0
COUGH: 0
NAUSEA: 0
COLOR CHANGE: 0
SHORTNESS OF BREATH: 0
SORE THROAT: 0

## 2021-08-09 NOTE — PROGRESS NOTES
ChiefComplaint:   Chief Complaint   Patient presents with    Abdominal Pain       History of Present Illness:  Eugenio Clarke is a 61 y.o. male who presents for evaluation of continued abdominal pain. Patient reports that the pain is from under ribs to above stomach. Patient reports that the pain started after gall bladder surgery-approximately two months ago. He reports he is unable to lay on his right side. He was told about his 9mm kidney stone on his right side. Reports he has black stool or diarrhea that has worsened over the last 2 weeks. Reports he ate some oatmeal this morning with some water. Reports yesterday he ate rice krispies, spicy breaded chicken sandwish with tortilla chips yesterday. He rates pain a 8/10 and reports it's more of a burning pain now. He reports the pain comes and goes. He takes norco  for back pain and it does not help relieve his pain. He has lost 10 pounds since 2021. Reports he is nauseated when he does not eat much. Reports he is not taking protonix and is unsure if it helped.        History:  Past Medical History:   Diagnosis Date    Anxiety     Chronic back pain     Depression     MD [facility]muscular dystrophy) ([facility] [address]     Neuropathy 2020    Scoliosis        Family History   Problem Relation Age of Onset    Diabetes Mother     Colon Cancer Neg Hx     Colon Polyps Neg Hx     Esophageal Cancer Neg Hx     Liver Cancer Neg Hx     Liver Disease Neg Hx     Rectal Cancer Neg Hx     Stomach Cancer Neg Hx      Social History     Socioeconomic History    Marital status: Single     Spouse name: Not on file    Number of children: Not on file    Years of education: Not on file    Highest education level: Not on file   Occupational History    Not on file   Tobacco Use    Smoking status: Former Smoker     Packs/day: 0.50     Types: Cigarettes     Quit date: 2021     Years since quittin.0    Smokeless tobacco: Never Used   Vaping Use    Vaping Use: Never used   Substance and Sexual Activity    Alcohol use: Yes     Comment: occas    Drug use: No    Sexual activity: Not on file   Other Topics Concern    Not on file   Social History Narrative    Not on file     Social Determinants of Health     Financial Resource Strain: Low Risk     Difficulty of Paying Living Expenses: Not hard at all   Food Insecurity: No Food Insecurity    Worried About Running Out of Food in the Last Year: Never true    [address] in the Last Year: Never true   Transportation Needs:     Lack of Transportation (Medical):  Lack of Transportation (Non-Medical):    Physical Activity:     Days of Exercise per Week:     Minutes of Exercise per Session:    Stress:     Feeling of Stress :    Social Connections:     Frequency of Communication with Friends and Family:     Frequency of Social Gatherings with Friends and Family:     Attends [employer] Services:     Active Member of Clubs or Organizations:     Attends Club or Organization Meetings:     Marital Status:    Intimate Partner Violence:     Fear of Current or Ex-Partner:     Emotionally Abused:     Physically Abused:     Sexually Abused: Allergies:   Allergies   Allergen Reactions    Shellfish-Derived Products Anaphylaxis       Medications:  Current Outpatient Medications on File Prior to Visit   Medication Sig Dispense Refill    HYDROcodone-acetaminophen (NORCO)  MG per tablet TAKE 1 TABLET BY MOUTH EVERY 8 HOURS AS NEEDED FOR PAIN FOR UP TO 30 DAYS REDUCE DOSES TAKEN AS PAIN BECOMES MANAGEABLE 90 tablet 0    FLUoxetine (PROZAC) 40 MG capsule TAKE 1 CAPSULE BY MOUTH ONCE DAILY 90 capsule 1    diclofenac (VOLTAREN) 50 MG EC tablet Take 1 tablet by mouth 3 times daily (with meals) 60 tablet 0    busPIRone (BUSPAR) 5 MG tablet Take 1 tablet by mouth twice daily as needed for anxiety 60 tablet 0    gabapentin (NEURONTIN) 600 MG tablet TAKE 1 TABLET BY MOUTH THREE TIMES DAILY 90 tablet 3    meloxicam (MOBIC) 15 MG tablet Take 1 tablet by mouth once daily 90 tablet 0    tiZANidine (ZANAFLEX) 4 MG tablet Take 1 tablet by mouth three times daily 90 tablet 2    ondansetron (ZOFRAN ODT) 4 MG disintegrating tablet Take 1 tablet by mouth every 8 hours as needed for Nausea or Vomiting 15 tablet 0    triamcinolone (KENALOG) 0.025 % cream Apply topically 2 times daily. 80 g 1    diclofenac sodium (VOLTAREN) 1 % GEL Apply 2 g topically 4 times daily 2 Tube 1    ketoconazole (NIZORAL) 2 % shampoo Apply topically daily as needed. 120 mL 1    amitriptyline (ELAVIL) 50 MG tablet TAKE ONE TABLET BY MOUTH IN THE EVENING 30 tablet 11     No current facility-administered medications on file prior to visit. Review of Systems:  Review of Systems   Constitutional: Negative for activity change and fever. HENT: Negative for congestion, ear pain and sore throat. Respiratory: Negative for cough, chest tightness and shortness of breath. Cardiovascular: Negative for chest pain. Gastrointestinal: Positive for abdominal pain, blood in stool and diarrhea. Negative for nausea and vomiting. Genitourinary: Negative for frequency and urgency. Musculoskeletal: Negative for arthralgias and myalgias. Skin: Negative for color change. Neurological: Negative for dizziness, weakness and numbness. Psychiatric/Behavioral: Negative for agitation. The patient is not nervous/anxious. Vital Signs:  /70   Pulse 86   Temp 96 °F (35.6 °C) (Temporal)   Ht 5' 11\" (1.803 m)   Wt 187 lb (84.8 kg)   SpO2 95%   BMI 26.08 kg/m²     Physical Exam:  Physical Exam  Vitals reviewed. Constitutional:       Appearance: Normal appearance. HENT:      Head: Normocephalic. Right Ear: Tympanic membrane normal.      Left Ear: Tympanic membrane normal.      Nose: Nose normal.      Mouth/Throat:      Mouth: Mucous membranes are moist.      Pharynx: Oropharynx is clear.    Eyes:      Extraocular Movements: Extraocular movements intact. Pupils: Pupils are equal, round, and reactive to light. Cardiovascular:      Rate and Rhythm: Normal rate and regular rhythm. Pulses: Normal pulses. Heart sounds: Normal heart sounds. Pulmonary:      Effort: Pulmonary effort is normal.      Breath sounds: Normal breath sounds. Abdominal:      General: Bowel sounds are normal. There is distension. Palpations: Abdomen is soft. Tenderness: There is abdominal tenderness in the epigastric area and periumbilical area. There is right CVA tenderness and guarding. There is no rebound. Musculoskeletal:         General: Normal range of motion. Cervical back: Normal range of motion. Skin:     General: Skin is warm and dry. Neurological:      Mental Status: He is alert and oriented to person, place, and time. Psychiatric:         Mood and Affect: Mood normal.         Behavior: Behavior normal.          Assessment & Plan    1. Upper abdominal pain  Have EGD done tomorrow and further discuss findings with GI provider. 2. Diarrhea, unspecified type      3. Nausea  Avoid spicy and greasy foods    4. Gastritis and duodenitis  Avoid spicy and greasy foods, have EGD done tomorrow. - pantoprazole (PROTONIX) 40 MG tablet; Take 1 tablet by mouth 2 times daily (before meals)  Dispense: 60 tablet; Refill: 0       Return in about 9 days (around 8/18/2021) for abdominal pain.

## 2021-08-09 NOTE — PATIENT INSTRUCTIONS
1. Avoid spicy and greasy foods  2. Go to EGD tomorrow  3.  Follow up in 7-10 days for abdominal pain

## 2021-08-10 ENCOUNTER — ANESTHESIA EVENT (OUTPATIENT)
Dept: OPERATING ROOM | Age: 56
End: 2021-08-10

## 2021-08-10 ENCOUNTER — ANESTHESIA (OUTPATIENT)
Dept: OPERATING ROOM | Age: 56
End: 2021-08-10

## 2021-08-10 ENCOUNTER — APPOINTMENT (OUTPATIENT)
Dept: OPERATING ROOM | Age: 56
End: 2021-08-10

## 2021-08-10 ENCOUNTER — HOSPITAL ENCOUNTER (OUTPATIENT)
Age: 56
Setting detail: SPECIMEN
Discharge: HOME | End: 2021-08-10
Payer: MEDICARE

## 2021-08-10 ENCOUNTER — HOSPITAL ENCOUNTER (OUTPATIENT)
Age: 56
Setting detail: OUTPATIENT SURGERY
Discharge: HOME | End: 2021-08-10
Attending: INTERNAL MEDICINE | Admitting: INTERNAL MEDICINE
Payer: MEDICARE

## 2021-08-10 VITALS
DIASTOLIC BLOOD PRESSURE: 67 MMHG | WEIGHT: 190 LBS | SYSTOLIC BLOOD PRESSURE: 108 MMHG | TEMPERATURE: 97.8 F | BODY MASS INDEX: 26.6 KG/M2 | HEIGHT: 71 IN | RESPIRATION RATE: 18 BRPM | HEART RATE: 64 BPM | OXYGEN SATURATION: 93 %

## 2021-08-10 VITALS — DIASTOLIC BLOOD PRESSURE: 73 MMHG | SYSTOLIC BLOOD PRESSURE: 109 MMHG | OXYGEN SATURATION: 99 %

## 2021-08-10 PROCEDURE — G8907 PT DOC NO EVENTS ON DISCHARG: HCPCS

## 2021-08-10 PROCEDURE — 88342 IMHCHEM/IMCYTCHM 1ST ANTB: CPT

## 2021-08-10 PROCEDURE — 43239 EGD BIOPSY SINGLE/MULTIPLE: CPT

## 2021-08-10 PROCEDURE — 43239 EGD BIOPSY SINGLE/MULTIPLE: CPT | Performed by: INTERNAL MEDICINE

## 2021-08-10 PROCEDURE — G8918 PT W/O PREOP ORDER IV AB PRO: HCPCS

## 2021-08-10 PROCEDURE — 88305 TISSUE EXAM BY PATHOLOGIST: CPT

## 2021-08-10 RX ORDER — PROPOFOL 10 MG/ML
INJECTION, EMULSION INTRAVENOUS PRN
Status: DISCONTINUED | OUTPATIENT
Start: 2021-08-10 | End: 2021-08-10 | Stop reason: SDUPTHER

## 2021-08-10 RX ORDER — DIPHENHYDRAMINE HYDROCHLORIDE 50 MG/ML
12.5 INJECTION INTRAMUSCULAR; INTRAVENOUS
Status: DISCONTINUED | OUTPATIENT
Start: 2021-08-10 | End: 2021-08-10 | Stop reason: HOSPADM

## 2021-08-10 RX ORDER — SODIUM CHLORIDE, SODIUM LACTATE, POTASSIUM CHLORIDE, CALCIUM CHLORIDE 600; 310; 30; 20 MG/100ML; MG/100ML; MG/100ML; MG/100ML
INJECTION, SOLUTION INTRAVENOUS CONTINUOUS PRN
Status: DISCONTINUED | OUTPATIENT
Start: 2021-08-10 | End: 2021-08-10 | Stop reason: SDUPTHER

## 2021-08-10 RX ORDER — ONDANSETRON 2 MG/ML
4 INJECTION INTRAMUSCULAR; INTRAVENOUS
Status: DISCONTINUED | OUTPATIENT
Start: 2021-08-10 | End: 2021-08-10 | Stop reason: HOSPADM

## 2021-08-10 RX ORDER — LIDOCAINE HYDROCHLORIDE 10 MG/ML
INJECTION, SOLUTION INFILTRATION; PERINEURAL PRN
Status: DISCONTINUED | OUTPATIENT
Start: 2021-08-10 | End: 2021-08-10 | Stop reason: SDUPTHER

## 2021-08-10 RX ORDER — SUCRALFATE 1 G/1
TABLET ORAL
Qty: 112 TABLET | Refills: 3 | Status: SHIPPED | OUTPATIENT
Start: 2021-08-10 | End: 2021-11-17

## 2021-08-10 RX ORDER — SODIUM CHLORIDE 9 MG/ML
INJECTION, SOLUTION INTRAVENOUS CONTINUOUS
Status: DISCONTINUED | OUTPATIENT
Start: 2021-08-10 | End: 2021-08-10 | Stop reason: HOSPADM

## 2021-08-10 RX ORDER — PROMETHAZINE HYDROCHLORIDE 25 MG/ML
6.25 INJECTION, SOLUTION INTRAMUSCULAR; INTRAVENOUS
Status: DISCONTINUED | OUTPATIENT
Start: 2021-08-10 | End: 2021-08-10 | Stop reason: HOSPADM

## 2021-08-10 RX ADMIN — SODIUM CHLORIDE: 9 INJECTION, SOLUTION INTRAVENOUS at 11:27

## 2021-08-10 RX ADMIN — LIDOCAINE HYDROCHLORIDE 50 MG: 10 INJECTION, SOLUTION INFILTRATION; PERINEURAL at 11:36

## 2021-08-10 RX ADMIN — PROPOFOL 200 MG: 10 INJECTION, EMULSION INTRAVENOUS at 11:36

## 2021-08-10 RX ADMIN — SODIUM CHLORIDE, SODIUM LACTATE, POTASSIUM CHLORIDE, CALCIUM CHLORIDE: 600; 310; 30; 20 INJECTION, SOLUTION INTRAVENOUS at 11:27

## 2021-08-10 NOTE — ANESTHESIA PRE PROCEDURE
Zeynep Flaherty MD       Current medications:    No current outpatient medications on file. No current facility-administered medications for this visit. Allergies:     Allergies   Allergen Reactions    Shellfish-Derived Products Anaphylaxis       Problem List:    Patient Active Problem List   Diagnosis Code    Transaminitis R74.01    Hepatitis C antibody test positive R76.8    History of hepatitis C Z86.19    History of hepatitis A vaccination Z92.29    History of hepatitis B vaccination Z92.29    History of hepatitis C virus infection Z86.19    MD [facility]muscular [facility]) ([email or web address] G71.00    Major depressive disorder with single episode, in full remission ([email or web address] F32.5    Chronic pain of right knee M25.561, G89.29    Neuropathy G62.9    Sludge in gallbladder K82.8    Chronic cholecystitis K81.1    Gastritis and duodenitis K29.90    Upper abdominal pain R10.10    Nausea R11.0    Diarrhea R19.7    S/P cholecystectomy Z90.49    Low back pain M54.5    Lumbar spondylosis M47.816       Past Medical History:        Diagnosis Date    Anxiety     Chronic back pain     Depression     MD (muscular dystrophy) ([email or web address]     Neuropathy 2020    Scoliosis        Past Surgical History:        Procedure Laterality Date    CHOLECYSTECTOMY, LAPAROSCOPIC N/A 2021    LAPAROSCOPIC CHOLECYSTECTOMY performed by Andressa Sarmiento DO at [address] [place] [address] COLONOSCOPY  2016    Dr Azul Gillette, normal, 10 yr recall    COLONOSCOPY N/A 2021    Dr Saintclair Barns hemorrhids, RANDOM COLON BX NEGATIVE, Sub Prep Fair, 3 year recall    FOOT SURGERY Left     UPPER GASTROINTESTINAL ENDOSCOPY N/A 2021    Dr Ubaldo Lobo, duodenal villous blunting otherwise normal EGD; serum celiac panel NEG for celiac sprue       Social History:    Social History     Tobacco Use    Smoking status: Former Smoker     Packs/day: 0.50     Types: Cigarettes     Quit date: 2021     Years since quittin.0    Smokeless tobacco: Never Used   Substance Use Topics    Alcohol use: Yes     Comment: occas                                Counseling given: Not Answered      Vital Signs (Current): There were no vitals filed for this visit. BP Readings from Last 3 Encounters:   08/09/21 100/70   07/30/21 132/87   07/28/21 125/80       NPO Status:                                                                                 BMI:   Wt Readings from Last 3 Encounters:   08/09/21 187 lb (84.8 kg)   07/30/21 185 lb 3.2 oz (84 kg)   07/28/21 185 lb (83.9 kg)     There is no height or weight on file to calculate BMI.    CBC:   Lab Results   Component Value Date    WBC 7.9 07/30/2021    RBC 4.35 07/30/2021    HGB 12.9 07/30/2021    HCT 40.6 07/30/2021    HCT 39.9 05/10/2012    MCV 93.3 07/30/2021    RDW 14.5 07/30/2021     07/30/2021     05/10/2012       CMP:   Lab Results   Component Value Date     07/30/2021     05/10/2012    K 4.6 07/30/2021    K 4.5 05/10/2012     07/30/2021     05/10/2012    CO2 31 07/30/2021    BUN 15 07/30/2021    CREATININE 0.5 07/30/2021    CREATININE 0.5 05/10/2012    GFRAA >59 07/30/2021    LABGLOM >60 07/30/2021    GLUCOSE 97 07/30/2021    PROT 8.2 07/30/2021    PROT 6.9 01/17/2013    CALCIUM 9.8 07/30/2021    BILITOT 0.3 07/30/2021    ALKPHOS 117 07/30/2021    ALKPHOS 67 05/10/2012    AST 19 07/30/2021    ALT 18 07/30/2021       POC Tests: No results for input(s): POCGLU, POCNA, POCK, POCCL, POCBUN, POCHEMO, POCHCT in the last 72 hours.     Coags:   Lab Results   Component Value Date    PROTIME 12.3 01/14/2016    PROTIME 10.80 10/20/2011    INR 0.94 01/14/2016       HCG (If Applicable): No results found for: PREGTESTUR, PREGSERUM, HCG, HCGQUANT     ABGs: No results found for: PHART, PO2ART, RAL3SWI, LZS2UPN, BEART, Y4UHBJOL     Type & Screen (If Applicable):  No results found for: LABABO, LABRH    Drug/Infectious Status (If Applicable):  No results found for: HIV, HEPCAB    COVID-19 Screening (If Applicable):   Lab Results   Component Value Date    COVID19 Not Detected 08/09/2021           Anesthesia Evaluation  Patient summary reviewed and Nursing notes reviewed  Airway: Mallampati: II  TM distance: >3 FB   Neck ROM: full  Mouth opening: > = 3 FB Dental: normal exam         Pulmonary:Negative Pulmonary ROS and normal exam                               Cardiovascular:Negative CV ROS             Beta Blocker:  Dose within 24 Hrs         Neuro/Psych:   (+) neuromuscular disease:, psychiatric history:            GI/Hepatic/Renal:   (+) hepatitis: C, liver disease:,           Endo/Other: Negative Endo/Other ROS                    Abdominal:             Vascular: negative vascular ROS. Other Findings:               Anesthesia Plan      general and TIVA     ASA 2       Induction: intravenous. Anesthetic plan and risks discussed with patient. Plan discussed with CRNA.                   ANTHONY Sorensen - CRNA   8/10/2021

## 2021-08-10 NOTE — OP NOTE
Endoscopic Procedure Note    Patient: Tessa Sidhu : 1965  Med Rec#: [id number] Acc#: [id number]     Primary Care Provider ANTHONY Monroy    Endoscopist: Rissa Leung MD, MD    Date of Procedure:  8/10/2021    Procedure:   1. EGD with cold biopsies    Indications:   Epigastric and right upper quadrant abdominal pain-chronic; recent decrease in hemoglobin and hematocrit with normocytic anemia; patient medications include NSAIDs    Anesthesia:  Sedation was administered by anesthesia who monitored the patient during the procedure. Estimated Blood Loss: minimal    Procedure:   After reviewing the patient's chart and obtaining informed consent, the patient was placed in the left lateral decubitus position. A forward-viewing Olympus endoscope was lubricated and inserted through the mouth into the oropharynx. Under direct visualization, the upper esophagus was intubated. The scope was advanced to the level of the third portion of duodenum. Scope was slowly withdrawn with careful inspection of the mucosal surfaces. The scope was retroflexed for inspection of the gastric fundus and incisura. Findings and maneuvers are listed in impression below. The patient tolerated the procedure well. The scope was removed. There were no immediate complications. Findings/IMPRESSION:  Esophagus: normal and a normal EG junction at 42 cm. NO erosions or ulcers or nodules or strictures or webs or rings or mass lesions or extrinsic compression or diverticula noted. There is no obvious hiatal hernia present. Stomach:  abnormal: Patchy mucosal changes areas of linear erythema in the antrum and distal body, few small 1 to 2 mm erosions in the antrum, and scattered flecks of old clotted blood in the body and antrum suggestive of medical gastritis noted -  Gastric biopsies were taken from the antrum  to rule out Helicobacter pylori infection.     NO ulcers or masses or gastric outlet obstruction or retained food or fluid. Rugae were normal and lumen distended well with insufflation. Retroflexed views otherwise revealed a normal GE junction, fundus and cardia as well. Duodenum: abnormal: 2 small shallow cratered, clean-based, 6-8 mm in diameter ulcers were noted in the proximal duodenal bulb likely NSAIDs related. Cold biopsies were obtained from the edges of this ulcer. In the posterior duodenal bulb a large nearly circumferential, cratered ulcer, at least 3 to 4 cm in length extending into the second portion of the duodenum along the post bulbar duodenal sweep but without any active bleeding or stigmata of recent bleeding was noted. The findings in duodenum or strongly suggestive of NSAIDs associated due adenopathy or enteropathy. Mild narrowing of the post-bulbar duodenal lumen was noted but the endoscope tip could be easily passed through this area beyond which the second portion of the duodenum including the area of the major duodenal papilla appeared essentially normal.       RECOMMENDATIONS:    1. Await path results, the patient will be contacted in 7-10 days with biopsy results. 2.  Discontinue Mobic and any use of NSAIDs  3. Tylenol 650 mg p.o. every 4-6 hours as needed as an adjunct for his upper abdominal pain  4. Continue PPI twice daily  5. Add Carafate 1 g p.o. before every meal and at bedtime x4 weeks; 3 refills  6. Mechanically soft or puréed diet until his duodenal ulcers have improved and without any stricture formation/functional gastric outlet obstruction in the future  7. Upper GI barium series with small bowel follow-through in 4 weeks, to check for healing of his duodenal ulcers and to rule out any strictures. Will consider a repeat EGD exam in 4 to 6 weeks, sooner if necessary.     -Otherwise, resume previous meds and diet  - GI clinic f/u 2-3 weeks with Ms. Frank Gerard; check a CBC on that visit.   - Keep scheduled f/u appts with other MDs     - NO NSAIDs x  2 months    The results were discussed with the patient and family. A copy of the images obtained were given to the patient.      Kylee Prescott MD, MD  8/10/2021  11:44 AM

## 2021-08-10 NOTE — ANESTHESIA POSTPROCEDURE EVALUATION
Department of Anesthesiology  Postprocedure Note    Patient: Loraine Mondragon  MRN: [id number]  YOB: 1965  Date of evaluation: 8/10/2021  Time:  11:39 AM     Procedure Summary     Date: 08/10/21 Room / Location: [facility] ENDO 02 / [address]    Anesthesia Start: 7320 Anesthesia Stop:     Procedure: EGD BIOPSY (N/A Esophagus) Diagnosis: (WALL Awanda Jaimee, POSS ULCER IN SMALL INTEST)    Surgeons: Vikash Bernardo MD Responsible Provider: ANTHONY Bernal CRNA    Anesthesia Type: general, TIVA ASA Status: 2          Anesthesia Type: No value filed. Manuel Phase I:      Manuel Phase II:      Last vitals: Reviewed and per EMR flowsheets.        Anesthesia Post Evaluation    Patient location during evaluation: bedside  Patient participation: complete - patient participated  Level of consciousness: sleepy but conscious  Pain score: 0  Airway patency: patent  Nausea & Vomiting: no nausea and no vomiting  Complications: no  Cardiovascular status: blood pressure returned to baseline  Respiratory status: acceptable, room air and spontaneous ventilation  Hydration status: euvolemic

## 2021-08-10 NOTE — H&P
Patient Name: Anish Benson  : 1965  MRN: [id number]  DATE: 08/10/21    Allergies: Allergies   Allergen Reactions    Shellfish-Derived Products Anaphylaxis        ENDOSCOPY  History and Physical    Procedure:    [] Diagnostic Colonoscopy       [] Screening Colonoscopy  [x] EGD      [] ERCP      [] EUS       [] Other    [x] Previous office notes/History and Physical reviewed from the patients chart. Please see EMR for further details of HPI. I have examined the patient's status immediately prior to the procedure and:      Indications/HPI:    Upper abdominal pain        RUQ and IAN region. This all started after his GB was removed. Pain is severe. And debilitating. Cant sleep at night due to the pain. Laying on his right side exacerbates the pain. The pains is constant since around the first week of May 2021. Pain is always worse after eating. But is there even if he hasn't eaten. Had CT 2021 in ED for eval of this pain which revealed gastritis/duodenitis, otherwise negative from GI standpoint. He reports he took PPI BID as prescribed by Dr Maryellen Christianson and this didn't help his pain. Pt reports the antibiotics and pain meds prescribed by the ED is the only thing that helped his pain.     []Abdominal Pain   []Cancer- GI/Lung     []Fhx of colon CA/polyps  []History of Polyps  []Barretts            []Melena  []Abnormal Imaging              []Dysphagia              []Persistent Pneumonia   []Anemia                            []Food Impaction        []History of Polyps  [] GI Bleed             []Pulmonary nodule/Mass   []Change in bowel habits []Heartburn/Reflux  []Rectal Bleed (BRBPR)  []Chest Pain - Non Cardiac []Heme (+) Stool []Ulcers  []Constipation  []Hemoptysis  []Varices  []Diarrhea  []Hypoxemia    []Nausea/Vomiting   []Screening   []Crohns/Colitis  []Other:     Anesthesia:   [x] MAC [] Moderate Sedation   [] General   [] None     ROS: 12 pt Review of Symptoms was negative unless mentioned above    Medications:   Prior to Admission medications    Medication Sig Start Date End Date Taking? Authorizing Provider   pantoprazole (PROTONIX) 40 MG tablet Take 1 tablet by mouth 2 times daily (before meals) 8/9/21  Yes ANTHONY Alba - KIRK   HYDROcodone-acetaminophen ([facility])  MG per tablet TAKE 1 TABLET BY MOUTH EVERY 8 HOURS AS NEEDED FOR PAIN FOR UP TO 30 DAYS REDUCE DOSES TAKEN AS PAIN BECOMES MANAGEABLE 8/3/21 8/23/21 Yes ANTHONY Guillen   FLUoxetine (PROZAC) 40 MG capsule TAKE 1 CAPSULE BY MOUTH ONCE DAILY 8/3/21  Yes ANTHONY Guillen   diclofenac (VOLTAREN) 50 MG EC tablet Take 1 tablet by mouth 3 times daily (with meals) 7/30/21  Yes ANTHONY Guillen   busPIRone (BUSPAR) 5 MG tablet Take 1 tablet by mouth twice daily as needed for anxiety 7/20/21  Yes ANTHONY Guillen   gabapentin (NEURONTIN) 600 MG tablet TAKE 1 TABLET BY MOUTH THREE TIMES DAILY 7/20/21 8/19/21 Yes ANTHONY Guillen   tiZANidine (ZANAFLEX) 4 MG tablet Take 1 tablet by mouth three times daily 6/8/21  Yes ANTHONY Guillen   ondansetron (ZOFRAN ODT) 4 MG disintegrating tablet Take 1 tablet by mouth every 8 hours as needed for Nausea or Vomiting 5/24/21  Yes Peter Robertson MD   diclofenac sodium (VOLTAREN) 1 % GEL Apply 2 g topically 4 times daily 6/18/20  Yes ANTHONY Guillen   amitriptyline (ELAVIL) 50 MG tablet TAKE ONE TABLET BY MOUTH IN THE EVENING 10/19/18  Yes Justino Molina MD   meloxicam (MOBIC) 15 MG tablet Take 1 tablet by mouth once daily 6/15/21   ANTHONY Guillen   triamcinolone (KENALOG) 0.025 % cream Apply topically 2 times daily. 10/21/20   ANTHONY Guillen   ketoconazole (NIZORAL) 2 % shampoo Apply topically daily as needed.  2/18/20   ANTHONY Guillen       Past Medical History:  Past Medical History:   Diagnosis Date    Anxiety     Chronic back pain     Depression     MD (muscular dystrophy) ([facility] [address]     Neuropathy 12/2/2020    Scoliosis        Past Surgical History:  Past Surgical

## 2021-08-18 ENCOUNTER — OFFICE VISIT (OUTPATIENT)
Dept: PRIMARY CARE CLINIC | Age: 56
End: 2021-08-18
Payer: MEDICARE

## 2021-08-18 VITALS
OXYGEN SATURATION: 94 % | TEMPERATURE: 96.5 F | WEIGHT: 187.6 LBS | HEIGHT: 71 IN | SYSTOLIC BLOOD PRESSURE: 118 MMHG | DIASTOLIC BLOOD PRESSURE: 68 MMHG | BODY MASS INDEX: 26.26 KG/M2 | HEART RATE: 90 BPM

## 2021-08-18 DIAGNOSIS — R10.10 UPPER ABDOMINAL PAIN: Primary | ICD-10-CM

## 2021-08-18 DIAGNOSIS — R19.7 DIARRHEA, UNSPECIFIED TYPE: ICD-10-CM

## 2021-08-18 DIAGNOSIS — M54.42 CHRONIC BILATERAL LOW BACK PAIN WITH LEFT-SIDED SCIATICA: ICD-10-CM

## 2021-08-18 DIAGNOSIS — G89.29 CHRONIC BILATERAL LOW BACK PAIN WITH LEFT-SIDED SCIATICA: ICD-10-CM

## 2021-08-18 PROCEDURE — 3017F COLORECTAL CA SCREEN DOC REV: CPT | Performed by: NURSE PRACTITIONER

## 2021-08-18 PROCEDURE — 99214 OFFICE O/P EST MOD 30 MIN: CPT | Performed by: NURSE PRACTITIONER

## 2021-08-18 PROCEDURE — G8427 DOCREV CUR MEDS BY ELIG CLIN: HCPCS | Performed by: NURSE PRACTITIONER

## 2021-08-18 PROCEDURE — G8419 CALC BMI OUT NRM PARAM NOF/U: HCPCS | Performed by: NURSE PRACTITIONER

## 2021-08-18 PROCEDURE — 1036F TOBACCO NON-USER: CPT | Performed by: NURSE PRACTITIONER

## 2021-08-18 ASSESSMENT — ENCOUNTER SYMPTOMS
COLOR CHANGE: 0
NAUSEA: 0
CHEST TIGHTNESS: 0
SORE THROAT: 0
VOMITING: 0
COUGH: 0
SHORTNESS OF BREATH: 0
ABDOMINAL PAIN: 0

## 2021-08-18 NOTE — PROGRESS NOTES
ChiefComplaint:   Chief Complaint   Patient presents with    Abdominal Pain     follow up       History of Present Illness:  Sanjay Cho is a 61 y.o. male who presents for evaluation of follow up abdominal pain. He was diagnosed with ulcers on his EGD and prescribed sulcralfate and patient reports feeling better. Patient reports starting to notice back pain again and pain shooting down left leg since he started feeling better from [employer]. He reports his pain is better overall, he is sleeping better, and has an increased appetite. Reports he is noticing his old pains now since his abdominal pain has relieved. He reports he is taking diclofenac, tizanidine, lortab and gabapentin for his low back pain with relief. He rates his back pain a 5-8/10 depending on his activity. He reports he still has some diarrhea but no nausea. He reports his upper abdominal pain has mostly resolved. Denies any black tarry stools.       History:  Past Medical History:   Diagnosis Date    Anxiety     Chronic back pain     Depression     [facility] [address]     Neuropathy 2020    Scoliosis        Family History   Problem Relation Age of Onset    Diabetes Mother     Colon Cancer Neg Hx     Colon Polyps Neg Hx     Esophageal Cancer Neg Hx     Liver Cancer Neg Hx     Liver Disease Neg Hx     Rectal Cancer Neg Hx     Stomach Cancer Neg Hx      Social History     Socioeconomic History    Marital status: Single     Spouse name: Not on file    Number of children: Not on file    Years of education: Not on file    Highest education level: Not on file   Occupational History    Not on file   Tobacco Use    Smoking status: Former Smoker     Packs/day: 0.50     Years: 30.00     Pack years: 15.00     Types: Cigarettes     Quit date: 2021     Years since quittin.0    Smokeless tobacco: Never Used   Vaping Use    Vaping Use: Never used   Substance and Sexual Activity    Alcohol use: Yes     Comment: occas  Drug use: No    Sexual activity: Not on file   Other Topics Concern    Not on file   Social History Narrative    Not on file     Social Determinants of Health     Financial Resource Strain: Low Risk     Difficulty of Paying Living Expenses: Not hard at all   Food Insecurity: No Food Insecurity    Worried About Running Out of Food in the Last Year: Never true    [address] in the Last Year: Never true   Transportation Needs:     Lack of Transportation (Medical):  Lack of Transportation (Non-Medical):    Physical Activity:     Days of Exercise per Week:     Minutes of Exercise per Session:    Stress:     Feeling of Stress :    Social Connections:     Frequency of Communication with Friends and Family:     Frequency of Social Gatherings with Friends and Family:     Attends Synagogue Services:     Active Member of Clubs or Organizations:     Attends Club or Organization Meetings:     Marital Status:    Intimate Partner Violence:     Fear of Current or Ex-Partner:     Emotionally Abused:     Physically Abused:     Sexually Abused: Allergies: Allergies   Allergen Reactions    Shellfish-Derived Products Anaphylaxis       Medications:  Current Outpatient Medications on File Prior to Visit   Medication Sig Dispense Refill    busPIRone (BUSPAR) 5 MG tablet Take 1 tablet by mouth twice daily as needed for anxiety 60 tablet 0    sucralfate (CARAFATE) 1 GM tablet Mix one tablet with 10 ml of water to make liquid suspension, take four times daily (before meals and bedtime).  112 tablet 3    pantoprazole (PROTONIX) 40 MG tablet Take 1 tablet by mouth 2 times daily (before meals) 60 tablet 0    HYDROcodone-acetaminophen (NORCO)  MG per tablet TAKE 1 TABLET BY MOUTH EVERY 8 HOURS AS NEEDED FOR PAIN FOR UP TO 30 DAYS REDUCE DOSES TAKEN AS PAIN BECOMES MANAGEABLE 90 tablet 0    FLUoxetine (PROZAC) 40 MG capsule TAKE 1 CAPSULE BY MOUTH ONCE DAILY 90 capsule 1    diclofenac (VOLTAREN) 50 MG EC tablet Take 1 tablet by mouth 3 times daily (with meals) 60 tablet 0    gabapentin (NEURONTIN) 600 MG tablet TAKE 1 TABLET BY MOUTH THREE TIMES DAILY 90 tablet 3    meloxicam (MOBIC) 15 MG tablet Take 1 tablet by mouth once daily 90 tablet 0    tiZANidine (ZANAFLEX) 4 MG tablet Take 1 tablet by mouth three times daily 90 tablet 2    ondansetron (ZOFRAN ODT) 4 MG disintegrating tablet Take 1 tablet by mouth every 8 hours as needed for Nausea or Vomiting 15 tablet 0    triamcinolone (KENALOG) 0.025 % cream Apply topically 2 times daily. 80 g 1    diclofenac sodium (VOLTAREN) 1 % GEL Apply 2 g topically 4 times daily 2 Tube 1    ketoconazole (NIZORAL) 2 % shampoo Apply topically daily as needed. 120 mL 1    amitriptyline (ELAVIL) 50 MG tablet TAKE ONE TABLET BY MOUTH IN THE EVENING 30 tablet 11     No current facility-administered medications on file prior to visit. Review of Systems:  Review of Systems   Constitutional: Negative for activity change and fever. HENT: Negative for congestion, ear pain and sore throat. Respiratory: Negative for cough, chest tightness and shortness of breath. Cardiovascular: Negative for chest pain. Gastrointestinal: Positive for diarrhea. Negative for abdominal pain, nausea and vomiting. Genitourinary: Negative for frequency and urgency. Musculoskeletal: Positive for back pain. Negative for arthralgias and myalgias. Skin: Negative for color change. Neurological: Negative for dizziness, weakness and numbness. Psychiatric/Behavioral: Negative for agitation. The patient is not nervous/anxious. Vital Signs:  /68   Pulse 90   Temp 96.5 °F (35.8 °C) (Temporal)   Ht 5' 11\" (1.803 m)   Wt 187 lb 9.6 oz (85.1 kg)   SpO2 94%   BMI 26.16 kg/m²     Physical Exam:  Physical Exam  Vitals reviewed. Constitutional:       Appearance: Normal appearance. HENT:      Head: Normocephalic.       Right Ear: Tympanic membrane normal.      Left

## 2021-08-23 ASSESSMENT — ENCOUNTER SYMPTOMS: BACK PAIN: 1

## 2021-08-24 ASSESSMENT — ENCOUNTER SYMPTOMS: DIARRHEA: 1

## 2021-09-01 ENCOUNTER — OFFICE VISIT (OUTPATIENT)
Dept: PRIMARY CARE CLINIC | Age: 56
End: 2021-09-01
Payer: MEDICARE

## 2021-09-01 ENCOUNTER — HOSPITAL ENCOUNTER (OUTPATIENT)
Dept: GENERAL RADIOLOGY | Age: 56
Discharge: HOME | End: 2021-09-01
Payer: MEDICARE

## 2021-09-01 VITALS
SYSTOLIC BLOOD PRESSURE: 134 MMHG | DIASTOLIC BLOOD PRESSURE: 74 MMHG | WEIGHT: 186.4 LBS | HEART RATE: 104 BPM | TEMPERATURE: 97.6 F | BODY MASS INDEX: 26 KG/M2 | OXYGEN SATURATION: 98 %

## 2021-09-01 DIAGNOSIS — Z79.899 DRUG THERAPY: ICD-10-CM

## 2021-09-01 DIAGNOSIS — G71.00 MD (MUSCULAR DYSTROPHY) (HCC): ICD-10-CM

## 2021-09-01 DIAGNOSIS — G71.00 MD (MUSCULAR DYSTROPHY) (HCC): Primary | ICD-10-CM

## 2021-09-01 DIAGNOSIS — R31.0 GROSS HEMATURIA: ICD-10-CM

## 2021-09-01 DIAGNOSIS — M54.32 LEFT SCIATIC NERVE PAIN: ICD-10-CM

## 2021-09-01 DIAGNOSIS — M54.50 LUMBAR PAIN: ICD-10-CM

## 2021-09-01 LAB
ALCOHOL URINE: NEGATIVE
AMPHETAMINE SCREEN, URINE: NEGATIVE
BARBITURATE SCREEN, URINE: NEGATIVE
BENZODIAZEPINE SCREEN, URINE: NORMAL
BUPRENORPHINE URINE: NEGATIVE
COCAINE METABOLITE SCREEN URINE: NEGATIVE
FENTANYL SCREEN, URINE: NEGATIVE
GABAPENTIN SCREEN, URINE: NEGATIVE
MDMA URINE: NEGATIVE
METHADONE SCREEN, URINE: NEGATIVE
METHAMPHETAMINE, URINE: NEGATIVE
OPIATE SCREEN URINE: NEGATIVE
OXYCODONE SCREEN URINE: NEGATIVE
PHENCYCLIDINE SCREEN URINE: NEGATIVE
PROPOXYPHENE SCREEN, URINE: NEGATIVE
SYNTHETIC CANNABINOIDS(K2) SCREEN, URINE: NEGATIVE
THC SCREEN, URINE: NEGATIVE
TRAMADOL SCREEN URINE: NEGATIVE
TRICYCLIC ANTIDEPRESSANTS, UR: NEGATIVE

## 2021-09-01 PROCEDURE — G8427 DOCREV CUR MEDS BY ELIG CLIN: HCPCS | Performed by: NURSE PRACTITIONER

## 2021-09-01 PROCEDURE — G8419 CALC BMI OUT NRM PARAM NOF/U: HCPCS | Performed by: NURSE PRACTITIONER

## 2021-09-01 PROCEDURE — 80305 DRUG TEST PRSMV DIR OPT OBS: CPT | Performed by: NURSE PRACTITIONER

## 2021-09-01 PROCEDURE — 3017F COLORECTAL CA SCREEN DOC REV: CPT | Performed by: NURSE PRACTITIONER

## 2021-09-01 PROCEDURE — 1036F TOBACCO NON-USER: CPT | Performed by: NURSE PRACTITIONER

## 2021-09-01 PROCEDURE — 74018 RADEX ABDOMEN 1 VIEW: CPT

## 2021-09-01 PROCEDURE — 99214 OFFICE O/P EST MOD 30 MIN: CPT | Performed by: NURSE PRACTITIONER

## 2021-09-01 RX ORDER — PREGABALIN 75 MG/1
75 CAPSULE ORAL 3 TIMES DAILY
Qty: 90 CAPSULE | Refills: 0 | Status: SHIPPED | OUTPATIENT
Start: 2021-09-01 | End: 2021-11-17 | Stop reason: SDUPTHER

## 2021-09-01 RX ORDER — OXYCODONE HYDROCHLORIDE AND ACETAMINOPHEN 5; 325 MG/1; MG/1
1 TABLET ORAL EVERY 8 HOURS PRN
Qty: 12 TABLET | Refills: 0 | Status: SHIPPED | OUTPATIENT
Start: 2021-09-01 | End: 2021-09-04

## 2021-09-01 RX ORDER — HYDROCODONE BITARTRATE AND ACETAMINOPHEN 10; 325 MG/1; MG/1
TABLET ORAL
Qty: 90 TABLET | Refills: 0 | Status: SHIPPED | OUTPATIENT
Start: 2021-09-01 | End: 2021-09-21

## 2021-09-01 NOTE — TELEPHONE ENCOUNTER
rOlin Bello called to request a refill on his medication. Last office visit : 8/18/2021   Next office visit : 9/1/2021     Last UDS:   Amphetamines   Date Value Ref Range Status   06/12/2013 NEGATIVE  Final     Amphetamine Screen, Urine   Date Value Ref Range Status   07/13/2021 -  Final     Barbiturates   Date Value Ref Range Status   06/12/2013 NEGATIVE  Final     Barbiturate Screen, Urine   Date Value Ref Range Status   07/13/2021 -  Final     Benzodiazepines   Date Value Ref Range Status   06/12/2013 NEGATIVE  Final     Benzodiazepine Screen, Urine   Date Value Ref Range Status   07/13/2021 -  Final     Buprenorphine Urine   Date Value Ref Range Status   07/13/2021 -  Final     Cocaine Metabolite Screen, Urine   Date Value Ref Range Status   07/13/2021 -  Final     Gabapentin Screen, Urine   Date Value Ref Range Status   07/13/2021 -  Final     MDMA, Urine   Date Value Ref Range Status   07/13/2021 -  Final     Methamphetamine, Urine   Date Value Ref Range Status   07/13/2021 -  Final     Opiate Scrn, Ur   Date Value Ref Range Status   07/13/2021 +  Final     Oxycodone Screen, Ur   Date Value Ref Range Status   07/13/2021 -  Final     PCP Screen, Urine   Date Value Ref Range Status   07/13/2021 -  Final     Propoxyphene Screen, Urine   Date Value Ref Range Status   07/13/2021 -  Final     THC Screen, Urine   Date Value Ref Range Status   07/13/2021 -  Final     Tricyclic Antidepressants, Urine   Date Value Ref Range Status   07/13/2021 -  Final       Last David Aileen: 8-3-21  Medication Contract: 4-16-21   Last Fill: 8-3    Requested Prescriptions     Pending Prescriptions Disp Refills    HYDROcodone-acetaminophen (NORCO)  MG per tablet 90 tablet 0     Sig: TAKE 1 TABLET BY MOUTH EVERY 8 HOURS AS NEEDED FOR PAIN FOR UP TO 30 DAYS REDUCE DOSES TAKEN AS PAIN BECOMES MANAGEABLE                 Please approve or refuse this medication.    Ova Libman, LPN

## 2021-09-01 NOTE — PROGRESS NOTES
[address] [facility]  [address] [address] [address]  [address] [address]  Dept: [phone]  Dept Fax: [phone]  Loc: [phone]    Altaf Batres is a 61 y.o. male who presents today for his medical conditions/complaints as noted below. Altaf Batres is c/o of Back Pain and Leg Swelling      Chief Complaint   Patient presents with    Back Pain    Leg Swelling       HPI:     HPI   Patient is in office today to be seen for back pain and leg swelling from mowing the yard last week. Pain is so bad he can not sit or sleep. He reports prior to the  issue his pain was better. [address] burned a nerve per patient report. He also is having some blood in his urine when voiding.      Past Medical History:   Diagnosis Date    Anxiety     Chronic back pain     Depression     MD [facility]muscular dystrophy) ([facility] [address]     Neuropathy 2020    Scoliosis         Past Surgical History:   Procedure Laterality Date    CHOLECYSTECTOMY, LAPAROSCOPIC N/A 2021    LAPAROSCOPIC CHOLECYSTECTOMY performed by Frances Dobbs DO at [facility] COLONOSCOPY  2016    Dr Maggie Stover, normal, 10 yr recall    COLONOSCOPY N/A 2021    Dr Karlie Pretty hemorrhids, RANDOM COLON BX NEGATIVE, Sub Prep Fair, 3 year recall    FOOT SURGERY Left     UPPER GASTROINTESTINAL ENDOSCOPY N/A 2021    Dr Joel Laird, duodenal villous blunting otherwise normal EGD; serum celiac panel NEG for celiac sprue    UPPER GASTROINTESTINAL ENDOSCOPY N/A 08/10/2021    Dr Joel Laird- mild gastritis, 2 small duodenal ulcers and 1 large cratered 3-4cm in posterior duodenal bulb, NEG h pylori       Social History     Tobacco Use    Smoking status: Former Smoker     Years: 30.00     Types: Cigarettes     Quit date: 2021     Years since quittin.1    Smokeless tobacco: Never Used   Substance Use Topics    Alcohol use: Yes     Comment: occas        Current Outpatient Medications Medication Sig Dispense Refill    HYDROcodone-acetaminophen (NORCO)  MG per tablet TAKE 1 TABLET BY MOUTH EVERY 8 HOURS AS NEEDED FOR PAIN FOR UP TO 30 DAYS REDUCE DOSES TAKEN AS PAIN BECOMES MANAGEABLE 90 tablet 0    pregabalin (LYRICA) 75 MG capsule Take 1 capsule by mouth 3 times daily for 30 days. 90 capsule 0    oxyCODONE-acetaminophen (PERCOCET) 5-325 MG per tablet Take 1 tablet by mouth every 8 hours as needed for Pain for up to 3 days. Intended supply: 3 days. Take lowest dose possible to manage pain 12 tablet 0    busPIRone (BUSPAR) 5 MG tablet Take 1 tablet by mouth twice daily as needed for anxiety 60 tablet 0    sucralfate (CARAFATE) 1 GM tablet Mix one tablet with 10 ml of water to make liquid suspension, take four times daily (before meals and bedtime). (Patient taking differently: 4 times daily ) 112 tablet 3    pantoprazole (PROTONIX) 40 MG tablet Take 1 tablet by mouth 2 times daily (before meals) 60 tablet 0    FLUoxetine (PROZAC) 40 MG capsule TAKE 1 CAPSULE BY MOUTH ONCE DAILY 90 capsule 1    tiZANidine (ZANAFLEX) 4 MG tablet Take 1 tablet by mouth three times daily 90 tablet 2    ondansetron (ZOFRAN ODT) 4 MG disintegrating tablet Take 1 tablet by mouth every 8 hours as needed for Nausea or Vomiting (Patient not taking: Reported on 9/3/2021) 15 tablet 0    triamcinolone (KENALOG) 0.025 % cream Apply topically 2 times daily. 80 g 1    diclofenac sodium (VOLTAREN) 1 % GEL Apply 2 g topically 4 times daily 2 Tube 1    ketoconazole (NIZORAL) 2 % shampoo Apply topically daily as needed. (Patient not taking: Reported on 9/3/2021) 120 mL 1    amitriptyline (ELAVIL) 50 MG tablet TAKE ONE TABLET BY MOUTH IN THE EVENING 30 tablet 11    gabapentin (NEURONTIN) 600 MG tablet TAKE 1 TABLET BY MOUTH THREE TIMES DAILY 90 tablet 3     No current facility-administered medications for this visit.        Allergies   Allergen Reactions    Shellfish-Derived Products Anaphylaxis       Family History   Problem Relation Age of Onset    Diabetes Mother     Colon Cancer Neg Hx     Colon Polyps Neg Hx     Esophageal Cancer Neg Hx     Liver Cancer Neg Hx     Liver Disease Neg Hx     Rectal Cancer Neg Hx     Stomach Cancer Neg Hx                Subjective:      Review of Systems   Constitutional: Negative. HENT: Negative. Eyes: Negative. Respiratory: Negative. Cardiovascular: Negative. Gastrointestinal: Negative. Endocrine: Negative. Genitourinary: Positive for hematuria. Musculoskeletal: Positive for arthralgias, back pain and myalgias. Skin: Negative. Neurological: Negative. Hematological: Negative. Psychiatric/Behavioral: Negative. Objective:     Physical Exam  Vitals and nursing note reviewed. Constitutional:       Appearance: Normal appearance. HENT:      Head: Normocephalic and atraumatic. Right Ear: Hearing, tympanic membrane, ear canal and external ear normal.      Left Ear: Hearing, tympanic membrane, ear canal and external ear normal.      Nose: Nose normal.      Mouth/Throat:      Lips: Pink. Mouth: Mucous membranes are moist.      Pharynx: Oropharynx is clear. Eyes:      General: Lids are normal.      Extraocular Movements: Extraocular movements intact. Conjunctiva/sclera: Conjunctivae normal.      Pupils: Pupils are equal, round, and reactive to light. Neck:      Thyroid: No thyromegaly. Cardiovascular:      Rate and Rhythm: Normal rate and regular rhythm. Pulses: Normal pulses. Dorsalis pedis pulses are 2+ on the right side and 2+ on the left side. Posterior tibial pulses are 2+ on the right side and 2+ on the left side. Heart sounds: Normal heart sounds. Pulmonary:      Effort: Pulmonary effort is normal.      Breath sounds: Normal breath sounds and air entry. Abdominal:      General: Bowel sounds are normal.      Palpations: Abdomen is soft.    Musculoskeletal:      Cervical back: Full passive range of motion without pain, normal range of motion and neck supple. Thoracic back: Deformity present. No tenderness. Decreased range of motion. Lumbar back: Deformity present. No tenderness. Decreased range of motion. Back:    Lymphadenopathy:      Cervical: No cervical adenopathy. Skin:     General: Skin is warm and dry. Capillary Refill: Capillary refill takes less than 2 seconds. Neurological:      General: No focal deficit present. Mental Status: He is alert and oriented to person, place, and time. Mental status is at baseline. Coordination: Coordination is intact. Psychiatric:         Mood and Affect: Mood normal.         Speech: Speech normal.         Behavior: Behavior normal.         Thought Content: Thought content normal.         Cognition and Memory: Cognition and memory normal.         Judgment: Judgment normal.         /74   Pulse 104   Temp 97.6 °F (36.4 °C)   Wt 186 lb 6.4 oz (84.6 kg)   SpO2 98%   BMI 26.00 kg/m²     Assessment:      Diagnosis Orders   1. [facility]  POCT Rapid Drug Screen    pregabalin (LYRICA) 75 MG capsule    CT LUMBAR SPINE WO CONTRAST    oxyCODONE-acetaminophen (PERCOCET) 5-325 MG per tablet   2. Drug therapy  POCT Rapid Drug Screen   3. Lumbar pain  pregabalin (LYRICA) 75 MG capsule    CT LUMBAR SPINE WO CONTRAST    oxyCODONE-acetaminophen (PERCOCET) 5-325 MG per tablet   4. Left sciatic nerve pain  pregabalin (LYRICA) 75 MG capsule    CT LUMBAR SPINE WO CONTRAST    oxyCODONE-acetaminophen (PERCOCET) 5-325 MG per tablet   5.  Gross hematuria  XR ABDOMEN (KUB) (SINGLE AP VIEW)       Results for orders placed or performed in visit on 09/01/21   POCT Rapid Drug Screen   Result Value Ref Range    Alcohol, Urine negative     Amphetamine Screen, Urine negative     Barbiturate Screen, Urine negative     Benzodiazepine Screen, Urine postive     Buprenorphine Urine negative     Cocaine Metabolite Screen, Urine negative     FENTANYL SCREEN, URINE negative     Gabapentin Screen, Urine negative     MDMA, Urine negative     Methadone Screen, Urine negative     Methamphetamine, Urine negative     Opiate Scrn, Ur negative     Oxycodone Screen, Ur negative     PCP Screen, Urine negative     Propoxyphene Screen, Urine negative     Synthetic Cannabinoids (K2) Screen, Urine negative     THC Screen, Urine negative     Tramadol Scrn, Ur negative     Tricyclic Antidepressants, Urine negative        Plan:     Checking CT of lumbar area due to significant pain. Percocet for break through pain. Switching to Lyrica from Gabapentin as well. More than 50% of the time was spent counseling and coordinating care for a total time of 34 mins face to face. Return for Keep follow up as scheduled. Orders Placed This Encounter   Procedures    CT LUMBAR SPINE WO CONTRAST     Standing Status:   Future     Number of Occurrences:   1     Standing Expiration Date:   9/1/2022    XR ABDOMEN (KUB) (SINGLE AP VIEW)     Standing Status:   Future     Number of Occurrences:   1     Standing Expiration Date:   9/1/2022    POCT Rapid Drug Screen       Orders Placed This Encounter   Medications    pregabalin (LYRICA) 75 MG capsule     Sig: Take 1 capsule by mouth 3 times daily for 30 days. Dispense:  90 capsule     Refill:  0    oxyCODONE-acetaminophen (PERCOCET) 5-325 MG per tablet     Sig: Take 1 tablet by mouth every 8 hours as needed for Pain for up to 3 days. Intended supply: 3 days. Take lowest dose possible to manage pain     Dispense:  12 tablet     Refill:  0     Reduce doses taken as pain becomes manageable            Patient offered educational handouts and has had all questions answered. Patient voices understanding and agrees to plans along with risks and benefits of plan. Patient is instructed to continue prior meds, diet, and exercise plans as instructed. Patient agrees to follow up as instructed and sooner if needed. Patient agrees to go to ER if condition becomes emergent. EMR Dragon/transcription disclaimer: Some of this encounter note is an electronic transcription/translation of spoken language to printed text. The electronic translation of spoken language may permit erroneous, or at times, nonsensical words or phrases to be inadvertently transcribed.  Although I have reviewed the note for such errors, some may still exist.    Electronically signed by ANTHONY Johnson on 9/3/2021 at 11:05 AM

## 2021-09-02 ENCOUNTER — HOSPITAL ENCOUNTER (OUTPATIENT)
Dept: CT IMAGING | Age: 56
Discharge: HOME | End: 2021-09-02
Payer: MEDICARE

## 2021-09-02 ENCOUNTER — TELEPHONE (OUTPATIENT)
Dept: PRIMARY CARE CLINIC | Age: 56
End: 2021-09-02

## 2021-09-02 DIAGNOSIS — M51.36 DDD (DEGENERATIVE DISC DISEASE), LUMBAR: Primary | ICD-10-CM

## 2021-09-02 DIAGNOSIS — M54.32 LEFT SCIATIC NERVE PAIN: ICD-10-CM

## 2021-09-02 DIAGNOSIS — M43.9 COMPRESSION DEFORMITY OF VERTEBRA: ICD-10-CM

## 2021-09-02 DIAGNOSIS — M54.50 LUMBAR PAIN: ICD-10-CM

## 2021-09-02 DIAGNOSIS — G71.00 MD (MUSCULAR DYSTROPHY) (HCC): ICD-10-CM

## 2021-09-02 DIAGNOSIS — N28.82 LEFT URETER DILATED: ICD-10-CM

## 2021-09-02 PROCEDURE — 72131 CT LUMBAR SPINE W/O DYE: CPT

## 2021-09-02 NOTE — TELEPHONE ENCOUNTER
----- Message from ANTHONY Rios sent at 9/1/2021 10:14 PM CDT -----  Stable KUB. No stone noted on here.  Will further evaluate with CT in AM.

## 2021-09-03 ENCOUNTER — APPOINTMENT (OUTPATIENT)
Dept: CT IMAGING | Age: 56
End: 2021-09-03
Payer: MEDICARE

## 2021-09-03 ENCOUNTER — OFFICE VISIT (OUTPATIENT)
Dept: GASTROENTEROLOGY | Age: 56
End: 2021-09-03
Payer: MEDICARE

## 2021-09-03 ENCOUNTER — HOSPITAL ENCOUNTER (EMERGENCY)
Age: 56
Discharge: ANOTHER HOSPITAL | End: 2021-09-03
Attending: EMERGENCY MEDICINE
Payer: MEDICARE

## 2021-09-03 VITALS
RESPIRATION RATE: 21 BRPM | HEART RATE: 86 BPM | SYSTOLIC BLOOD PRESSURE: 118 MMHG | DIASTOLIC BLOOD PRESSURE: 81 MMHG | TEMPERATURE: 98.4 F | WEIGHT: 190 LBS | OXYGEN SATURATION: 95 % | HEIGHT: 71 IN | BODY MASS INDEX: 26.6 KG/M2

## 2021-09-03 VITALS
SYSTOLIC BLOOD PRESSURE: 148 MMHG | HEART RATE: 112 BPM | OXYGEN SATURATION: 98 % | BODY MASS INDEX: 26.6 KG/M2 | WEIGHT: 190 LBS | HEIGHT: 71 IN | DIASTOLIC BLOOD PRESSURE: 82 MMHG

## 2021-09-03 DIAGNOSIS — R31.9 URINARY TRACT INFECTION WITH HEMATURIA, SITE UNSPECIFIED: ICD-10-CM

## 2021-09-03 DIAGNOSIS — D68.9 COAGULOPATHY (HCC): Primary | ICD-10-CM

## 2021-09-03 DIAGNOSIS — R31.0 GROSS HEMATURIA: ICD-10-CM

## 2021-09-03 DIAGNOSIS — N39.0 URINARY TRACT INFECTION WITH HEMATURIA, SITE UNSPECIFIED: ICD-10-CM

## 2021-09-03 DIAGNOSIS — N13.30 HYDRONEPHROSIS, UNSPECIFIED HYDRONEPHROSIS TYPE: ICD-10-CM

## 2021-09-03 DIAGNOSIS — N20.0 KIDNEY STONE: ICD-10-CM

## 2021-09-03 DIAGNOSIS — K26.9 DUODENAL ULCER: Primary | ICD-10-CM

## 2021-09-03 LAB
ABO/RH: NORMAL
ALBUMIN SERPL-MCNC: 4.2 G/DL (ref 3.5–5.2)
ALP BLD-CCNC: 127 U/L (ref 40–130)
ALT SERPL-CCNC: 22 U/L (ref 5–41)
ANION GAP SERPL CALCULATED.3IONS-SCNC: 11 MMOL/L (ref 7–19)
ANTIBODY SCREEN: NORMAL
APTT: >200 SEC (ref 26–36.2)
AST SERPL-CCNC: 39 U/L (ref 5–40)
BASOPHILS ABSOLUTE: 0 K/UL (ref 0–0.2)
BASOPHILS RELATIVE PERCENT: 0.2 % (ref 0–1)
BILIRUB SERPL-MCNC: 0.4 MG/DL (ref 0.2–1.2)
BILIRUBIN URINE: ABNORMAL
BLOOD BANK DISPENSE STATUS: NORMAL
BLOOD BANK PRODUCT CODE: NORMAL
BLOOD, URINE: ABNORMAL
BPU ID: NORMAL
BUN BLDV-MCNC: 15 MG/DL (ref 6–20)
CALCIUM SERPL-MCNC: 9.3 MG/DL (ref 8.6–10)
CHLORIDE BLD-SCNC: 100 MMOL/L (ref 98–111)
CLARITY: ABNORMAL
CO2: 27 MMOL/L (ref 22–29)
COLOR: ABNORMAL
CREAT SERPL-MCNC: 0.5 MG/DL (ref 0.5–1.2)
D DIMER: 1.24 UG/ML FEU (ref 0–0.48)
DESCRIPTION BLOOD BANK: NORMAL
EOSINOPHILS ABSOLUTE: 0.1 K/UL (ref 0–0.6)
EOSINOPHILS RELATIVE PERCENT: 0.6 % (ref 0–5)
EPITHELIAL CELLS, UA: ABNORMAL /HPF
FIBRINOGEN: 526 MG/DL (ref 238–505)
GFR AFRICAN AMERICAN: >59
GFR NON-AFRICAN AMERICAN: >60
GLUCOSE BLD-MCNC: 121 MG/DL (ref 74–109)
GLUCOSE URINE: NEGATIVE MG/DL
HCT VFR BLD CALC: 31.9 % (ref 42–52)
HEMOGLOBIN: 9.9 G/DL (ref 14–18)
IMMATURE GRANULOCYTES #: 0 K/UL
INR BLD: >18.8 (ref 0.88–1.18)
KETONES, URINE: NEGATIVE MG/DL
LACTIC ACID: 0.8 MMOL/L (ref 0.5–1.9)
LEUKOCYTE ESTERASE, URINE: ABNORMAL
LIPASE: 28 U/L (ref 13–60)
LYMPHOCYTES ABSOLUTE: 1.4 K/UL (ref 1.1–4.5)
LYMPHOCYTES RELATIVE PERCENT: 12.8 % (ref 20–40)
Lab: NORMAL
Lab: NORMAL
MCH RBC QN AUTO: 27.3 PG (ref 27–31)
MCHC RBC AUTO-ENTMCNC: 31 G/DL (ref 33–37)
MCV RBC AUTO: 88.1 FL (ref 80–94)
MONOCYTES ABSOLUTE: 1.5 K/UL (ref 0–0.9)
MONOCYTES RELATIVE PERCENT: 13.3 % (ref 0–10)
NEUTROPHILS ABSOLUTE: 8.2 K/UL (ref 1.5–7.5)
NEUTROPHILS RELATIVE PERCENT: 72.8 % (ref 50–65)
NITRITE, URINE: POSITIVE
PDW BLD-RTO: 15 % (ref 11.5–14.5)
PH UA: 5 (ref 5–8)
PLATELET # BLD: 448 K/UL (ref 130–400)
PMV BLD AUTO: 9.1 FL (ref 9.4–12.4)
POTASSIUM SERPL-SCNC: 3.5 MMOL/L (ref 3.5–5)
PROTEIN UA: 100 MG/DL
PROTHROMBIN TIME: >120 SEC (ref 12–14.6)
RBC # BLD: 3.62 M/UL (ref 4.7–6.1)
RBC UA: ABNORMAL /HPF (ref 0–2)
REPORT: NORMAL
REPORT: NORMAL
SARS-COV-2, NAAT: NOT DETECTED
SODIUM BLD-SCNC: 138 MMOL/L (ref 136–145)
SPECIFIC GRAVITY UA: 1.03 (ref 1–1.03)
THIS TEST SENT TO: NORMAL
THIS TEST SENT TO: NORMAL
TOTAL PROTEIN: 7.7 G/DL (ref 6.6–8.7)
UROBILINOGEN, URINE: 0.2 E.U./DL
WBC # BLD: 11.2 K/UL (ref 4.8–10.8)
WBC UA: ABNORMAL /HPF (ref 0–5)

## 2021-09-03 PROCEDURE — 74150 CT ABDOMEN W/O CONTRAST: CPT

## 2021-09-03 PROCEDURE — 86901 BLOOD TYPING SEROLOGIC RH(D): CPT

## 2021-09-03 PROCEDURE — 83690 ASSAY OF LIPASE: CPT

## 2021-09-03 PROCEDURE — 96365 THER/PROPH/DIAG IV INF INIT: CPT

## 2021-09-03 PROCEDURE — 36430 TRANSFUSION BLD/BLD COMPNT: CPT

## 2021-09-03 PROCEDURE — 99213 OFFICE O/P EST LOW 20 MIN: CPT | Performed by: NURSE PRACTITIONER

## 2021-09-03 PROCEDURE — 2580000003 HC RX 258: Performed by: EMERGENCY MEDICINE

## 2021-09-03 PROCEDURE — 99223 1ST HOSP IP/OBS HIGH 75: CPT | Performed by: INTERNAL MEDICINE

## 2021-09-03 PROCEDURE — 87635 SARS-COV-2 COVID-19 AMP PRB: CPT

## 2021-09-03 PROCEDURE — 99283 EMERGENCY DEPT VISIT LOW MDM: CPT

## 2021-09-03 PROCEDURE — 96375 TX/PRO/DX INJ NEW DRUG ADDON: CPT

## 2021-09-03 PROCEDURE — 87086 URINE CULTURE/COLONY COUNT: CPT

## 2021-09-03 PROCEDURE — 73206 CT ANGIO UPR EXTRM W/O&W/DYE: CPT

## 2021-09-03 PROCEDURE — P9059 PLASMA, FRZ BETWEEN 8-24HOUR: HCPCS

## 2021-09-03 PROCEDURE — 86850 RBC ANTIBODY SCREEN: CPT

## 2021-09-03 PROCEDURE — 3017F COLORECTAL CA SCREEN DOC REV: CPT | Performed by: NURSE PRACTITIONER

## 2021-09-03 PROCEDURE — 85379 FIBRIN DEGRADATION QUANT: CPT

## 2021-09-03 PROCEDURE — 72131 CT LUMBAR SPINE W/O DYE: CPT

## 2021-09-03 PROCEDURE — 85250 CLOT FACTOR IX PTC/CHRSTMAS: CPT

## 2021-09-03 PROCEDURE — 83605 ASSAY OF LACTIC ACID: CPT

## 2021-09-03 PROCEDURE — 36415 COLL VENOUS BLD VENIPUNCTURE: CPT

## 2021-09-03 PROCEDURE — 81001 URINALYSIS AUTO W/SCOPE: CPT

## 2021-09-03 PROCEDURE — G8419 CALC BMI OUT NRM PARAM NOF/U: HCPCS | Performed by: NURSE PRACTITIONER

## 2021-09-03 PROCEDURE — 85230 CLOT FACTOR VII PROCONVERTIN: CPT

## 2021-09-03 PROCEDURE — 85240 CLOT FACTOR VIII AHG 1 STAGE: CPT

## 2021-09-03 PROCEDURE — 87040 BLOOD CULTURE FOR BACTERIA: CPT

## 2021-09-03 PROCEDURE — 85220 BLOOC CLOT FACTOR V TEST: CPT

## 2021-09-03 PROCEDURE — 85610 PROTHROMBIN TIME: CPT

## 2021-09-03 PROCEDURE — 1036F TOBACCO NON-USER: CPT | Performed by: NURSE PRACTITIONER

## 2021-09-03 PROCEDURE — 85260 CLOT FACTOR X STUART-POWER: CPT

## 2021-09-03 PROCEDURE — 80053 COMPREHEN METABOLIC PANEL: CPT

## 2021-09-03 PROCEDURE — 6360000004 HC RX CONTRAST MEDICATION: Performed by: EMERGENCY MEDICINE

## 2021-09-03 PROCEDURE — 6360000002 HC RX W HCPCS: Performed by: EMERGENCY MEDICINE

## 2021-09-03 PROCEDURE — 85025 COMPLETE CBC W/AUTO DIFF WBC: CPT

## 2021-09-03 PROCEDURE — 85384 FIBRINOGEN ACTIVITY: CPT

## 2021-09-03 PROCEDURE — 86900 BLOOD TYPING SEROLOGIC ABO: CPT

## 2021-09-03 PROCEDURE — G8427 DOCREV CUR MEDS BY ELIG CLIN: HCPCS | Performed by: NURSE PRACTITIONER

## 2021-09-03 PROCEDURE — 85730 THROMBOPLASTIN TIME PARTIAL: CPT

## 2021-09-03 RX ORDER — MORPHINE SULFATE 4 MG/ML
4 INJECTION, SOLUTION INTRAMUSCULAR; INTRAVENOUS ONCE
Status: COMPLETED | OUTPATIENT
Start: 2021-09-03 | End: 2021-09-03

## 2021-09-03 RX ORDER — SODIUM CHLORIDE 9 MG/ML
INJECTION, SOLUTION INTRAVENOUS PRN
Status: DISCONTINUED | OUTPATIENT
Start: 2021-09-03 | End: 2021-09-03 | Stop reason: HOSPADM

## 2021-09-03 RX ORDER — HYDROMORPHONE HYDROCHLORIDE 1 MG/ML
1 INJECTION, SOLUTION INTRAMUSCULAR; INTRAVENOUS; SUBCUTANEOUS ONCE
Status: COMPLETED | OUTPATIENT
Start: 2021-09-03 | End: 2021-09-03

## 2021-09-03 RX ORDER — 0.9 % SODIUM CHLORIDE 0.9 %
1000 INTRAVENOUS SOLUTION INTRAVENOUS ONCE
Status: DISCONTINUED | OUTPATIENT
Start: 2021-09-03 | End: 2021-09-03

## 2021-09-03 RX ORDER — ONDANSETRON 2 MG/ML
4 INJECTION INTRAMUSCULAR; INTRAVENOUS ONCE
Status: COMPLETED | OUTPATIENT
Start: 2021-09-03 | End: 2021-09-03

## 2021-09-03 RX ADMIN — MORPHINE SULFATE 4 MG: 4 INJECTION, SOLUTION INTRAMUSCULAR; INTRAVENOUS at 12:22

## 2021-09-03 RX ADMIN — HYDROMORPHONE HYDROCHLORIDE 1 MG: 1 INJECTION, SOLUTION INTRAMUSCULAR; INTRAVENOUS; SUBCUTANEOUS at 16:30

## 2021-09-03 RX ADMIN — IOPAMIDOL 90 ML: 755 INJECTION, SOLUTION INTRAVENOUS at 15:51

## 2021-09-03 RX ADMIN — ONDANSETRON HYDROCHLORIDE 4 MG: 2 SOLUTION INTRAMUSCULAR; INTRAVENOUS at 12:22

## 2021-09-03 RX ADMIN — CEFTRIAXONE 1000 MG: 1 INJECTION, POWDER, FOR SOLUTION INTRAMUSCULAR; INTRAVENOUS at 14:15

## 2021-09-03 RX ADMIN — PHYTONADIONE 10 MG: 10 INJECTION, EMULSION INTRAMUSCULAR; INTRAVENOUS; SUBCUTANEOUS at 15:39

## 2021-09-03 ASSESSMENT — ENCOUNTER SYMPTOMS
ABDOMINAL PAIN: 0
DIARRHEA: 0
SHORTNESS OF BREATH: 0
SHORTNESS OF BREATH: 0
BACK PAIN: 1
VOICE CHANGE: 0
EYES NEGATIVE: 1
RESPIRATORY NEGATIVE: 1
BACK PAIN: 1
VOMITING: 0
NAUSEA: 0
NAUSEA: 0
BLOOD IN STOOL: 0
BACK PAIN: 1
RECTAL PAIN: 0
SORE THROAT: 0
TROUBLE SWALLOWING: 0
GASTROINTESTINAL NEGATIVE: 1
CONSTIPATION: 0
DIARRHEA: 0
RHINORRHEA: 0
ABDOMINAL DISTENTION: 0
ANAL BLEEDING: 0
COUGH: 0
VOMITING: 0
ABDOMINAL PAIN: 0

## 2021-09-03 ASSESSMENT — PAIN SCALES - GENERAL
PAINLEVEL_OUTOF10: 10

## 2021-09-03 NOTE — ED NOTES
[facility] called back to accept the patient. Dr Uzma Pérez is the accepting. Called [employer] to fly to [place] @ [id number].  They accepted will be here in 18 minutes     Mendy Ly  09/03/21 1550

## 2021-09-03 NOTE — ED NOTES
Report given to [employer] crew     Suresh Portillo, [address]  09/03/21 0594

## 2021-09-03 NOTE — PATIENT INSTRUCTIONS
Follow up when your next colonoscopy is due or sooner if needed. Avoid any NSAIDs lifelong. Below is a list of NSAIDs      Patient Education        Nonsteroidal Anti-Inflammatory Drugs (NSAIDs): Care Instructions  Your Care Instructions     Nonsteroidal anti-inflammatory drugs (NSAIDs) relieve pain and fever. You also can use them to reduce swelling and inflammation. Over-the-counter NSAIDs include:  · Ibuprofen (Advil, Motrin). · Naproxen (Aleve). Aspirin (Deshaun, Bufferin) is also an NSAID. But it doesn't work the same way as these other NSAIDs. Prescription NSAIDs include:  · Diclofenac (Voltaren). · Indomethacin (Indocin). · Piroxicam (Feldene). Take NSAIDS exactly as prescribed. Call your doctor if you think you are having a problem with your medicine. If you are taking over-the-counter medicine, read and follow all instructions on the label. Follow-up care is a key part of your treatment and safety. Be sure to make and go to all appointments, and call your doctor if you are having problems. It's also a good idea to know your test results and keep a list of the medicines you take. What should you know about NSAIDs? · Do not use an over-the-counter NSAID for longer than 10 days. Talk to your doctor first.  · The most common side effects from NSAIDs are stomachaches, heartburn, and nausea. NSAIDs may irritate the stomach lining. If the medicine upsets your stomach, you can try taking it with food. But if that doesn't help, talk with your doctor to make sure it's not a more serious problem, such as a stomach ulcer or bleeding in the stomach or intestines. · Using NSAIDs may:  ? Lead to high blood pressure. ? Make symptoms of heart failure worse. ? Raise the risk of heart attack, stroke, kidney damage, and skin reactions. · Your risks are greater if you take NSAIDs at higher doses or for longer than the label says.  People who are older than 72 or who have heart, stomach, or intestinal disease have a higher risk for problems. Aspirin  Aspirin is not like other NSAIDs. It can help people who are at high risk for heart attack or stroke. But taking aspirin isn't right for everyone, because it can cause serious bleeding. Talk to your doctor before you start taking aspirin every day. You and your doctor can decide if aspirin is a good choice for you based on your risk of a heart attack or stroke and your risk of serious bleeding. Unless you have a high risk of a heart attack or stroke, the benefits of aspirin probably won't outweigh the risk of bleeding. · If you use other NSAIDs a lot, aspirin may not work as well to prevent heart attack and stroke. · If you take aspirin every day for your heart, talk with your doctor before you take other NSAIDs. · Do not give aspirin to anyone younger than 20. It has been linked to Reye syndrome, a serious illness. When should you call for help? Call 911 anytime you think you may need emergency care. For example, call if:    · You passed out (lost consciousness).     · You vomit blood or what looks like coffee grounds.     · You pass maroon or very bloody stools. Call your doctor now or seek immediate medical care if:    · Your stools are black and tarlike or have streaks of blood. Watch closely for changes in your health, and be sure to contact your doctor if you have any problems. Where can you learn more? Go to https://[employer]ory.[employer]. org and sign in to your [employer] account. Enter A328 in the [facility] box to learn more about \"Nonsteroidal Anti-Inflammatory Drugs (NSAIDs): Care Instructions. \"     If you do not have an account, please click on the \"Sign Up Now\" link. Current as of: August 4, 2020               Content Version: 12.9  © 9024-8918 [employer]. Care instructions adapted under license by [facility] ([facility]).  If you have questions about a medical condition or this instruction, always ask your healthcare professional. [employer] disclaims any warranty or liability for your use of this information.

## 2021-09-03 NOTE — ED NOTES
[facility] @ [phone] @ 1406.  Left a message     Zena Hernandes  09/03/21 1497

## 2021-09-03 NOTE — ED PROVIDER NOTES
[facility]  eMERGENCY dEPARTMENT eNCOUnter      Pt Name: Rain Das  MRN: [id number]  [place] 1965  Date of evaluation: 9/3/2021  Provider: Lauren Braun MD    [address] [place] [address]       Chief Complaint   Patient presents with    Back Pain    Hematuria    Arm Pain     RIGHT ARM PAIN, BRUISING. UNKNOWN CAUSE         HISTORY OF PRESENT ILLNESS   (Location/Symptom, Timing/Onset,Context/Setting, Quality, Duration, Modifying Factors, Severity)  Note limiting factors. Rain Das is a 61 y.o. male who presents to the emergency department with diffuse bruising and back pain. He reports pain across his lower back after a fall. He reports he is having bruises diffusely in his extremities with no trauma. He has hematuria dysuria. He takes no blood thinners. His symptoms started about 4 days ago. HPI    NursingNotes were reviewed. REVIEW OF SYSTEMS    (2-9 systems for level 4, 10 or more for level 5)     Review of Systems   Constitutional: Positive for fatigue. Negative for chills and fever. HENT: Negative for rhinorrhea and sore throat. Respiratory: Negative for shortness of breath. Cardiovascular: Negative for chest pain and leg swelling. Gastrointestinal: Negative for abdominal pain, diarrhea, nausea and vomiting. Genitourinary: Positive for hematuria. Negative for difficulty urinating. Musculoskeletal: Positive for arthralgias, back pain, joint swelling and myalgias. Negative for neck pain. Skin: Negative for rash. Neurological: Positive for weakness. Negative for headaches. Hematological: Bruises/bleeds easily. Psychiatric/Behavioral: Negative for confusion. A complete review of systems was performed and is negative except as noted above in the HPI.        PAST MEDICAL HISTORY     Past Medical History:   Diagnosis Date    Anxiety     Chronic back pain     Depression     MD (muscular dystrophy) ([facility] [address]     Neuropathy 12/02/2020    Scoliosis          SURGICAL HISTORY       Past Surgical History:   Procedure Laterality Date    CHOLECYSTECTOMY, LAPAROSCOPIC N/A 04/27/2021    LAPAROSCOPIC CHOLECYSTECTOMY performed by Daisy Sims DO at [facility] COLONOSCOPY  01/08/2016    Dr Steff Medrano, normal, 10 yr recall    COLONOSCOPY N/A 06/30/2021    Dr Sushila Reich hemorrhids, RANDOM COLON BX NEGATIVE, Sub Prep Fair, 3 year recall    FOOT SURGERY Left     UPPER GASTROINTESTINAL ENDOSCOPY N/A 06/30/2021    Dr Rocio Stratton, duodenal villous blunting otherwise normal EGD; serum celiac panel NEG for celiac sprue    UPPER GASTROINTESTINAL ENDOSCOPY N/A 08/10/2021    Dr Rocio Stratton- mild gastritis, 2 small duodenal ulcers and 1 large cratered 3-4cm in posterior duodenal bulb, NEG h pylori         CURRENT MEDICATIONS       Previous Medications    AMITRIPTYLINE (ELAVIL) 50 MG TABLET    TAKE ONE TABLET BY MOUTH IN THE EVENING    BUSPIRONE (BUSPAR) 5 MG TABLET    Take 1 tablet by mouth twice daily as needed for anxiety    DICLOFENAC SODIUM (VOLTAREN) 1 % GEL    Apply 2 g topically 4 times daily    FLUOXETINE (PROZAC) 40 MG CAPSULE    TAKE 1 CAPSULE BY MOUTH ONCE DAILY    GABAPENTIN (NEURONTIN) 600 MG TABLET    TAKE 1 TABLET BY MOUTH THREE TIMES DAILY    HYDROCODONE-ACETAMINOPHEN (NORCO)  MG PER TABLET    TAKE 1 TABLET BY MOUTH EVERY 8 HOURS AS NEEDED FOR PAIN FOR UP TO 30 DAYS REDUCE DOSES TAKEN AS PAIN BECOMES MANAGEABLE    KETOCONAZOLE (NIZORAL) 2 % SHAMPOO    Apply topically daily as needed. ONDANSETRON (ZOFRAN ODT) 4 MG DISINTEGRATING TABLET    Take 1 tablet by mouth every 8 hours as needed for Nausea or Vomiting    OXYCODONE-ACETAMINOPHEN (PERCOCET) 5-325 MG PER TABLET    Take 1 tablet by mouth every 8 hours as needed for Pain for up to 3 days. Intended supply: 3 days.  Take lowest dose possible to manage pain    PANTOPRAZOLE (PROTONIX) 40 MG TABLET    Take 1 tablet by mouth 2 times daily (before meals)    PREGABALIN (LYRICA) 75 MG CAPSULE    Take 1 capsule by mouth 3 times daily for 30 days. SUCRALFATE (CARAFATE) 1 GM TABLET    Mix one tablet with 10 ml of water to make liquid suspension, take four times daily (before meals and bedtime). TIZANIDINE (ZANAFLEX) 4 MG TABLET    Take 1 tablet by mouth three times daily    TRIAMCINOLONE (KENALOG) 0.025 % CREAM    Apply topically 2 times daily. ALLERGIES     Shellfish-derived products    FAMILY HISTORY       Family History   Problem Relation Age of Onset    Diabetes Mother     Colon Cancer Neg Hx     Colon Polyps Neg Hx     Esophageal Cancer Neg Hx     Liver Cancer Neg Hx     Liver Disease Neg Hx     Rectal Cancer Neg Hx     Stomach Cancer Neg Hx           SOCIAL HISTORY       Social History     Socioeconomic History    Marital status: Single     Spouse name: Not on file    Number of children: Not on file    Years of education: Not on file    Highest education level: Not on file   Occupational History    Not on file   Tobacco Use    Smoking status: Former Smoker     Years: 30.00     Types: Cigarettes     Quit date: 2021     Years since quittin.1    Smokeless tobacco: Never Used   Vaping Use    Vaping Use: Never used   Substance and Sexual Activity    Alcohol use: Yes     Comment: occas    Drug use: No    Sexual activity: Not on file   Other Topics Concern    Not on file   Social History Narrative    Not on file     Social Determinants of Health     Financial Resource Strain: Low Risk     Difficulty of Paying Living Expenses: Not hard at all   Food Insecurity: No Food Insecurity    Worried About Running Out of Food in the Last Year: Never true    Rebecca of Food in the Last Year: Never true   Transportation Needs:     Lack of Transportation (Medical):      Lack of Transportation (Non-Medical):    Physical Activity:     Days of Exercise per Week:     Minutes of Exercise per Session:    Stress:     Feeling of Stress :    Social Connections:     Frequency of Communication with Friends and Family:     Frequency of Social Gatherings with Friends and Family:     Attends Jew Services:     Active Member of Clubs or Organizations:     Attends Club or Organization Meetings:     Marital Status:    Intimate Partner Violence:     Fear of Current or Ex-Partner:     Emotionally Abused:     Physically Abused:     Sexually Abused:        SCREENINGS             PHYSICAL EXAM    (up to 7 for level 4, 8 or more for level 5)     ED Triage Vitals [09/03/21 1125]   BP Temp Temp Source Pulse Resp SpO2 Height Weight   (!) 167/101 98.4 °F (36.9 °C) Oral 114 18 94 % 5' 11\" (1.803 m) 190 lb (86.2 kg)       Physical Exam  Vitals and nursing note reviewed. Constitutional:       General: He is not in acute distress. Appearance: He is well-developed. He is not diaphoretic. HENT:      Head: Normocephalic and atraumatic. Eyes:      Pupils: Pupils are equal, round, and reactive to light. Cardiovascular:      Rate and Rhythm: Normal rate and regular rhythm. Heart sounds: Normal heart sounds. Pulmonary:      Effort: Pulmonary effort is normal. No respiratory distress. Breath sounds: Normal breath sounds. Abdominal:      General: Bowel sounds are normal. There is no distension. Palpations: Abdomen is soft. Tenderness: There is no abdominal tenderness. Musculoskeletal:         General: Normal range of motion. Cervical back: Normal range of motion and neck supple. Comments: Diffuse bruising bilateral upper and lower extremities, chest, abdomen, swelling to left olecranon bursa   Skin:     General: Skin is warm and dry. Findings: No rash. Neurological:      Mental Status: He is alert and oriented to person, place, and time. Cranial Nerves: No cranial nerve deficit. Motor: No abnormal muscle tone.       Coordination: Coordination normal.   Psychiatric:         Behavior: Behavior normal.         DIAGNOSTIC RESULTS     EKG: All EKG's are interpreted by the Emergency Department Physician who either signs or Co-signs this chart in the absence of a cardiologist.        RADIOLOGY:   Non-plain film images such as CT, Ultrasound and MRI are read by the radiologist. Tina Glassing images are visualized and preliminarily interpreted by the emergency physician with the below findings:        Interpretation per the Radiologist below, if available at the time of this note:    CT Lumbar Spine WO Contrast   Final Result   No change since the previous study. A persistent and unchanged chronic appearing superior endplate   compression deformities of vertebrae L1 and T12. Severe degeneration of the lumbar spine at L5-S1. Multilevel prominent disc osteophyte complexes and facetal arthropathy   and resultant neural foramina and spinal canal stenosis similar to the   previous study. Left-sided hydronephrosis and hydroureter with peripelvic and   periureteral retroperitoneal fat infiltration which is similar to the   previous study. This may represent an inflammatory or obstructive   process. Please correlate with CT scan of the abdomen. Nonobstructing right renal calculi. Signed by Dr Marsha Juarez   Final Result   1. Nonobstructing right renal stones. 2. Mild left hydronephrosis and proximal ureteral dilation with   perinephric stranding. Correlate with urinalysis to assess for UTI.    Signed by Dr Nini Brown    (Results Pending)         ED BEDSIDE ULTRASOUND:   Performed by ED Physician - none    LABS:  Labs Reviewed   CBC WITH AUTO DIFFERENTIAL - Abnormal; Notable for the following components:       Result Value    WBC 11.2 (*)     RBC 3.62 (*)     Hemoglobin 9.9 (*)     Hematocrit 31.9 (*)     MCHC 31.0 (*)     RDW 15.0 (*)     Platelets 341 (*)     MPV 9.1 (*)     Neutrophils % 72.8 (*)     Lymphocytes % 12.8 (*)     Monocytes % 13.3 (*)     Neutrophils Absolute 8.2 (*) Monocytes Absolute 1.50 (*)     All other components within normal limits   COMPREHENSIVE METABOLIC PANEL - Abnormal; Notable for the following components:    Glucose 121 (*)     All other components within normal limits   URINE RT REFLEX TO CULTURE - Abnormal; Notable for the following components:    Color, UA RED (*)     Clarity, UA TURBID (*)     Bilirubin Urine SMALL (*)     Blood, Urine MODERATE (*)     Protein,  (*)     Nitrite, Urine POSITIVE (*)     Leukocyte Esterase, Urine MODERATE (*)     All other components within normal limits   MICROSCOPIC URINALYSIS - Abnormal; Notable for the following components:    RBC, UA TNTC (*)     All other components within normal limits   APTT - Abnormal; Notable for the following components:    aPTT >200.0 (*)     All other components within normal limits    Narrative:     CALL  [facility] ,  Coag results called to and read back by jitendra painting er, 09/03/2021 13:26, by Melquiades Ledesma - Abnormal; Notable for the following components:    Protime >120.0 (*)     INR >18.80 (*)     All other components within normal limits    Narrative:     CALL  [facility]. ,  Coag results called to and read back by jitendra painting er, 09/03/2021 13:26, by Dima Blackwood - Abnormal; Notable for the following components:    Fibrinogen 526 (*)     All other components within normal limits   D-DIMER, QUANTITATIVE - Abnormal; Notable for the following components:    D-Dimer, Quant 1.24 (*)     All other components within normal limits   COVID-19, RAPID   CULTURE, URINE   CULTURE, BLOOD 1   CULTURE, BLOOD 2   LIPASE   MISCELLANEOUS SENDOUT 1   LACTIC ACID, PLASMA   PTT MIX STUDIES   PROTHROMBIN TIME MIXING STUDY   MONOCLONAL PROTEIN AND FLC, SERUM   MISCELLANEOUS SENDOUT 1   FACTOR 5 ASSAY   FACTOR 10 ASSAY   FACTOR 7 ASSAY   FACTOR VIII INHIBITOR   FACTOR 9 ASSAY   MIS ARUP 2   PREPARE FRESH FROZEN PLASMA   TYPE AND SCREEN CAPTURE THREE SCREEN CELL       All other labs were within normal range or not returned as of this dictation. EMERGENCY DEPARTMENT COURSE and DIFFERENTIALDIAGNOSIS/MDM:   Vitals:    Vitals:    09/03/21 1330 09/03/21 1400 09/03/21 1430 09/03/21 1500   BP: 127/78 (!) 146/94 111/71 128/77   Pulse: 82 95 89 85   Resp: 14  18    Temp:       TempSrc:       SpO2: 90% 95% 92% 93%   Weight:       Height:           MDM  D/w Dr Ria Severin, fibrinogen, ddimer, pt mixing, ptt mixing studies, 2 units ffp and 10mg of vitamin K after labs have been drawn  D/w Dr Eduardo Lui and due to the hydronephrosis and kidney stones, pt will need transfer as we have no urology on call    No beds at [facility].   No urology at [facility] purchase until next week  No beds at [facility] for 5-7 days probably. Pt reporting increased pain in his right forearm, states it feels like it is on fire but numb, can't straighten his fingers anymore. Diameter increased on his forearm. Pulses are still intact    Pt accepted by Dr Uzma P[facility]rez to [facility]      CONSULTS:  None    PROCEDURES:  Unless otherwise notedbelow, none     Procedures    FINAL IMPRESSION     1. Coagulopathy ([address]    2. Hydronephrosis, unspecified hydronephrosis type    3. Kidney stone    4. Urinary tract infection with hematuria, site unspecified    5.  Gross hematuria          DISPOSITION/PLAN   DISPOSITION        PATIENT REFERRED TO:  @FUP@    DISCHARGE MEDICATIONS:  New Prescriptions    No medications on file          (Please note that portions of this note were completed with a voice recognition program.  Efforts were made to edit the dictations butoccasionally words are mis-transcribed.)    Yandy Perez MD (electronically signed)  AttendingEmergency Physician         Yandy Perez MD  09/03/21 6341

## 2021-09-03 NOTE — PROGRESS NOTES
Years: 30.00     Types: Cigarettes     Quit date: 2021     Years since quittin.1    Smokeless tobacco: Never Used   Vaping Use    Vaping Use: Never used   Substance and Sexual Activity    Alcohol use: Yes     Comment: occas    Drug use: No    Sexual activity: None   Other Topics Concern    None   Social History Narrative    None     Social Determinants of Health     Financial Resource Strain: Low Risk     Difficulty of Paying Living Expenses: Not hard at all   Food Insecurity: No Food Insecurity    Worried About Running Out of Food in the Last Year: Never true    Rebecca of Food in the Last Year: Never true   Transportation Needs:     Lack of Transportation (Medical):  Lack of Transportation (Non-Medical):    Physical Activity:     Days of Exercise per Week:     Minutes of Exercise per Session:    Stress:     Feeling of Stress :    Social Connections:     Frequency of Communication with Friends and Family:     Frequency of Social Gatherings with Friends and Family:     Attends [employer] Services:     Active Member of Clubs or Organizations:     Attends Club or Organization Meetings:     Marital Status:    Intimate Partner Violence:     Fear of Current or Ex-Partner:     Emotionally Abused:     Physically Abused:     Sexually Abused: Allergies   Allergen Reactions    Shellfish-Derived Products Anaphylaxis       Current Outpatient Medications   Medication Sig Dispense Refill    HYDROcodone-acetaminophen (NORCO)  MG per tablet TAKE 1 TABLET BY MOUTH EVERY 8 HOURS AS NEEDED FOR PAIN FOR UP TO 30 DAYS REDUCE DOSES TAKEN AS PAIN BECOMES MANAGEABLE 90 tablet 0    pregabalin (LYRICA) 75 MG capsule Take 1 capsule by mouth 3 times daily for 30 days. 90 capsule 0    oxyCODONE-acetaminophen (PERCOCET) 5-325 MG per tablet Take 1 tablet by mouth every 8 hours as needed for Pain for up to 3 days. Intended supply: 3 days.  Take lowest dose possible to manage pain 12 tablet 0    dysphoric mood. The patient is nervous/anxious. Objective:     Physical Exam  Vitals and nursing note reviewed. Constitutional:       Appearance: He is well-developed. Comments: BP (!) 148/82   Pulse 112   Ht 5' 11\" (1.803 m)   Wt 190 lb (86.2 kg)   SpO2 98%   BMI 26.50 kg/m²    Eyes:      General: No scleral icterus. Conjunctiva/sclera: Conjunctivae normal.      Pupils: Pupils are equal, round, and reactive to light. Neck:      Thyroid: No thyromegaly. Cardiovascular:      Rate and Rhythm: Normal rate and regular rhythm. Heart sounds: Normal heart sounds. No murmur heard. No friction rub. No gallop. Pulmonary:      Effort: Pulmonary effort is normal. No respiratory distress. Breath sounds: Normal breath sounds. Abdominal:      General: Bowel sounds are normal. There is no distension. Palpations: Abdomen is soft. Tenderness: There is no abdominal tenderness. There is no rebound. Musculoskeletal:         General: No deformity. Normal range of motion. Cervical back: Normal range of motion and neck supple. Skin:     Coloration: Skin is not pale. Neurological:      Mental Status: He is alert and oriented to person, place, and time. Cranial Nerves: No cranial nerve deficit. Psychiatric:         Judgment: Judgment normal.           Assessment:       Diagnosis Orders   1. Duodenal ulcer           Plan:      1. Sterling Thibodeauxyadiel notified at checkout to put him in recall for UGI with SBFT in 2-3 weeks to assure healing of ulcers and no stricturing. Will call with results  2. Use the carafate for another 4 weeks then okay to stop if the UGI is unrevealing.    3. F/u when next colonoscopy is due or sooner if needed

## 2021-09-03 NOTE — CONSULTS
MEDICAL ONCOLOGY CONSULTATION    Pt Name: Marc Wheeler  MRN: [id number]  YOB: 1965  Date of evaluation: 9/3/2021    REASON FOR CONSULTATION: Abnormal coagulation studies, easy bruising  REQUESTING PHYSICIAN: ER    History Obtained From:    patient, electronic medical record    HISTORY OF PRESENT ILLNESS:  Isabelle Rodríguez. Ana Garcia was first seen by me on 9/3/2021. I received a call from the ER regarding this patient. He presented to the ER with complaints of increased easy bruising. The patient was seen by his primary care provider on 9/1/2021 with complaints of back pain and leg swelling. In addition, he also had complaints of hematuria.   Upon presentation today to the ER he was found to have prolonged PT above 120/INR 18.8, prolonged APTT above 200, D-dimer mild elevated 1.24 and fibrinogen elevated 526        Past Medical History:    Past Medical History:   Diagnosis Date    Anxiety     Chronic back pain     Depression     [facility]     Neuropathy 12/02/2020    Scoliosis        Past Surgical History:    Past Surgical History:   Procedure Laterality Date    CHOLECYSTECTOMY, LAPAROSCOPIC N/A 04/27/2021    LAPAROSCOPIC CHOLECYSTECTOMY performed by Dharmesh Saavedra, DO at [address]   01/08/2016    Dr Adri De La Vega, normal, 10 yr recall    COLONOSCOPY N/A 06/30/2021    Dr Erwin Servant hemorrhids, RANDOM COLON BX NEGATIVE, Sub Prep Fair, 3 year recall    FOOT SURGERY Left     UPPER GASTROINTESTINAL ENDOSCOPY N/A 06/30/2021    Dr Steff Travis, duodenal villous blunting otherwise normal EGD; serum celiac panel NEG for celiac sprue    UPPER GASTROINTESTINAL ENDOSCOPY N/A 08/10/2021    Dr Steff Travis- mild gastritis, 2 small duodenal ulcers and 1 large cratered 3-4cm in posterior duodenal bulb, NEG h pylori       Social History:      Family History:   Family History   Problem Relation Age of Onset    Diabetes Mother     Colon Cancer Neg Hx     Colon Polyps Neg Hx     Esophageal Cancer Neg Hx     Liver Cancer Neg Hx     Liver Disease Neg Hx     Rectal Cancer Neg Hx     Stomach Cancer Neg Hx        Current Hospital Medications:    Current Facility-Administered Medications   Medication Dose Route Frequency Provider Last Rate Last Admin    0.9 % sodium chloride infusion   IntraVENous PRN Fatimah Lind MD        phytonadione (ADULT) (VITAMIN K) 10 mg in dextrose 5 % 100 mL IVPB  10 mg IntraVENous Once Fatimah Lind  mL/hr at 09/03/21 1539 10 mg at 09/03/21 1539     Current Outpatient Medications   Medication Sig Dispense Refill    HYDROcodone-acetaminophen (NORCO)  MG per tablet TAKE 1 TABLET BY MOUTH EVERY 8 HOURS AS NEEDED FOR PAIN FOR UP TO 30 DAYS REDUCE DOSES TAKEN AS PAIN BECOMES MANAGEABLE 90 tablet 0    pregabalin (LYRICA) 75 MG capsule Take 1 capsule by mouth 3 times daily for 30 days. 90 capsule 0    oxyCODONE-acetaminophen (PERCOCET) 5-325 MG per tablet Take 1 tablet by mouth every 8 hours as needed for Pain for up to 3 days. Intended supply: 3 days. Take lowest dose possible to manage pain 12 tablet 0    busPIRone (BUSPAR) 5 MG tablet Take 1 tablet by mouth twice daily as needed for anxiety 60 tablet 0    sucralfate (CARAFATE) 1 GM tablet Mix one tablet with 10 ml of water to make liquid suspension, take four times daily (before meals and bedtime).  (Patient taking differently: 4 times daily ) 112 tablet 3    pantoprazole (PROTONIX) 40 MG tablet Take 1 tablet by mouth 2 times daily (before meals) 60 tablet 0    FLUoxetine (PROZAC) 40 MG capsule TAKE 1 CAPSULE BY MOUTH ONCE DAILY 90 capsule 1    gabapentin (NEURONTIN) 600 MG tablet TAKE 1 TABLET BY MOUTH THREE TIMES DAILY 90 tablet 3    tiZANidine (ZANAFLEX) 4 MG tablet Take 1 tablet by mouth three times daily 90 tablet 2    ondansetron (ZOFRAN ODT) 4 MG disintegrating tablet Take 1 tablet by mouth every 8 hours as needed for Nausea or Vomiting (Patient not taking: Reported on 9/3/2021) 15 tablet 0    triamcinolone (KENALOG) 0.025 % cream Apply topically 2 times daily. 80 g 1    diclofenac sodium (VOLTAREN) 1 % GEL Apply 2 g topically 4 times daily 2 Tube 1    ketoconazole (NIZORAL) 2 % shampoo Apply topically daily as needed. (Patient not taking: Reported on 9/3/2021) 120 mL 1    amitriptyline (ELAVIL) 50 MG tablet TAKE ONE TABLET BY MOUTH IN THE EVENING 30 tablet 11       Allergies: Allergies   Allergen Reactions    Shellfish-Derived Products Anaphylaxis         Subjective   REVIEW OF SYSTEMS:   CONSTITUTIONAL: no fever, no night sweats, fatigue;  HEENT: no blurring of vision, no double vision, no hearing difficulty, no tinnitus, no ulceration, no epistaxis;  LUNGS: no cough, no hemoptysis, no wheeze,  no shortness of breath;  CARDIOVASCULAR: no palpitation, no chest pain, no shortness of breath;  GI: no abdominal pain, no nausea, no vomiting, no diarrhea, no constipation;  ESVIN: no dysuria, no hematuria, no frequency or urgency, no nephrolithiasis;  MUSCULOSKELETAL: no joint pain, no swelling, no stiffness;  ENDOCRINE: no polyuria, no polydipsia, no cold or heat intolerance;  HEMATOLOGY: easy bruising,  no history of clotting disorder;  DERMATOLOGY: no skin rash, no eczema, no pruritus;  PSYCHIATRY: no depression, no anxiety, no panic attacks, no suicidal ideation, no homicidal ideation;  NEUROLOGY: no syncope, no seizures, no numbness or tingling of hands, no numbness or tingling of feet, no paresis;    Objective   /77   Pulse 85   Temp 98.4 °F (36.9 °C) (Oral)   Resp 18   Ht 5' 11\" (1.803 m)   Wt 190 lb (86.2 kg)   SpO2 93%   BMI 26.50 kg/m²     PHYSICAL EXAM:  CONSTITUTIONAL: Alert, appropriate, no acute distress  EYES: Non icteric, EOM intact, pupils equal round   ENT: Mucus membranes moist, no oral pharyngeal lesions, external inspection of ears and nose are normal  NECK: Supple, no masses.   No palpable thyroid mass  CHEST/LUNGS: CTA bilaterally, normal respiratory effort CARDIOVASCULAR: RRR, no murmurs. No lower extremity edema  ABDOMEN: soft non-tender, active bowel sounds, no HSM. No palpable masses  EXTREMITIES: warm, full ROM in all 4 extremities, no focal weakness. SKIN: Skin bruises, LYMPH: No cervical, clavicular, axillary, or inguinal lymphadenopathy  NEUROLOGIC: follows commands, non focal   PSYCH: mood and affect appropriate. Alert and oriented to time, place, person        LABORATORY RESULTS REVIEWED/ANALYZED BY ME:  Recent Labs     09/03/21  1218   WBC 11.2*   HGB 9.9*   HCT 31.9*   MCV 88.1   *       Lab Results   Component Value Date     09/03/2021    K 3.5 09/03/2021     09/03/2021    CO2 27 09/03/2021    BUN 15 09/03/2021    CREATININE 0.5 09/03/2021    GLUCOSE 121 (H) 09/03/2021    CALCIUM 9.3 09/03/2021    PROT 7.7 09/03/2021    LABALBU 4.2 09/03/2021    BILITOT 0.4 09/03/2021    ALKPHOS 127 09/03/2021    AST 39 09/03/2021    ALT 22 09/03/2021    LABGLOM >60 09/03/2021    GFRAA >59 09/03/2021       Lab Results   Component Value Date    INR >18.80 (HH) 09/03/2021    INR 0.94 01/14/2016    INR 1.05 08/06/2015    PROTIME >120.0 (HH) 09/03/2021    PROTIME 12.3 01/14/2016    PROTIME 13.4 08/06/2015       RADIOLOGY STUDIES REPORT/REVIEWED AND INTERPRETED BY ME:  XR ABDOMEN (KUB) (SINGLE AP VIEW)    Result Date: 9/1/2021  EXAMINATION: XR ABDOMEN (KUB) (SINGLE AP VIEW) 9/1/2021 5:35 PM HISTORY: XR ABDOMEN (KUB) (SINGLE AP VIEW) 9/1/2021 4:21 PM HISTORY: R31.0 COMPARISON: None FINDINGS: The bowel gas pattern is nonspecific. The colon is mildly distended and air-filled. . No pathologic calcification or organomegaly is visualized. Surgical clips are present previous cholecystectomy. . The osseous structures demonstrate no acute abnormality. 1. No radiographic evidence of acute abdominopelvic process. Signed by Dr Lestine Cushing    CT Lumbar Spine WO Contrast    Result Date: 9/3/2021  Examination.  CT LUMBAR SPINE WO CONTRAST 9/3/2021 1:02 PM History: History of muscular dystrophy. The patient complains of back pain after fall. Hematuria. DLP: 961 mGycm. The CT scan of the abdomen and pelvis is performed without intravenous or intrathecal contrast enhancement. The images are acquired in axial plane with subsequent reconstruction in coronal and sagittal planes. The comparison is made with the previous study dated 9/2/2021. There is no significant interval change. The superior endplate compression of vertebra L1 and to a lesser extent T12 are similar to the previous study. No change. Severe degeneration of the intervertebral disc at L5-S1 and marginal sclerosis of the endplates at L5 and S1 are similar to the previous study. The remaining vertebral body heights are normal. The posterior processes are intact. A moderate spinal stenosis at L3-4 and L4-5 and L5-S1 is reidentified. Bilateral neural foraminal stenosis at L5-S1 is noted similar to the previous study. There is a persistent moderate left-sided hydronephrosis and hydroureter. This may represent an inflammatory or obstructive process. The distal left ureter is not optimally visualized are evaluated. There are radiopaque calculi in the lower pole of the right kidney. There is no hydronephrosis on the right side. No change since the previous study. A persistent and unchanged chronic appearing superior endplate compression deformities of vertebrae L1 and T12. Severe degeneration of the lumbar spine at L5-S1. Multilevel prominent disc osteophyte complexes and facetal arthropathy and resultant neural foramina and spinal canal stenosis similar to the previous study. Left-sided hydronephrosis and hydroureter with peripelvic and periureteral retroperitoneal fat infiltration which is similar to the previous study. This may represent an inflammatory or obstructive process. Please correlate with CT scan of the abdomen. Nonobstructing right renal calculi.  Signed by Dr Leeroy Amador CONTRAST    Result Date: 9/2/2021  Examination. CT LUMBAR SPINE WO CONTRAST 9/2/2021 11:27 AM History: Back pain. Left sciatica. History of muscular dystrophy. The CT scan of the lumbar spine is performed without intravenous or intrathecal contrast enhancement. The images are acquired in axial plane with subsequent reconstruction in coronal and sagittal planes. There is no previous similar study for comparison. The correlation made with radiographs of the lumbar spine dated 4/11/2018 and MR imaging of the lumbar spine dated 3/20/2018. There is loss of lumbar lordosis. The alignment is maintained. There is a mild superior endplate compression of vertebra L1 similar to the previous radiographs and MR imaging of the lumbar spine in 2018. A mild superior endplate compression of vertebra T12 is also similar to the previous study. No change. There is loss of height of the intervertebral disc at L5-S1 with a vacuum sign representing severe degeneration. There are type 2 discogenic degeneration of the endplates of F6-F6 which is similar to the previous MR imaging in 2018. T11-L1. There is moderate facetal arthropathy. The neural foramina spinal canal are patent. L1-2: There is moderate asymmetrical disc bulging towards left. There is bilateral facet arthropathy. There is left neural foraminal stenosis. The spinal canal is patent. L2-3: No disc bulging or herniation. There is bilateral facet arthropathy. The neural foramina spinal canal are patent. L3-4: Moderate diffuse disc bulging. There is bilateral facet arthropathy. There is moderate spinal stenosis. The neural foramina are patent. L4-5: Moderate diffuse disc bulging, more pronounced centrally and right paracentrally. There is bilateral facetal arthropathy. There is moderate spinal stenosis. Neural foramina are patent. L5-S1: There are prominent posterior osteophytes. There is moderate disc bulging asymmetrically more towards left.  There is bilateral facetal arthropathy. There is moderate spinal stenosis and bilateral neural foraminal stenosis. There is apparent moderate dilatation and wall thickening of the left renal pelvis and limited visualized left ureter and there is a surrounding retroperitoneal fat infiltration. This may represent an inflammatory process or distal obstruction? .    Chronic appearing compression deformity of vertebra L1 and to a lesser extent T12. Severe degeneration of the disc and vertebrae at L5-S1. The multilevel prominent disc osteophyte complexes and facetal arthropathy and resultant neural foraminal or spinal canal stenosis as detailed above. A moderate dilatation and thickening of the wall and mucosa of the left kidney are pelvis and limited visualized left ureter may represent an inflammatory or obstructive process. This needs to be clinically correlated and further evaluated. Signed by Dr Chrissy White    Result Date: 9/3/2021  CT KIDNEY WO CONTRAST 9/3/2021 2:04 PM History: Fall injury. Diffuse bruising and back pain. Hematuria. History of muscular dystrophy. In order to have a CT radiation dose as low as reasonably achievable Automated Exposure Control was utilized for adjustment of the mA and/or KV according to patient size. DLP in mGycm= 840. Noncontrast abdomen/pelvis CT. Comparison is made with postcontrast CT imaging from August 4, 2021. Heart size is within normal limits. Mild atelectasis at the lung bases. Cholecystectomy clips. Normal noncontrast appearance of the liver. Distended food and fluid filled stomach. Normal pancreas and spleen. Mild inflammatory change previously seen adjacent to the distal stomach has resolved. No free air or free fluid in this region. Normal adrenal glands. Symmetric renal size. Mild left hydronephrosis and mild dilation of the proximal half of the left ureter. Associated mild perinephric and periureteral stranding.  Correlate with urinalysis to assess for urinary tract infection. 3 nonobstructing right renal stones. The largest stone measures 9 x 6 mm. No ureteral stone. No bowel dilation or free fluid. No appendicitis or diverticulitis. No colitis. Pelvic phleboliths are present. No lumbar spine fracture is seen. No subcutaneous or paraspinal soft tissue hematoma. 1. Nonobstructing right renal stones. 2. Mild left hydronephrosis and proximal ureteral dilation with perinephric stranding. Correlate with urinalysis to assess for UTI. Signed by Dr Venkat Pérez:  #Prolonged PT/APTT  Patient denies any use of anticoagulants, synthetic medication use or any incidental use of rodenticide  Prolongation of both the prothrombin time (PT) and activated partial thromboplastin time (aPTT) tests suggests a deficiency (or inhibitor) in a common pathway factor (eg, factor X, V, II [prothrombin], or fibrinogen) or a combination of deficiencies (or inhibitors) affecting both the intrinsic and extrinsic pathways   Patient has significant easy bruising. Differential diagnosis    Reviewed LFTs-no evidence of cirrhosis  No evidence of acute DIC  Patient denies taking any anticoagulants    The main differential diagnosis is vitamin K deficiency versus acquired inhibitors of prothrombin, fibrinogen, factor V or factor X  Amyloidosis associated factor X deficiency      Normocytic anemia  Hemoglobin 9.9/MCV 88    Reactive thrombocytosis  Platelet count [phone]      #Liver Dysfunction  #Cirrhosis 2/2 Hep C Infection s/p treatment  CT abd/pelvis from 9/4/2021 showing lobulated contour of the liver with fissural widening consistent with cirrhosis and chronic liver disease       PLAN:  Factor II, fibrinogen, factor X, factor V, factor VII, factor VIII  Repeat PT/PTT  2 units FFP now  Vitamin K 10 mg IV now  PT/APTT mixing study  Okay with [facility]      I have seen, examined and reviewed this patient medication list, appropriate labs and imaging studies.  I reviewed relevant medical records and others physicians notes. I discussed the plans of care with the patient. I answered all the questions to the patients satisfaction. I have also reviewed the chief complaint (CC) and part of the history (History of Present Illness (HPI), Past Family Social History [facility]), or Review of Systems (ROS) and made changes when appropriated.        (Please note that portions of this note were completed with a voice recognition program. Efforts were made to edit the dictations but occasionally words are mis-transcribed.)        Kade Ortega MD    09/03/21  3:45 PM

## 2021-09-05 LAB — URINE CULTURE, ROUTINE: NORMAL

## 2021-09-06 LAB — FACTOR V ACTIVITY: 104 % (ref 62–140)

## 2021-09-07 LAB
F8 INHIBITOR, BETHESDA UNIT: ABNORMAL BU
FACTOR IX ACTIVITY: 2
FACTOR VII ACTIVITY: <6
FACTOR VIII ACTIVITY: 340 % (ref 56–191)
FACTOR X ACTIVITY: <7

## 2021-09-08 LAB
BLOOD CULTURE, ROUTINE: NORMAL
CULTURE, BLOOD 2: NORMAL

## 2021-09-15 ENCOUNTER — TELEPHONE (OUTPATIENT)
Dept: UROLOGY | Age: 56
End: 2021-09-15

## 2021-09-16 ENCOUNTER — CONSULT (OUTPATIENT)
Dept: ONCOLOGY | Facility: CLINIC | Age: 56
End: 2021-09-16

## 2021-09-16 ENCOUNTER — LAB (OUTPATIENT)
Dept: LAB | Facility: HOSPITAL | Age: 56
End: 2021-09-16

## 2021-09-16 VITALS
SYSTOLIC BLOOD PRESSURE: 126 MMHG | BODY MASS INDEX: 36.89 KG/M2 | RESPIRATION RATE: 18 BRPM | TEMPERATURE: 97 F | HEART RATE: 84 BPM | DIASTOLIC BLOOD PRESSURE: 78 MMHG | WEIGHT: 187.9 LBS | HEIGHT: 60 IN | OXYGEN SATURATION: 94 %

## 2021-09-16 DIAGNOSIS — E56.1 VITAMIN K DEFICIENCY: Primary | ICD-10-CM

## 2021-09-16 LAB
ALBUMIN SERPL-MCNC: 4.6 G/DL (ref 3.5–5.2)
ALBUMIN/GLOB SERPL: 1.6 G/DL
ALP SERPL-CCNC: 109 U/L (ref 39–117)
ALT SERPL W P-5'-P-CCNC: 19 U/L (ref 1–41)
ANION GAP SERPL CALCULATED.3IONS-SCNC: 10 MMOL/L (ref 5–15)
APTT PPP: 31.3 SECONDS (ref 24.1–35)
AST SERPL-CCNC: 25 U/L (ref 1–40)
BASOPHILS # BLD AUTO: 0.04 10*3/MM3 (ref 0–0.2)
BASOPHILS NFR BLD AUTO: 0.7 % (ref 0–1.5)
BILIRUB SERPL-MCNC: 0.2 MG/DL (ref 0–1.2)
BUN SERPL-MCNC: 16 MG/DL (ref 6–20)
BUN/CREAT SERPL: 39 (ref 7–25)
CALCIUM SPEC-SCNC: 8.8 MG/DL (ref 8.6–10.5)
CHLORIDE SERPL-SCNC: 103 MMOL/L (ref 98–107)
CO2 SERPL-SCNC: 27 MMOL/L (ref 22–29)
CREAT SERPL-MCNC: 0.41 MG/DL (ref 0.76–1.27)
DEPRECATED RDW RBC AUTO: 62.2 FL (ref 37–54)
EOSINOPHIL # BLD AUTO: 0.11 10*3/MM3 (ref 0–0.4)
EOSINOPHIL NFR BLD AUTO: 2 % (ref 0.3–6.2)
ERYTHROCYTE [DISTWIDTH] IN BLOOD BY AUTOMATED COUNT: 18.6 % (ref 12.3–15.4)
GFR SERPL CREATININE-BSD FRML MDRD: >150 ML/MIN/1.73
GLOBULIN UR ELPH-MCNC: 2.9 GM/DL
GLUCOSE SERPL-MCNC: 86 MG/DL (ref 65–99)
HCT VFR BLD AUTO: 31.7 % (ref 37.5–51)
HGB BLD-MCNC: 9.6 G/DL (ref 13–17.7)
HOLD SPECIMEN: NORMAL
IMM GRANULOCYTES # BLD AUTO: 0.01 10*3/MM3 (ref 0–0.05)
IMM GRANULOCYTES NFR BLD AUTO: 0.2 % (ref 0–0.5)
INR PPP: 1.91 (ref 0.91–1.09)
LYMPHOCYTES # BLD AUTO: 2.26 10*3/MM3 (ref 0.7–3.1)
LYMPHOCYTES NFR BLD AUTO: 41 % (ref 19.6–45.3)
MCH RBC QN AUTO: 27.9 PG (ref 26.6–33)
MCHC RBC AUTO-ENTMCNC: 30.3 G/DL (ref 31.5–35.7)
MCV RBC AUTO: 92.2 FL (ref 79–97)
MONOCYTES # BLD AUTO: 0.6 10*3/MM3 (ref 0.1–0.9)
MONOCYTES NFR BLD AUTO: 10.9 % (ref 5–12)
NEUTROPHILS NFR BLD AUTO: 2.49 10*3/MM3 (ref 1.7–7)
NEUTROPHILS NFR BLD AUTO: 45.2 % (ref 42.7–76)
NRBC BLD AUTO-RTO: 0 /100 WBC (ref 0–0.2)
PLATELET # BLD AUTO: 557 10*3/MM3 (ref 140–450)
PMV BLD AUTO: 9.1 FL (ref 6–12)
POTASSIUM SERPL-SCNC: 3.6 MMOL/L (ref 3.5–5.2)
PROT SERPL-MCNC: 7.5 G/DL (ref 6–8.5)
PROTHROMBIN TIME: 21.1 SECONDS (ref 11.9–14.6)
RBC # BLD AUTO: 3.44 10*6/MM3 (ref 4.14–5.8)
SODIUM SERPL-SCNC: 140 MMOL/L (ref 136–145)
WBC # BLD AUTO: 5.51 10*3/MM3 (ref 3.4–10.8)

## 2021-09-16 PROCEDURE — 85610 PROTHROMBIN TIME: CPT | Performed by: INTERNAL MEDICINE

## 2021-09-16 PROCEDURE — 36415 COLL VENOUS BLD VENIPUNCTURE: CPT | Performed by: INTERNAL MEDICINE

## 2021-09-16 PROCEDURE — 99204 OFFICE O/P NEW MOD 45 MIN: CPT | Performed by: INTERNAL MEDICINE

## 2021-09-16 PROCEDURE — 80053 COMPREHEN METABOLIC PANEL: CPT | Performed by: INTERNAL MEDICINE

## 2021-09-16 PROCEDURE — 85025 COMPLETE CBC W/AUTO DIFF WBC: CPT

## 2021-09-16 PROCEDURE — 85730 THROMBOPLASTIN TIME PARTIAL: CPT | Performed by: INTERNAL MEDICINE

## 2021-09-16 RX ORDER — HYDROCODONE BITARTRATE AND ACETAMINOPHEN 10; 325 MG/1; MG/1
TABLET ORAL
COMMUNITY
Start: 2021-09-01 | End: 2021-09-21

## 2021-09-16 RX ORDER — ONDANSETRON 4 MG/1
4 TABLET, ORALLY DISINTEGRATING ORAL
COMMUNITY
Start: 2021-05-24

## 2021-09-16 RX ORDER — GABAPENTIN 600 MG/1
TABLET ORAL
COMMUNITY
Start: 2021-07-20

## 2021-09-16 RX ORDER — FLUOXETINE HYDROCHLORIDE 40 MG/1
40 CAPSULE ORAL DAILY
COMMUNITY
Start: 2021-08-03

## 2021-09-16 RX ORDER — PHYTONADIONE 5 MG/1
5 TABLET ORAL WEEKLY
Qty: 4 TABLET | Refills: 3 | Status: SHIPPED | OUTPATIENT
Start: 2021-09-16

## 2021-09-16 RX ORDER — PANTOPRAZOLE SODIUM 40 MG/1
40 TABLET, DELAYED RELEASE ORAL
COMMUNITY
Start: 2021-08-09

## 2021-09-16 RX ORDER — PREGABALIN 75 MG/1
75 CAPSULE ORAL
COMMUNITY
Start: 2021-09-01 | End: 2021-10-01

## 2021-09-16 RX ORDER — BUSPIRONE HYDROCHLORIDE 5 MG/1
5 TABLET ORAL
COMMUNITY
Start: 2021-08-18 | End: 2021-10-14

## 2021-09-16 RX ORDER — TIZANIDINE 4 MG/1
TABLET ORAL
COMMUNITY
Start: 2021-06-08

## 2021-09-16 RX ORDER — SUCRALFATE 1 G/1
TABLET ORAL
COMMUNITY
Start: 2021-08-10

## 2021-09-16 NOTE — PROGRESS NOTES
MGW ONC [facility] GROUP HEMATOLOGY AND ONCOLOGY  [address]  [place] [address]  [phone]       09/16/2021     CONSULT NOTE     PATIENT NAME: Gregor Zarate  YOB: 1965  MR: [id number]    REFERRING PROVIDER: Richmond Rainey MD  PCP: Gisele Lucero APRN    REASON FOR REFERRAL: Coagulopathy    HPI:   Mr. Gregor Zarate is a very pleasant 56 y.o. white male referred to us for management of coagulopathy.     The patient is not a good historian but in reviewing extractable details of the history, he reports that took antibiotics months ago for cholecystis, at some point had epistaxis, skin bleeding from minor scratches, hematuria, melena.  He is unable to precisely identify the dates associated with those signs and symptoms.    The patient was seen at his primary care office on 9/4/2021 with evidence of a major bleeding episode.  He was sent to [facility] and from there he was transferred by helicopter to [facility].  He was discharged from [facility] on 9/13/2021.  The hematologist from [facility] called me last week to inform me about this case so we discussed the case over the phone. There he was diagnosed with extremely severe coagulopathy associated with vitamin K deficiency.  He was treated with plasma and vitamin K.  The vitamin K dependent coagulation factors were severely depleted.  Etiology of the vitamin K deficiency was not identifiable.  The patient was questioned about possible accidental poisoning with vitamin K antagonists but there were no identifiable events. He had taken antibiotics in the past associated with cholecystitis therapy but he was not on any long-term antibiotics.    He was referred here to us to continue evaluation regarding coagulopathy since here is much closer to his home compared to [facility]. The patient currently denies any evidence of bleeding except for minimal hematuria.   He used to have gross hematuria.  I advised him to discuss with primary care and consider referral to urology.  We discussed that since we could not identify the etiology of vitamin K deficiency, for now he should continue to have vitamin K replacement orally.  I submitted the prescription of 5 mg weekly to his pharmacy.  At [facility], they could not prescribe the oral vitamin K because they noted that the cost was prohibitive.  I discussed with the patient that for now he should take at least 5 mg weekly which would cost him out-of-pocket about $25 a tablet if he uses [facility] but ideally he should take 5 mg daily since the etiology of the vitamin K deficiency remains unknown and could still be persistent.  We also discussed that should he develop gross bleeding again he should immediately go to the emergency room.    PAST HISTORY:     HEME/ONC HISTORY  Oncology/Hematology History Overview Note   HEME/ONC DX/RX/INVESTIGATIONS SUMMARY:   DX:   Severe coagulopathy with bleeding in 9/2021 associated vitamin K deficiency of unknown etiology.  Patient presented with gross bleeding, was airlifted from [facility] to [facility].  Cirrhosis, asymptomatic, history of hepatitis C infection, cured.  Muscular dystrophy but patient is ambulatory and has mild weakness on extremities, arms weaker than legs, right side weaker than left.  RX: FFP, PRBCs, vitamin K at [facility] in 9/2021. 9/16/2021, I prescribed 5 mg vitamin K p.o. weekly.  OTHER INFO:   INVESTIGATIONS:    9/4/20201, CT abd/pelvis: 1.  New diffuse urothelial thickening of the bilateral renal collecting systems and left ureter, unclear in etiology and presumably related to an inflammatory or infectious process. No hydronephrosis. Suggest correlation with urine culture and cytology. 2.  Lobulated contour of the liver with fissural widening, suggesting underlying chronic liver disease. No focal liver lesion or findings of portal hypertension.       MEDICAL  HISTORY   has no past medical history on file.   HEALTH MAINTENANCE ITEMS  Health Maintenance Due   Topic Date Due   • COLORECTAL CANCER SCREENING  Never done   • Pneumococcal Vaccine 0-64 (1 of 2 - PPSV23) Never done   • COVID-19 Vaccine (1) Never done   • Hepatitis B (1 of 3 - Risk 3-dose series) Never done   • HEPATITIS C SCREENING  Never done   • ANNUAL WELLNESS VISIT  Never done     <no information>  Last Completed Colonoscopy     This patient has no relevant Health Maintenance data.          There is no immunization history on file for this patient.  Last Completed Mammogram     This patient has no relevant Health Maintenance data.        FAMILY HISTORY  No family history on file.    Cancer-related family history is not on file.   SURGICAL HISTORY   has no past surgical history on file.  SOCIAL HISTORY  Social History     Socioeconomic History   • Marital status: Single     Spouse name: Not on file   • Number of children: Not on file   • Years of education: Not on file   • Highest education level: Not on file     MEDICATIONS:     Current Outpatient Medications   Medication Instructions   • busPIRone (BUSPAR) 5 mg, Oral   • diclofenac (VOLTAREN) 50 mg, Oral, 3 Times Daily With Meals, NULL   • Diclofenac Sodium (VOLTAREN) 2 g, Topical   • FLUoxetine (PROZAC) 40 mg, Oral, Daily   • gabapentin (NEURONTIN) 600 MG tablet TAKE 1 TABLET BY MOUTH THREE TIMES DAILY   • HYDROcodone-acetaminophen (NORCO)  MG per tablet TAKE 1 TABLET BY MOUTH EVERY 8 HOURS AS NEEDED FOR PAIN FOR UP TO 30 DAYS REDUCE DOSES TAKEN AS PAIN BECOMES MANAGEABLE   • ondansetron ODT (ZOFRAN-ODT) 4 mg, Oral   • pantoprazole (PROTONIX) 40 mg, Oral   • phytonadione (MEPHYTON, VITAMIN K) 5 mg, Oral, Weekly   • pregabalin (LYRICA) 75 mg, Oral   • sucralfate (CARAFATE) 1 g tablet Mix one tablet with 10 ml of water to make liquid suspension, take four times daily (before meals and bedtime).   • tiZANidine (ZANAFLEX) 4 MG tablet Take 1 tablet by  "mouth three times daily      ALLERGIES:   No Known Allergies  ROS:   Pertinent positive and negative findings as noted in HPI.   PHYSICAL EXAM:   /78   Pulse 84   Temp 97 °F (36.1 °C) (Temporal)   Resp 18   Ht 70 cm (27.56\")   Wt 85.2 kg (187 lb 14.4 oz)   SpO2 94%   .94 kg/m²  Body surface area is 1.03 meters squared.   Pain Score    09/16/21 1028   PainSc: 6  Comment: lower back area     Alert, oriented x3, cooperative, not in distress.  HEENT: Normocephalic, wearing a facemask.  Lungs: No tachypnea. Clear bilaterally.  Heart: Regular rate and rhythm.  Abdomen: Soft, nontender, no palpable organomegaly, distended with adiposity.  Extremities: No ankle edema.  Skin: Multiple excoriation marks on both legs, no evidence of active bleeding.  Neurologic: Moves all extremities.  He ambulates without assistance.  When he gets up from the chair, it is noticeable that he has weakness.  He appears to have minimal muscle atrophy on the right arm.    INVESTIGATIONS/LABS:     Lab Results - Last 18 Months   Lab Units 09/16/21  1138 09/03/21  1218 07/30/21  1446 05/24/21  0101 06/19/20  1542   WBC 10*3/mm3 5.51 11.2* 7.9 8.5 8.6   HEMOGLOBIN g/dL 9.6* 9.9* 12.9* 13.6* 14.7   HEMATOCRIT % 31.7* 31.9* 40.6* 43.8 43.9   MCV fL 92.2 88.1 93.3 96.3* 95.9*   PLATELETS 10*3/mm3 557* 448* 430* 357 303   IMM GRAN % % 0.2  --   --   --   --    NEUTROS ABS 10*3/mm3 2.49 8.2*  --  5.1 4.4   LYMPHS ABS 10*3/mm3 2.26 1.4  --  2.3 3.3   MONOS ABS 10*3/mm3 0.60 1.50*  --  0.90 0.80   EOS ABS 10*3/mm3 0.11 0.10  --  0.20 0.10   BASOS ABS 10*3/mm3 0.04 0.00  --  0.00 0.00   IMMATURE GRANS (ABS) 10*3/mm3 0.01 0.0  --  0.0 0.0   NRBC /100 WBC 0.0  --   --   --   --        Lab Results - Last 18 Months   Lab Units 09/16/21  1138 09/03/21  1218 07/30/21  1446 05/24/21  0101 04/23/21  1213 06/19/20  1542   BUN mg/dL 16 15 15 11 15 12   CREATININE mg/dL 0.41* 0.5 0.5 0.4* 0.4* <0.5   GLUCOSE mg/dL 86 121* 97 112* 86 77   SODIUM " mmol/L 140 138 138 136 139 139   POTASSIUM mmol/L 3.6 3.5 4.6 3.8 4.7 4.0   TOTAL CO2 mmol/L  --  27 31* 31* 27 28   CO2 mmol/L 27.0  --   --   --   --   --    CHLORIDE mmol/L 103 100 100 99 102 99   ANION GAP mmol/L 10.0 11 7 6* 10 12   BUN / CREAT RATIO  39.0*  --   --   --   --   --    CALCIUM mg/dL 8.8 9.3 9.8 9.8 9.1 9.2   EGFR IF NONAFRICN AM mL/min/1.73 >150 >60 >60 >60 >60 >60   ALK PHOS U/L 109 127 117 146* 117 88   TOTAL PROTEIN g/dL 7.5 7.7 8.2 8.0 7.6 7.1   ALT (SGPT) U/L 19 22 18 22 12 18   AST (SGOT) U/L 25 39 19 26 18 18   BILIRUBIN mg/dL 0.2 0.4 0.3 <0.2 <0.2 0.3   ALBUMIN g/dL 4.60 4.2 4.4 4.4 4.5 4.6   GLOBULIN gm/dL 2.9  --   --   --   --   --        Lab Results - Last 18 Months   Lab Units 09/03/21  1910   LDH unit/L 396*       Lab Results - Last 18 Months   Lab Units 09/11/21  0048 09/08/21  1059   IRON mcg/dL 264* 76   TIBC mcg/mL 391 389   IRON SATURATION % 68 20   FERRITIN ng/mL  --  162   VITAMIN B 12 pg/mL  --  400   FOLATE ng/mL  --  14.4       Lab Results   Component Value Date    PROTIME 21.1 (H) 09/16/2021    INR 1.91 (H) 09/16/2021    PTT 31.3 09/16/2021       ASSESSMENT:   Coagulopathy associated with severe vitamin K deficiency.  Please see summary from [facility]. The patient was discharged from there a few days ago.  The patient had a major severe life-threatening bleeding event. The etiology of the vitamin K deficiency is unknown, patient denies taking warfarin, antibiotics or accidental poisoning.    Minimal hematuria with evidence of bladder thickening, patient was advised to discuss with PCP and strongly consider referral to urology.    Some of the coagulopathy that persists could be secondary to liver disease, the patient has evidence of cirrhosis.    PLAN/RECOMMENDATIONS:   Labs today.    I advised the patient to start taking oral vitamin K immediately, starting today.    VST one week, labs same day prior to VST.     DX/ORDERS:   Diagnoses and all orders for  this visit:    1. Vitamin K deficiency (Primary)  -     CBC Auto Differential; Future  -     Comprehensive Metabolic Panel  -     aPTT  -     Protime-INR  -     Protime-INR; Future    Other orders  -     phytonadione (MEPHYTON, VITAMIN K) 5 MG tablet; Take 1 tablet by mouth 1 (One) Time Per Week.  Dispense: 4 tablet; Refill: 3         Future Appointments   Date Time Provider Department Center   9/23/2021 10:00 AM [facility] CANCER CTR LAB [facility] CCL[facility]   9/23/2021 10:30 AM Leo Foster MD [facility] ONC [facility]        Thank you very much for having referred this very pleasant patient.    Leo Foster MD  9/16/2021    cc: Richmond Rainey MD, Gisele Lucero APRN

## 2021-09-20 ENCOUNTER — TELEPHONE (OUTPATIENT)
Dept: GASTROENTEROLOGY | Age: 56
End: 2021-09-20

## 2021-09-20 DIAGNOSIS — K26.9 DUODENAL ULCER: Primary | ICD-10-CM

## 2021-09-20 DIAGNOSIS — K29.90 GASTRITIS AND DUODENITIS: ICD-10-CM

## 2021-09-20 RX ORDER — BUSPIRONE HYDROCHLORIDE 5 MG/1
TABLET ORAL
Qty: 60 TABLET | Refills: 0 | Status: SHIPPED | OUTPATIENT
Start: 2021-09-20 | End: 2021-10-21 | Stop reason: SDUPTHER

## 2021-09-20 RX ORDER — PANTOPRAZOLE SODIUM 40 MG/1
TABLET, DELAYED RELEASE ORAL
Qty: 60 TABLET | Refills: 0 | Status: SHIPPED | OUTPATIENT
Start: 2021-09-20 | End: 2021-10-22

## 2021-09-20 NOTE — TELEPHONE ENCOUNTER
Ramesh Lim called to request a refill on his medication.       Last office visit : 9/1/2021   Next office visit : 9/20/2021     Requested Prescriptions     Pending Prescriptions Disp Refills    busPIRone (BUSPAR) 5 MG tablet [Pharmacy Med Name: busPIRone HCl 5 MG Oral Tablet] 60 tablet 0     Sig: Take 1 tablet by mouth twice daily as needed for anxiety            [employer]

## 2021-09-20 NOTE — TELEPHONE ENCOUNTER
Assessment:        Diagnosis Orders   1. Duodenal ulcer                       Plan:      1. Baron Celis notified at checkout to put him in recall for UGI with SBFT in 2-3 weeks to assure healing of ulcers and no stricturing. Will call with results  2. Use the carafate for another 4 weeks then okay to stop if the UGI is unrevealing. 3. F/u when next colonoscopy is due or sooner if needed      This may be a bit of a confusing message, [facility]. Last visit [facility]rn 9-3-21, see above. No FU scheduled. Egd with  8-10-21. Patient said he was recently at [facility] ED for right arm pain and bruising, Hematuria and was then transported to [facility] and admitted  There. Patient said Elliott mentioned the ingestion of Rat Poison per [facility] but he told them there was no way, he went into a long detail of his stay at [facility] and then said he was to FU here with GI for liver disease. Hx of Hepatitis C with treatment in the past per patient. Patient said he was also suppose to have an UGI and SBFT per [facility] but that was never scheduled as to date either, patient seems not really sure at this point what he is suppose to do or where and wants [place] to review the records in [facility] and see if she can make some sense of this and what she thinks he needs to be doing at present. I will forward to [facility]rn per patient request to review and make further recommendations.  Jackson dang

## 2021-09-21 DIAGNOSIS — M54.42 LEFT-SIDED LOW BACK PAIN WITH LEFT-SIDED SCIATICA, UNSPECIFIED CHRONICITY: ICD-10-CM

## 2021-09-21 RX ORDER — TIZANIDINE 4 MG/1
TABLET ORAL
Qty: 90 TABLET | Refills: 0 | Status: SHIPPED | OUTPATIENT
Start: 2021-09-21 | End: 2021-10-28

## 2021-09-21 NOTE — TELEPHONE ENCOUNTER
Yes he was supposed to be put in that recall as noted below. Ask Terance Gilford if this was done or if she sent to [facility] to do. In regards to hepatitis C I don't see hep c labs done however do see they documented \"hep c cured. \" if he needs a liver w/u he needs an appt please, 40 min. Thank you!

## 2021-09-21 NOTE — TELEPHONE ENCOUNTER
Last OV with [facility] was 9-3-21 and it says do UGI and SBFT in 2-3-weeks, Tomeka Pisano if you will place the orders I will notify the patient and get his scheduled and then a 40 minute FU.  Thanks [facility]

## 2021-09-22 ENCOUNTER — HOSPITAL ENCOUNTER (OUTPATIENT)
Dept: MRI IMAGING | Age: 56
Discharge: HOME | End: 2021-09-22
Payer: MEDICARE

## 2021-09-22 DIAGNOSIS — M43.9 COMPRESSION DEFORMITY OF VERTEBRA: ICD-10-CM

## 2021-09-22 DIAGNOSIS — M51.36 DDD (DEGENERATIVE DISC DISEASE), LUMBAR: ICD-10-CM

## 2021-09-22 PROCEDURE — 72148 MRI LUMBAR SPINE W/O DYE: CPT

## 2021-09-22 NOTE — TELEPHONE ENCOUNTER
9-22-21  @ 3:12 pm    I was finally able to reach this patient and let him know [facility] aprn wants to get the UGI and SBFT scheduled  ( orders in Epic)and also she wants to see him in FU after his DC from [facility] with a 40 minute liver appointment. I will forward to KS and BL at  to get the UGI and SBFT scheduled as well as the 40 minute OV per [facility], hopefully the patient can get the two test done and have the results back in time for his office visit once scheduled.  Jackson cma per [facility]

## 2021-09-23 ENCOUNTER — INFUSION (OUTPATIENT)
Dept: ONCOLOGY | Facility: HOSPITAL | Age: 56
End: 2021-09-23

## 2021-09-23 ENCOUNTER — TELEPHONE (OUTPATIENT)
Dept: PRIMARY CARE CLINIC | Age: 56
End: 2021-09-23

## 2021-09-23 ENCOUNTER — TELEPHONE (OUTPATIENT)
Dept: ONCOLOGY | Facility: CLINIC | Age: 56
End: 2021-09-23

## 2021-09-23 ENCOUNTER — OFFICE VISIT (OUTPATIENT)
Dept: PRIMARY CARE CLINIC | Age: 56
End: 2021-09-23
Payer: MEDICARE

## 2021-09-23 ENCOUNTER — LAB (OUTPATIENT)
Dept: LAB | Facility: HOSPITAL | Age: 56
End: 2021-09-23

## 2021-09-23 ENCOUNTER — OFFICE VISIT (OUTPATIENT)
Dept: ONCOLOGY | Facility: CLINIC | Age: 56
End: 2021-09-23

## 2021-09-23 VITALS
SYSTOLIC BLOOD PRESSURE: 138 MMHG | RESPIRATION RATE: 16 BRPM | BODY MASS INDEX: 26.77 KG/M2 | HEART RATE: 67 BPM | WEIGHT: 187 LBS | TEMPERATURE: 98.1 F | OXYGEN SATURATION: 98 % | DIASTOLIC BLOOD PRESSURE: 89 MMHG | HEIGHT: 70 IN

## 2021-09-23 VITALS
SYSTOLIC BLOOD PRESSURE: 130 MMHG | WEIGHT: 187.7 LBS | DIASTOLIC BLOOD PRESSURE: 78 MMHG | BODY MASS INDEX: 26.87 KG/M2 | TEMPERATURE: 97.7 F | HEIGHT: 70 IN | OXYGEN SATURATION: 97 % | RESPIRATION RATE: 16 BRPM | HEART RATE: 71 BPM

## 2021-09-23 VITALS
WEIGHT: 198.4 LBS | DIASTOLIC BLOOD PRESSURE: 76 MMHG | OXYGEN SATURATION: 97 % | BODY MASS INDEX: 27.67 KG/M2 | SYSTOLIC BLOOD PRESSURE: 138 MMHG | HEART RATE: 75 BPM | TEMPERATURE: 97.5 F

## 2021-09-23 DIAGNOSIS — D68.9 COAGULATION DEFECT (HCC): ICD-10-CM

## 2021-09-23 DIAGNOSIS — E56.1 VITAMIN K DEFICIENCY: Primary | ICD-10-CM

## 2021-09-23 DIAGNOSIS — G71.00 MD (MUSCULAR DYSTROPHY) (HCC): ICD-10-CM

## 2021-09-23 DIAGNOSIS — G71.00 MD (MUSCULAR DYSTROPHY) (HCC): Primary | ICD-10-CM

## 2021-09-23 DIAGNOSIS — R79.1 ELEVATED INR: ICD-10-CM

## 2021-09-23 DIAGNOSIS — N13.5 OBSTRUCTION OF LEFT URETER: ICD-10-CM

## 2021-09-23 DIAGNOSIS — F32.5 MAJOR DEPRESSIVE DISORDER WITH SINGLE EPISODE, IN FULL REMISSION (HCC): ICD-10-CM

## 2021-09-23 DIAGNOSIS — Z09 HOSPITAL DISCHARGE FOLLOW-UP: Primary | ICD-10-CM

## 2021-09-23 LAB
HOLD SPECIMEN: NORMAL
INR PPP: 10.28 (ref 0.91–1.09)
PROTHROMBIN TIME: 77.9 SECONDS (ref 11.9–14.6)
WHOLE BLOOD HOLD SPECIMEN: NORMAL

## 2021-09-23 PROCEDURE — G8427 DOCREV CUR MEDS BY ELIG CLIN: HCPCS | Performed by: NURSE PRACTITIONER

## 2021-09-23 PROCEDURE — 85610 PROTHROMBIN TIME: CPT

## 2021-09-23 PROCEDURE — G8419 CALC BMI OUT NRM PARAM NOF/U: HCPCS | Performed by: NURSE PRACTITIONER

## 2021-09-23 PROCEDURE — 99215 OFFICE O/P EST HI 40 MIN: CPT | Performed by: NURSE PRACTITIONER

## 2021-09-23 PROCEDURE — 36415 COLL VENOUS BLD VENIPUNCTURE: CPT

## 2021-09-23 PROCEDURE — 99214 OFFICE O/P EST MOD 30 MIN: CPT | Performed by: INTERNAL MEDICINE

## 2021-09-23 PROCEDURE — 96372 THER/PROPH/DIAG INJ SC/IM: CPT

## 2021-09-23 PROCEDURE — 1036F TOBACCO NON-USER: CPT | Performed by: NURSE PRACTITIONER

## 2021-09-23 PROCEDURE — 25010000003 PHYTONADIONE 10 MG/ML SOLUTION: Performed by: INTERNAL MEDICINE

## 2021-09-23 PROCEDURE — 3017F COLORECTAL CA SCREEN DOC REV: CPT | Performed by: NURSE PRACTITIONER

## 2021-09-23 RX ORDER — PHYTONADIONE 10 MG/ML
10 INJECTION, EMULSION INTRAMUSCULAR; INTRAVENOUS; SUBCUTANEOUS ONCE
Status: CANCELLED
Start: 2021-09-23 | End: 2021-09-23

## 2021-09-23 RX ORDER — PHYTONADIONE 10 MG/ML
10 INJECTION, EMULSION INTRAMUSCULAR; INTRAVENOUS; SUBCUTANEOUS ONCE
Status: DISCONTINUED | OUTPATIENT
Start: 2021-09-23 | End: 2021-09-23

## 2021-09-23 RX ORDER — OXYCODONE HYDROCHLORIDE AND ACETAMINOPHEN 5; 325 MG/1; MG/1
1 TABLET ORAL EVERY 8 HOURS PRN
Qty: 90 TABLET | Refills: 0 | Status: SHIPPED | OUTPATIENT
Start: 2021-09-23 | End: 2021-10-23

## 2021-09-23 RX ORDER — PHYTONADIONE 10 MG/ML
10 INJECTION, EMULSION INTRAMUSCULAR; INTRAVENOUS; SUBCUTANEOUS ONCE
Status: COMPLETED | OUTPATIENT
Start: 2021-09-23 | End: 2021-09-23

## 2021-09-23 RX ADMIN — PHYTONADIONE 10 MG: 10 INJECTION, EMULSION INTRAMUSCULAR; INTRAVENOUS; SUBCUTANEOUS at 13:08

## 2021-09-23 ASSESSMENT — ENCOUNTER SYMPTOMS
RESPIRATORY NEGATIVE: 1
GASTROINTESTINAL NEGATIVE: 1
BACK PAIN: 1
EYES NEGATIVE: 1

## 2021-09-23 NOTE — TELEPHONE ENCOUNTER
I set the patient up for the UGI with SBFT for Sept 28 @ 8AM. I told him where to go and he voiced understanding. However, I forgot to tell him to be fasting for it. I was going to set up the 40 min OV with [address], but he was at [address] right now and said he isn't able to write all of this down, so I'm going to call him later to set that up and I'll tell him to be fasting then.

## 2021-09-23 NOTE — TELEPHONE ENCOUNTER
CRITICAL LAB VALUE  Received call from [facility] hematology lab with CRITICAL LAB VALUE.    PT: 77.9  INR: 10.28  This information was relayed to Dr Foster for review and to address.    NOTE:  Patient is in office today for f/u Dr Foster to address

## 2021-09-23 NOTE — PROGRESS NOTES
MGW ONC [facility] GROUP HEMATOLOGY AND ONCOLOGY  [address]  [place] [address]  [phone]       09/23/2021     PROGRESS NOTE     PATIENT NAME: Gregor Zarate  YOB: 1965  56 y.o. MR: [id number]    PCP: Gisele Lucero APRN     HPI:   Mr. Gregor Zarate was seen as a new patient last week.  He is here for follow-up.  He has been diagnosed with vitamin K deficiency, extremely severe, unknown cause.  He was treated at [facility] this month for the vitamin K deficiency.  Today the patient is feeling well.  He is asymptomatic.  He denies any evidence of bleeding.  However, the INR today has worsened to 10.2.  Again, the patient denies evidence of any therapy that might be lowering vitamin K.  He is sure that is not being poisoned.  He prepares his own meals but apparently eats mostly cereals.  Last week I prescribed oral vitamin K which he said he could buy for the few tabs but he did not buy them.  We decided to proceed with parenteral vitamin K today in the office.  I told him that he might need to return to [facility] depending how he progresses but may also need to go to our emergency room if he develops any bleeding or if tomorrow he is not improved.  PAST HISTORY:     HEME/ONC HISTORY  Oncology/Hematology History Overview Note   HEME/ONC DX/RX/INVESTIGATIONS SUMMARY:   DX:   Severe coagulopathy with bleeding in 9/3/2021 associated vitamin K deficiency of unknown etiology.  Patient presented with gross bleeding, was airlifted from [facility] to [facility]. Dcd from [facility] on 9/13/2021.    RX:   FFP, vitamin K at [facility] in 9/2021.   9/16/2021, I prescribed 5 mg vitamin K p.o. weekly. - patient is not taking it.  9/23/2021, Vit K 10 mg SQ (with us).    OTHER INFO:   Cirrhosis, asymptomatic, history of hepatitis C infection, cured.  Muscular dystrophy but patient is ambulatory and has mild weakness on extremities, arms weaker than  legs, right side weaker than left.    INVESTIGATIONS:    9/4/20201, CT abd/pelvis: 1.  New diffuse urothelial thickening of the bilateral renal collecting systems and left ureter, unclear in etiology and presumably related to an inflammatory or infectious process. No hydronephrosis. Suggest correlation with urine culture and cytology. 2.  Lobulated contour of the liver with fissural widening, suggesting underlying chronic liver disease. No focal liver lesion or findings of portal hypertension.    INR: 9/16/2021 = 1.91. 9/23/2021, = 10.28         MEDICAL HISTORY   has no past medical history on file.   HEALTH MAINTENANCE ITEMS  Health Maintenance Due   Topic Date Due   • COLORECTAL CANCER SCREENING  Never done   • COVID-19 Vaccine (1) Never done   • HEPATITIS C SCREENING  Never done   • ANNUAL WELLNESS VISIT  Never done     <no information>  Last Completed Colonoscopy     This patient has no relevant Health Maintenance data.          There is no immunization history on file for this patient.  Last Completed Mammogram     This patient has no relevant Health Maintenance data.        FAMILY HISTORY  No family history on file.    Cancer-related family history is not on file.   SURGICAL HISTORY   has no past surgical history on file.  SOCIAL HISTORY  Social History     Socioeconomic History   • Marital status: Single     Spouse name: Not on file   • Number of children: Not on file   • Years of education: Not on file   • Highest education level: Not on file     MEDICATIONS:     Current Outpatient Medications   Medication Instructions   • busPIRone (BUSPAR) 5 mg, Oral   • diclofenac (VOLTAREN) 50 mg, Oral, 3 Times Daily With Meals, NULL   • Diclofenac Sodium (VOLTAREN) 2 g, Topical   • FLUoxetine (PROZAC) 40 mg, Oral, Daily   • gabapentin (NEURONTIN) 600 MG tablet TAKE 1 TABLET BY MOUTH THREE TIMES DAILY   • ondansetron ODT (ZOFRAN-ODT) 4 mg, Oral   • pantoprazole (PROTONIX) 40 mg, Oral   • phytonadione (MEPHYTON, VITAMIN  "K) 5 mg, Oral, Weekly   • pregabalin (LYRICA) 75 mg, Oral   • sucralfate (CARAFATE) 1 g tablet Mix one tablet with 10 ml of water to make liquid suspension, take four times daily (before meals and bedtime).   • tiZANidine (ZANAFLEX) 4 MG tablet Take 1 tablet by mouth three times daily      ALLERGIES:   No Known Allergies  ROS:   Pertinent positive and negative findings as noted in HPI.   PHYSICAL EXAM:   /78   Pulse 71   Temp 97.7 °F (36.5 °C)   Resp 16   Ht 177.8 cm (70\")   Wt 85.1 kg (187 lb 11.2 oz)   SpO2 97%   BMI 26.93 kg/m²  Body surface area is 2.03 meters squared.   Pain Score    09/23/21 1042   PainSc: 0-No pain     Alert, oriented x3, cooperative, not in distress.  HEENT: Normocephalic, wearing a facemask.  Lungs: No tachypnea.   Extremities: No ankle edema.  Neurologic: Moves all extremities.  INVESTIGATIONS/LABS:     Lab Results   Component Value Date    WBC 5.51 09/16/2021    HGB 9.6 (L) 09/16/2021    HCT 31.7 (L) 09/16/2021    MCV 92.2 09/16/2021     (H) 09/16/2021    NEUTROABS 2.49 09/16/2021    LYMPHSABS 2.26 09/16/2021       Lab Results   Component Value Date    FERRITIN 162 09/08/2021    IRON 264 (H) 09/11/2021    TIBC 391 09/11/2021    CIVAUNSZ86 400 09/08/2021    FOLATE 14.4 09/08/2021        Lab Results   Component Value Date    GLUCOSE 86 09/16/2021    BUN 16 09/16/2021    CREATININE 0.41 (L) 09/16/2021    K 3.6 09/16/2021    CO2 27.0 09/16/2021    CALCIUM 8.8 09/16/2021    ALBUMIN 4.60 09/16/2021    BILITOT 0.2 09/16/2021    ALT 19 09/16/2021    AST 25 09/16/2021       Lab Results   Component Value Date    PROTIME 77.9 (C) 09/23/2021    INR 10.28 (C) 09/23/2021    PTT 31.3 09/16/2021       ASSESSMENT:   Vitamin K deficiency, severe, unknown etiology.  The patient never took warfarin and is not aware of any possible accidental poisoning. Please review my consultation last week and prior records from [place] admission.    PLAN/RECOMMENDATIONS:   Vit K 10 mg SQ now.    VST " + INR tomorrow.    DX/ORDERS:   Diagnoses and all orders for this visit:    1. Vitamin K deficiency (Primary)  -     Protime-INR; Future  -     CBC Auto Differential; Future  -     Comprehensive Metabolic Panel; Future    Other orders  -     Discontinue: phytonadione (AQUA-MEPHYTON, VITAMIN K) injection 10 mg  -     phytonadione (AQUA-MEPHYTON, VITAMIN K) injection 10 mg        Future Appointments   Date Time Provider Department Center   9/24/2021 10:15 AM [facility] CANCER CTR LAB [facility]\""   9/24/2021 10:45 AM Leo Foster MD [facility] ONC [facility]        Leo Foster MD  9/23/2021    cc: No ref. provider found, Gisele Luceor, APRN
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
## 2021-09-23 NOTE — PROGRESS NOTES
[facility] PRIMARY CARE  [address] [facility] [address] [address]  Dept: [phone]  Dept Fax: [phone]  Loc: [phone]    Nicole Rowan is a 61 y.o. male who presents today for his medical conditions/complaints as noted below. Nicole Rowan is c/o of Follow-Up from [address]   Patient presents with    Follow-Up from Hospital       HPI:     HPI   Patient is in office today to follow up on his recent hospital stay. He states at the moment he is still sore and he says they are trying to get him on track with his Vit k. He is also wanting on a referral/rehab on his back. Patient was seen in [facility] and was told he had a vit k deficiency likely secondary to liver dysfunction. Patient has seen hematology here in [place] a few days after discharge. He has an appointment this afternoon to see them as well. Patient is wanting the COVID vaccine.       Past Medical History:   Diagnosis Date    Anxiety     Chronic back pain     Depression     MD [facility]muscular dystrophy) ([facility] [address]     Neuropathy 12/02/2020    Scoliosis         Past Surgical History:   Procedure Laterality Date    CHOLECYSTECTOMY, LAPAROSCOPIC N/A 04/27/2021    LAPAROSCOPIC CHOLECYSTECTOMY performed by Chanda Gil DO at [facility] COLONOSCOPY  01/08/2016    Dr Diana García, normal, 10 yr recall    COLONOSCOPY N/A 06/30/2021    Dr Sunny Dwyer hemorrhids, RANDOM COLON BX NEGATIVE, Sub Prep Fair, 3 year recall    FOOT SURGERY Left     UPPER GASTROINTESTINAL ENDOSCOPY N/A 06/30/2021    Dr Kishor Angeles, duodenal villous blunting otherwise normal EGD; serum celiac panel NEG for celiac sprue    UPPER GASTROINTESTINAL ENDOSCOPY N/A 08/10/2021    Dr Kishor Angeles- mild gastritis, 2 small duodenal ulcers and 1 large cratered 3-4cm in posterior duodenal bulb, NEG h pylori       Social History     Tobacco Use    Smoking status: Former Smoker     Years: 30.00     Types: Cigarettes Quit date: 2021     Years since quittin.1    Smokeless tobacco: Never Used   Substance Use Topics    Alcohol use: Yes     Comment: occas        Current Outpatient Medications   Medication Sig Dispense Refill    tiZANidine (ZANAFLEX) 4 MG tablet TAKE 1 TABLET BY MOUTH THREE TIMES DAILY 90 tablet 0    busPIRone (BUSPAR) 5 MG tablet Take 1 tablet by mouth twice daily as needed for anxiety 60 tablet 0    pregabalin (LYRICA) 75 MG capsule Take 1 capsule by mouth 3 times daily for 30 days. 90 capsule 0    FLUoxetine (PROZAC) 40 MG capsule TAKE 1 CAPSULE BY MOUTH ONCE DAILY 90 capsule 1    triamcinolone (KENALOG) 0.025 % cream Apply topically 2 times daily. 80 g 1    diclofenac sodium (VOLTAREN) 1 % GEL Apply 2 g topically 4 times daily 2 Tube 1    amitriptyline (ELAVIL) 50 MG tablet TAKE ONE TABLET BY MOUTH IN THE EVENING 30 tablet 11    pantoprazole (PROTONIX) 40 MG tablet TAKE 1 TABLET BY MOUTH TWICE DAILY BEFORE MEAL(S) 60 tablet 0    sucralfate (CARAFATE) 1 GM tablet Mix one tablet with 10 ml of water to make liquid suspension, take four times daily (before meals and bedtime). (Patient taking differently: 4 times daily ) 112 tablet 3     No current facility-administered medications for this visit. Allergies   Allergen Reactions    Shellfish-Derived Products Anaphylaxis       Family History   Problem Relation Age of Onset    Diabetes Mother     Colon Cancer Neg Hx     Colon Polyps Neg Hx     Esophageal Cancer Neg Hx     Liver Cancer Neg Hx     Liver Disease Neg Hx     Rectal Cancer Neg Hx     Stomach Cancer Neg Hx                Subjective:      Review of Systems   Constitutional: Positive for fatigue. HENT: Negative. Eyes: Negative. Respiratory: Negative. Cardiovascular: Negative. Gastrointestinal: Negative. Endocrine: Negative. Genitourinary: Positive for hematuria. Musculoskeletal: Positive for back pain, gait problem and myalgias. Skin: Negative. Neurological: Positive for weakness. Hematological: Negative. Psychiatric/Behavioral: Negative. Objective:     Physical Exam  Vitals and nursing note reviewed. Constitutional:       Appearance: Normal appearance. HENT:      Head: Normocephalic and atraumatic. Right Ear: Hearing, tympanic membrane, ear canal and external ear normal.      Left Ear: Hearing, tympanic membrane, ear canal and external ear normal.      Nose: Nose normal.      Mouth/Throat:      Lips: Pink. Mouth: Mucous membranes are moist.      Pharynx: Oropharynx is clear. Eyes:      General: Lids are normal.      Extraocular Movements: Extraocular movements intact. Conjunctiva/sclera: Conjunctivae normal.      Pupils: Pupils are equal, round, and reactive to light. Neck:      Thyroid: No thyromegaly. Cardiovascular:      Rate and Rhythm: Normal rate and regular rhythm. Pulses: Normal pulses. Dorsalis pedis pulses are 2+ on the right side and 2+ on the left side. Posterior tibial pulses are 2+ on the right side and 2+ on the left side. Heart sounds: Normal heart sounds. Pulmonary:      Effort: Pulmonary effort is normal.      Breath sounds: Normal breath sounds and air entry. Abdominal:      General: Bowel sounds are normal.      Palpations: Abdomen is soft. Musculoskeletal:      Cervical back: Full passive range of motion without pain, normal range of motion and neck supple. Thoracic back: Deformity present. No tenderness. Decreased range of motion. Lumbar back: Deformity present. No tenderness. Decreased range of motion. Back:    Lymphadenopathy:      Cervical: No cervical adenopathy. Skin:     General: Skin is warm and dry. Capillary Refill: Capillary refill takes less than 2 seconds. Neurological:      General: No focal deficit present. Mental Status: He is alert and oriented to person, place, and time. Mental status is at baseline. Coordination: Coordination is intact. Psychiatric:         Mood and Affect: Mood normal.         Speech: Speech normal.         Behavior: Behavior normal.         Thought Content: Thought content normal.         Cognition and Memory: Cognition and memory normal.         Judgment: Judgment normal.         /76   Pulse 75   Temp 97.5 °F (36.4 °C)   Wt 198 lb 6.4 oz (90 kg)   SpO2 97%   BMI 27.67 kg/m²     Assessment:      Diagnosis Orders   1. Hospital discharge follow-up  External Referral To Physical Therapy   2. Major depressive disorder with single episode, in full remission ([facility] [address]     3. Obstruction of left ureter  Jana Jara Officer, MD, [facility]   4. MD (muscular dystrophy) [facility])  External Referral To Physical Therapy   5. Coagulation defect ([facility] [address]         No results found for this visit on 09/23/21. Plan:     Due to his continued pain while on Norco, I am switching him to Percocet to see if this will help with his discomfort. We discussed using a walker at home to help support his gait. Referral to PT for gait strengthening. Patient to keep appointment with hematology. Referral to Urology for hematuria. RTC in 3 weeks to monitor medication changes and overall status. Patient is to ask hematology if they are ok with him receiving COVID vaccine if so then will return to our office for this. Higher level of service due to severity of his case and the length spent in office with patient on education and recommendations. More than 50% of the time was spent counseling and coordinating care for a total time of 42 mins face to face. Return in about 3 weeks (around 10/14/2021) for coagulation and pain issues .     Orders Placed This Encounter   Procedures   John Marquez MD, [facility]     Referral Priority:   Routine     Referral Type:   Eval and Treat     Referral Reason:   Specialty Services Required     Referred to Provider:   Martin Delacrzu MD

## 2021-09-23 NOTE — TELEPHONE ENCOUNTER
Changing patient from Norco 10 mg TID to Percocet 5 mg TID to see if this change will help with his pain related to muscular dystrophy.

## 2021-09-23 NOTE — TELEPHONE ENCOUNTER
I got the pt scheduled for a 40 min appt on 10/6/21. And I told him to be fasting after midnight for the UGI w SBFT.

## 2021-09-24 ENCOUNTER — LAB (OUTPATIENT)
Dept: LAB | Facility: HOSPITAL | Age: 56
End: 2021-09-24

## 2021-09-24 ENCOUNTER — INFUSION (OUTPATIENT)
Dept: ONCOLOGY | Facility: HOSPITAL | Age: 56
End: 2021-09-24

## 2021-09-24 ENCOUNTER — TELEPHONE (OUTPATIENT)
Dept: ONCOLOGY | Facility: CLINIC | Age: 56
End: 2021-09-24

## 2021-09-24 ENCOUNTER — OFFICE VISIT (OUTPATIENT)
Dept: ONCOLOGY | Facility: CLINIC | Age: 56
End: 2021-09-24

## 2021-09-24 VITALS
RESPIRATION RATE: 18 BRPM | WEIGHT: 190 LBS | HEART RATE: 65 BPM | HEIGHT: 70 IN | SYSTOLIC BLOOD PRESSURE: 141 MMHG | TEMPERATURE: 97.6 F | BODY MASS INDEX: 27.2 KG/M2 | DIASTOLIC BLOOD PRESSURE: 84 MMHG | OXYGEN SATURATION: 100 %

## 2021-09-24 VITALS
HEART RATE: 71 BPM | OXYGEN SATURATION: 97 % | BODY MASS INDEX: 27.24 KG/M2 | HEIGHT: 70 IN | WEIGHT: 190.3 LBS | DIASTOLIC BLOOD PRESSURE: 72 MMHG | RESPIRATION RATE: 16 BRPM | SYSTOLIC BLOOD PRESSURE: 130 MMHG | TEMPERATURE: 97.8 F

## 2021-09-24 DIAGNOSIS — E56.1 VITAMIN K DEFICIENCY: Primary | ICD-10-CM

## 2021-09-24 DIAGNOSIS — R79.1 ELEVATED INR: ICD-10-CM

## 2021-09-24 LAB
ALBUMIN SERPL-MCNC: 4.1 G/DL (ref 3.5–5.2)
ALBUMIN/GLOB SERPL: 1.4 G/DL
ALP SERPL-CCNC: 105 U/L (ref 39–117)
ALT SERPL W P-5'-P-CCNC: 16 U/L (ref 1–41)
ANION GAP SERPL CALCULATED.3IONS-SCNC: 6 MMOL/L (ref 5–15)
AST SERPL-CCNC: 25 U/L (ref 1–40)
BASOPHILS # BLD AUTO: 0.02 10*3/MM3 (ref 0–0.2)
BASOPHILS NFR BLD AUTO: 0.4 % (ref 0–1.5)
BILIRUB SERPL-MCNC: 0.2 MG/DL (ref 0–1.2)
BUN SERPL-MCNC: 15 MG/DL (ref 6–20)
BUN/CREAT SERPL: 35.7 (ref 7–25)
CALCIUM SPEC-SCNC: 8.5 MG/DL (ref 8.6–10.5)
CHLORIDE SERPL-SCNC: 106 MMOL/L (ref 98–107)
CO2 SERPL-SCNC: 29 MMOL/L (ref 22–29)
CREAT SERPL-MCNC: 0.42 MG/DL (ref 0.76–1.27)
DEPRECATED RDW RBC AUTO: 61.6 FL (ref 37–54)
EOSINOPHIL # BLD AUTO: 0.07 10*3/MM3 (ref 0–0.4)
EOSINOPHIL NFR BLD AUTO: 1.3 % (ref 0.3–6.2)
ERYTHROCYTE [DISTWIDTH] IN BLOOD BY AUTOMATED COUNT: 18.3 % (ref 12.3–15.4)
GFR SERPL CREATININE-BSD FRML MDRD: >150 ML/MIN/1.73
GLOBULIN UR ELPH-MCNC: 2.9 GM/DL
GLUCOSE SERPL-MCNC: 106 MG/DL (ref 65–99)
HCT VFR BLD AUTO: 32.7 % (ref 37.5–51)
HGB BLD-MCNC: 9.6 G/DL (ref 13–17.7)
HOLD SPECIMEN: NORMAL
IMM GRANULOCYTES # BLD AUTO: 0.02 10*3/MM3 (ref 0–0.05)
IMM GRANULOCYTES NFR BLD AUTO: 0.4 % (ref 0–0.5)
INR PPP: 9.99 (ref 0.91–1.09)
LYMPHOCYTES # BLD AUTO: 1.04 10*3/MM3 (ref 0.7–3.1)
LYMPHOCYTES NFR BLD AUTO: 19.4 % (ref 19.6–45.3)
MCH RBC QN AUTO: 27.1 PG (ref 26.6–33)
MCHC RBC AUTO-ENTMCNC: 29.4 G/DL (ref 31.5–35.7)
MCV RBC AUTO: 92.4 FL (ref 79–97)
MONOCYTES # BLD AUTO: 0.53 10*3/MM3 (ref 0.1–0.9)
MONOCYTES NFR BLD AUTO: 9.9 % (ref 5–12)
NEUTROPHILS NFR BLD AUTO: 3.68 10*3/MM3 (ref 1.7–7)
NEUTROPHILS NFR BLD AUTO: 68.6 % (ref 42.7–76)
NRBC BLD AUTO-RTO: 0 /100 WBC (ref 0–0.2)
PLATELET # BLD AUTO: 419 10*3/MM3 (ref 140–450)
PMV BLD AUTO: 8.9 FL (ref 6–12)
POTASSIUM SERPL-SCNC: 4.2 MMOL/L (ref 3.5–5.2)
PROT SERPL-MCNC: 7 G/DL (ref 6–8.5)
PROTHROMBIN TIME: 76.2 SECONDS (ref 11.9–14.6)
RBC # BLD AUTO: 3.54 10*6/MM3 (ref 4.14–5.8)
SODIUM SERPL-SCNC: 141 MMOL/L (ref 136–145)
WBC # BLD AUTO: 5.36 10*3/MM3 (ref 3.4–10.8)

## 2021-09-24 PROCEDURE — 25010000002 VITAMIN K1 PER 1 MG: Performed by: INTERNAL MEDICINE

## 2021-09-24 PROCEDURE — 80053 COMPREHEN METABOLIC PANEL: CPT

## 2021-09-24 PROCEDURE — 96372 THER/PROPH/DIAG INJ SC/IM: CPT

## 2021-09-24 PROCEDURE — 99213 OFFICE O/P EST LOW 20 MIN: CPT | Performed by: INTERNAL MEDICINE

## 2021-09-24 PROCEDURE — 85610 PROTHROMBIN TIME: CPT

## 2021-09-24 PROCEDURE — 85025 COMPLETE CBC W/AUTO DIFF WBC: CPT

## 2021-09-24 RX ORDER — PHYTONADIONE 10 MG/ML
20 INJECTION, EMULSION INTRAMUSCULAR; INTRAVENOUS; SUBCUTANEOUS ONCE
Status: DISCONTINUED | OUTPATIENT
Start: 2021-09-24 | End: 2021-09-24

## 2021-09-24 RX ORDER — PHYTONADIONE 10 MG/ML
20 INJECTION, EMULSION INTRAMUSCULAR; INTRAVENOUS; SUBCUTANEOUS ONCE
Status: CANCELLED
Start: 2021-09-24 | End: 2021-09-24

## 2021-09-24 RX ORDER — PHYTONADIONE 10 MG/ML
20 INJECTION, EMULSION INTRAMUSCULAR; INTRAVENOUS; SUBCUTANEOUS ONCE
Status: COMPLETED | OUTPATIENT
Start: 2021-09-24 | End: 2021-09-24

## 2021-09-24 RX ADMIN — PHYTONADIONE 20 MG: 10 INJECTION, EMULSION INTRAMUSCULAR; INTRAVENOUS; SUBCUTANEOUS at 12:07

## 2021-09-24 NOTE — PROGRESS NOTES
[facility] HEMATOLOGY AND ONCOLOGY  [address]  [place] [address]  [phone]       09/24/2021     PROGRESS NOTE     PATIENT NAME: Gregor Zarate  YOB: 1965  56 y.o. MR: [id number]    PCP: Gisele Lucero APRN    INVESTIGATIONS:   HPI:   Mr. Gregor Zarate continues to deny any evidence of bleeding.  He has 5 kittens at home and a scratch his legs often but he does not notice any abnormal bleeding.  When he was first diagnosed with the extreme coagulopathy he did have profuse bleeding from the scratches.  We reviewed that yesterday he received vitamin K injection for the coagulopathy.  The INR was 10.2 yesterday and today was 9.9.  Since he is not bleeding and the INR may be slightly improved, we decided to try vitamin K again today at a higher dose of 20 mg and follow-up on Monday.    We discussed that if on Monday the coagulopathy is not improved despite vitamin K replacement that he should consider returning to [facility] and he agreed that he would.    Once more we reviewed history of potential inadvertent poisoning with vitamin K antagonist and he denies any possible event associated with that.  PAST HISTORY:     HEME/ONC HISTORY  Oncology/Hematology History Overview Note   HEME/ONC DX/RX/INVESTIGATIONS SUMMARY:   DX:   Severe coagulopathy with bleeding in 9/3/2021 associated vitamin K deficiency of unknown etiology.  Patient presented with gross bleeding, was airlifted from [facility] to [facility]. Dcd from [facility] on 9/13/2021.    RX:   FFP, vitamin K at [facility] in 9/2021.   9/16/2021, I prescribed 5 mg vitamin K p.o. weekly. - patient is not taking it.  Vit K (SQ, in our office): 9/23/2021, 10 mg. 9/24/2021, 20 mg.     OTHER INFO:   Cirrhosis, asymptomatic, history of hepatitis C infection, cured.  Muscular dystrophy but patient is ambulatory and has mild weakness on extremities, arms weaker than  legs, right side weaker than left.    INVESTIGATIONS:    9/4/20201, CT abd/pelvis: 1.  New diffuse urothelial thickening of the bilateral renal collecting systems and left ureter, unclear in etiology and presumably related to an inflammatory or infectious process. No hydronephrosis. Suggest correlation with urine culture and cytology. 2.  Lobulated contour of the liver with fissural widening, suggesting underlying chronic liver disease. No focal liver lesion or findings of portal hypertension.    INR: 9/16/2021 = 1.91. 9/23/2021, = 10.28. 9/24/2021, 9.9.         MEDICAL HISTORY   has no past medical history on file.   HEALTH MAINTENANCE ITEMS  Health Maintenance Due   Topic Date Due   • COLORECTAL CANCER SCREENING  Never done   • COVID-19 Vaccine (1) Never done   • HEPATITIS C SCREENING  Never done   • ANNUAL WELLNESS VISIT  Never done     <no information>  Last Completed Colonoscopy     This patient has no relevant Health Maintenance data.          There is no immunization history on file for this patient.  Last Completed Mammogram     This patient has no relevant Health Maintenance data.        FAMILY HISTORY  History reviewed. No pertinent family history.    Cancer-related family history is not on file.   SURGICAL HISTORY   has no past surgical history on file.  SOCIAL HISTORY  Social History     Socioeconomic History   • Marital status: Single     Spouse name: Not on file   • Number of children: Not on file   • Years of education: Not on file   • Highest education level: Not on file     MEDICATIONS:     Current Outpatient Medications   Medication Instructions   • busPIRone (BUSPAR) 5 mg, Oral   • diclofenac (VOLTAREN) 50 mg, Oral, 3 Times Daily With Meals, NULL   • Diclofenac Sodium (VOLTAREN) 2 g, Topical   • FLUoxetine (PROZAC) 40 mg, Oral, Daily   • gabapentin (NEURONTIN) 600 MG tablet TAKE 1 TABLET BY MOUTH THREE TIMES DAILY   • ondansetron ODT (ZOFRAN-ODT) 4 mg, Oral   • pantoprazole (PROTONIX) 40 mg, Oral  "  • phytonadione (MEPHYTON, VITAMIN K) 5 mg, Oral, Weekly   • pregabalin (LYRICA) 75 mg, Oral   • sucralfate (CARAFATE) 1 g tablet Mix one tablet with 10 ml of water to make liquid suspension, take four times daily (before meals and bedtime).   • tiZANidine (ZANAFLEX) 4 MG tablet Take 1 tablet by mouth three times daily      ALLERGIES:   No Known Allergies  ROS:   Pertinent positive and negative findings as noted in HPI.   PHYSICAL EXAM:   /72   Pulse 71   Temp 97.8 °F (36.6 °C)   Resp 16   Ht 177.8 cm (70\")   Wt 86.3 kg (190 lb 4.8 oz)   SpO2 97%   BMI 27.31 kg/m²  Body surface area is 2.04 meters squared.   Pain Score    09/24/21 1044   PainSc:   1     Alert, oriented x3, cooperative, not in distress.  HEENT: Normocephalic, wearing a facemask.  Lungs: No tachypnea.   Extremities: No ankle edema.  Neurologic: Moves all extremities.  Skin: Several scratch marks on bilateral lower legs without active bleeding (cat scratches).  INVESTIGATIONS/LABS:     Lab Results - Last 18 Months   Lab Units 09/24/21  1029 09/16/21  1138 09/03/21  1218 07/30/21  1446 05/24/21  0101 06/19/20  1542   WBC 10*3/mm3 5.36 5.51 11.2* 7.9 8.5 8.6   HEMOGLOBIN g/dL 9.6* 9.6* 9.9* 12.9* 13.6* 14.7   HEMATOCRIT % 32.7* 31.7* 31.9* 40.6* 43.8 43.9   MCV fL 92.4 92.2 88.1 93.3 96.3* 95.9*   PLATELETS 10*3/mm3 419 557* 448* 430* 357 303   IMM GRAN % % 0.4 0.2  --   --   --   --    NEUTROS ABS 10*3/mm3 3.68 2.49 8.2*  --  5.1 4.4   LYMPHS ABS 10*3/mm3 1.04 2.26 1.4  --  2.3 3.3   MONOS ABS 10*3/mm3 0.53 0.60 1.50*  --  0.90 0.80   EOS ABS 10*3/mm3 0.07 0.11 0.10  --  0.20 0.10   BASOS ABS 10*3/mm3 0.02 0.04 0.00  --  0.00 0.00   IMMATURE GRANS (ABS) 10*3/mm3 0.02 0.01 0.0  --  0.0 0.0   NRBC /100 WBC 0.0 0.0  --   --   --   --        Lab Results - Last 18 Months   Lab Units 09/24/21  1029 09/16/21  1138 09/03/21  1218 07/30/21  1446 05/24/21  0101 04/23/21  1213   BUN mg/dL 15 16 15 15 11 15   CREATININE mg/dL 0.42* 0.41* 0.5 0.5 0.4* " 0.4*   GLUCOSE mg/dL 106* 86 121* 97 112* 86   SODIUM mmol/L 141 140 138 138 136 139   POTASSIUM mmol/L 4.2 3.6 3.5 4.6 3.8 4.7   TOTAL CO2 mmol/L  --   --  27 31* 31* 27   CO2 mmol/L 29.0 27.0  --   --   --   --    CHLORIDE mmol/L 106 103 100 100 99 102   ANION GAP mmol/L 6.0 10.0 11 7 6* 10   BUN / CREAT RATIO  35.7* 39.0*  --   --   --   --    CALCIUM mg/dL 8.5* 8.8 9.3 9.8 9.8 9.1   EGFR IF NONAFRICN AM mL/min/1.73 >150 >150 >60 >60 >60 >60   ALK PHOS U/L 105 109 127 117 146* 117   TOTAL PROTEIN g/dL 7.0 7.5 7.7 8.2 8.0 7.6   ALT (SGPT) U/L 16 19 22 18 22 12   AST (SGOT) U/L 25 25 39 19 26 18   BILIRUBIN mg/dL 0.2 0.2 0.4 0.3 <0.2 <0.2   ALBUMIN g/dL 4.10 4.60 4.2 4.4 4.4 4.5   GLOBULIN gm/dL 2.9 2.9  --   --   --   --        Lab Results - Last 18 Months   Lab Units 09/03/21  1910   LDH unit/L 396*       Lab Results - Last 18 Months   Lab Units 09/11/21  0048 09/08/21  1059   IRON mcg/dL 264* 76   TIBC mcg/mL 391 389   IRON SATURATION % 68 20   FERRITIN ng/mL  --  162   VITAMIN B 12 pg/mL  --  400   FOLATE ng/mL  --  14.4       Lab Results   Component Value Date    PROTIME 76.2 (C) 09/24/2021    PROTIME 77.9 (C) 09/23/2021    PROTIME 21.1 (H) 09/16/2021    PROTIME 13.2 09/13/2021    PROTIME 13.9 09/12/2021    INR 9.99 (C) 09/24/2021    INR 10.28 (C) 09/23/2021    INR 1.91 (H) 09/16/2021    INR 1.0 09/13/2021    INR 1.1 09/12/2021    PTT 31.3 09/16/2021    PTT 27.8 09/13/2021    PTT 29.8 09/12/2021    PTT 29.6 09/11/2021    PTT 25.9 09/10/2021       ASSESSMENT:   Severe coagulopathy consistent with vitamin K deficiency based on the evaluation at [facility]. He did have severe deficiency of vitamin K dependent coagulation factors and responded to their treatment with FFP and vitamin K replacement.    He received 10 mg of vitamin K yesterday, the INR may have slightly improved today but it is inconclusive at this time whether or not he is responding to parenteral vitamin K.    PLAN/RECOMMENDATIONS:   Vit K 20 mg SQ  now.    VST 9/27/2021, labs 1h PTV    DX/ORDERS:   Diagnoses and all orders for this visit:    1. Vitamin K deficiency (Primary)  -     Cancel: phytonadione (AQUA-MEPHYTON) injection 20 mg    2. Elevated INR  -     Cancel: phytonadione (AQUA-MEPHYTON) injection 20 mg    Other orders  -     Discontinue: phytonadione (AQUA-MEPHYTON, VITAMIN K) injection 20 mg  -     Cancel: phytonadione (AQUA-MEPHYTON, VITAMIN K) injection 20 mg        Future Appointments   Date Time Provider Department Center   9/27/2021 12:00 PM [facility] CANCER [facility] LAB [facility]   9/27/2021  1:00 PM Leo Foster MD [facility]        Leo Foster MD  9/24/2021    cc: No ref. provider found, Gisele Lucero APRN

## 2021-09-24 NOTE — TELEPHONE ENCOUNTER
Monique with  Hematology Lab called with a Critical Lab Value for this patient.    PT- 76.2  INR- 9.99    Patient is here for a follow up with Dr. Foster today and the labs will be discussed with the patient during his appt    rp

## 2021-09-27 ENCOUNTER — LAB (OUTPATIENT)
Dept: LAB | Facility: HOSPITAL | Age: 56
End: 2021-09-27

## 2021-09-27 ENCOUNTER — INFUSION (OUTPATIENT)
Dept: ONCOLOGY | Facility: HOSPITAL | Age: 56
End: 2021-09-27

## 2021-09-27 ENCOUNTER — TELEPHONE (OUTPATIENT)
Dept: PRIMARY CARE CLINIC | Age: 56
End: 2021-09-27

## 2021-09-27 ENCOUNTER — OFFICE VISIT (OUTPATIENT)
Dept: ONCOLOGY | Facility: CLINIC | Age: 56
End: 2021-09-27

## 2021-09-27 VITALS
HEIGHT: 70 IN | RESPIRATION RATE: 16 BRPM | SYSTOLIC BLOOD PRESSURE: 132 MMHG | BODY MASS INDEX: 26.77 KG/M2 | OXYGEN SATURATION: 96 % | HEART RATE: 71 BPM | WEIGHT: 187 LBS | DIASTOLIC BLOOD PRESSURE: 68 MMHG | TEMPERATURE: 98 F

## 2021-09-27 VITALS
HEART RATE: 62 BPM | OXYGEN SATURATION: 100 % | SYSTOLIC BLOOD PRESSURE: 137 MMHG | RESPIRATION RATE: 18 BRPM | HEIGHT: 70 IN | DIASTOLIC BLOOD PRESSURE: 78 MMHG | TEMPERATURE: 98.3 F | BODY MASS INDEX: 26.77 KG/M2 | WEIGHT: 187 LBS

## 2021-09-27 DIAGNOSIS — E56.1 VITAMIN K DEFICIENCY: Primary | ICD-10-CM

## 2021-09-27 DIAGNOSIS — R79.1 ELEVATED INR: ICD-10-CM

## 2021-09-27 DIAGNOSIS — R79.1 ELEVATED INR: Primary | ICD-10-CM

## 2021-09-27 LAB
HOLD SPECIMEN: NORMAL
INR PPP: 2.52 (ref 0.91–1.09)
PROTHROMBIN TIME: 26.2 SECONDS (ref 11.9–14.6)

## 2021-09-27 PROCEDURE — 36415 COLL VENOUS BLD VENIPUNCTURE: CPT

## 2021-09-27 PROCEDURE — 99213 OFFICE O/P EST LOW 20 MIN: CPT | Performed by: INTERNAL MEDICINE

## 2021-09-27 PROCEDURE — 25010000003 PHYTONADIONE 10 MG/ML SOLUTION: Performed by: INTERNAL MEDICINE

## 2021-09-27 PROCEDURE — 96372 THER/PROPH/DIAG INJ SC/IM: CPT

## 2021-09-27 PROCEDURE — 85610 PROTHROMBIN TIME: CPT

## 2021-09-27 RX ORDER — PHYTONADIONE 10 MG/ML
10 INJECTION, EMULSION INTRAMUSCULAR; INTRAVENOUS; SUBCUTANEOUS ONCE
Status: COMPLETED | OUTPATIENT
Start: 2021-09-27 | End: 2021-09-27

## 2021-09-27 RX ORDER — PHYTONADIONE 10 MG/ML
20 INJECTION, EMULSION INTRAMUSCULAR; INTRAVENOUS; SUBCUTANEOUS ONCE
Start: 2021-09-27 | End: 2021-09-27

## 2021-09-27 RX ORDER — PHYTONADIONE 10 MG/ML
10 INJECTION, EMULSION INTRAMUSCULAR; INTRAVENOUS; SUBCUTANEOUS ONCE
Status: CANCELLED
Start: 2021-09-27 | End: 2021-09-27

## 2021-09-27 RX ADMIN — PHYTONADIONE 10 MG: 10 INJECTION, EMULSION INTRAMUSCULAR; INTRAVENOUS; SUBCUTANEOUS at 15:12

## 2021-09-27 NOTE — PROGRESS NOTES
[facility] GROUP HEMATOLOGY AND ONCOLOGY  [address]  [facility] [address]  [phone]       09/27/2021     PROGRESS NOTE     PATIENT NAME: Gregor Zarate  YOB: 1965  56 y.o. MR: [id number]    PCP: Gisele Lucero APRN    HPI:   Mr. Gregor Zarate remains without evidence of bleeding.  We reviewed the INR today which is improved compared to Friday so the INR improved from 9.9 to2.52 today.  I told the patient this is consistent with response to vitamin K replacement.  Once more, he denies any possibility of poisoning or having been prescribed warfarin.    I asked for help from our pharmacy team and the Pharm.D. kindly became involved and has discussed with the patient the history and will review further the home medications to try to identify any possible inadvertent medication error and will also assist with vitamin K replacement occluding possible oral vitamin K through our pharmacy.  PAST HISTORY:     HEME/ONC HISTORY  Oncology/Hematology History Overview Note   HEME/ONC DX/RX/INVESTIGATIONS SUMMARY:   DX:   Severe coagulopathy with bleeding in 9/3/2021 associated vitamin K deficiency of unknown etiology.  Patient presented with gross bleeding, was airlifted from [facility] to [place]. Dcd from [place] on 9/13/2021.    RX:   FFP, vitamin K at [place] in 9/2021.   9/16/2021, I prescribed 5 mg vitamin K p.o. weekly. - patient is not taking it.  Vit K (SQ, in our office): 9/23/2021, 10 mg. 9/24/2021, 20 mg. 9/27/2021, 10mg.    OTHER INFO:   Cirrhosis, asymptomatic, history of hepatitis C infection, cured.  Muscular dystrophy but patient is ambulatory and has mild weakness on extremities, arms weaker than legs, right side weaker than left.    INVESTIGATIONS:    9/4/20201, CT abd/pelvis: 1.  New diffuse urothelial thickening of the bilateral renal collecting systems and left ureter, unclear in etiology and presumably related  to an inflammatory or infectious process. No hydronephrosis. Suggest correlation with urine culture and cytology. 2.  Lobulated contour of the liver with fissural widening, suggesting underlying chronic liver disease. No focal liver lesion or findings of portal hypertension.    INR: 9/16/2021 = 1.91. 9/23/2021, = 10.28. 9/24/2021 = 9.9. 9/27/2021 = 2.52.         MEDICAL HISTORY   has no past medical history on file.   HEALTH MAINTENANCE ITEMS  Health Maintenance Due   Topic Date Due   • COLORECTAL CANCER SCREENING  Never done   • COVID-19 Vaccine (1) Never done   • HEPATITIS C SCREENING  Never done   • ANNUAL WELLNESS VISIT  Never done     <no information>  Last Completed Colonoscopy     This patient has no relevant Health Maintenance data.          There is no immunization history on file for this patient.  Last Completed Mammogram     This patient has no relevant Health Maintenance data.        FAMILY HISTORY  History reviewed. No pertinent family history.    Cancer-related family history is not on file.   SURGICAL HISTORY   has no past surgical history on file.  SOCIAL HISTORY  Social History     Socioeconomic History   • Marital status: Single     Spouse name: Not on file   • Number of children: Not on file   • Years of education: Not on file   • Highest education level: Not on file     MEDICATIONS:     Current Outpatient Medications   Medication Instructions   • busPIRone (BUSPAR) 5 mg, Oral   • diclofenac (VOLTAREN) 50 mg, Oral, 3 Times Daily With Meals, NULL   • Diclofenac Sodium (VOLTAREN) 2 g, Topical   • FLUoxetine (PROZAC) 40 mg, Oral, Daily   • gabapentin (NEURONTIN) 600 MG tablet TAKE 1 TABLET BY MOUTH THREE TIMES DAILY   • ondansetron ODT (ZOFRAN-ODT) 4 mg, Oral   • pantoprazole (PROTONIX) 40 mg, Oral   • phytonadione (MEPHYTON, VITAMIN K) 5 mg, Oral, Weekly   • pregabalin (LYRICA) 75 mg, Oral   • sucralfate (CARAFATE) 1 g tablet Mix one tablet with 10 ml of water to make liquid suspension, take four  "times daily (before meals and bedtime).   • tiZANidine (ZANAFLEX) 4 MG tablet Take 1 tablet by mouth three times daily      ALLERGIES:   No Known Allergies  ROS:   Pertinent positive and negative findings as noted in HPI.   PHYSICAL EXAM:   /68   Pulse 71   Temp 98 °F (36.7 °C)   Resp 16   Ht 177.8 cm (70\")   Wt 84.8 kg (187 lb)   SpO2 96%   BMI 26.83 kg/m²  Body surface area is 2.03 meters squared.   Pain Score    09/27/21 1257   PainSc:   2     Alert, oriented x3, cooperative, not in distress.  HEENT: Normocephalic, wearing a facemask.  Lungs: No tachypnea.   Extremities: No ankle edema.  Neurologic: Moves all extremities.  INVESTIGATIONS/LABS:     Lab Results - Last 18 Months   Lab Units 09/24/21  1029 09/16/21  1138 09/03/21  1218 07/30/21  1446 05/24/21  0101 06/19/20  1542   WBC 10*3/mm3 5.36 5.51 11.2* 7.9 8.5 8.6   HEMOGLOBIN g/dL 9.6* 9.6* 9.9* 12.9* 13.6* 14.7   HEMATOCRIT % 32.7* 31.7* 31.9* 40.6* 43.8 43.9   MCV fL 92.4 92.2 88.1 93.3 96.3* 95.9*   PLATELETS 10*3/mm3 419 557* 448* 430* 357 303   IMM GRAN % % 0.4 0.2  --   --   --   --    NEUTROS ABS 10*3/mm3 3.68 2.49 8.2*  --  5.1 4.4   LYMPHS ABS 10*3/mm3 1.04 2.26 1.4  --  2.3 3.3   MONOS ABS 10*3/mm3 0.53 0.60 1.50*  --  0.90 0.80   EOS ABS 10*3/mm3 0.07 0.11 0.10  --  0.20 0.10   BASOS ABS 10*3/mm3 0.02 0.04 0.00  --  0.00 0.00   IMMATURE GRANS (ABS) 10*3/mm3 0.02 0.01 0.0  --  0.0 0.0   NRBC /100 WBC 0.0 0.0  --   --   --   --        Lab Results - Last 18 Months   Lab Units 09/24/21  1029 09/16/21  1138 09/03/21  1218 07/30/21  1446 05/24/21  0101 04/23/21  1213   BUN mg/dL 15 16 15 15 11 15   CREATININE mg/dL 0.42* 0.41* 0.5 0.5 0.4* 0.4*   GLUCOSE mg/dL 106* 86 121* 97 112* 86   SODIUM mmol/L 141 140 138 138 136 139   POTASSIUM mmol/L 4.2 3.6 3.5 4.6 3.8 4.7   TOTAL CO2 mmol/L  --   --  27 31* 31* 27   CO2 mmol/L 29.0 27.0  --   --   --   --    CHLORIDE mmol/L 106 103 100 100 99 102   ANION GAP mmol/L 6.0 10.0 11 7 6* 10   BUN / " CREAT RATIO  35.7* 39.0*  --   --   --   --    CALCIUM mg/dL 8.5* 8.8 9.3 9.8 9.8 9.1   EGFR IF NONAFRICN AM mL/min/1.73 >150 >150 >60 >60 >60 >60   ALK PHOS U/L 105 109 127 117 146* 117   TOTAL PROTEIN g/dL 7.0 7.5 7.7 8.2 8.0 7.6   ALT (SGPT) U/L 16 19 22 18 22 12   AST (SGOT) U/L 25 25 39 19 26 18   BILIRUBIN mg/dL 0.2 0.2 0.4 0.3 <0.2 <0.2   ALBUMIN g/dL 4.10 4.60 4.2 4.4 4.4 4.5   GLOBULIN gm/dL 2.9 2.9  --   --   --   --        Lab Results - Last 18 Months   Lab Units 09/03/21  1910   LDH unit/L 396*       Lab Results - Last 18 Months   Lab Units 09/11/21  0048 09/08/21  1059   IRON mcg/dL 264* 76   TIBC mcg/mL 391 389   IRON SATURATION % 68 20   FERRITIN ng/mL  --  162   VITAMIN B 12 pg/mL  --  400   FOLATE ng/mL  --  14.4       Lab Results   Component Value Date    INR 2.52 (H) 09/27/2021    INR 9.99 (C) 09/24/2021    INR 10.28 (C) 09/23/2021    INR 1.91 (H) 09/16/2021    INR 1.0 09/13/2021       ASSESSMENT:   Vitamin K deficiency, responding to parenteral replacement.  The etiology of the vitamin K deficiency remains unknown. The INR has improved since last week after parenteral vitamin K, summarized above.    PLAN/RECOMMENDATIONS:   The patient will receive another dose of vitamin K today, 10 mg subcu.    Heme-onc follow-up on 9/20/2021 with previsit INR.    If possible, the patient may also start oral vitamin K replacement.  DX/ORDERS:   Diagnoses and all orders for this visit:    1. Vitamin K deficiency (Primary)      Future Appointments   Date Time Provider Department Center   9/30/2021  9:30 AM [facility] CANCER CTR LAB [facility] CCLAB ARDEN Foster MD  9/27/2021    cc: No ref. provider found, Gisele Lucero APRN

## 2021-09-27 NOTE — TELEPHONE ENCOUNTER
----- Message from ANTHONY Combs sent at 9/26/2021 10:46 PM CDT -----  Please let patient know that MRI has returned. It does show some chronic endplate compression of L1. Moderate spinal stenosis throughout the lumbar region. We can refer to neurosurgery if he wishes. BUT we may need to wait until he has the coagulation issue under control.

## 2021-09-28 ENCOUNTER — HOSPITAL ENCOUNTER (OUTPATIENT)
Dept: GENERAL RADIOLOGY | Age: 56
Discharge: HOME | End: 2021-09-28
Payer: MEDICARE

## 2021-09-28 ENCOUNTER — TELEPHONE (OUTPATIENT)
Dept: GASTROENTEROLOGY | Age: 56
End: 2021-09-28

## 2021-09-28 DIAGNOSIS — K26.9 DUODENAL ULCER: ICD-10-CM

## 2021-09-28 PROCEDURE — 74248 X-RAY SM INT F-THRU STD: CPT

## 2021-09-28 NOTE — TELEPHONE ENCOUNTER
Please let Marisol Gavin know that there was no ulcer noted on the small bowel xray. This is good news!

## 2021-09-30 ENCOUNTER — OFFICE VISIT (OUTPATIENT)
Dept: ONCOLOGY | Facility: CLINIC | Age: 56
End: 2021-09-30

## 2021-09-30 ENCOUNTER — LAB (OUTPATIENT)
Dept: LAB | Facility: HOSPITAL | Age: 56
End: 2021-09-30

## 2021-09-30 ENCOUNTER — DOCUMENTATION (OUTPATIENT)
Dept: ONCOLOGY | Facility: CLINIC | Age: 56
End: 2021-09-30

## 2021-09-30 VITALS
HEIGHT: 70 IN | RESPIRATION RATE: 16 BRPM | HEART RATE: 73 BPM | OXYGEN SATURATION: 98 % | BODY MASS INDEX: 27 KG/M2 | TEMPERATURE: 98.2 F | WEIGHT: 188.6 LBS | SYSTOLIC BLOOD PRESSURE: 128 MMHG | DIASTOLIC BLOOD PRESSURE: 68 MMHG

## 2021-09-30 DIAGNOSIS — R79.1 ELEVATED INR: ICD-10-CM

## 2021-09-30 DIAGNOSIS — E56.1 VITAMIN K DEFICIENCY: Primary | ICD-10-CM

## 2021-09-30 LAB
INR PPP: 1.22 (ref 0.91–1.09)
PROTHROMBIN TIME: 14.9 SECONDS (ref 11.9–14.6)

## 2021-09-30 PROCEDURE — 85610 PROTHROMBIN TIME: CPT

## 2021-09-30 PROCEDURE — 36415 COLL VENOUS BLD VENIPUNCTURE: CPT

## 2021-09-30 PROCEDURE — 99213 OFFICE O/P EST LOW 20 MIN: CPT | Performed by: INTERNAL MEDICINE

## 2021-09-30 NOTE — PROGRESS NOTES
[facility] GROUP HEMATOLOGY AND ONCOLOGY  [address]  [place] [address]  [phone]       09/30/2021     PROGRESS NOTE     PATIENT NAME: Gregor Zarate  YOB: 1965  56 y.o. MR: [id number]    PCP: Gisele Lucero APRN    HPI:   Mr. Gregor Zarate remains without evidence of bleeding.  He came today for follow-up of the coagulopathy.  We discussed the results of the INR.  He has further improved to 1.22 today.  He received vitamin K again on 9/27/2021, 10 mg subcu.  He was able to obtain vitamin K p.o. 5 mg with the help of the pharmacist.  He has not taken it yet but he will anticipate taking it this weekend and we will repeat the INR next week.  PAST HISTORY:     HEME/ONC HISTORY  Oncology/Hematology History Overview Note   HEME/ONC DX/RX/INVESTIGATIONS SUMMARY:   DX:   Severe coagulopathy with bleeding in 9/3/2021 associated vitamin K deficiency of unknown etiology.  Patient presented with gross bleeding, was airlifted from [facility] to [place]. Dcd from [place] on 9/13/2021.    RX:   FFP, vitamin K at [place] in 9/2021.   9/16/2021, I prescribed 5 mg vitamin K p.o. weekly. - patient is not taking it.  Vit K (SQ, in our office): 9/23/2021, 10 mg. 9/24/2021, 20 mg. 9/27/2021, 10mg.   Vit K PO: 5 mg on 10/3/2021 (anticipated)    OTHER INFO:   Cirrhosis, asymptomatic, history of hepatitis C infection, cured.  Muscular dystrophy but patient is ambulatory and has mild weakness on extremities, arms weaker than legs, right side weaker than left.    INVESTIGATIONS:    9/4/20201, CT abd/pelvis: 1.  New diffuse urothelial thickening of the bilateral renal collecting systems and left ureter, unclear in etiology and presumably related to an inflammatory or infectious process. No hydronephrosis. Suggest correlation with urine culture and cytology. 2.  Lobulated contour of the liver with fissural widening, suggesting underlying  chronic liver disease. No focal liver lesion or findings of portal hypertension.    INR: 9/16/2021 = 1.91. 9/23/2021, = 10.28. 9/24/2021 = 9.9. 9/27/2021 = 2.52. 9/30/2021 = 1.22         MEDICAL HISTORY   has no past medical history on file.   HEALTH MAINTENANCE ITEMS  Health Maintenance Due   Topic Date Due   • COLORECTAL CANCER SCREENING  Never done   • COVID-19 Vaccine (1) Never done   • HEPATITIS C SCREENING  Never done   • ANNUAL WELLNESS VISIT  Never done     <no information>  Last Completed Colonoscopy     This patient has no relevant Health Maintenance data.          There is no immunization history on file for this patient.  Last Completed Mammogram     This patient has no relevant Health Maintenance data.        FAMILY HISTORY  No family history on file.    Cancer-related family history is not on file.   SURGICAL HISTORY   has no past surgical history on file.  SOCIAL HISTORY  Social History     Socioeconomic History   • Marital status: Single     Spouse name: Not on file   • Number of children: Not on file   • Years of education: Not on file   • Highest education level: Not on file     MEDICATIONS:     Current Outpatient Medications   Medication Instructions   • busPIRone (BUSPAR) 5 mg, Oral   • diclofenac (VOLTAREN) 50 mg, Oral, 3 Times Daily With Meals, NULL   • Diclofenac Sodium (VOLTAREN) 2 g, Topical   • FLUoxetine (PROZAC) 40 mg, Oral, Daily   • gabapentin (NEURONTIN) 600 MG tablet TAKE 1 TABLET BY MOUTH THREE TIMES DAILY   • ondansetron ODT (ZOFRAN-ODT) 4 mg, Oral   • pantoprazole (PROTONIX) 40 mg, Oral   • phytonadione (MEPHYTON, VITAMIN K) 5 MG tablet Take 1 tablet by mouth once weekly.   • phytonadione (MEPHYTON, VITAMIN K) 5 mg, Oral, Weekly   • pregabalin (LYRICA) 75 mg, Oral   • sucralfate (CARAFATE) 1 g tablet Mix one tablet with 10 ml of water to make liquid suspension, take four times daily (before meals and bedtime).   • tiZANidine (ZANAFLEX) 4 MG tablet Take 1 tablet by mouth three times  "daily      ALLERGIES:   No Known Allergies  ROS:   Pertinent positive and negative findings as noted in HPI.   PHYSICAL EXAM:   /68   Pulse 73   Temp 98.2 °F (36.8 °C)   Resp 16   Ht 177.8 cm (70\")   Wt 85.5 kg (188 lb 9.6 oz)   SpO2 98%   BMI 27.06 kg/m²  Body surface area is 2.04 meters squared.   Pain Score    09/30/21 1022   PainSc:   2   Alert, oriented x3, cooperative, not in distress.  HEENT: Normocephalic, wearing a facemask.  Lungs: No tachypnea.   Extremities: No ankle edema.  Neurologic: Moves all extremities.    INVESTIGATIONS/LABS:     Lab Results - Last 18 Months   Lab Units 09/24/21  1029 09/16/21  1138 09/03/21  1218 07/30/21  1446 05/24/21  0101 06/19/20  1542   WBC 10*3/mm3 5.36 5.51 11.2* 7.9 8.5 8.6   HEMOGLOBIN g/dL 9.6* 9.6* 9.9* 12.9* 13.6* 14.7   HEMATOCRIT % 32.7* 31.7* 31.9* 40.6* 43.8 43.9   MCV fL 92.4 92.2 88.1 93.3 96.3* 95.9*   PLATELETS 10*3/mm3 419 557* 448* 430* 357 303   IMM GRAN % % 0.4 0.2  --   --   --   --    NEUTROS ABS 10*3/mm3 3.68 2.49 8.2*  --  5.1 4.4   LYMPHS ABS 10*3/mm3 1.04 2.26 1.4  --  2.3 3.3   MONOS ABS 10*3/mm3 0.53 0.60 1.50*  --  0.90 0.80   EOS ABS 10*3/mm3 0.07 0.11 0.10  --  0.20 0.10   BASOS ABS 10*3/mm3 0.02 0.04 0.00  --  0.00 0.00   IMMATURE GRANS (ABS) 10*3/mm3 0.02 0.01 0.0  --  0.0 0.0   NRBC /100 WBC 0.0 0.0  --   --   --   --        Lab Results - Last 18 Months   Lab Units 09/24/21  1029 09/16/21  1138 09/03/21  1218 07/30/21  1446 05/24/21  0101 04/23/21  1213   BUN mg/dL 15 16 15 15 11 15   CREATININE mg/dL 0.42* 0.41* 0.5 0.5 0.4* 0.4*   GLUCOSE mg/dL 106* 86 121* 97 112* 86   SODIUM mmol/L 141 140 138 138 136 139   POTASSIUM mmol/L 4.2 3.6 3.5 4.6 3.8 4.7   TOTAL CO2 mmol/L  --   --  27 31* 31* 27   CO2 mmol/L 29.0 27.0  --   --   --   --    CHLORIDE mmol/L 106 103 100 100 99 102   ANION GAP mmol/L 6.0 10.0 11 7 6* 10   BUN / CREAT RATIO  35.7* 39.0*  --   --   --   --    CALCIUM mg/dL 8.5* 8.8 9.3 9.8 9.8 9.1   EGFR IF NONAFRICN AM " mL/min/1.73 >150 >150 >60 >60 >60 >60   ALK PHOS U/L 105 109 127 117 146* 117   TOTAL PROTEIN g/dL 7.0 7.5 7.7 8.2 8.0 7.6   ALT (SGPT) U/L 16 19 22 18 22 12   AST (SGOT) U/L 25 25 39 19 26 18   BILIRUBIN mg/dL 0.2 0.2 0.4 0.3 <0.2 <0.2   ALBUMIN g/dL 4.10 4.60 4.2 4.4 4.4 4.5   GLOBULIN gm/dL 2.9 2.9  --   --   --   --        Lab Results - Last 18 Months   Lab Units 09/03/21  1910   LDH unit/L 396*       Lab Results - Last 18 Months   Lab Units 09/11/21  0048 09/08/21  1059   IRON mcg/dL 264* 76   TIBC mcg/mL 391 389   IRON SATURATION % 68 20   FERRITIN ng/mL  --  162   VITAMIN B 12 pg/mL  --  400   FOLATE ng/mL  --  14.4       Lab Results   Component Value Date    PROTIME 14.9 (H) 09/30/2021    PROTIME 26.2 (H) 09/27/2021    PROTIME 76.2 (C) 09/24/2021    PROTIME 77.9 (C) 09/23/2021    PROTIME 21.1 (H) 09/16/2021    INR 1.22 (H) 09/30/2021    INR 2.52 (H) 09/27/2021    INR 9.99 (C) 09/24/2021    INR 10.28 (C) 09/23/2021    INR 1.91 (H) 09/16/2021    PTT 31.3 09/16/2021    PTT 27.8 09/13/2021    PTT 29.8 09/12/2021    PTT 29.6 09/11/2021    PTT 25.9 09/10/2021       ASSESSMENT:   Coagulopathy secondary to vitamin K deficiency.  He responded well to parenteral vitamin K.  The source of the vitamin K deficiency remains unknown.    PLAN/RECOMMENDATIONS:   VST in one week, labs 1h PTV.    The patient will take vitamin K 5 mg once on 10/3/2021.    DX/ORDERS:   Diagnoses and all orders for this visit:    1. Vitamin K deficiency (Primary)  -     Protime-INR; Future  -     aPTT; Future    2. Elevated INR        Future Appointments   Date Time Provider Department Center   10/7/2021 10:15 AM [facility] CANCER CTR LAB [facility]   10/7/2021 11:15 AM Leo Foster MD [facility]        Leo Foster MD  9/30/2021    cc: No ref. provider found, Gisele Lucero, APRN

## 2021-10-06 ENCOUNTER — OFFICE VISIT (OUTPATIENT)
Dept: GASTROENTEROLOGY | Age: 56
End: 2021-10-06
Payer: MEDICARE

## 2021-10-06 VITALS
WEIGHT: 190.8 LBS | DIASTOLIC BLOOD PRESSURE: 76 MMHG | HEART RATE: 67 BPM | BODY MASS INDEX: 26.71 KG/M2 | SYSTOLIC BLOOD PRESSURE: 120 MMHG | HEIGHT: 71 IN | OXYGEN SATURATION: 97 %

## 2021-10-06 DIAGNOSIS — R93.2 ABNORMAL CT OF LIVER: Primary | ICD-10-CM

## 2021-10-06 PROCEDURE — 3017F COLORECTAL CA SCREEN DOC REV: CPT | Performed by: NURSE PRACTITIONER

## 2021-10-06 PROCEDURE — 1036F TOBACCO NON-USER: CPT | Performed by: NURSE PRACTITIONER

## 2021-10-06 PROCEDURE — G8419 CALC BMI OUT NRM PARAM NOF/U: HCPCS | Performed by: NURSE PRACTITIONER

## 2021-10-06 PROCEDURE — G8427 DOCREV CUR MEDS BY ELIG CLIN: HCPCS | Performed by: NURSE PRACTITIONER

## 2021-10-06 PROCEDURE — G8484 FLU IMMUNIZE NO ADMIN: HCPCS | Performed by: NURSE PRACTITIONER

## 2021-10-06 PROCEDURE — 99215 OFFICE O/P EST HI 40 MIN: CPT | Performed by: NURSE PRACTITIONER

## 2021-10-06 ASSESSMENT — ENCOUNTER SYMPTOMS
COUGH: 0
BLOOD IN STOOL: 0
RECTAL PAIN: 0
TROUBLE SWALLOWING: 0
BACK PAIN: 1
VOICE CHANGE: 0
CONSTIPATION: 0
NAUSEA: 0
SORE THROAT: 0
ABDOMINAL DISTENTION: 0
ANAL BLEEDING: 0
VOMITING: 0
DIARRHEA: 0
ABDOMINAL PAIN: 0
SHORTNESS OF BREATH: 0

## 2021-10-06 NOTE — PROGRESS NOTES
Subjective:      Robel Caraballo is a63 y.o. male  Chief Complaint   Patient presents with    Abnormal CT       HPI  PCP: ANTHONY Zapata  Pt made an appt as a f/u from his recent stay at [facility]  He has severe coagulopathy (due to Vit K deficiency of unknown etiology) and presented to [facility] with gross bleeding and was airlifted to [facility] and d/heather from there 9/13/2021. He was advised he has cirrhosis of the liver based on results of CT he had at [facility]  Pt report this is a new finding. Reports starting drinking Cheryln Flagler over the past 2 months to treat his chronic pain. Has drank two 1/5's over the past 2 months. Says he has cut back now. never really drank ETOH prior to here recently  Hx of chronic Hepatits C treated with harvoni in 2016 with SVR noted after treatment. He has no GI complaints today      CT abd/pelvis 9/4/2021  Impression  1.  New diffuse urothelial thickening of the bilateral renal collecting systems and left ureter, unclear in etiology and presumably related to an inflammatory or infectious process. No hydronephrosis. Suggest correlation with urine culture and cytology. 2.  Lobulated contour of the liver with fissural widening, suggesting underlying chronic liver disease. No focal liver lesion or findings of portal hypertension. Electronically signed by Jose Alejandro Montalvo MD on 9/4/2021 12:17 PM  Narrative    CONTRAST-ENHANCED CT OF THE ABDOMEN AND PELVIS   REASON FOR STUDY/CLINICAL HISTORY: History of muscular dystrophy with hematuria and low back pain. Also with history of coagulopathy as well as left hydronephrosis and hydroureter on prior imaging. COMPARISON: Outside CT from 9/3/2021   TECHNIQUE: Axial CT images of the abdomen and pelvis were obtained after the IV administration of contrast. Multiplanar reformats were also generated.    INTRAVENOUS CONTRAST ADMINISTRATION: IV Contrast Dose is documented in the electronic medical record   DOSE REPORT: Total DLP: 562 mGycm   FINDINGS: Lower thorax: Right greater than left basilar atelectasis. CT ABDOMEN AND PELVIS:   Liver: Homogeneous enhancement without focal lesion. Mildly lobulated contour of the liver with fissural widening. Gallbladder/Biliary tree: Cholecystectomy. Spleen: Calcified granulomas. Pancreas: Within normal limits   Adrenals: Within normal limits   Kidneys/Ureters: Symmetric enhancement. No hydronephrosis. Redemonstrated right lower pole calculi measuring up to 7 mm. Marked urothelial thickening of the left renal collecting system and proximal left ureter. . Few thickening is seen to lesser extent   in the right renal collecting system. There is excretion of contrast within the bilateral renal collecting systems and ureters. Gastrointestinal: No bowel dilatation. Normal appendix. Peritoneum: No free fluid or pneumoperitoneum   Vasculature: Mild atherosclerosis of the abdominal aorta and iliacs. Lymph Nodes: No pathologically enlarged lymph nodes in the abdomen or pelvis. Urinary Bladder: Within normal limits   Reproductive organs: Within normal limits   Abdominal Wall: Within normal limits   MUSCULOSKELETAL: Compression deformities T12 and L1 with mild associated height loss. No retropulsion. Degenerative changes spine, most pronounced at L5-S1. No aggressive osseous lesion. Family HX:    Pt denies family hx of colon polyps, colon CA, inflammatory bowel dx, gastric CA and esophageal CA.     Past Medical History:   Diagnosis Date    Anxiety     Chronic back pain     Depression     MD (muscular dystrophy) ([facility] [address]     Neuropathy 12/02/2020    Scoliosis           Past Surgical History:   Procedure Laterality Date    CHOLECYSTECTOMY, LAPAROSCOPIC N/A 04/27/2021    LAPAROSCOPIC CHOLECYSTECTOMY performed by Jhonatan Bo DO at [address] [place] [address] COLONOSCOPY  01/08/2016    Dr Luke Angulo, normal, 10 yr recall    COLONOSCOPY N/A 06/30/2021    Dr Dae Roberts hemorrhids, RANDOM COLON BX NEGATIVE, [address], 3 year recall    FOOT SURGERY Left     UPPER GASTROINTESTINAL ENDOSCOPY N/A 2021    Dr Cait Aguilar, duodenal villous blunting otherwise normal EGD; serum celiac panel NEG for celiac sprue    UPPER GASTROINTESTINAL ENDOSCOPY N/A 08/10/2021    Dr Cait Aguilar- mild gastritis, 2 small duodenal ulcers and 1 large cratered 3-4cm in posterior duodenal bulb, NEG h pylori       Social History     Socioeconomic History    Marital status: Single     Spouse name: None    Number of children: None    Years of education: None    Highest education level: None   Occupational History    None   Tobacco Use    Smoking status: Former Smoker     Years: 30.00     Types: Cigarettes     Quit date: 2021     Years since quittin.1    Smokeless tobacco: Never Used   Vaping Use    Vaping Use: Never used   Substance and Sexual Activity    Alcohol use: Yes     Comment: occas    Drug use: No    Sexual activity: None   Other Topics Concern    None   Social History Narrative    None     Social Determinants of Health     Financial Resource Strain: Low Risk     Difficulty of Paying Living Expenses: Not hard at all   Food Insecurity: No Food Insecurity    Worried About Running Out of Food in the Last Year: Never true    Rebecca of Food in the Last Year: Never true   Transportation Needs:     Lack of Transportation (Medical):      Lack of Transportation (Non-Medical):    Physical Activity:     Days of Exercise per Week:     Minutes of Exercise per Session:    Stress:     Feeling of Stress :    Social Connections:     Frequency of Communication with Friends and Family:     Frequency of Social Gatherings with Friends and Family:     Attends Yazidi Services:     Active Member of Clubs or Organizations:     Attends Club or Organization Meetings:     Marital Status:    Intimate Partner Violence:     Fear of Current or Ex-Partner:     Emotionally Abused:     Physically Abused:     Sexually Abused: Allergies   Allergen Reactions    Shellfish-Derived Products Anaphylaxis       Current Outpatient Medications   Medication Sig Dispense Refill    oxyCODONE-acetaminophen (PERCOCET) 5-325 MG per tablet Take 1 tablet by mouth every 8 hours as needed for Pain for up to 30 days. Take lowest dose possible to manage pain 90 tablet 0    tiZANidine (ZANAFLEX) 4 MG tablet TAKE 1 TABLET BY MOUTH THREE TIMES DAILY 90 tablet 0    busPIRone (BUSPAR) 5 MG tablet Take 1 tablet by mouth twice daily as needed for anxiety 60 tablet 0    pantoprazole (PROTONIX) 40 MG tablet TAKE 1 TABLET BY MOUTH TWICE DAILY BEFORE MEAL(S) 60 tablet 0    pregabalin (LYRICA) 75 MG capsule Take 1 capsule by mouth 3 times daily for 30 days. 90 capsule 0    sucralfate (CARAFATE) 1 GM tablet Mix one tablet with 10 ml of water to make liquid suspension, take four times daily (before meals and bedtime). (Patient taking differently: 4 times daily ) 112 tablet 3    FLUoxetine (PROZAC) 40 MG capsule TAKE 1 CAPSULE BY MOUTH ONCE DAILY 90 capsule 1    triamcinolone (KENALOG) 0.025 % cream Apply topically 2 times daily. (Patient taking differently: as needed Apply topically 2 times daily.) 80 g 1    diclofenac sodium (VOLTAREN) 1 % GEL Apply 2 g topically 4 times daily 2 Tube 1    amitriptyline (ELAVIL) 50 MG tablet TAKE ONE TABLET BY MOUTH IN THE EVENING 30 tablet 11     No current facility-administered medications for this visit. Review of Systems   Constitutional: Negative for appetite change, fatigue, fever and unexpected weight change. HENT: Negative for sore throat, trouble swallowing and voice change. Respiratory: Negative for cough and shortness of breath. Cardiovascular: Negative for chest pain, palpitations and leg swelling. Gastrointestinal: Negative for abdominal distention, abdominal pain, anal bleeding, blood in stool, constipation, diarrhea, nausea, rectal pain and vomiting. Genitourinary: Negative for hematuria.

## 2021-10-07 ENCOUNTER — LAB (OUTPATIENT)
Dept: LAB | Facility: HOSPITAL | Age: 56
End: 2021-10-07

## 2021-10-07 ENCOUNTER — OFFICE VISIT (OUTPATIENT)
Dept: ONCOLOGY | Facility: CLINIC | Age: 56
End: 2021-10-07

## 2021-10-07 VITALS
RESPIRATION RATE: 18 BRPM | SYSTOLIC BLOOD PRESSURE: 140 MMHG | OXYGEN SATURATION: 95 % | HEART RATE: 73 BPM | TEMPERATURE: 97.7 F | DIASTOLIC BLOOD PRESSURE: 82 MMHG | BODY MASS INDEX: 26.99 KG/M2 | WEIGHT: 188.5 LBS | HEIGHT: 70 IN

## 2021-10-07 DIAGNOSIS — E56.1 VITAMIN K DEFICIENCY: Primary | ICD-10-CM

## 2021-10-07 LAB
APTT PPP: 27.1 SECONDS (ref 24.1–35)
HOLD SPECIMEN: NORMAL
INR PPP: 1.08 (ref 0.91–1.09)
PROTHROMBIN TIME: 13.6 SECONDS (ref 11.9–14.6)

## 2021-10-07 PROCEDURE — 36415 COLL VENOUS BLD VENIPUNCTURE: CPT

## 2021-10-07 PROCEDURE — 99213 OFFICE O/P EST LOW 20 MIN: CPT | Performed by: INTERNAL MEDICINE

## 2021-10-07 PROCEDURE — 85730 THROMBOPLASTIN TIME PARTIAL: CPT

## 2021-10-07 PROCEDURE — 85610 PROTHROMBIN TIME: CPT

## 2021-10-07 NOTE — PROGRESS NOTES
[facility] ONC [facility] GROUP HEMATOLOGY AND ONCOLOGY  [address]  [facility] [place] [address]  [phone]       10/07/2021     PROGRESS NOTE     PATIENT NAME: Gregor Zarate  YOB: 1965  56 y.o. MR: [id number]    PCP: Gisele Lucero APRN    HPI:   Mr. Gregor Zarate returned once more for follow-up regarding coagulopathy associated with vitamin K deficiency.  See summary below.  He started taking oral vitamin K last Monday, 5 mg weekly.  He remains asymptomatic.  We reviewed today's INR which was normal at 1.08.  We decided to continue monitoring and repeating the labs next week.  He was also advised to continue his general medical care including follow-up with his appointment previously recommended for urology.  PAST HISTORY:     HEME/ONC HISTORY  Oncology/Hematology History Overview Note   HEME/ONC DX/RX/INVESTIGATIONS SUMMARY:   DX:   Severe coagulopathy with bleeding in 9/3/2021 associated vitamin K deficiency of unknown etiology.  Patient presented with gross bleeding, was airlifted from [facility] to [place]. Dcd from [place] on 9/13/2021.    RX:   FFP, vitamin K at [place] in 9/2021.   9/16/2021, I prescribed 5 mg vitamin K p.o. weekly. - patient is not taking it.  Vit K (SQ, in our office): 9/23/2021, 10 mg. 9/24/2021, 20 mg. 9/27/2021, 10mg.   Vit K PO: 5 mg weekly, 1st on 10/3/2021.    OTHER INFO:   Cirrhosis, asymptomatic, history of hepatitis C infection, cured.  Muscular dystrophy but patient is ambulatory and has mild weakness on extremities, arms weaker than legs, right side weaker than left.    INVESTIGATIONS:    9/4/20201, CT abd/pelvis: 1.  New diffuse urothelial thickening of the bilateral renal collecting systems and left ureter, unclear in etiology and presumably related to an inflammatory or infectious process. No hydronephrosis. Suggest correlation with urine culture and cytology. 2.  Lobulated contour of the liver with  fissural widening, suggesting underlying chronic liver disease. No focal liver lesion or findings of portal hypertension.    INR: 9/16/2021 = 1.91. 9/23/2021, = 10.28. 9/24/2021 = 9.9. 9/27/2021 = 2.52. 9/30/2021 = 1.22 10/7/2021 = 1.08         MEDICAL HISTORY   has no past medical history on file.   HEALTH MAINTENANCE ITEMS  Health Maintenance Due   Topic Date Due   • COLORECTAL CANCER SCREENING  Never done   • COVID-19 Vaccine (1) Never done   • HEPATITIS C SCREENING  Never done   • ANNUAL WELLNESS VISIT  Never done     <no information>  Last Completed Colonoscopy     This patient has no relevant Health Maintenance data.          There is no immunization history on file for this patient.  Last Completed Mammogram     This patient has no relevant Health Maintenance data.        FAMILY HISTORY  No family history on file.    Cancer-related family history is not on file.   SURGICAL HISTORY   has no past surgical history on file.  SOCIAL HISTORY  Social History     Socioeconomic History   • Marital status: Single     Spouse name: Not on file   • Number of children: Not on file   • Years of education: Not on file   • Highest education level: Not on file     MEDICATIONS:     Current Outpatient Medications   Medication Instructions   • busPIRone (BUSPAR) 5 mg, Oral   • diclofenac (VOLTAREN) 50 mg, Oral, 3 Times Daily With Meals, NULL   • Diclofenac Sodium (VOLTAREN) 2 g, Topical   • FLUoxetine (PROZAC) 40 mg, Oral, Daily   • gabapentin (NEURONTIN) 600 MG tablet TAKE 1 TABLET BY MOUTH THREE TIMES DAILY   • ondansetron ODT (ZOFRAN-ODT) 4 mg, Oral   • pantoprazole (PROTONIX) 40 mg, Oral   • phytonadione (MEPHYTON, VITAMIN K) 5 MG tablet Take 1 tablet by mouth once weekly.   • phytonadione (MEPHYTON, VITAMIN K) 5 mg, Oral, Weekly   • pregabalin (LYRICA) 75 mg, Oral   • sucralfate (CARAFATE) 1 g tablet Mix one tablet with 10 ml of water to make liquid suspension, take four times daily (before meals and bedtime).   • tiZANidine  "(ZANAFLEX) 4 MG tablet Take 1 tablet by mouth three times daily      ALLERGIES:   No Known Allergies  ROS:   Pertinent positive and negative findings as noted in HPI.   PHYSICAL EXAM:   /82   Pulse 73   Temp 97.7 °F (36.5 °C) (Temporal)   Resp 18   Ht 177.8 cm (70\")   Wt 85.5 kg (188 lb 8 oz)   SpO2 95%   BMI 27.05 kg/m²  Body surface area is 2.04 meters squared.   Pain Score    10/07/21 1108   PainSc: 6  Comment: chronic back pain     Alert, oriented x3, cooperative, not in distress.  HEENT: Normocephalic, wearing a facemask.  Lungs: No tachypnea.   Extremities: No ankle edema.  Neurologic: Moves all extremities.  INVESTIGATIONS/LABS:     Lab Results - Last 18 Months   Lab Units 10/06/21  1058 09/24/21  1029 09/16/21  1138 09/03/21  1218 07/30/21  1446 05/24/21  0101 06/19/20  1542 06/19/20  1542   WBC K/uL 5.9 5.36 5.51 11.2* 7.9 8.5   < > 8.6   HEMOGLOBIN g/dL 9.7* 9.6* 9.6* 9.9* 12.9* 13.6*   < > 14.7   HEMATOCRIT % 32.1* 32.7* 31.7* 31.9* 40.6* 43.8   < > 43.9   MCV fL 93.9 92.4 92.2 88.1 93.3 96.3*   < > 95.9*   PLATELETS K/uL 356 419 557* 448* 430* 357   < > 303   IMM GRAN % %  --  0.4 0.2  --   --   --   --   --    NEUTROS ABS 10*3/mm3  --  3.68 2.49 8.2*  --  5.1  --  4.4   LYMPHS ABS 10*3/mm3  --  1.04 2.26 1.4  --  2.3  --  3.3   MONOS ABS 10*3/mm3  --  0.53 0.60 1.50*  --  0.90  --  0.80   EOS ABS 10*3/mm3  --  0.07 0.11 0.10  --  0.20  --  0.10   BASOS ABS 10*3/mm3  --  0.02 0.04 0.00  --  0.00  --  0.00   IMMATURE GRANS (ABS) 10*3/mm3  --  0.02 0.01 0.0  --  0.0  --  0.0   NRBC /100 WBC  --  0.0 0.0  --   --   --   --   --     < > = values in this interval not displayed.       Lab Results - Last 18 Months   Lab Units 10/06/21  1058 09/24/21  1029 09/16/21  1138 09/03/21  1218 07/30/21  1446 05/24/21  0101   BUN mg/dL 14 15 16 15 15 11   CREATININE mg/dL 0.5 0.42* 0.41* 0.5 0.5 0.4*   GLUCOSE mg/dL 76 106* 86 121* 97 112*   SODIUM mmol/L 140 141 140 138 138 136   POTASSIUM mmol/L 4.2 4.2 " 3.6 3.5 4.6 3.8   TOTAL CO2 mmol/L 29  --   --  27 31* 31*   CO2 mmol/L  --  29.0 27.0  --   --   --    CHLORIDE mmol/L 102 106 103 100 100 99   ANION GAP mmol/L 9 6.0 10.0 11 7 6*   BUN / CREAT RATIO   --  35.7* 39.0*  --   --   --    CALCIUM mg/dL 8.5* 8.5* 8.8 9.3 9.8 9.8   EGFR IF NONAFRICN AM  >60 >150 >150 >60 >60 >60   ALK PHOS U/L 93 105 109 127 117 146*   TOTAL PROTEIN g/dL 6.7 7.0 7.5 7.7 8.2 8.0   ALT (SGPT) U/L 14 16 19 22 18 22   AST (SGOT) U/L 18 25 25 39 19 26   BILIRUBIN mg/dL 0.3 0.2 0.2 0.4 0.3 <0.2   ALBUMIN g/dL 4.5 4.10 4.60 4.2 4.4 4.4   GLOBULIN gm/dL  --  2.9 2.9  --   --   --        Lab Results - Last 18 Months   Lab Units 09/03/21  1910   LDH unit/L 396*       Lab Results - Last 18 Months   Lab Units 10/06/21  1058 09/11/21  0048 09/08/21  1059   IRON ug/dL 28* 264* 76   TIBC ug/dL 448* 391 389   IRON SATURATION %  --  68 20   FERRITIN ng/mL 30.1  --  162   VITAMIN B 12 pg/mL  --   --  400   FOLATE ng/mL  --   --  14.4       No radiology results for the last 90 days.    ASSESSMENT:   Vitamin K deficiency, currently compensated with prior parenteral vitamin K and currently 5 mg orally weekly, first dose on 10/3/2021.  Today's INR was normal.  Last month he had life-threatening bleeding from extreme coagulopathy associated vitamin K deficiency and was airlifted to [place] because of bleeding. The source of vitamin K deficiency remains unknown.    PLAN/RECOMMENDATIONS:   VST in one week, labs 1h PTV.    DX/ORDERS:   Diagnoses and all orders for this visit:    1. Vitamin K deficiency (Primary)  -     Protime-INR; Future      No future appointments.     Leo Foster MD  10/7/2021    cc: Gisele Lucero APRN

## 2021-10-08 ENCOUNTER — TELEPHONE (OUTPATIENT)
Dept: PRIMARY CARE CLINIC | Age: 56
End: 2021-10-08

## 2021-10-08 NOTE — TELEPHONE ENCOUNTER
----- Message from [address] [place] [address] sent at 10/8/2021  3:12 PM CDT -----  Subject: Refill Request    QUESTIONS  Name of Medication? oxyCODONE-acetaminophen (PERCOCET) 7.5-325 MG per   tablet  Patient-reported dosage and instructions? 7.5-325 MG   How many days do you have left? 3  Preferred Pharmacy? [facility] phone number (if available)? [phone]  Additional Information for Provider? Patient called in requesting refill,   Best contact if any questions is [phone] messages okay   ---------------------------------------------------------------------------  --------------  CALL BACK INFO  What is the best way for the office to contact you? OK to leave message on   voicemail  Preferred Call Back Phone Number?  [phone]

## 2021-10-14 ENCOUNTER — LAB (OUTPATIENT)
Dept: LAB | Facility: HOSPITAL | Age: 56
End: 2021-10-14

## 2021-10-14 ENCOUNTER — OFFICE VISIT (OUTPATIENT)
Dept: ONCOLOGY | Facility: CLINIC | Age: 56
End: 2021-10-14

## 2021-10-14 VITALS
OXYGEN SATURATION: 97 % | RESPIRATION RATE: 16 BRPM | SYSTOLIC BLOOD PRESSURE: 130 MMHG | WEIGHT: 181.2 LBS | TEMPERATURE: 98 F | HEART RATE: 83 BPM | DIASTOLIC BLOOD PRESSURE: 70 MMHG | BODY MASS INDEX: 25.94 KG/M2 | HEIGHT: 70 IN

## 2021-10-14 DIAGNOSIS — E56.1 VITAMIN K DEFICIENCY: Primary | ICD-10-CM

## 2021-10-14 LAB
INR PPP: 1 (ref 0.91–1.09)
PROTHROMBIN TIME: 12.4 SECONDS (ref 10–13.8)

## 2021-10-14 PROCEDURE — 85610 PROTHROMBIN TIME: CPT

## 2021-10-14 PROCEDURE — 99213 OFFICE O/P EST LOW 20 MIN: CPT | Performed by: INTERNAL MEDICINE

## 2021-10-14 NOTE — PROGRESS NOTES
[facility] ONC [facility] GROUP HEMATOLOGY & ONCOLOGY  [address]  [facility] [address]  [phone]       10/14/2021     PROGRESS NOTE     PATIENT NAME: Gregor Zarate  YOB: 1965  56 y.o. MR: [id number]    PCP: Gisele Lucero APRN      HPI:   Mr. Gregor Zarate is feeling well today.  He is asymptomatic.  He remains without evidence of abnormal bleeding.  He took another dose of oral vitamin K 5 mg on 10/11/2021.  He reports that has only 1 pill left.  He was unable to fill the prescription for vitamin K.  We decided that since he is not going to be able to continue oral vitamin K since it is not available to him, he will save the pill that he has left and will take it if needed.  At this point we decided to continue observation.  PAST HISTORY:     HEME/ONC HISTORY  Oncology/Hematology History Overview Note   HEME/ONC DX/RX/INVESTIGATIONS SUMMARY:   DX:   Severe coagulopathy with bleeding in 9/3/2021 associated vitamin K deficiency of unknown etiology.  Patient presented with gross bleeding, was airlifted from [facility] to [place]. Dcd from [place] on 9/13/2021.    RX:   FFP, vitamin K at [place] in 9/2021.   9/16/2021, I prescribed 5 mg vitamin K p.o. weekly. - patient is not taking it.  Vit K (SQ, in our office): 9/23/2021, 10 mg. 9/24/2021, 20 mg. 9/27/2021, 10mg.   Vit K PO: 5 mg weekly, 1st on 10/3/2021, 2nd on 10/11/2021. Unable to continue it b/o unable to fill rx.    OTHER INFO:   Cirrhosis, asymptomatic, history of hepatitis C infection, cured.  Muscular dystrophy but patient is ambulatory and has mild weakness on extremities, arms weaker than legs, right side weaker than left.    INVESTIGATIONS:    9/4/20201, CT abd/pelvis: 1.  New diffuse urothelial thickening of the bilateral renal collecting systems and left ureter, unclear in etiology and presumably related to an inflammatory or infectious process. No hydronephrosis.  Suggest correlation with urine culture and cytology. 2.  Lobulated contour of the liver with fissural widening, suggesting underlying chronic liver disease. No focal liver lesion or findings of portal hypertension.    INR: 9/16/2021 = 1.91. 9/23/2021, = 10.28. 9/24/2021 = 9.9. 9/27/2021 = 2.52. 9/30/2021 = 1.22 10/7/2021 = 1.08. 10/14/2021, INR = 1.0         MEDICAL HISTORY   has no past medical history on file.   HEALTH MAINTENANCE ITEMS  Health Maintenance Due   Topic Date Due   • COLORECTAL CANCER SCREENING  Never done   • COVID-19 Vaccine (1) Never done   • ANNUAL WELLNESS VISIT  Never done     <no information>  Last Completed Colonoscopy     This patient has no relevant Health Maintenance data.          There is no immunization history on file for this patient.  Last Completed Mammogram     This patient has no relevant Health Maintenance data.        FAMILY HISTORY  History reviewed. No pertinent family history.    Cancer-related family history is not on file.   SURGICAL HISTORY   has no past surgical history on file.  SOCIAL HISTORY  Social History     Socioeconomic History   • Marital status: Single     MEDICATIONS:     Current Outpatient Medications   Medication Instructions   • busPIRone (BUSPAR) 5 mg, Oral   • diclofenac (VOLTAREN) 50 mg, Oral, 3 Times Daily With Meals, NULL   • Diclofenac Sodium (VOLTAREN) 2 g, Topical   • FLUoxetine (PROZAC) 40 mg, Oral, Daily   • gabapentin (NEURONTIN) 600 MG tablet TAKE 1 TABLET BY MOUTH THREE TIMES DAILY   • ondansetron ODT (ZOFRAN-ODT) 4 mg, Oral   • pantoprazole (PROTONIX) 40 mg, Oral   • phytonadione (MEPHYTON, VITAMIN K) 5 MG tablet Take 1 tablet by mouth once weekly.   • phytonadione (MEPHYTON, VITAMIN K) 5 mg, Oral, Weekly   • pregabalin (LYRICA) 75 mg, Oral   • sucralfate (CARAFATE) 1 g tablet Mix one tablet with 10 ml of water to make liquid suspension, take four times daily (before meals and bedtime).   • tiZANidine (ZANAFLEX) 4 MG tablet Take 1 tablet by  "mouth three times daily      ALLERGIES:   No Known Allergies  ROS:   Pertinent positive and negative findings as noted in HPI.   PHYSICAL EXAM:   /70   Pulse 83   Temp 98 °F (36.7 °C)   Resp 16   Ht 177.8 cm (70\")   Wt 82.2 kg (181 lb 3.2 oz)   SpO2 97%   BMI 26.00 kg/m²  Body surface area is 2 meters squared.   Pain Score    10/14/21 1306   PainSc:   2     Alert, oriented x3, cooperative, not in distress.  HEENT: Normocephalic, wearing a facemask.  Lungs: No tachypnea.   Extremities: No ankle edema.  Neurologic: Moves all extremities.    INVESTIGATIONS/LABS:     Lab Results - Last 18 Months   Lab Units 10/06/21  1058 09/24/21  1029 09/16/21  1138 09/03/21  1218 07/30/21  1446 05/24/21  0101 06/19/20  1542 06/19/20  1542   WBC K/uL 5.9 5.36 5.51 11.2* 7.9 8.5   < > 8.6   HEMOGLOBIN g/dL 9.7* 9.6* 9.6* 9.9* 12.9* 13.6*   < > 14.7   HEMATOCRIT % 32.1* 32.7* 31.7* 31.9* 40.6* 43.8   < > 43.9   MCV fL 93.9 92.4 92.2 88.1 93.3 96.3*   < > 95.9*   PLATELETS K/uL 356 419 557* 448* 430* 357   < > 303   IMM GRAN % %  --  0.4 0.2  --   --   --   --   --    NEUTROS ABS 10*3/mm3  --  3.68 2.49 8.2*  --  5.1  --  4.4   LYMPHS ABS 10*3/mm3  --  1.04 2.26 1.4  --  2.3  --  3.3   MONOS ABS 10*3/mm3  --  0.53 0.60 1.50*  --  0.90  --  0.80   EOS ABS 10*3/mm3  --  0.07 0.11 0.10  --  0.20  --  0.10   BASOS ABS 10*3/mm3  --  0.02 0.04 0.00  --  0.00  --  0.00   IMMATURE GRANS (ABS) 10*3/mm3  --  0.02 0.01 0.0  --  0.0  --  0.0   NRBC /100 WBC  --  0.0 0.0  --   --   --   --   --     < > = values in this interval not displayed.       Lab Results - Last 18 Months   Lab Units 10/06/21  1058 09/24/21  1029 09/16/21  1138 09/03/21  1218 07/30/21  1446 05/24/21  0101   BUN mg/dL 14 15 16 15 15 11   CREATININE mg/dL 0.5 0.42* 0.41* 0.5 0.5 0.4*   GLUCOSE mg/dL 76 106* 86 121* 97 112*   SODIUM mmol/L 140 141 140 138 138 136   POTASSIUM mmol/L 4.2 4.2 3.6 3.5 4.6 3.8   TOTAL CO2 mmol/L 29  --   --  27 31* 31*   CO2 mmol/L  --  " 29.0 27.0  --   --   --    CHLORIDE mmol/L 102 106 103 100 100 99   ANION GAP mmol/L 9 6.0 10.0 11 7 6*   BUN / CREAT RATIO   --  35.7* 39.0*  --   --   --    CALCIUM mg/dL 8.5* 8.5* 8.8 9.3 9.8 9.8   EGFR IF NONAFRICN AM  >60 >150 >150 >60 >60 >60   ALK PHOS U/L 93 105 109 127 117 146*   TOTAL PROTEIN g/dL 6.7 7.0 7.5 7.7 8.2 8.0   ALT (SGPT) U/L 14 16 19 22 18 22   AST (SGOT) U/L 18 25 25 39 19 26   BILIRUBIN mg/dL 0.3 0.2 0.2 0.4 0.3 <0.2   ALBUMIN g/dL 4.5 4.10 4.60 4.2 4.4 4.4   GLOBULIN gm/dL  --  2.9 2.9  --   --   --        Lab Results - Last 18 Months   Lab Units 09/03/21  1910   LDH unit/L 396*       Lab Results - Last 18 Months   Lab Units 10/06/21  1058 09/11/21  0048 09/08/21  1059   IRON ug/dL 28* 264* 76   TIBC ug/dL 448* 391 389   IRON SATURATION %  --  68 20   FERRITIN ng/mL 30.1  --  162   VITAMIN B 12 pg/mL  --   --  400   FOLATE ng/mL  --   --  14.4       No radiology results for the last 90 days.    ASSESSMENT:   History of severe vitamin K deficiency, life-threatening with bleeding and extreme coagulopathy last month.  It has finally resolved.  The etiology of the vitamin K deficiency remains unknown but after he received additional vitamin K both parenterally then orally the INR has corrected.    The plan for now is to continue observation and the patient now that the patient is familiar with abnormal bleeding, he will contact the office immediately should he develop abnormal bleeding or if necessary he will go to the emergency room.    PLAN/RECOMMENDATIONS:   VST in 2 weeks. Labs 1 h PTV.    DX/ORDERS:   Diagnoses and all orders for this visit:    1. Vitamin K deficiency (Primary)  -     Protime-INR; Future      Future Appointments   Date Time Provider Department Center   10/28/2021 10:30 AM [facility] LAB [facility]   10/28/2021 11:30 AM Leo Foster MD [facility]        Leo Foster MD  10/14/2021    cc:  Gisele Lucero APRN

## 2021-10-15 DIAGNOSIS — G71.00 MD (MUSCULAR DYSTROPHY) (HCC): ICD-10-CM

## 2021-10-15 DIAGNOSIS — F32.A DEPRESSION, UNSPECIFIED DEPRESSION TYPE: ICD-10-CM

## 2021-10-15 RX ORDER — OXYCODONE HYDROCHLORIDE AND ACETAMINOPHEN 5; 325 MG/1; MG/1
1 TABLET ORAL EVERY 8 HOURS PRN
Qty: 90 TABLET | Refills: 0 | Status: CANCELLED | OUTPATIENT
Start: 2021-10-15 | End: 2021-11-14

## 2021-10-15 RX ORDER — FLUOXETINE HYDROCHLORIDE 40 MG/1
CAPSULE ORAL
Qty: 90 CAPSULE | Refills: 1 | Status: SHIPPED | OUTPATIENT
Start: 2021-10-15 | End: 2021-10-21 | Stop reason: ALTCHOICE

## 2021-10-15 NOTE — TELEPHONE ENCOUNTER
Jael Anaya called to request a refill on his medication. Last office visit : 9/23/2021   Next office visit : 10/21/2021     Last UDS:   Amphetamines   Date Value Ref Range Status   06/12/2013 NEGATIVE  Final     Amphetamine Screen, Urine   Date Value Ref Range Status   09/01/2021 negative  Final     Barbiturates   Date Value Ref Range Status   06/12/2013 NEGATIVE  Final     Barbiturate Screen, Urine   Date Value Ref Range Status   09/01/2021 negative  Final     Benzodiazepines   Date Value Ref Range Status   06/12/2013 NEGATIVE  Final     Benzodiazepine Screen, Urine   Date Value Ref Range Status   09/01/2021 postive  Final     Buprenorphine Urine   Date Value Ref Range Status   09/01/2021 negative  Final     Cocaine Metabolite Screen, Urine   Date Value Ref Range Status   09/01/2021 negative  Final     Gabapentin Screen, Urine   Date Value Ref Range Status   09/01/2021 negative  Final     MDMA, Urine   Date Value Ref Range Status   09/01/2021 negative  Final     Methamphetamine, Urine   Date Value Ref Range Status   09/01/2021 negative  Final     Opiate Scrn, Ur   Date Value Ref Range Status   09/01/2021 negative  Final     Oxycodone Screen, Ur   Date Value Ref Range Status   09/01/2021 negative  Final     PCP Screen, Urine   Date Value Ref Range Status   09/01/2021 negative  Final     Propoxyphene Screen, Urine   Date Value Ref Range Status   09/01/2021 negative  Final     THC Screen, Urine   Date Value Ref Range Status   09/01/2021 negative  Final     Tricyclic Antidepressants, Urine   Date Value Ref Range Status   09/01/2021 negative  Final       Last Leo Nida: 8-3-21  Medication Contract: [email or web address]   Last Fill: 9-23-21    Requested Prescriptions     Pending Prescriptions Disp Refills    oxyCODONE-acetaminophen (PERCOCET) 5-325 MG per tablet 90 tablet 0     Sig: Take 1 tablet by mouth every 8 hours as needed for Pain for up to 30 days.  Take lowest dose possible to manage pain         Please approve or refuse this medication.    Dee Gonzalez MA

## 2021-10-15 NOTE — TELEPHONE ENCOUNTER
Jael Anaya called to request a refill on his medication.       Last office visit : 9/23/2021   Next office visit : 10/21/2021     Requested Prescriptions     Pending Prescriptions Disp Refills    FLUoxetine (PROZAC) 40 MG capsule 90 capsule 1     Sig: TAKE 1 CAPSULE BY MOUTH ONCE DAILY            Armando Uribe MA

## 2021-10-21 ENCOUNTER — LAB (OUTPATIENT)
Dept: LAB | Facility: HOSPITAL | Age: 56
End: 2021-10-21

## 2021-10-21 ENCOUNTER — OFFICE VISIT (OUTPATIENT)
Dept: PRIMARY CARE CLINIC | Age: 56
End: 2021-10-21
Payer: MEDICARE

## 2021-10-21 ENCOUNTER — TELEPHONE (OUTPATIENT)
Dept: ONCOLOGY | Facility: CLINIC | Age: 56
End: 2021-10-21

## 2021-10-21 ENCOUNTER — OFFICE VISIT (OUTPATIENT)
Dept: ONCOLOGY | Facility: CLINIC | Age: 56
End: 2021-10-21

## 2021-10-21 VITALS
HEART RATE: 73 BPM | OXYGEN SATURATION: 98 % | TEMPERATURE: 97.8 F | DIASTOLIC BLOOD PRESSURE: 76 MMHG | SYSTOLIC BLOOD PRESSURE: 134 MMHG | BODY MASS INDEX: 25.66 KG/M2 | WEIGHT: 184 LBS

## 2021-10-21 VITALS
DIASTOLIC BLOOD PRESSURE: 74 MMHG | TEMPERATURE: 97.8 F | HEART RATE: 90 BPM | RESPIRATION RATE: 16 BRPM | OXYGEN SATURATION: 98 % | BODY MASS INDEX: 26.41 KG/M2 | WEIGHT: 184.5 LBS | HEIGHT: 70 IN | SYSTOLIC BLOOD PRESSURE: 132 MMHG

## 2021-10-21 DIAGNOSIS — R77.9 ABNORMALITY OF PLASMA PROTEIN, UNSPECIFIED: ICD-10-CM

## 2021-10-21 DIAGNOSIS — M48.061 SPINAL STENOSIS OF LUMBAR REGION, UNSPECIFIED WHETHER NEUROGENIC CLAUDICATION PRESENT: ICD-10-CM

## 2021-10-21 DIAGNOSIS — D68.9 COAGULATION DEFECT (HCC): Primary | ICD-10-CM

## 2021-10-21 DIAGNOSIS — E56.1 VITAMIN K DEFICIENCY: Primary | ICD-10-CM

## 2021-10-21 DIAGNOSIS — G71.00 MD (MUSCULAR DYSTROPHY) (HCC): ICD-10-CM

## 2021-10-21 DIAGNOSIS — R63.4 WEIGHT LOSS: ICD-10-CM

## 2021-10-21 DIAGNOSIS — M51.36 DDD (DEGENERATIVE DISC DISEASE), LUMBAR: ICD-10-CM

## 2021-10-21 LAB
APPEARANCE FLUID: NORMAL
BASOPHILS # BLD AUTO: 0.02 10*3/MM3 (ref 0–0.2)
BASOPHILS NFR BLD AUTO: 0.3 % (ref 0–1.5)
BILIRUBIN, POC: NEGATIVE
BLOOD URINE, POC: NEGATIVE
CLARITY, POC: CLEAR
COLOR, POC: YELLOW
DEPRECATED RDW RBC AUTO: 56.4 FL (ref 37–54)
EOSINOPHIL # BLD AUTO: 0.06 10*3/MM3 (ref 0–0.4)
EOSINOPHIL NFR BLD AUTO: 0.8 % (ref 0.3–6.2)
ERYTHROCYTE [DISTWIDTH] IN BLOOD BY AUTOMATED COUNT: 17.6 % (ref 12.3–15.4)
GLUCOSE URINE, POC: NEGATIVE
HCT VFR BLD AUTO: 35.3 % (ref 37.5–51)
HGB BLD-MCNC: 10.9 G/DL (ref 13–17.7)
HOLD SPECIMEN: NORMAL
IMM GRANULOCYTES # BLD AUTO: 0.02 10*3/MM3 (ref 0–0.05)
IMM GRANULOCYTES NFR BLD AUTO: 0.3 % (ref 0–0.5)
INR PPP: 1.06 (ref 0.91–1.09)
KETONES, POC: NEGATIVE
LEUKOCYTE EST, POC: NEGATIVE
LYMPHOCYTES # BLD AUTO: 2.37 10*3/MM3 (ref 0.7–3.1)
LYMPHOCYTES NFR BLD AUTO: 30.9 % (ref 19.6–45.3)
MCH RBC QN AUTO: 27.3 PG (ref 26.6–33)
MCHC RBC AUTO-ENTMCNC: 30.9 G/DL (ref 31.5–35.7)
MCV RBC AUTO: 88.5 FL (ref 79–97)
MONOCYTES # BLD AUTO: 0.76 10*3/MM3 (ref 0.1–0.9)
MONOCYTES NFR BLD AUTO: 9.9 % (ref 5–12)
NEUTROPHILS NFR BLD AUTO: 4.43 10*3/MM3 (ref 1.7–7)
NEUTROPHILS NFR BLD AUTO: 57.8 % (ref 42.7–76)
NITRITE, POC: NEGATIVE
NRBC BLD AUTO-RTO: 0 /100 WBC (ref 0–0.2)
PH, POC: 6
PLATELET # BLD AUTO: 375 10*3/MM3 (ref 140–450)
PMV BLD AUTO: 9 FL (ref 6–12)
PROTEIN, POC: NEGATIVE
PROTHROMBIN TIME: 13.4 SECONDS (ref 11.9–14.6)
RBC # BLD AUTO: 3.99 10*6/MM3 (ref 4.14–5.8)
SPECIFIC GRAVITY, POC: 1.01
UROBILINOGEN, POC: 0.2
WBC # BLD AUTO: 7.66 10*3/MM3 (ref 3.4–10.8)

## 2021-10-21 PROCEDURE — 36415 COLL VENOUS BLD VENIPUNCTURE: CPT | Performed by: INTERNAL MEDICINE

## 2021-10-21 PROCEDURE — G8419 CALC BMI OUT NRM PARAM NOF/U: HCPCS | Performed by: NURSE PRACTITIONER

## 2021-10-21 PROCEDURE — 3017F COLORECTAL CA SCREEN DOC REV: CPT | Performed by: NURSE PRACTITIONER

## 2021-10-21 PROCEDURE — 1036F TOBACCO NON-USER: CPT | Performed by: NURSE PRACTITIONER

## 2021-10-21 PROCEDURE — 85610 PROTHROMBIN TIME: CPT | Performed by: INTERNAL MEDICINE

## 2021-10-21 PROCEDURE — 85025 COMPLETE CBC W/AUTO DIFF WBC: CPT | Performed by: INTERNAL MEDICINE

## 2021-10-21 PROCEDURE — 81002 URINALYSIS NONAUTO W/O SCOPE: CPT | Performed by: NURSE PRACTITIONER

## 2021-10-21 PROCEDURE — 99214 OFFICE O/P EST MOD 30 MIN: CPT | Performed by: INTERNAL MEDICINE

## 2021-10-21 PROCEDURE — 99214 OFFICE O/P EST MOD 30 MIN: CPT | Performed by: NURSE PRACTITIONER

## 2021-10-21 PROCEDURE — G8484 FLU IMMUNIZE NO ADMIN: HCPCS | Performed by: NURSE PRACTITIONER

## 2021-10-21 PROCEDURE — G8427 DOCREV CUR MEDS BY ELIG CLIN: HCPCS | Performed by: NURSE PRACTITIONER

## 2021-10-21 RX ORDER — BUSPIRONE HYDROCHLORIDE 10 MG/1
10 TABLET ORAL 3 TIMES DAILY
Qty: 90 TABLET | Refills: 3 | Status: SHIPPED | OUTPATIENT
Start: 2021-10-21 | End: 2022-02-17

## 2021-10-21 RX ORDER — ESCITALOPRAM OXALATE 10 MG/1
10 TABLET ORAL DAILY
Qty: 30 TABLET | Refills: 3 | Status: SHIPPED | OUTPATIENT
Start: 2021-10-21 | End: 2022-02-17

## 2021-10-21 NOTE — PROGRESS NOTES
[facility] ONC [facility] GROUP HEMATOLOGY & ONCOLOGY  [address]  [facility] [address]  [phone]       10/21/2021     PROGRESS NOTE     PATIENT NAME: Gregor Zarate  YOB: 1965  56 y.o. MR: [id number]    PCP: Gisele Lucero APRN    HPI:   Mr. Gregor Zarate requested to be evaluated today because since last week he noted darker urine.  He also noted hematuria 2 nights ago after sexual intercourse.  Today he denies isidro hematuria.  He complains that has urine darker than normal.  Because he had life-threatening coagulopathy with near fatal bleeding last month he was very concerned that he again could have developed bleeding from coagulopathy again.  Today the INR was normal.  I told the patient there is no evidence today of coagulopathy today.  I advised him to follow-up with urology.  The EMR indicates that he was previously referred to urology so he agreed that he will call urology.     We reviewed that he has mild iron deficiency anemia, probably from prior bleeding associated with the severe coagulopathy.  I advised him to take ferrous sulfate on Monday, Wednesday and Fridays.  He recently completed GI work-up and there is no evidence of significant GI pathology except for liver disease.  PAST HISTORY:     HEME/ONC HISTORY  Oncology/Hematology History Overview Note   HEME/ONC DX/RX/INVESTIGATIONS SUMMARY:   DX:   Severe coagulopathy with bleeding in 9/3/2021 associated vitamin K deficiency of unknown etiology.  Patient presented with gross bleeding, was airlifted from [facility] to [facility]. Dcd from [place] on 9/13/2021.    RX:   FFP, vitamin K at [place] in 9/2021.   9/16/2021, I prescribed 5 mg vitamin K p.o. weekly. - patient is not taking it.  Vit K (SQ, in our office): 9/23/2021, 10 mg. 9/24/2021, 20 mg. 9/27/2021, 10mg.   Vit K PO: 5 mg weekly, 1st on 10/3/2021, 2nd on 10/11/2021. Unable to continue it b/o unable to fill  rx.    OTHER INFO:   Cirrhosis, asymptomatic, history of hepatitis C infection, cured.  Muscular dystrophy but patient is ambulatory and has mild weakness on extremities, arms weaker than legs, right side weaker than left.    INVESTIGATIONS:    9/4/20201, CT abd/pelvis: 1.  New diffuse urothelial thickening of the bilateral renal collecting systems and left ureter, unclear in etiology and presumably related to an inflammatory or infectious process. No hydronephrosis. Suggest correlation with urine culture and cytology. 2.  Lobulated contour of the liver with fissural widening, suggesting underlying chronic liver disease. No focal liver lesion or findings of portal hypertension.    INR: 9/16/2021 = 1.91. 9/23/2021, = 10.28. 9/24/2021 = 9.9. 9/27/2021 = 2.52. 9/30/2021 = 1.22 10/7/2021 = 1.08. 10/14/2021, INR = 1.0         MEDICAL HISTORY   has no past medical history on file.   HEALTH MAINTENANCE ITEMS  Health Maintenance Due   Topic Date Due   • COLORECTAL CANCER SCREENING  Never done   • COVID-19 Vaccine (1) Never done   • ANNUAL WELLNESS VISIT  Never done     <no information>  Last Completed Colonoscopy     This patient has no relevant Health Maintenance data.          There is no immunization history on file for this patient.  Last Completed Mammogram     This patient has no relevant Health Maintenance data.        FAMILY HISTORY  History reviewed. No pertinent family history.    Cancer-related family history is not on file.   SURGICAL HISTORY   has no past surgical history on file.  SOCIAL HISTORY  Social History     Socioeconomic History   • Marital status: Single     MEDICATIONS:     Current Outpatient Medications   Medication Instructions   • busPIRone (BUSPAR) 5 mg, Oral   • diclofenac (VOLTAREN) 50 mg, Oral, 3 Times Daily With Meals, NULL   • Diclofenac Sodium (VOLTAREN) 2 g, Topical   • FLUoxetine (PROZAC) 40 mg, Oral, Daily   • gabapentin (NEURONTIN) 600 MG tablet TAKE 1 TABLET BY MOUTH THREE TIMES DAILY  "  • ondansetron ODT (ZOFRAN-ODT) 4 mg, Oral   • pantoprazole (PROTONIX) 40 mg, Oral   • phytonadione (MEPHYTON, VITAMIN K) 5 MG tablet Take 1 tablet by mouth once weekly.   • phytonadione (MEPHYTON, VITAMIN K) 5 mg, Oral, Weekly   • pregabalin (LYRICA) 75 mg, Oral   • sucralfate (CARAFATE) 1 g tablet Mix one tablet with 10 ml of water to make liquid suspension, take four times daily (before meals and bedtime).   • tiZANidine (ZANAFLEX) 4 MG tablet Take 1 tablet by mouth three times daily      ALLERGIES:   No Known Allergies  ROS:   Pertinent positive and negative findings as noted in HPI.   PHYSICAL EXAM:   /74   Pulse 90   Temp 97.8 °F (36.6 °C)   Resp 16   Ht 177.8 cm (70\")   Wt 83.7 kg (184 lb 8 oz)   SpO2 98%   BMI 26.47 kg/m²  Body surface area is 2.02 meters squared.   Pain Score    10/21/21 1311   PainSc:   6     Alert, oriented x3, cooperative, not in distress.  HEENT: Normocephalic, wearing a facemask.  Lungs: No tachypnea.   Extremities: No ankle edema.  Neurologic: Moves all extremities.    INVESTIGATIONS/LABS:     Lab Results - Last 18 Months   Lab Units 10/21/21  1302 10/06/21  1058 09/24/21  1029 09/16/21  1138 09/03/21  1218 07/30/21  1446 05/24/21  0101 05/24/21  0101 06/19/20  1542 06/19/20  1542   WBC 10*3/mm3 7.66 5.9 5.36 5.51 11.2* 7.9   < > 8.5   < > 8.6   HEMOGLOBIN g/dL 10.9* 9.7* 9.6* 9.6* 9.9* 12.9*   < > 13.6*   < > 14.7   HEMATOCRIT % 35.3* 32.1* 32.7* 31.7* 31.9* 40.6*   < > 43.8   < > 43.9   MCV fL 88.5 93.9 92.4 92.2 88.1 93.3   < > 96.3*   < > 95.9*   PLATELETS 10*3/mm3 375 356 419 557* 448* 430*   < > 357   < > 303   IMM GRAN % % 0.3  --  0.4 0.2  --   --   --   --   --   --    NEUTROS ABS 10*3/mm3 4.43  --  3.68 2.49 8.2*  --   --  5.1  --  4.4   LYMPHS ABS 10*3/mm3 2.37  --  1.04 2.26 1.4  --   --  2.3  --  3.3   MONOS ABS 10*3/mm3 0.76  --  0.53 0.60 1.50*  --   --  0.90  --  0.80   EOS ABS 10*3/mm3 0.06  --  0.07 0.11 0.10  --   --  0.20  --  0.10   BASOS ABS " 10*3/mm3 0.02  --  0.02 0.04 0.00  --   --  0.00  --  0.00   IMMATURE GRANS (ABS) 10*3/mm3 0.02  --  0.02 0.01 0.0  --   --  0.0  --  0.0   NRBC /100 WBC 0.0  --  0.0 0.0  --   --   --   --   --   --     < > = values in this interval not displayed.       Lab Results - Last 18 Months   Lab Units 10/06/21  1058 09/24/21  1029 09/16/21  1138 09/03/21  1218 07/30/21  1446 05/24/21  0101   BUN mg/dL 14 15 16 15 15 11   CREATININE mg/dL 0.5 0.42* 0.41* 0.5 0.5 0.4*   GLUCOSE mg/dL 76 106* 86 121* 97 112*   SODIUM mmol/L 140 141 140 138 138 136   POTASSIUM mmol/L 4.2 4.2 3.6 3.5 4.6 3.8   TOTAL CO2 mmol/L 29  --   --  27 31* 31*   CO2 mmol/L  --  29.0 27.0  --   --   --    CHLORIDE mmol/L 102 106 103 100 100 99   ANION GAP mmol/L 9 6.0 10.0 11 7 6*   BUN / CREAT RATIO   --  35.7* 39.0*  --   --   --    CALCIUM mg/dL 8.5* 8.5* 8.8 9.3 9.8 9.8   EGFR IF NONAFRICN AM  >60 >150 >150 >60 >60 >60   ALK PHOS U/L 93 105 109 127 117 146*   TOTAL PROTEIN g/dL 6.7 7.0 7.5 7.7 8.2 8.0   ALT (SGPT) U/L 14 16 19 22 18 22   AST (SGOT) U/L 18 25 25 39 19 26   BILIRUBIN mg/dL 0.3 0.2 0.2 0.4 0.3 <0.2   ALBUMIN g/dL 4.5 4.10 4.60 4.2 4.4 4.4   GLOBULIN gm/dL  --  2.9 2.9  --   --   --        Lab Results - Last 18 Months   Lab Units 09/03/21  1910   LDH unit/L 396*       Lab Results - Last 18 Months   Lab Units 10/06/21  1058 09/11/21  0048 09/08/21  1059   IRON ug/dL 28* 264* 76   TIBC ug/dL 448* 391 389   IRON SATURATION %  --  68 20   FERRITIN ng/mL 30.1  --  162   VITAMIN B 12 pg/mL  --   --  400   FOLATE ng/mL  --   --  14.4     Lab Results   Component Value Date    PROTIME 13.4 10/21/2021    INR 1.06 10/21/2021    PTT 27.1 10/07/2021       No radiology results for the last 90 days.    ASSESSMENT:   History of extreme coagulopathy last month secondary to vitamin K deficiency.  Today there is no evidence of coagulopathy.    Iron deficiency anemia possibly from major bleeding in 9/2021 associated with coagulopathy. The patient was advised  to take ferrous sulfate every other day.    Complaints of hematuria, unlikely that it is secondary to a hematological condition.    PLAN/RECOMMENDATIONS:   Cancel appointment for next week.    VST in 2 weeks, labs 1h PTV.    DX/ORDERS:   Diagnoses and all orders for this visit:    1. Vitamin K deficiency (Primary)  -     CBC Auto Differential  -     Protime-INR  -     Protime-INR; Future        Future Appointments   Date Time Provider Department Center   11/4/2021 12:00 PM [facility] CANCER CTR LAB [facility] CCL[facility]   11/4/2021  1:00 PM Leo Foster MD [facility] ON[facility]        Leo Foster MD  10/21/2021    cc: No ref. provider found, Gisele Lucero, RUFINA

## 2021-10-21 NOTE — PROGRESS NOTES
[address] [facility] CARE  [address] [id number] [address]  Dept: [phone]  Dept Fax: [phone]  Loc: [phone]    Kenny Guerrier is a 61 y.o. male who presents today for his medical conditions/complaints as noted below. Kenny Guerrier is c/o of Other (Blood coming out everywhere )      Chief Complaint   Patient presents with    Other     Blood coming out everywhere        HPI:     HPI  Patient is in office today to follow up continued pain and bleeding issues. Patient reports that he is currently bleeding every where. He had sex recently and the condom was full of blood in his semen. He is also having issues with depression symptoms. He has been taking his Prozac.       Past Medical History:   Diagnosis Date    Anxiety     Chronic back pain     Depression     [facility]     Neuropathy 2020    Scoliosis         Past Surgical History:   Procedure Laterality Date    CHOLECYSTECTOMY, LAPAROSCOPIC N/A 2021    LAPAROSCOPIC CHOLECYSTECTOMY performed by Carlo Alcantar DO at [address] [place] [address] COLONOSCOPY  2016    Dr Naomi Chandra, normal, 10 yr recall    COLONOSCOPY N/A 2021    Dr Johnnie Gutierrez hemorrhids, RANDOM COLON BX NEGATIVE, Sub Prep Fair, 3 year recall    FOOT SURGERY Left     UPPER GASTROINTESTINAL ENDOSCOPY N/A 2021    Dr Da Arreguin, duodenal villous blunting otherwise normal EGD; serum celiac panel NEG for celiac sprue    UPPER GASTROINTESTINAL ENDOSCOPY N/A 08/10/2021    Dr Da Arreguin- mild gastritis, 2 small duodenal ulcers and 1 large cratered 3-4cm in posterior duodenal bulb, NEG h pylori       Social History     Tobacco Use    Smoking status: Former Smoker     Years: 30.00     Types: Cigarettes     Quit date: 2021     Years since quittin.2    Smokeless tobacco: Never Used   Substance Use Topics    Alcohol use: Yes     Comment: occas        Current Outpatient Medications Medication Sig Dispense Refill    busPIRone (BUSPAR) 10 MG tablet Take 1 tablet by mouth 3 times daily 90 tablet 3    escitalopram (LEXAPRO) 10 MG tablet Take 1 tablet by mouth daily 30 tablet 3    oxyCODONE-acetaminophen (PERCOCET) 5-325 MG per tablet Take 1 tablet by mouth every 8 hours as needed for Pain for up to 30 days. Take lowest dose possible to manage pain 90 tablet 0    tiZANidine (ZANAFLEX) 4 MG tablet TAKE 1 TABLET BY MOUTH THREE TIMES DAILY 90 tablet 0    sucralfate (CARAFATE) 1 GM tablet Mix one tablet with 10 ml of water to make liquid suspension, take four times daily (before meals and bedtime). (Patient taking differently: 4 times daily ) 112 tablet 3    triamcinolone (KENALOG) 0.025 % cream Apply topically 2 times daily. (Patient taking differently: as needed Apply topically 2 times daily.) 80 g 1    diclofenac sodium (VOLTAREN) 1 % GEL Apply 2 g topically 4 times daily 2 Tube 1    amitriptyline (ELAVIL) 50 MG tablet TAKE ONE TABLET BY MOUTH IN THE EVENING 30 tablet 11    pantoprazole (PROTONIX) 40 MG tablet TAKE 1 TABLET BY MOUTH TWICE DAILY BEFORE MEAL(S) 60 tablet 0    pregabalin (LYRICA) 75 MG capsule Take 1 capsule by mouth 3 times daily for 30 days. 90 capsule 0     No current facility-administered medications for this visit. Allergies   Allergen Reactions    Shellfish-Derived Products Anaphylaxis       Family History   Problem Relation Age of Onset    Diabetes Mother     Colon Cancer Neg Hx     Colon Polyps Neg Hx     Esophageal Cancer Neg Hx     Liver Cancer Neg Hx     Liver Disease Neg Hx     Rectal Cancer Neg Hx     Stomach Cancer Neg Hx                Subjective:      Review of Systems   Constitutional: Positive for fatigue. HENT: Negative. Eyes: Negative. Respiratory: Negative. Cardiovascular: Negative. Gastrointestinal: Negative. Endocrine: Negative. Genitourinary: Negative.     Musculoskeletal: Positive for arthralgias, back pain and myalgias. Skin: Negative. Neurological: Negative. Hematological: Bruises/bleeds easily. Psychiatric/Behavioral: Negative. Objective:     Physical Exam  Vitals and nursing note reviewed. Constitutional:       Appearance: Normal appearance. HENT:      Head: Normocephalic and atraumatic. Right Ear: Hearing, tympanic membrane, ear canal and external ear normal.      Left Ear: Hearing, tympanic membrane, ear canal and external ear normal.      Nose: Nose normal.      Mouth/Throat:      Lips: Pink. Mouth: Mucous membranes are moist.      Pharynx: Oropharynx is clear. Eyes:      General: Lids are normal.      Extraocular Movements: Extraocular movements intact. Conjunctiva/sclera: Conjunctivae normal.      Pupils: Pupils are equal, round, and reactive to light. Neck:      Thyroid: No thyromegaly. Cardiovascular:      Rate and Rhythm: Normal rate and regular rhythm. Pulses: Normal pulses. Dorsalis pedis pulses are 2+ on the right side and 2+ on the left side. Posterior tibial pulses are 2+ on the right side and 2+ on the left side. Heart sounds: Normal heart sounds. Pulmonary:      Effort: Pulmonary effort is normal.      Breath sounds: Normal breath sounds and air entry. Abdominal:      General: Bowel sounds are normal.      Palpations: Abdomen is soft. Musculoskeletal:      Cervical back: Full passive range of motion without pain, normal range of motion and neck supple. Thoracic back: Deformity present. No tenderness. Decreased range of motion. Lumbar back: Deformity present. No tenderness. Decreased range of motion. Back:    Lymphadenopathy:      Cervical: No cervical adenopathy. Skin:     General: Skin is warm and dry. Capillary Refill: Capillary refill takes less than 2 seconds. Neurological:      General: No focal deficit present. Mental Status: He is alert and oriented to person, place, and time.  Mental status is at baseline. Coordination: Coordination is intact. Psychiatric:         Mood and Affect: Mood normal.         Speech: Speech normal.         Behavior: Behavior normal.         Thought Content: Thought content normal.         Cognition and Memory: Cognition and memory normal.         Judgment: Judgment normal.         /76   Pulse 73   Temp 97.8 °F (36.6 °C)   Wt 184 lb (83.5 kg)   SpO2 98%   BMI 25.66 kg/m²     Assessment:      Diagnosis Orders   1. Coagulation defect ([address]  POCT Urinalysis no Micro    PET  FULL BODY   2. DDD (degenerative disc disease), lumbar  [address] [place] [address], , Neurosurgery, [place]   3. MD (muscular dystrophy) [facility])  [address] [place] [address], , Neurosurgery, [place]    DME Order for [employer] as OP   4. Spinal stenosis of lumbar region, unspecified whether neurogenic claudication present  [address] [place] [address], , Neurosurgery, [place]    DME Order for [employer] as OP   5. Weight loss  PET  FULL BODY   6. Abnormality of plasma protein, unspecified   PET  FULL BODY       Results for orders placed or performed in visit on 10/21/21   POCT Urinalysis no Micro   Result Value Ref Range    Color, UA yellow     Clarity, UA clear     Glucose, UA POC negative     Bilirubin, UA negative     Ketones, UA negative     Spec Grav, UA 1.010     Blood, UA POC negative     pH, UA 6.0     Protein, UA POC negative     Urobilinogen, UA 0.2     Leukocytes, UA negative     Nitrite, UA negative     Appearance, Fluid         Plan:     Referral to neurosurgery due to abnormal MRI. Aware he is not a good candidate for surgery at this time due to anticoagulation issues. PET scan ordered due to unexplained weight loss and recent anticoagulation issues. He is to follow up with hematology as discussed. Increasing Percocet up to 7.5 mg TID due to worsening pain.     Discussed possible referral to other hematology such as [facility]    More than 50% of the time was spent counseling and coordinating care for a total time of 41 mins face to face. Return in about 3 weeks (around 11/11/2021) for pain medication changes - 30 min appt please. Orders Placed This Encounter   Procedures    PET  FULL BODY     Standing Status:   Future     Standing Expiration Date:   10/21/2022   [facility], Yosi DO Lukasz, Neurosurgery, [place]     Referral Priority:   Routine     Referral Type:   Eval and Treat     Referral Reason:   Specialty Services Required     Referred to Provider:   Claudio Fox DO     Requested Specialty:   Neurosurgery     Number of Visits Requested:   1    POCT Urinalysis no Micro    DME Order for Bath[employer]Shower Seat as OP     Bath/Shower Bench    Current patient weight: Weight: 184 lb (83.5 kg)   Current patient height:    Diagnosis: muscular dystrophy  Duration: Purchase       Orders Placed This Encounter   Medications    busPIRone (BUSPAR) 10 MG tablet     Sig: Take 1 tablet by mouth 3 times daily     Dispense:  90 tablet     Refill:  3    escitalopram (LEXAPRO) 10 MG tablet     Sig: Take 1 tablet by mouth daily     Dispense:  30 tablet     Refill:  3            Patient offered educational handouts and has had all questions answered. Patient voices understanding and agrees to plans along with risks and benefits of plan. Patient is instructed to continue prior meds, diet, and exercise plans as instructed. Patient agrees to follow up as instructed and sooner if needed. Patient agrees to go to ER if condition becomes emergent. EMR Dragon/transcription disclaimer: Some of this encounter note is an electronic transcription/translation of spoken language to printed text. The electronic translation of spoken language may permit erroneous, or at times, nonsensical words or phrases to be inadvertently transcribed.  Although I have reviewed the note for such errors, some may still exist.    Electronically signed by ANTHONY Mittal on 10/22/2021 at 12:44 PM

## 2021-10-21 NOTE — TELEPHONE ENCOUNTER
BLEEDING EPISODES:  Received call from patient Gregor Zarate, he calls to inform that he has been having episodes of bleeding and was told per Dr Foster if he had any bleeding he should call and he would be seen. Patient was cher to a two week f/u at his last visit (stable at this visit) providing he remained stable with no bleeding.   Patient states that he is now bleeding from his penis and from scratched areas on his body at times uncontrolled. Patient is currently being seen in another Providers office at this time of call.     Relayed this information to Dr Foster and Coty ARREAGA, Dr Foster request patient be scheduled ASAP with him Today.   And if he begins to bleed again he should go to the ER dept.     Coty ARREAGA will add patient to Dr Foster Cher, @ 1 pm per patient request, post completion of his current visit with another provider @ [facility].

## 2021-10-22 DIAGNOSIS — G71.00 MD (MUSCULAR DYSTROPHY) (HCC): Primary | ICD-10-CM

## 2021-10-22 DIAGNOSIS — K29.90 GASTRITIS AND DUODENITIS: ICD-10-CM

## 2021-10-22 RX ORDER — PANTOPRAZOLE SODIUM 40 MG/1
TABLET, DELAYED RELEASE ORAL
Qty: 60 TABLET | Refills: 0 | Status: SHIPPED | OUTPATIENT
Start: 2021-10-22 | End: 2021-11-24

## 2021-10-22 ASSESSMENT — ENCOUNTER SYMPTOMS
BACK PAIN: 1
GASTROINTESTINAL NEGATIVE: 1
EYES NEGATIVE: 1
RESPIRATORY NEGATIVE: 1

## 2021-10-22 NOTE — TELEPHONE ENCOUNTER
Shira Wheeler called to request a refill on his medication. Last office visit : 10/21/2021   Next office visit : 11/16/2021     Last UDS:   Amphetamines   Date Value Ref Range Status   06/12/2013 NEGATIVE  Final     Amphetamine Screen, Urine   Date Value Ref Range Status   09/01/2021 negative  Final     Barbiturates   Date Value Ref Range Status   06/12/2013 NEGATIVE  Final     Barbiturate Screen, Urine   Date Value Ref Range Status   09/01/2021 negative  Final     Benzodiazepines   Date Value Ref Range Status   06/12/2013 NEGATIVE  Final     Benzodiazepine Screen, Urine   Date Value Ref Range Status   09/01/2021 postive  Final     Buprenorphine Urine   Date Value Ref Range Status   09/01/2021 negative  Final     Cocaine Metabolite Screen, Urine   Date Value Ref Range Status   09/01/2021 negative  Final     Gabapentin Screen, Urine   Date Value Ref Range Status   09/01/2021 negative  Final     MDMA, Urine   Date Value Ref Range Status   09/01/2021 negative  Final     Methamphetamine, Urine   Date Value Ref Range Status   09/01/2021 negative  Final     Opiate Scrn, Ur   Date Value Ref Range Status   09/01/2021 negative  Final     Oxycodone Screen, Ur   Date Value Ref Range Status   09/01/2021 negative  Final     PCP Screen, Urine   Date Value Ref Range Status   09/01/2021 negative  Final     Propoxyphene Screen, Urine   Date Value Ref Range Status   09/01/2021 negative  Final     THC Screen, Urine   Date Value Ref Range Status   09/01/2021 negative  Final     Tricyclic Antidepressants, Urine   Date Value Ref Range Status   09/01/2021 negative  Final       Last Corky Michael: 8-3-21  Medication Contract: 4-16-21   Last Fill: 9-23-21    Requested Prescriptions     Pending Prescriptions Disp Refills    oxyCODONE-acetaminophen (PERCOCET) 7.5-325 MG per tablet 90 tablet 0     Sig: Take 1 tablet by mouth every 8 hours as needed for Pain for up to 30 days.  Intended supply: 30 days     Signed Prescriptions Disp Refills    pantoprazole (PROTONIX) 40 MG tablet 60 tablet 0     Sig: TAKE 1 TABLET BY MOUTH TWICE DAILY BEFORE MEAL(S)     Authorizing Provider: Malvern Dec     Ordering User: NATA Moses         Please approve or refuse this medication.    Elizabeth Fox MA

## 2021-10-25 ENCOUNTER — TELEPHONE (OUTPATIENT)
Dept: NEUROSURGERY | Age: 56
End: 2021-10-25

## 2021-10-25 RX ORDER — OXYCODONE AND ACETAMINOPHEN 7.5; 325 MG/1; MG/1
1 TABLET ORAL EVERY 8 HOURS PRN
Qty: 90 TABLET | Refills: 0 | Status: SHIPPED | OUTPATIENT
Start: 2021-10-25 | End: 2021-11-19 | Stop reason: SDUPTHER

## 2021-10-25 NOTE — TELEPHONE ENCOUNTER
1st attempt to call patient to schedule appointment, left voicemail with call back number [phone]

## 2021-10-27 ENCOUNTER — TELEPHONE (OUTPATIENT)
Dept: NEUROSURGERY | Age: 56
End: 2021-10-27

## 2021-10-27 DIAGNOSIS — M54.42 LEFT-SIDED LOW BACK PAIN WITH LEFT-SIDED SCIATICA, UNSPECIFIED CHRONICITY: ICD-10-CM

## 2021-10-27 NOTE — TELEPHONE ENCOUNTER
[facility] New Patient Questionnaire    1. Diagnosis/Reason for Referral?    Spinal stenosis of lumbar region, unspecified whether neurogenic claudication present, DDD    2. Who is completing questionnaire? Patient X Caregiver Family      3. Has the patient had any previous spinal/brain surgeries? NO        A. If yes, what is the name of the facility in which the surgery was performed? B. Procedure/Surgery performed? C. Who was the surgeon? D. When was the surgery? MM/YY       E. Did the patient improve after the surgery? 4. Is this a second opinion? If yes, Dr. Aura Bermeo would like to review patient first before making the appointment. 5. Have MRI Images been obtain within the last year? Yes X  No      XR  CT     If yes, where was the imaging performed? [facility]     If yes, what part of the body? Lumbar X Cervical  Thoracic  Brain     If yes, when was it obtained?      9-    Note: if the scan was performed at a facility other than [facility], the disc will need to be brought to the appointment or we need to reach out to obtain the disc. A. Was the patient instructed to provide the disc? Yes   No X      8. Has the patient had a NCV/EMG within the last year? Yes  No X     If yes, where was it performed and date? MM/YY  Location:      9. Has the patient been to Physical Therapy? Yes X No      If yes, what location, how long attended, and last visit? Location: [facility]        Therapy Lasted: ONCE    Date of Last Visit:2019      10. Has the patient been to Pain Management? Yes X No     If yes, what location and last visit     Location: [facility]   Last Visit:8-2019   Is it helping?

## 2021-10-28 ENCOUNTER — HOSPITAL ENCOUNTER (OUTPATIENT)
Dept: ULTRASOUND IMAGING | Age: 56
Discharge: HOME | End: 2021-10-28
Payer: MEDICARE

## 2021-10-28 ENCOUNTER — OFFICE VISIT (OUTPATIENT)
Dept: NEUROSURGERY | Age: 56
End: 2021-10-28
Payer: MEDICARE

## 2021-10-28 ENCOUNTER — APPOINTMENT (OUTPATIENT)
Dept: LAB | Facility: HOSPITAL | Age: 56
End: 2021-10-28

## 2021-10-28 VITALS
DIASTOLIC BLOOD PRESSURE: 76 MMHG | OXYGEN SATURATION: 96 % | WEIGHT: 187 LBS | HEIGHT: 71 IN | HEART RATE: 84 BPM | SYSTOLIC BLOOD PRESSURE: 129 MMHG | BODY MASS INDEX: 26.18 KG/M2

## 2021-10-28 DIAGNOSIS — G89.29 CHRONIC MIDLINE LOW BACK PAIN WITH LEFT-SIDED SCIATICA: Primary | ICD-10-CM

## 2021-10-28 DIAGNOSIS — M54.42 CHRONIC MIDLINE LOW BACK PAIN WITH LEFT-SIDED SCIATICA: Primary | ICD-10-CM

## 2021-10-28 DIAGNOSIS — M48.061 LUMBAR FORAMINAL STENOSIS: ICD-10-CM

## 2021-10-28 DIAGNOSIS — R93.2 ABNORMAL CT OF LIVER: ICD-10-CM

## 2021-10-28 DIAGNOSIS — M51.27 HERNIATION OF INTERVERTEBRAL DISC BETWEEN L5 AND S1: ICD-10-CM

## 2021-10-28 PROCEDURE — 76705 ECHO EXAM OF ABDOMEN: CPT

## 2021-10-28 PROCEDURE — G8427 DOCREV CUR MEDS BY ELIG CLIN: HCPCS | Performed by: NEUROLOGICAL SURGERY

## 2021-10-28 PROCEDURE — 76981 USE PARENCHYMA: CPT

## 2021-10-28 PROCEDURE — 3017F COLORECTAL CA SCREEN DOC REV: CPT | Performed by: NEUROLOGICAL SURGERY

## 2021-10-28 PROCEDURE — G8484 FLU IMMUNIZE NO ADMIN: HCPCS | Performed by: NEUROLOGICAL SURGERY

## 2021-10-28 PROCEDURE — 1036F TOBACCO NON-USER: CPT | Performed by: NEUROLOGICAL SURGERY

## 2021-10-28 PROCEDURE — 99204 OFFICE O/P NEW MOD 45 MIN: CPT | Performed by: NEUROLOGICAL SURGERY

## 2021-10-28 PROCEDURE — G8419 CALC BMI OUT NRM PARAM NOF/U: HCPCS | Performed by: NEUROLOGICAL SURGERY

## 2021-10-28 RX ORDER — TIZANIDINE 4 MG/1
TABLET ORAL
Qty: 90 TABLET | Refills: 0 | Status: SHIPPED | OUTPATIENT
Start: 2021-10-28 | End: 2021-12-16

## 2021-10-28 ASSESSMENT — ENCOUNTER SYMPTOMS
GASTROINTESTINAL NEGATIVE: 1
RESPIRATORY NEGATIVE: 1
BACK PAIN: 1
EYES NEGATIVE: 1

## 2021-10-28 NOTE — PROGRESS NOTES
mild gastritis, 2 small duodenal ulcers and 1 large cratered 3-4cm in posterior duodenal bulb, NEG h pylori       Current Outpatient Medications   Medication Sig Dispense Refill    oxyCODONE-acetaminophen (PERCOCET) 7.5-325 MG per tablet Take 1 tablet by mouth every 8 hours as needed for Pain for up to 30 days. Intended supply: 30 days 90 tablet 0    pantoprazole (PROTONIX) 40 MG tablet TAKE 1 TABLET BY MOUTH TWICE DAILY BEFORE MEAL(S) 60 tablet 0    busPIRone (BUSPAR) 10 MG tablet Take 1 tablet by mouth 3 times daily 90 tablet 3    escitalopram (LEXAPRO) 10 MG tablet Take 1 tablet by mouth daily 30 tablet 3    tiZANidine (ZANAFLEX) 4 MG tablet TAKE 1 TABLET BY MOUTH THREE TIMES DAILY 90 tablet 0    sucralfate (CARAFATE) 1 GM tablet Mix one tablet with 10 ml of water to make liquid suspension, take four times daily (before meals and bedtime). (Patient taking differently: 4 times daily ) 112 tablet 3    triamcinolone (KENALOG) 0.025 % cream Apply topically 2 times daily. (Patient taking differently: as needed Apply topically 2 times daily.) 80 g 1    diclofenac sodium (VOLTAREN) 1 % GEL Apply 2 g topically 4 times daily 2 Tube 1    amitriptyline (ELAVIL) 50 MG tablet TAKE ONE TABLET BY MOUTH IN THE EVENING 30 tablet 11    pregabalin (LYRICA) 75 MG capsule Take 1 capsule by mouth 3 times daily for 30 days. 90 capsule 0     No current facility-administered medications for this visit.        Allergies:  Shellfish-derived products    Social History:   Social History     Tobacco Use   Smoking Status Former Smoker    Years: 30.00    Types: Cigarettes    Quit date: 2021    Years since quittin.2   Smokeless Tobacco Never Used     Social History     Substance and Sexual Activity   Alcohol Use Yes    Comment: occas         Family History:   Family History   Problem Relation Age of Onset    Diabetes Mother     Colon Cancer Neg Hx     Colon Polyps Neg Hx     Esophageal Cancer Neg Hx     Liver Cancer Neg Hx     Liver Disease Neg Hx     Rectal Cancer Neg Hx     Stomach Cancer Neg Hx        REVIEW OF SYSTEMS:  Constitutional: Negative. HENT: Negative. Eyes: Negative. Respiratory: Negative. Cardiovascular: Negative. Gastrointestinal: Negative. Genitourinary: Negative. Musculoskeletal: Positive for back pain. Skin: Negative. Neurological: Negative. Endo/Heme/Allergies: Negative. Psychiatric/Behavioral: Negative. PHYSICAL EXAM:  Vitals:    10/28/21 0953   BP: 129/76   Pulse: 84   SpO2: 96%     Constitutional: appears well-developed and well-nourished. Eyes  conjunctiva normal.  Pupils react to light  Ear, nose, throat - hearing intact to finger rub, No scars, masses, or lesions over external nose or ears, no atrophy oftongue  Neck- symmetric, no masses noted, no jugular vein distension  Respiration- chest wall appears symmetric, good expansion, normal effort without use of accessory muscles  Musculoskeletal  no significant wasting of muscles noted, no bony deformities, gait no gross ataxia  Extremities- no clubbing, cyanosis oredema  Skin  warm, dry, and intact. No rash, erythema, or pallor.   Psychiatric  mood, affect, and behavior appear normal.     Neurologic Examination  Awake, Alert and oriented x 4  Normal speech pattern, following commands    Motor:  RIGHT: hand grasp 5/5    finger extension 5/5    bicep 5/5    triceps 5/5    deltoid 5/5      iliopsoas 5/5    knee flexor 5/5    knee extension 5/5    EHL/dorsiflexion 5/5    plantar flexion 5/5    LEFT:   hand grasp 5/5    finger extension 5/5    bicep 5/5    triceps 5/5    deltoid 5/5      iliopsoas 5/5    knee flexor 5/5    knee extension 5/5    EHL/dorsiflexion 5/5    plantar flexion 5/5    No deficits to light touch or pinprick sensation  Reflexes are 2+ and symmetric  No myofacial tenderness to palpation  Unsteady gait; walks with a upton     Wearing an AFO of the RLE        DATA and IMAGING:    Nursing/pcp notes, imaging, labs, and vitals reviewed. PT,OT and/or speech notes reviewed    Lab Results   Component Value Date    WBC 5.9 10/06/2021    HGB 9.7 (L) 10/06/2021    HCT 32.1 (L) 10/06/2021    MCV 93.9 10/06/2021     10/06/2021     Lab Results   Component Value Date     10/06/2021    K 4.2 10/06/2021     10/06/2021    CO2 29 10/06/2021    BUN 14 10/06/2021    CREATININE 0.5 10/06/2021    GLUCOSE 76 10/06/2021    CALCIUM 8.5 (L) 10/06/2021    PROT 6.7 10/06/2021    LABALBU 4.5 10/06/2021    BILITOT 0.3 10/06/2021    ALKPHOS 93 10/06/2021    AST 18 10/06/2021    ALT 14 10/06/2021    LABGLOM >60 10/06/2021    GFRAA >59 10/06/2021     Lab Results   Component Value Date    INR 1.07 10/06/2021    INR >18.80 (HH) 09/03/2021    INR 0.94 01/14/2016    PROTIME 14.1 10/06/2021    PROTIME >120.0 (HH) 09/03/2021    PROTIME 12.3 01/14/2016     Examination. MRI LUMBAR SPINE WO CONTRAST 9/22/2021 10:46 AM   History: Degenerative disc disease. Chronic low back pain. The multiplanar, multisequence MR imaging of the lumbar spine is   performed without intravenous contrast enhancement. There is no previous study for comparison. The correlation is made   with previous CT scan of the lumbar spine dated 9/3/2021. There is loss of lumbar lordosis. The alignment is maintained. A mild superior endplate compression of vertebral body L1 is similar   to the previous CT scan of the lumbar spine. No bone marrow edema   suggests this to be a chronic process. The remaining vertebral body   heights are normal.   There is loss of height and signal of the intervertebral disc at L5-S1   representing severe degeneration of the disc. Small straight loss of   signal of the intervertebral disc at L3-4 and L4-5 representing   desiccation. There are type I discogenic degeneration of the endplates at L3-2. L1-2: No disc bulging. There is moderate facetal arthropathy. The   neural foramina spinal canal are patent.    L2-3: There is moderate bilateral facet arthropathy. The neural   foramina spinal canal are patent. L3-4: There is moderate diffuse disc bulging asymmetrically more   towards left. There is bilateral facet arthropathy and ligamentum   hypertrophy. There is moderate spinal stenosis. There is moderate   asymmetrical narrowing of the left neural foraminal.   L4-5: There is annular tear of the disc L4-5 with a moderate   asymmetrical disc bulging, more towards left. There is bilateral facet   arthropathy and ligamentum hypertrophy. There is moderate spinal   stenosis and left neural foraminal stenosis   At L5-S1: There is moderate diffuse disc bulging, asymmetrically more   towards left. There is bilateral facet arthropathy. There is moderate   spinal stenosis and bilateral neural foraminal stenosis, more severe   on the left side. The conus medullaris is not optimally visualized in this study. This   area was not included. It probably terminates above the level of space   T12-L1. The visualized cauda equina is normal.   Paravertebral paraspinal soft tissues appear unremarkable.       Impression   Lumbar spondylosis, most severe at L5-S1. Chronic appearing superior endplate compression of vertebra L1. The multilevel prominent disc osteophyte complexes, facetal   arthropathy and ligamentum hypertrophy and resultant neural foramina   spinal canal stenosis as detailed above. Signed by Dr Brodie Underwood   I have personally reviewed these images and my interpretation is: There is DDD L3-S1, most severe at L5-S1  L3-4 mild canal stenosis  L5-S1 there is a left paracentral disc herniation that abuts the left S1 nerve root. There is mild right and severe left foraminal stenosis. ASSESSMENT:    Devorah Kowalski is a 61 y.o. male with a significant medical history with complaints of mostly low back pain. ICD-10-CM    1. Chronic midline low back pain with left-sided sciatica  M54.42     G89.29    2.  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 Lumbar foraminal stenosis  M48.061        PLAN:  We have discussed and reviewed the results of the MRI lumbar spine with Mr. Ruddy Moya at length. We explained that he does have DDD and a scoliosis which are likely the culprit of his low back pain. He does have a disc herniation that correlates with his described leg pain, however, he complains of mostly low back pain and in fact states \"my leg pain is negligible\". At this point given that he has mostly low back pain and his significant medical issues we do not recommend undergoing any neurosurgical intervention. We would recommend pain management. Mr. Ruddy Moya is very upset that we will not offer surgery.   He states he has already done pain management and injections in the past.    -Refer to Pain Management Dr. Moses Hutchinson   -Follow up as needed       Romayne Denmark,

## 2021-11-02 ENCOUNTER — HOSPITAL ENCOUNTER (OUTPATIENT)
Dept: NUCLEAR MEDICINE | Age: 56
Discharge: HOME | End: 2021-11-04
Payer: MEDICARE

## 2021-11-02 DIAGNOSIS — R63.4 WEIGHT LOSS: ICD-10-CM

## 2021-11-02 DIAGNOSIS — D68.9 COAGULATION DEFECT (HCC): ICD-10-CM

## 2021-11-02 DIAGNOSIS — R77.9 ABNORMALITY OF PLASMA PROTEIN, UNSPECIFIED: ICD-10-CM

## 2021-11-02 LAB
GLUCOSE BLD-MCNC: 104 MG/DL (ref 70–99)
PERFORMED ON: ABNORMAL

## 2021-11-02 PROCEDURE — 78813 PET IMAGE FULL BODY: CPT

## 2021-11-02 PROCEDURE — 3430000000 HC RX DIAGNOSTIC RADIOPHARMACEUTICAL: Performed by: NURSE PRACTITIONER

## 2021-11-02 PROCEDURE — 82947 ASSAY GLUCOSE BLOOD QUANT: CPT

## 2021-11-02 PROCEDURE — A9552 F18 FDG: HCPCS | Performed by: NURSE PRACTITIONER

## 2021-11-02 RX ORDER — FLUDEOXYGLUCOSE F 18 200 MCI/ML
10 INJECTION, SOLUTION INTRAVENOUS
Status: COMPLETED | OUTPATIENT
Start: 2021-11-02 | End: 2021-11-02

## 2021-11-02 RX ADMIN — FLUDEOXYGLUCOSE F 18 10 MILLICURIE: 200 INJECTION, SOLUTION INTRAVENOUS at 12:42

## 2021-11-04 ENCOUNTER — OFFICE VISIT (OUTPATIENT)
Dept: ONCOLOGY | Facility: CLINIC | Age: 56
End: 2021-11-04

## 2021-11-04 ENCOUNTER — LAB (OUTPATIENT)
Dept: LAB | Facility: HOSPITAL | Age: 56
End: 2021-11-04

## 2021-11-04 VITALS
SYSTOLIC BLOOD PRESSURE: 130 MMHG | DIASTOLIC BLOOD PRESSURE: 72 MMHG | TEMPERATURE: 96.8 F | RESPIRATION RATE: 16 BRPM | HEIGHT: 70 IN | OXYGEN SATURATION: 98 % | BODY MASS INDEX: 26.88 KG/M2 | WEIGHT: 187.8 LBS | HEART RATE: 79 BPM

## 2021-11-04 DIAGNOSIS — E56.1 VITAMIN K DEFICIENCY: Primary | ICD-10-CM

## 2021-11-04 LAB
HOLD SPECIMEN: NORMAL
INR PPP: 1.07 (ref 0.91–1.09)
PROTHROMBIN TIME: 13.5 SECONDS (ref 11.9–14.6)

## 2021-11-04 PROCEDURE — 85610 PROTHROMBIN TIME: CPT

## 2021-11-04 PROCEDURE — 99213 OFFICE O/P EST LOW 20 MIN: CPT | Performed by: INTERNAL MEDICINE

## 2021-11-04 PROCEDURE — 36415 COLL VENOUS BLD VENIPUNCTURE: CPT

## 2021-11-04 NOTE — PROGRESS NOTES
[facility]  [address]  [place] [address]  [phone]       11/04/2021     PROGRESS NOTE     PATIENT NAME: Gregor Zarate  YOB: 1965  56 y.o. MR: [id number]    PCP: Gisele Lucero APRN    HPI:   Mr. Gregor Zarate reports that is feeling well today.  He has not noticed any further evidence of bleeding.  We discussed that because of his prior hematuria, he should be evaluated by urology and he had previously been referred and he agreed that he will discuss with primary care again to make sure that he is evaluated by urology.    We reviewed the history of extremely severe vitamin K deficiency probably provoked by unknown source of vitamin K antagonist but since it now appears to have resolved and that INR has been normal for the last several measurements, we decided to continue follow-up here on a as needed basis.  I advised him that if he notices again evidence of mild abnormal bleeding he should call our office and request INR to be done 1 hour prior to the visit.  Should he develop severe bleeding, he should go to the emergency room instead.    I also reviewed with the patient that he has multiple other medical problems that of course require attention.  He was incidentally noted to have evidence of cirrhosis in 9/2021 while at [place].  He was recently evaluated by gastroenterology at [facility] and I advised him to follow-up on that as well.  He had evidence of mild iron deficiency anemia and because of that also he should follow-up with gastroenterology and if remains with iron deficiency and it is okay with gastroenterology, he may benefit from oral iron replacement until the anemia improves.  PAST HISTORY:     HEME/ONC HISTORY  Oncology/Hematology History Overview Note   HEME/ONC DX/RX/INVESTIGATIONS SUMMARY:   DX:   Severe coagulopathy with bleeding in 9/3/2021 associated vitamin K deficiency  of unknown etiology.  Patient presented with gross bleeding, was airlifted from [facility] to [place]. Dcd from [place] on 9/13/2021. Had recurrence of coagulopathy on 9/16/2021 in our office, received Vit K (see below), last dose on 10/11/2021. Coagulopathy resolved, on 11/4/2021 INR was normal, patient will be seen PRN.    RX:   FFP, vitamin K at [place] in 9/2021.   9/16/2021, I prescribed 5 mg vitamin K p.o. weekly. - patient is not taking it.  Vit K (SQ, in our office): 9/23/2021, 10 mg. 9/24/2021, 20 mg. 9/27/2021, 10mg.   Vit K PO: 5 mg weekly, 1st on 10/3/2021, 2nd on 10/11/2021. Unable to continue it b/o unable to fill rx.    OTHER INFO:   Cirrhosis, asymptomatic, history of hepatitis C infection, cured.  Muscular dystrophy but patient is ambulatory and has mild weakness on extremities, arms weaker than legs, right side weaker than left.    INVESTIGATIONS:    9/4/20201, CT abd/pelvis: 1.  New diffuse urothelial thickening of the bilateral renal collecting systems and left ureter, unclear in etiology and presumably related to an inflammatory or infectious process. No hydronephrosis. Suggest correlation with urine culture and cytology. 2.  Lobulated contour of the liver with fissural widening, suggesting underlying chronic liver disease. No focal liver lesion or findings of portal hypertension.    INR: 9/16/2021 = 1.91. 9/23/2021, = 10.28. 9/24/2021 = 9.9. 9/27/2021 = 2.52. 9/30/2021 = 1.22 10/7/2021 = 1.08. 10/14/2021, INR = 1.0. 11/4/2021, INR = 1.07         MEDICAL HISTORY   has no past medical history on file.   HEALTH MAINTENANCE ITEMS  Health Maintenance Due   Topic Date Due   • COLORECTAL CANCER SCREENING  Never done   • COVID-19 Vaccine (1) Never done   • ANNUAL WELLNESS VISIT  Never done     <no information>  Last Completed Colonoscopy     This patient has no relevant Health Maintenance data.          There is no immunization history on file for this patient.  Last Completed Mammogram      "This patient has no relevant Health Maintenance data.        FAMILY HISTORY  History reviewed. No pertinent family history.    Cancer-related family history is not on file.   SURGICAL HISTORY   has no past surgical history on file.  SOCIAL HISTORY  Social History     Socioeconomic History   • Marital status: Single     MEDICATIONS:     Current Outpatient Medications   Medication Instructions   • busPIRone (BUSPAR) 5 mg, Oral   • diclofenac (VOLTAREN) 50 mg, Oral, 3 Times Daily With Meals, NULL   • Diclofenac Sodium (VOLTAREN) 2 g, Topical   • FLUoxetine (PROZAC) 40 mg, Oral, Daily   • gabapentin (NEURONTIN) 600 MG tablet TAKE 1 TABLET BY MOUTH THREE TIMES DAILY   • ondansetron ODT (ZOFRAN-ODT) 4 mg, Oral   • pantoprazole (PROTONIX) 40 mg, Oral   • phytonadione (MEPHYTON, VITAMIN K) 5 MG tablet Take 1 tablet by mouth once weekly.   • phytonadione (MEPHYTON, VITAMIN K) 5 mg, Oral, Weekly   • pregabalin (LYRICA) 75 mg, Oral   • sucralfate (CARAFATE) 1 g tablet Mix one tablet with 10 ml of water to make liquid suspension, take four times daily (before meals and bedtime).   • tiZANidine (ZANAFLEX) 4 MG tablet Take 1 tablet by mouth three times daily      ALLERGIES:   No Known Allergies  ROS:   Pertinent positive and negative findings as noted in HPI.   PHYSICAL EXAM:   /72   Pulse 79   Temp 96.8 °F (36 °C)   Resp 16   Ht 177.8 cm (70\")   Wt 85.2 kg (187 lb 12.8 oz)   SpO2 98%   BMI 26.95 kg/m²  Body surface area is 2.03 meters squared.   Pain Score    11/04/21 1308   PainSc:   6     Alert, oriented x3, cooperative, not in distress.  HEENT: Normocephalic, wearing a facemask.  Lungs: No tachypnea.   Extremities: No ankle edema.  Neurologic: Moves all extremities, ambulates with a cane.    INVESTIGATIONS/LABS:     Lab Results - Last 18 Months   Lab Units 10/21/21  1302 10/06/21  1058 09/24/21  1029 09/16/21  1138 09/03/21  1218 07/30/21  1446 05/24/21  0101 05/24/21  0101 06/19/20  1542 06/19/20  1542   WBC " 10*3/mm3 7.66 5.9 5.36 5.51 11.2* 7.9   < > 8.5   < > 8.6   HEMOGLOBIN g/dL 10.9* 9.7* 9.6* 9.6* 9.9* 12.9*   < > 13.6*   < > 14.7   HEMATOCRIT % 35.3* 32.1* 32.7* 31.7* 31.9* 40.6*   < > 43.8   < > 43.9   MCV fL 88.5 93.9 92.4 92.2 88.1 93.3   < > 96.3*   < > 95.9*   PLATELETS 10*3/mm3 375 356 419 557* 448* 430*   < > 357   < > 303   IMM GRAN % % 0.3  --  0.4 0.2  --   --   --   --   --   --    NEUTROS ABS 10*3/mm3 4.43  --  3.68 2.49 8.2*  --   --  5.1  --  4.4   LYMPHS ABS 10*3/mm3 2.37  --  1.04 2.26 1.4  --   --  2.3  --  3.3   MONOS ABS 10*3/mm3 0.76  --  0.53 0.60 1.50*  --   --  0.90  --  0.80   EOS ABS 10*3/mm3 0.06  --  0.07 0.11 0.10  --   --  0.20  --  0.10   BASOS ABS 10*3/mm3 0.02  --  0.02 0.04 0.00  --   --  0.00  --  0.00   IMMATURE GRANS (ABS) 10*3/mm3 0.02  --  0.02 0.01 0.0  --   --  0.0  --  0.0   NRBC /100 WBC 0.0  --  0.0 0.0  --   --   --   --   --   --     < > = values in this interval not displayed.       Lab Results - Last 18 Months   Lab Units 10/06/21  1058 09/24/21  1029 09/16/21  1138 09/03/21  1218 07/30/21  1446 05/24/21  0101   BUN mg/dL 14 15 16 15 15 11   CREATININE mg/dL 0.5 0.42* 0.41* 0.5 0.5 0.4*   GLUCOSE mg/dL 76 106* 86 121* 97 112*   SODIUM mmol/L 140 141 140 138 138 136   POTASSIUM mmol/L 4.2 4.2 3.6 3.5 4.6 3.8   TOTAL CO2 mmol/L 29  --   --  27 31* 31*   CO2 mmol/L  --  29.0 27.0  --   --   --    CHLORIDE mmol/L 102 106 103 100 100 99   ANION GAP mmol/L 9 6.0 10.0 11 7 6*   BUN / CREAT RATIO   --  35.7* 39.0*  --   --   --    CALCIUM mg/dL 8.5* 8.5* 8.8 9.3 9.8 9.8   EGFR IF NONAFRICN AM  >60 >150 >150 >60 >60 >60   ALK PHOS U/L 93 105 109 127 117 146*   TOTAL PROTEIN g/dL 6.7 7.0 7.5 7.7 8.2 8.0   ALT (SGPT) U/L 14 16 19 22 18 22   AST (SGOT) U/L 18 25 25 39 19 26   BILIRUBIN mg/dL 0.3 0.2 0.2 0.4 0.3 <0.2   ALBUMIN g/dL 4.5 4.10 4.60 4.2 4.4 4.4   GLOBULIN gm/dL  --  2.9 2.9  --   --   --        Lab Results - Last 18 Months   Lab Units 09/03/21  1910   LDH unit/L 396*        Lab Results - Last 18 Months   Lab Units 10/06/21  1058 09/11/21  0048 09/08/21  1059   IRON ug/dL 28* 264* 76   TIBC ug/dL 448* 391 389   IRON SATURATION %  --  68 20   FERRITIN ng/mL 30.1  --  162   VITAMIN B 12 pg/mL  --   --  400   FOLATE ng/mL  --   --  14.4       Lab Results   Component Value Date    PROTIME 13.5 11/04/2021    INR 1.07 11/04/2021    PTT 27.1 10/07/2021       No radiology results for the last 90 days.    ASSESSMENT:   History of vitamin K deficiency, resolved. The etiology of the vitamin K deficiency was not identified. Inadvertent poisoning with vitamin K antagonist is in the differential diagnosis.    PLAN/RECOMMENDATIONS:   I advised the patient to call us as needed if he develops evidence of mild abnormal bleeding.  Consider ordering INR 1 hour prior to the visit.  If he develops evidence of severe bleeding, he should go to the emergency room instead.    VST PRN.    DX/ORDERS:   Diagnoses and all orders for this visit:    1. Vitamin K deficiency (Primary)      No future appointments.     Leo Foster MD  11/4/2021    cc: No ref. provider found, Gisele Lucero APRN

## 2021-11-10 ENCOUNTER — TELEPHONE (OUTPATIENT)
Dept: GASTROENTEROLOGY | Age: 56
End: 2021-11-10

## 2021-11-10 DIAGNOSIS — R93.2 ABNORMAL CT OF LIVER: Primary | ICD-10-CM

## 2021-11-11 DIAGNOSIS — R93.2 ABNORMAL CT OF LIVER: ICD-10-CM

## 2021-11-13 LAB — ANA IGG, ELISA: NORMAL

## 2021-11-17 ENCOUNTER — OFFICE VISIT (OUTPATIENT)
Dept: PRIMARY CARE CLINIC | Age: 56
End: 2021-11-17
Payer: MEDICARE

## 2021-11-17 ENCOUNTER — OFFICE VISIT (OUTPATIENT)
Dept: GASTROENTEROLOGY | Age: 56
End: 2021-11-17
Payer: MEDICARE

## 2021-11-17 VITALS
BODY MASS INDEX: 24.92 KG/M2 | DIASTOLIC BLOOD PRESSURE: 80 MMHG | HEIGHT: 71 IN | HEART RATE: 99 BPM | SYSTOLIC BLOOD PRESSURE: 120 MMHG | WEIGHT: 178 LBS | OXYGEN SATURATION: 99 %

## 2021-11-17 VITALS
OXYGEN SATURATION: 98 % | HEART RATE: 86 BPM | SYSTOLIC BLOOD PRESSURE: 116 MMHG | RESPIRATION RATE: 18 BRPM | WEIGHT: 179 LBS | BODY MASS INDEX: 25.06 KG/M2 | DIASTOLIC BLOOD PRESSURE: 78 MMHG | HEIGHT: 71 IN | TEMPERATURE: 98.3 F

## 2021-11-17 DIAGNOSIS — D68.4: ICD-10-CM

## 2021-11-17 DIAGNOSIS — M54.50 LUMBAR PAIN: ICD-10-CM

## 2021-11-17 DIAGNOSIS — M54.32 LEFT SCIATIC NERVE PAIN: ICD-10-CM

## 2021-11-17 DIAGNOSIS — G71.00 MD (MUSCULAR DYSTROPHY) (HCC): Primary | ICD-10-CM

## 2021-11-17 DIAGNOSIS — K74.60 CIRRHOSIS OF LIVER WITHOUT ASCITES, UNSPECIFIED HEPATIC CIRRHOSIS TYPE (HCC): Primary | ICD-10-CM

## 2021-11-17 DIAGNOSIS — K74.60 CIRRHOSIS OF LIVER WITHOUT ASCITES, UNSPECIFIED HEPATIC CIRRHOSIS TYPE (HCC): ICD-10-CM

## 2021-11-17 DIAGNOSIS — D68.9 COAGULATION DEFECT (HCC): ICD-10-CM

## 2021-11-17 LAB
HAV IGM SER IA-ACNC: NORMAL
HEPATITIS B CORE IGM ANTIBODY: NORMAL
HEPATITIS B SURFACE ANTIGEN INTERPRETATION: NORMAL

## 2021-11-17 PROCEDURE — G8427 DOCREV CUR MEDS BY ELIG CLIN: HCPCS | Performed by: NURSE PRACTITIONER

## 2021-11-17 PROCEDURE — 3017F COLORECTAL CA SCREEN DOC REV: CPT | Performed by: NURSE PRACTITIONER

## 2021-11-17 PROCEDURE — G8420 CALC BMI NORM PARAMETERS: HCPCS | Performed by: NURSE PRACTITIONER

## 2021-11-17 PROCEDURE — G8484 FLU IMMUNIZE NO ADMIN: HCPCS | Performed by: NURSE PRACTITIONER

## 2021-11-17 PROCEDURE — 99214 OFFICE O/P EST MOD 30 MIN: CPT | Performed by: NURSE PRACTITIONER

## 2021-11-17 PROCEDURE — 1036F TOBACCO NON-USER: CPT | Performed by: NURSE PRACTITIONER

## 2021-11-17 RX ORDER — PREGABALIN 75 MG/1
75 CAPSULE ORAL 3 TIMES DAILY
Qty: 90 CAPSULE | Refills: 0 | Status: SHIPPED | OUTPATIENT
Start: 2021-11-17 | End: 2021-12-15 | Stop reason: SDUPTHER

## 2021-11-17 ASSESSMENT — ENCOUNTER SYMPTOMS
SHORTNESS OF BREATH: 0
VOMITING: 0
NAUSEA: 0
COUGH: 0
TROUBLE SWALLOWING: 0
DIARRHEA: 0
CONSTIPATION: 0
ABDOMINAL DISTENTION: 0
SORE THROAT: 0
GASTROINTESTINAL NEGATIVE: 1
BACK PAIN: 1
BLOOD IN STOOL: 0
EYES NEGATIVE: 1
BACK PAIN: 1
RESPIRATORY NEGATIVE: 1
RECTAL PAIN: 0
VOICE CHANGE: 0
ANAL BLEEDING: 0
ABDOMINAL PAIN: 0

## 2021-11-17 NOTE — PROGRESS NOTES
Subjective:      Atiya Martinez is a63 y.o. male  Chief Complaint   Patient presents with    Follow-up       HPI  PCP: ANTHONY Mittal  Pt made a f/u appt. To discuss results of labs and imaging. He was recently dx with cirrhosis of the liver at [facility]. Full panel of labs performed to evaluate possible etiology for the cirrhosis. Results of labs, US, and elastography in epic. No indication of autoimmune, hereditary, or viral hepatitis noted  He has hx of hepatitis C that has been cured with antiviral therapy. Liver US notes no liver lesion  Elastography confirms F4 fibrosis. AFP normal.  MELD is 7. All the labs I ordered were not completed, will re-order hep A and B labs today and provide him with Twinrix immunization RX if needed. Egd/colon current  No varices noted on recent EGD. Reports rare ETOH use, reports he only drinks when he runs out of pain meds    He has no GI complaints today    Family HX:    Pt denies family hx of colon polyps, colon CA, inflammatory bowel dx, gastric CA and esophageal CA.     Past Medical History:   Diagnosis Date    Anxiety     Chronic back pain     Depression     MD (muscular dystrophy) ([facility] [address]     Neuropathy 12/02/2020    Scoliosis           Past Surgical History:   Procedure Laterality Date    CHOLECYSTECTOMY, LAPAROSCOPIC N/A 04/27/2021    LAPAROSCOPIC CHOLECYSTECTOMY performed by Branden Garcia DO at [address] [place] [address] COLONOSCOPY  01/08/2016    Dr Laine Marks, normal, 10 yr recall    COLONOSCOPY N/A 06/30/2021    Dr Celina Daugherty hemorrhids, RANDOM COLON BX NEGATIVE, Sub Prep Fair, 3 year recall    FOOT SURGERY Left     UPPER GASTROINTESTINAL ENDOSCOPY N/A 06/30/2021    Dr Windy Andre, duodenal villous blunting otherwise normal EGD; serum celiac panel NEG for celiac sprue    UPPER GASTROINTESTINAL ENDOSCOPY N/A 08/10/2021    Dr Windy Andre- mild gastritis, 2 small duodenal ulcers and 1 large cratered 3-4cm in posterior duodenal bulb, NEG h pylori Social History     Socioeconomic History    Marital status: Single     Spouse name: None    Number of children: None    Years of education: None    Highest education level: None   Occupational History    None   Tobacco Use    Smoking status: Former Smoker     Years: 30.00     Types: Cigarettes     Quit date: 2021     Years since quittin.3    Smokeless tobacco: Never Used   Vaping Use    Vaping Use: Never used   Substance and Sexual Activity    Alcohol use: Yes     Comment: occas    Drug use: No    Sexual activity: None   Other Topics Concern    None   Social History Narrative    None     Social Determinants of Health     Financial Resource Strain: Low Risk     Difficulty of Paying Living Expenses: Not hard at all   Food Insecurity: No Food Insecurity    Worried About Running Out of Food in the Last Year: Never true    Rebecca of Food in the Last Year: Never true   Transportation Needs:     Lack of Transportation (Medical): Not on file    Lack of Transportation (Non-Medical):  Not on file   Physical Activity:     Days of Exercise per Week: Not on file    Minutes of Exercise per Session: Not on file   Stress:     Feeling of Stress : Not on file   Social Connections:     Frequency of Communication with Friends and Family: Not on file    Frequency of Social Gatherings with Friends and Family: Not on file    Attends Voodoo Services: Not on file    Active Member of [address] [place] [address] or Organizations: Not on file    Attends Club or Organization Meetings: Not on file    Marital Status: Not on file   Intimate Partner Violence:     Fear of Current or Ex-Partner: Not on file    Emotionally Abused: Not on file    Physically Abused: Not on file    Sexually Abused: Not on file   Housing Stability:     Unable to Pay for Housing in the Last Year: Not on file    Number of [place] in the Last Year: Not on file    Unstable Housing in the Last Year: Not on file       Allergies   Allergen pain. Negative for arthralgias. Neurological: Negative for dizziness, weakness, light-headedness and headaches. Psychiatric/Behavioral: Positive for dysphoric mood. Negative for sleep disturbance. The patient is nervous/anxious. All other systems reviewed and are negative. Objective:     Physical Exam  Vitals and nursing note reviewed. Constitutional:       Appearance: He is well-developed. Comments: /80 (Site: Left Upper Arm)   Pulse 99   Ht 5' 11\" (1.803 m)   Wt 178 lb (80.7 kg)   SpO2 99%   BMI 24.83 kg/m²    Eyes:      General: No scleral icterus. Conjunctiva/sclera: Conjunctivae normal.      Pupils: Pupils are equal, round, and reactive to light. Neck:      Thyroid: No thyromegaly. Cardiovascular:      Rate and Rhythm: Normal rate and regular rhythm. Heart sounds: Normal heart sounds. No murmur heard. No friction rub. No gallop. Pulmonary:      Effort: Pulmonary effort is normal. No respiratory distress. Breath sounds: Normal breath sounds. Abdominal:      General: Bowel sounds are normal. There is no distension. Palpations: Abdomen is soft. Tenderness: There is no abdominal tenderness. There is no rebound. Musculoskeletal:         General: No deformity. Normal range of motion. Cervical back: Normal range of motion and neck supple. Skin:     Coloration: Skin is not pale. Neurological:      Mental Status: He is alert and oriented to person, place, and time. Cranial Nerves: No cranial nerve deficit. Psychiatric:         Judgment: Judgment normal.           Assessment:       Diagnosis Orders   1. Cirrhosis of liver without ascites, unspecified hepatic cirrhosis type (HCC)  Hepatitis A Antibody, Total    Hepatitis B Core Antibody, Total    Hepatitis B Core Antibody, IgM    Hepatitis B Surface Antigen    Hepatitis A Antibody, IgM    Hepatitis B Surface Antibody         Plan:      1.  Had a long conversation about the repercussions of continued ETOH use including decompensation of his cirrhosis and death, he understands  2. Labs today-will call with results and mail RX for twinrix to him if needed  3. F/u in 6 months for cirrhosis with labs/US prior  4.  Time spent 30 min

## 2021-11-17 NOTE — PATIENT INSTRUCTIONS
Patient Education        Cirrhosis: Care Instructions  Overview     Cirrhosis occurs when healthy tissue in your liver gets scarred. This keeps the liver from working well. It usually happens after a liver has been inflamed for years. Cirrhosis is most often caused by alcohol use disorder or hepatitis infection. But there are other causes too. These include medicines and too much fat in the liver. Conditions passed down in families and other disorders can also cause it. In some cases, no cause can be found. Treatment can't completely fix liver damage. But you may be able to slow or prevent more damage if you don't drink alcohol or take medicines, drugs, or supplements that harm your liver. Follow-up care is a key part of your treatment and safety. Be sure to make and go to all appointments, and call your doctor if you are having problems. It's also a good idea to know your test results and keep a list of the medicines you take. How can you care for yourself at home? · Do not drink any alcohol. It can harm your liver. Talk to your doctor if you need help to stop drinking. · Be safe with medicines. Take your medicines exactly as prescribed. Call your doctor if you think you are having a problem with your medicine. · Talk to your doctor before you take any other medicines. These include over-the-counter medicines and herbal products. · Be careful taking acetaminophen (Tylenol), ibuprofen (Advil, Motrin), or naproxen (Aleve). These can sometimes cause more liver damage. Talk with your doctor if you're not sure which medicines are safe. · If your cirrhosis causes extra fluid to build up in your body, try not to eat a lot of salt. Use less salt when you cook and at the table. Don't eat fast foods or snack foods with a lot of salt. Extra fluid in your belly, legs, and chest can cause serious problems.   · Work with your doctor or a dietitian to be sure you eat the right amount of carbohydrate, protein, fat, and sodium (salt). It's very important to choose the best foods for the health of your liver. · If your doctor recommends it, limit how much fluid you drink. · If your doctor recommends it, get more exercise. Walking is a good choice. Bit by bit, increase the amount you walk every day. Try for at least 30 minutes on most days of the week. You also may want to swim, bike, or do other activities. When should you call for help? Call 911 anytime you think you may need emergency care. For example, call if:    · You have trouble breathing.     · You vomit blood or what looks like coffee grounds. Call your doctor now or seek immediate medical care if:    · You feel very sleepy or confused.     · You have new or worse belly pain.     · You have a fever.     · There is a new or increasing yellow tint to your skin or the whites of your eyes.     · You have any abnormal bleeding, such as:  ? Nosebleeds. ? Vaginal bleeding that is different (heavier, more frequent, at a different time of the month) than what you are used to.  ? Bloody or black stools, or rectal bleeding. ? Bloody or pink urine. Watch closely for changes in your health, and be sure to contact your doctor if:    · You have any problems.     · Your belly is getting bigger.     · You are gaining weight. Where can you learn more? Go to [email or web address] org and sign in to your orderTopia account. Enter M412 in the [facility] box to learn more about \"Cirrhosis: Care Instructions. \"     If you do not have an account, please click on the \"Sign Up Now\" link. Current as of: February 10, 2021               Content Version: 13.0  © 3001-9238 Healthwise, Incorporated. Care instructions adapted under license by [facility] [place] ([facility]). If you have questions about a medical condition or this instruction, always ask your healthcare professional. [address] any warranty or liability for your use of this information.

## 2021-11-17 NOTE — PROGRESS NOTES
[address] [facility] CARE  [address] [address]  [address] [address]  Dept: [phone]  Dept Fax: [phone]  Loc: [phone]    Kodi Hester is a 61 y.o. male who presents today for his medical conditions/complaints as noted below. Kodi Hester is c/o of Pain      Chief Complaint   Patient presents with    Pain       HPI:     HPI   Patient returns for 3 week follow up to discuss pain medication changes. Pt reports that the dosage increase is better than the last dosage he was on but reports it is still not working that great to manage his pain. Pt reports no other concerns to address today. Patient has had follow up with hematology for vit K deficiency as well. Labs have been stable recently. He is not sleeping well at this time. He is constantly worrying.       Past Medical History:   Diagnosis Date    Anxiety     Chronic back pain     Depression     MD [facility]muscular dystrophy) ([facility] [address]     Neuropathy 2020    Scoliosis         Past Surgical History:   Procedure Laterality Date    CHOLECYSTECTOMY, LAPAROSCOPIC N/A 2021    LAPAROSCOPIC CHOLECYSTECTOMY performed by Evelyn Chopra DO at [address] [place] [address] COLONOSCOPY  2016    Dr Jess Mcfarlane, normal, 10 yr recall    COLONOSCOPY N/A 2021    Dr Cope [place] hemorrhids, RANDOM COLON BX NEGATIVE, Sub Prep Fair, 3 year recall    FOOT SURGERY Left     UPPER GASTROINTESTINAL ENDOSCOPY N/A 2021    Dr Nick Hayden, duodenal villous blunting otherwise normal EGD; serum celiac panel NEG for celiac sprue    UPPER GASTROINTESTINAL ENDOSCOPY N/A 08/10/2021    Dr Nick Hayden- mild gastritis, 2 small duodenal ulcers and 1 large cratered 3-4cm in posterior duodenal bulb, NEG h pylori       Social History     Tobacco Use    Smoking status: Former Smoker     Years: 30.00     Types: Cigarettes     Quit date: 2021     Years since quittin.3    Smokeless tobacco: Never Used   Substance Use Subjective:      Nai Tavarez is a 62 y.o. female who presents with Results (labs)      HPI:  The patient is a 62 y.o. Female who presents for a follow up on her recent lab results.     She has not been seen since 2017. Her blood pressure today is elevated at 160/70. Repeat blood pressure is 160/90. She has not been checking her blood pressure at home and does not know how long it has been elevated. She is not experiencing headaches, lightheadedness, vertigo, chest pains, palpitations, leg edema or vision changes.  She has not been treated for hypertension in the past.    Her dyslipidemia is managed with simvastatin 10mg daily. She states that she has been unable to exercise due to chronic midline low back pain, which has caused her cholesterol to elevate. She has tried physical therapy, chiropractic, and has been followed by [facility].  She had an epidural shot in December. She did not experience significant relief from any of these treatments. She has a follow up appointment with [facility] next week. Her epidural was about 1 month ago, and it was her first injection. Her pain does not shoot down her legs or cause saddle anesthesia.  X-ray of the lumbarspine showed grade 1 anterolisthesis L4 and L5, degenerative changes, facet arthropathy.  MRI of the lumbar spine showed multilevel degenerative changes, no significant central canal narrowing and mild to moderate bilateral neural foraminal stenosis at L4-L5.    The patient states that her sister has been taking topiramate and phentermine for weight loss, and it has helped her lose 46lbs in 7 months. The patient is inquiring about taking these medications to aid her weight loss. She has gained 8lbs since her last visit.     Her hypothyroidism is managed on levothyroxine 75mcg daily.    We follow her for impaired fasting blood sugar which she has been managing with her own efforts.  She said it was hard the last few months because of the holidays.  She  Topics    Alcohol use: Yes     Comment: occas        Current Outpatient Medications   Medication Sig Dispense Refill    pregabalin (LYRICA) 75 MG capsule Take 1 capsule by mouth 3 times daily for 30 days. 90 capsule 0    tiZANidine (ZANAFLEX) 4 MG tablet TAKE 1 TABLET BY MOUTH THREE TIMES DAILY 90 tablet 0    oxyCODONE-acetaminophen (PERCOCET) 7.5-325 MG per tablet Take 1 tablet by mouth every 8 hours as needed for Pain for up to 30 days. Intended supply: 30 days 90 tablet 0    pantoprazole (PROTONIX) 40 MG tablet TAKE 1 TABLET BY MOUTH TWICE DAILY BEFORE MEAL(S) 60 tablet 0    busPIRone (BUSPAR) 10 MG tablet Take 1 tablet by mouth 3 times daily 90 tablet 3    escitalopram (LEXAPRO) 10 MG tablet Take 1 tablet by mouth daily 30 tablet 3    triamcinolone (KENALOG) 0.025 % cream Apply topically 2 times daily. (Patient taking differently: as needed Apply topically 2 times daily.) 80 g 1    diclofenac sodium (VOLTAREN) 1 % GEL Apply 2 g topically 4 times daily 2 Tube 1    amitriptyline (ELAVIL) 50 MG tablet TAKE ONE TABLET BY MOUTH IN THE EVENING 30 tablet 11     No current facility-administered medications for this visit. Allergies   Allergen Reactions    Shellfish-Derived Products Anaphylaxis       Family History   Problem Relation Age of Onset    Diabetes Mother     Colon Cancer Neg Hx     Colon Polyps Neg Hx     Esophageal Cancer Neg Hx     Liver Cancer Neg Hx     Liver Disease Neg Hx     Rectal Cancer Neg Hx     Stomach Cancer Neg Hx                Subjective:      Review of Systems   Constitutional: Positive for fatigue. HENT: Negative. Eyes: Negative. Respiratory: Negative. Cardiovascular: Negative. Gastrointestinal: Negative. Endocrine: Negative. Genitourinary: Negative. Musculoskeletal: Positive for arthralgias, back pain and myalgias. Skin: Negative. Neurological: Negative. Hematological: Negative. Psychiatric/Behavioral: Negative.         Objective: notes that she had an eye exam 1 month ago and there were no signs of diabetes at that time.     She continues to smoke 1/2 a pack daily, and does not want to quit at this time.         Past medical history, past surgical history, family history reviewed-no changes    Social history reviewed-no changes    Allergies reviewed-no changes    Medications reviewed-no changes      ROS:  As per the HPI as shown above, the rest are negative.       Objective:     /70 (BP Location: Left arm, Patient Position: Sitting, BP Cuff Size: Adult)   Pulse (!) 106   Temp 36.6 °C (97.9 °F) (Temporal)   Wt 102.1 kg (225 lb 1.4 oz)   SpO2 96%   BMI 38.64 kg/m²     Physical Exam  Examined alert, awake, oriented, not in distress    Neck-supple, no lymphadenopathy, no thyromegaly  Lungs-clear to auscultation, no rales, no wheezes  Heart-regular rate and rhythm, no murmur  Extremities-no edema, clubbing, cyanosis  Back exam-no pinpoint tenderness spinous processes lumbosacral spine, mild tenderness left lumbar paravertebral muscles without any spasm  Neuro exam-motor strength 5/5 lower extremity flexors and extensors, DTRs 2+, lower extremities, sensation intact to light touch lower extremities       Labs:  No visits with results within 1 Week(s) from this visit.   Latest known visit with results is:   Hospital Outpatient Visit on 01/21/2019   Component Date Value Ref Range Status   • Cholesterol,Tot 01/21/2019 134  100 - 199 mg/dL Final   • Triglycerides 01/21/2019 112  0 - 149 mg/dL Final   • HDL 01/21/2019 37* >=40 mg/dL Final   • LDL 01/21/2019 75  <100 mg/dL Final   • Sodium 01/21/2019 140  135 - 145 mmol/L Final   • Potassium 01/21/2019 4.5  3.6 - 5.5 mmol/L Final   • Chloride 01/21/2019 103  96 - 112 mmol/L Final   • Co2 01/21/2019 27  20 - 33 mmol/L Final   • Anion Gap 01/21/2019 10.0  0.0 - 11.9 Final   • Glucose 01/21/2019 174* 65 - 99 mg/dL Final   • Bun 01/21/2019 14  8 - 22 mg/dL Final   • Creatinine 01/21/2019 1.19   0.50 - 1.40 mg/dL Final   • Calcium 01/21/2019 10.0  8.5 - 10.5 mg/dL Final   • AST(SGOT) 01/21/2019 20  12 - 45 U/L Final   • ALT(SGPT) 01/21/2019 26  2 - 50 U/L Final   • Alkaline Phosphatase 01/21/2019 65  30 - 99 U/L Final   • Total Bilirubin 01/21/2019 1.2  0.1 - 1.5 mg/dL Final   • Albumin 01/21/2019 4.5  3.2 - 4.9 g/dL Final   • Total Protein 01/21/2019 7.2  6.0 - 8.2 g/dL Final   • Globulin 01/21/2019 2.7  1.9 - 3.5 g/dL Final   • A-G Ratio 01/21/2019 1.7  g/dL Final   • WBC 01/21/2019 10.7  4.8 - 10.8 K/uL Final   • RBC 01/21/2019 4.98  4.20 - 5.40 M/uL Final   • Hemoglobin 01/21/2019 16.1* 12.0 - 16.0 g/dL Final   • Hematocrit 01/21/2019 49.1* 37.0 - 47.0 % Final   • MCV 01/21/2019 98.6* 81.4 - 97.8 fL Final   • MCH 01/21/2019 32.3  27.0 - 33.0 pg Final   • MCHC 01/21/2019 32.8* 33.6 - 35.0 g/dL Final   • RDW 01/21/2019 48.1  35.9 - 50.0 fL Final   • Platelet Count 01/21/2019 306  164 - 446 K/uL Final   • MPV 01/21/2019 10.5  9.0 - 12.9 fL Final   • Neutrophils-Polys 01/21/2019 61.50  44.00 - 72.00 % Final   • Lymphocytes 01/21/2019 29.00  22.00 - 41.00 % Final   • Monocytes 01/21/2019 6.70  0.00 - 13.40 % Final   • Eosinophils 01/21/2019 2.00  0.00 - 6.90 % Final   • Basophils 01/21/2019 0.50  0.00 - 1.80 % Final   • Immature Granulocytes 01/21/2019 0.30  0.00 - 0.90 % Final   • Nucleated RBC 01/21/2019 0.00  /100 WBC Final   • Neutrophils (Absolute) 01/21/2019 6.59  2.00 - 7.15 K/uL Final    Includes immature neutrophils, if present.   • Lymphs (Absolute) 01/21/2019 3.11  1.00 - 4.80 K/uL Final   • Monos (Absolute) 01/21/2019 0.72  0.00 - 0.85 K/uL Final   • Eos (Absolute) 01/21/2019 0.21  0.00 - 0.51 K/uL Final   • Baso (Absolute) 01/21/2019 0.05  0.00 - 0.12 K/uL Final   • Immature Granulocytes (abs) 01/21/2019 0.03  0.00 - 0.11 K/uL Final   • NRBC (Absolute) 01/21/2019 0.00  K/uL Final   • 25-Hydroxy   Vitamin D 25 01/21/2019 75  30 - 100 ng/mL Final    Comment: Adult Ranges:   <20 ng/mL -  Physical Exam  Vitals and nursing note reviewed. Constitutional:       Appearance: Normal appearance. HENT:      Head: Normocephalic and atraumatic. Right Ear: Hearing, tympanic membrane, ear canal and external ear normal.      Left Ear: Hearing, tympanic membrane, ear canal and external ear normal.      Nose: Nose normal.      Mouth/Throat:      Lips: Pink. Mouth: Mucous membranes are moist.      Pharynx: Oropharynx is clear. Eyes:      General: Lids are normal.      Extraocular Movements: Extraocular movements intact. Conjunctiva/sclera: Conjunctivae normal.      Pupils: Pupils are equal, round, and reactive to light. Neck:      Thyroid: No thyromegaly. Cardiovascular:      Rate and Rhythm: Normal rate and regular rhythm. Pulses: Normal pulses. Dorsalis pedis pulses are 2+ on the right side and 2+ on the left side. Posterior tibial pulses are 2+ on the right side and 2+ on the left side. Heart sounds: Normal heart sounds. Pulmonary:      Effort: Pulmonary effort is normal.      Breath sounds: Normal breath sounds and air entry. Abdominal:      General: Bowel sounds are normal.      Palpations: Abdomen is soft. Musculoskeletal:      Cervical back: Full passive range of motion without pain, normal range of motion and neck supple. Thoracic back: Deformity present. No tenderness. Decreased range of motion. Lumbar back: Deformity present. No tenderness. Decreased range of motion. Back:    Lymphadenopathy:      Cervical: No cervical adenopathy. Skin:     General: Skin is warm and dry. Capillary Refill: Capillary refill takes less than 2 seconds. Neurological:      General: No focal deficit present. Mental Status: He is alert and oriented to person, place, and time. Mental status is at baseline. Coordination: Coordination is intact.    Psychiatric:         Mood and Affect: Mood normal.         Speech: Speech normal. Behavior: Behavior normal.         Thought Content: Thought content normal.         Cognition and Memory: Cognition and memory normal.         Judgment: Judgment normal.         /78   Pulse 86   Temp 98.3 °F (36.8 °C) (Temporal)   Resp 18   Ht 5' 11\" (1.803 m)   Wt 179 lb (81.2 kg)   SpO2 98%   BMI 24.97 kg/m²     Assessment:      Diagnosis Orders   1. MD (muscular dystrophy) ([facility] [address]  pregabalin (LYRICA) 75 MG capsule   2. Coagulation defect ([address]     3. Vitamin K deficiency coagulation disorder ([facility] [address]     4. Lumbar pain  pregabalin (LYRICA) 75 MG capsule   5. Left sciatic nerve pain  pregabalin (LYRICA) 75 MG capsule       No results found for this visit on 11/17/21. Plan:     Referral to Pain management for better pain control. Will continue Percocet 7.5 mg at this time. Patient is to continue treatment with hematology for vitamin K deficiency. We did discuss the referral to pain management that if they want to do injections he is to avoid these due to his vitamin K deficiency. Starting patient on Lyrica to see if this will help with his pain as well. 75 mg 3 times daily. More than 50% of the time was spent counseling and coordinating care for a total time of 34 mins face to face. Return in about 4 weeks (around 12/15/2021) for Lyrica follow up. No orders of the defined types were placed in this encounter. Orders Placed This Encounter   Medications    pregabalin (LYRICA) 75 MG capsule     Sig: Take 1 capsule by mouth 3 times daily for 30 days. Dispense:  90 capsule     Refill:  0            Patient offered educational handouts and has had all questions answered. Patient voices understanding and agrees to plans along with risks and benefits of plan. Patient is instructed to continue prior meds, diet, and exercise plans as instructed. Patient agrees to follow up as instructed and sooner if needed. Patient agrees to go to ER if condition becomes emergent.       EMR Deficiency  20-29 ng/mL - Insufficiency   ng/mL - Sufficiency  The [employer] Centaur Vitamin D Assay is standardized to the  [employer] reference measurement procedures, a  reference method for the Vitamin D Standardization Program  (VDSP).  The VDSP aligns patient results among 25 (OH)  Vitamin D methods.     • TSH 01/21/2019 3.730  0.380 - 5.330 uIU/mL Final    Comment: Please note new reference ranges effective 12/14/2017 10:00 AM  Pregnant Females, 1st Trimester  0.050-3.700  Pregnant Females, 2nd Trimester  0.310-4.350  Pregnant Females, 3rd Trimester  0.410-5.180     • Glycohemoglobin 01/21/2019 7.9* 0.0 - 5.6 % Final    Comment: Increased risk for diabetes:  5.7 -6.4%  Diabetes:  >6.4%  Glycemic control for adults with diabetes:  <7.0%  The above interpretations are per ADA guidelines.  Diagnosis  of diabetes mellitus on the basis of elevated Hemoglobin A1c  should be confirmed by repeating the Hb A1c test.     • Est Avg Glucose 01/21/2019 180  mg/dL Final    Comment: The eAG calculation is based on the A1c-Derived Daily Glucose  (ADAG) study.  See the ADA's website for additional information.     • Fasting Status 01/21/2019 Fasting   Final   • GFR If  01/21/2019 55* >60 mL/min/1.73 m 2 Final   • GFR If Non  01/21/2019 46* >60 mL/min/1.73 m 2 Final        Assessment/Plan:   1. Chronic midline low back pain without sciatica  The patients chronic back pain is unchanging. I informed her that she can have up to 3 epidurals before needing an ablation. She will follow up with [facility] next week as scheduled to discuss further treatment options.    2. Hypothyroidism, unspecified type  Well controlled on levothyroxine. Continue taking as directed.   - levothyroxine (SYNTHROID) 75 MCG Tab; TAKE 1 TABLET BY MOUTH EVERYDAY. LAB WORK & APPOINTMENT WITH DOCTOR REQUIRED.  Dispense: 30 Tab; Refill: 5    3. Dyslipidemia  Her HDL has improved and closer to goal.  She has a hard time  Dragon/transcription disclaimer: Some of this encounter note is an electronic transcription/translation of spoken language to printed text. The electronic translation of spoken language may permit erroneous, or at times, nonsensical words or phrases to be inadvertently transcribed.  Although I have reviewed the note for such errors, some may still exist.    Electronically signed by ANTHONY Cobb on 11/18/2021 at 7:16 AM with exercise because of her back pain but she will try her best to increase her activity.. Her total cholesterol and LDL is within normal limits. Continue taking simvastatin as directed.  - Lipid Profile; Future  - COMP METABOLIC PANEL; Future  - HEMOGLOBIN A1C; Future  - MICROALBUMIN CREAT RATIO URINE; Future  - simvastatin (ZOCOR) 10 MG Tab; TAKE 1 TABLET BY MOUTH EVERYDAY AT BEDTIME NEED APPOINTMET FOR REFILLS  Dispense: 30 Tab; Refill: 5    4. Uncontrolled diabetes mellitus type 2 without complications (HCC)  Weight has increased by 8lbs. her hemoglobin A1c has increased from 6.4-7.9 consistent with uncontrolled diabetes. I discussed that her diabetes is out of control, and she needs to begin taking medication to control it. The patient states she wants to avoid taking medication as much as possible, and wants to try controlling it with diet and exercise. She has already cut back on carbohydrate and soda intake. However, she is unable to exercise due to her back pain. She is agreeable to the diabetes and weight loss clinics. If in 3 months her diabetes has not improved, she will begin taking medication.  I have provided her with a diabetes diet guide for her to follow until she can go to the diabetes clinic.   - REFERRAL TO DIABETIC EDUCATION Diabetes Self Management Education / Training (DSME/T) and Medical Nutrition Therapy (MNT): Initial Group DSME/MNT as authorized by payor, Follow-Up DSME/MNT as authorized by payor; DSME/T Content: Monitoring Diabete...  - Lipid Profile; Future  - COMP METABOLIC PANEL; Future  - HEMOGLOBIN A1C; Future  - MICROALBUMIN CREAT RATIO URINE; Future    5. Essential hypertension  The patient's blood pressure is elevated today at 160/90. I explained the risks of untreated hypertension, including stroke, heart disease, and heart attack. She is agreeable to starting antihypertensive medication. I will begin her on lisinopril.  This would also afford renal protection from the  diabetes.  I discussed common side effects, including dry itchy cough, and told her to contact me if this occurs. I additionally advised her to begin taking 81mg baby aspirin to further reduce the risk of stroke and heart disease. She does own a blood pressure machine and will begin checking her pressures daily. She will bring the records and the machine with her to her next appointment.   - lisinopril (PRINIVIL) 10 MG Tab; Take 1 Tab by mouth every day.  Dispense: 30 Tab; Refill: 5    6. BMI 38.0-38.9,adult  She wants help with regards to losing weight.  She asked about the phentermine topiramate combination that her sister is taking.  Explained to the patient that the best way is to refer her to our [facility] program for physician managed weight loss.  This is a comprehensive program that will work with her in terms of exercise, low calorie intake and possibly weight loss medication if needed.  She agrees to proceed and referral was placed.  Patient's body mass index is 38.64 kg/m². Exercise and nutrition counseling were performed at this visit.  - REFERRAL TO [facility] IMPROVEMENT PROGRAMS (HIP) Services Requested: Physician Medical Weight Management Program; Reason for Referral? BMI>30; Reason for Visit: Overweight/Obesity    7. Screening for breast cancer  She is overdue for her mammogram. She will complete it at her convenience.   - MA-SCREEN MAMMO W/CAD-BILAT; Future      Follow up with me in 3 months.       Ila VIVAS (Scribe), am scribing for, and in the presence of, Priscilla Hanson MD     Electronically signed by: Ila Burden (Scribe), 1/28/2019    IPriscilla MD personally performed the services described in this documentation, as scribed by Ila Burden in my presence, and it is both accurate and complete.

## 2021-11-18 ENCOUNTER — TELEPHONE (OUTPATIENT)
Dept: GASTROENTEROLOGY | Age: 56
End: 2021-11-18

## 2021-11-18 LAB — HBV SURFACE AB TITR SER: NORMAL {TITER}

## 2021-11-18 NOTE — TELEPHONE ENCOUNTER
I have reviewed his labs. He is not immune to hepatitis A or B  He needs the [facility] immunization. Please have him call his PCP to see if they have this there, if not he needs to get this from his local health dept.  thanks

## 2021-11-18 NOTE — TELEPHONE ENCOUNTER
Pt has been notified of results and recommendations. Pt voiced understanding and will call us back when he gets the first dose.

## 2021-11-19 DIAGNOSIS — G71.00 MD (MUSCULAR DYSTROPHY) (HCC): ICD-10-CM

## 2021-11-19 LAB — HEPATITIS B CORE TOTAL ANTIBODY: NEGATIVE

## 2021-11-20 NOTE — TELEPHONE ENCOUNTER
Last UDS was before the Percocet was started. So it was negative. Will need to have UDS at follow up appointment. Patient had been in the ER and received Benzo as well. So UDS was appropriate or expected.
this medication.    Kalia Limon, LPN

## 2021-11-23 RX ORDER — OXYCODONE AND ACETAMINOPHEN 7.5; 325 MG/1; MG/1
1 TABLET ORAL EVERY 8 HOURS PRN
Qty: 90 TABLET | Refills: 0 | Status: SHIPPED | OUTPATIENT
Start: 2021-11-24 | End: 2021-12-17 | Stop reason: SDUPTHER

## 2021-11-24 DIAGNOSIS — K29.90 GASTRITIS AND DUODENITIS: ICD-10-CM

## 2021-11-24 RX ORDER — PANTOPRAZOLE SODIUM 40 MG/1
TABLET, DELAYED RELEASE ORAL
Qty: 60 TABLET | Refills: 2 | Status: SHIPPED | OUTPATIENT
Start: 2021-11-24 | End: 2022-03-21

## 2021-11-24 NOTE — TELEPHONE ENCOUNTER
Kandice Morrison called to request a refill on his medication.       Last office visit : 11/17/2021   Next office visit : 12/15/2021     Requested Prescriptions     Signed Prescriptions Disp Refills    pantoprazole (PROTONIX) 40 MG tablet 60 tablet 2     Sig: TAKE 1 TABLET BY MOUTH TWICE DAILY BEFORE MEAL(S)     Authorizing Provider: Ayaz Hardin     Ordering User: Chepe Sanchez MA

## 2021-12-15 ENCOUNTER — OFFICE VISIT (OUTPATIENT)
Dept: PRIMARY CARE CLINIC | Age: 56
End: 2021-12-15
Payer: MEDICARE

## 2021-12-15 VITALS
HEART RATE: 102 BPM | TEMPERATURE: 97.9 F | HEIGHT: 71 IN | WEIGHT: 182 LBS | DIASTOLIC BLOOD PRESSURE: 78 MMHG | OXYGEN SATURATION: 97 % | SYSTOLIC BLOOD PRESSURE: 140 MMHG | BODY MASS INDEX: 25.48 KG/M2

## 2021-12-15 DIAGNOSIS — M54.32 LEFT SCIATIC NERVE PAIN: ICD-10-CM

## 2021-12-15 DIAGNOSIS — G71.00 MD (MUSCULAR DYSTROPHY) (HCC): ICD-10-CM

## 2021-12-15 DIAGNOSIS — Z02.83 ENCOUNTER FOR DRUG SCREENING: ICD-10-CM

## 2021-12-15 DIAGNOSIS — M70.22 OLECRANON BURSITIS OF LEFT ELBOW: ICD-10-CM

## 2021-12-15 DIAGNOSIS — Z23 NEED FOR COVID-19 VACCINE: ICD-10-CM

## 2021-12-15 DIAGNOSIS — M54.50 LUMBAR PAIN: Primary | ICD-10-CM

## 2021-12-15 PROCEDURE — 90471 IMMUNIZATION ADMIN: CPT | Performed by: NURSE PRACTITIONER

## 2021-12-15 PROCEDURE — 1036F TOBACCO NON-USER: CPT | Performed by: NURSE PRACTITIONER

## 2021-12-15 PROCEDURE — 3017F COLORECTAL CA SCREEN DOC REV: CPT | Performed by: NURSE PRACTITIONER

## 2021-12-15 PROCEDURE — 91300 COVID-19, PFIZER VACCINE 30MCG/0.3ML DOSE: CPT | Performed by: NURSE PRACTITIONER

## 2021-12-15 PROCEDURE — 99214 OFFICE O/P EST MOD 30 MIN: CPT | Performed by: NURSE PRACTITIONER

## 2021-12-15 PROCEDURE — 90633 HEPA VACC PED/ADOL 2 DOSE IM: CPT | Performed by: NURSE PRACTITIONER

## 2021-12-15 PROCEDURE — 0001A COVID-19, PFIZER VACCINE 30MCG/0.3ML DOSE: CPT | Performed by: NURSE PRACTITIONER

## 2021-12-15 PROCEDURE — G8419 CALC BMI OUT NRM PARAM NOF/U: HCPCS | Performed by: NURSE PRACTITIONER

## 2021-12-15 PROCEDURE — G8484 FLU IMMUNIZE NO ADMIN: HCPCS | Performed by: NURSE PRACTITIONER

## 2021-12-15 PROCEDURE — G8427 DOCREV CUR MEDS BY ELIG CLIN: HCPCS | Performed by: NURSE PRACTITIONER

## 2021-12-15 PROCEDURE — 80305 DRUG TEST PRSMV DIR OPT OBS: CPT | Performed by: NURSE PRACTITIONER

## 2021-12-15 RX ORDER — PREGABALIN 150 MG/1
150 CAPSULE ORAL 3 TIMES DAILY
Qty: 90 CAPSULE | Refills: 0 | Status: SHIPPED | OUTPATIENT
Start: 2021-12-15 | End: 2022-03-15 | Stop reason: SDUPTHER

## 2021-12-15 ASSESSMENT — ENCOUNTER SYMPTOMS
GASTROINTESTINAL NEGATIVE: 1
RESPIRATORY NEGATIVE: 1
EYES NEGATIVE: 1
BACK PAIN: 1

## 2021-12-15 NOTE — PROGRESS NOTES
[address] [facility] C[facility]  [address] [address]  [address] [address]  Dept: [phone]  Dept Fax: [phone]  Loc: [phone]    Niles Abel is a 61 y.o. male who presents today for his medical conditions/complaints as noted below. Niles Abel is c/o of Discuss Medications (lyrica), Immunizations (heb a vaccine, and covid vaccine), and Arm Pain (knot on left elbow that has been checked out before)      Chief Complaint   Patient presents with    Discuss Medications     lyrica    Immunizations     heb a vaccine, and covid vaccine    Arm Pain     knot on left elbow that has been checked out before       HPI:     HPI  Pt is here for medication follow up. He states that the lyrica isn't really working. He states that nothing is really working. He is fatigued all the time. He reports not sleeping well at all. He is having appointment with pain management soon hopefully. He does have paperwork to fill out. He states that needs hep a vaccine and covid vaccine as well. He is complaining of left elbow pain as well. There is a bump on the elbow that has been there for approximately 6 months. The bump has not gotten any smaller.       Past Medical History:   Diagnosis Date    Anxiety     Chronic back pain     Depression     [facility]     Neuropathy 12/02/2020    Scoliosis         Past Surgical History:   Procedure Laterality Date    CHOLECYSTECTOMY, LAPAROSCOPIC N/A 04/27/2021    LAPAROSCOPIC CHOLECYSTECTOMY performed by Lisandro Leone DO at [address] [place] [address] COLONOSCOPY  01/08/2016    Dr Ginna Rahman, normal, 10 yr recall    COLONOSCOPY N/A 06/30/2021    Dr Stevie Rod hemorrhids, RANDOM COLON BX NEGATIVE, Sub Prep Fair, 3 year recall    FOOT SURGERY Left     UPPER GASTROINTESTINAL ENDOSCOPY N/A 06/30/2021    Dr Scott Factor, duodenal villous blunting otherwise normal EGD; serum celiac panel NEG for celiac sprue    UPPER GASTROINTESTINAL ENDOSCOPY N/A 08/10/2021    Dr Moya December- mild gastritis, 2 small duodenal ulcers and 1 large cratered 3-4cm in posterior duodenal bulb, NEG h pylori       Social History     Tobacco Use    Smoking status: Former Smoker     Years: 30.00     Types: Cigarettes     Quit date: 2021     Years since quittin.3    Smokeless tobacco: Never Used   Substance Use Topics    Alcohol use: Yes     Comment: occas        Current Outpatient Medications   Medication Sig Dispense Refill    pregabalin (LYRICA) 150 MG capsule Take 1 capsule by mouth 3 times daily for 30 days. 90 capsule 0    pantoprazole (PROTONIX) 40 MG tablet TAKE 1 TABLET BY MOUTH TWICE DAILY BEFORE MEAL(S) 60 tablet 2    oxyCODONE-acetaminophen (PERCOCET) 7.5-325 MG per tablet Take 1 tablet by mouth every 8 hours as needed for Pain for up to 30 days. Intended supply: 30 days 90 tablet 0    tiZANidine (ZANAFLEX) 4 MG tablet TAKE 1 TABLET BY MOUTH THREE TIMES DAILY 90 tablet 0    busPIRone (BUSPAR) 10 MG tablet Take 1 tablet by mouth 3 times daily 90 tablet 3    escitalopram (LEXAPRO) 10 MG tablet Take 1 tablet by mouth daily 30 tablet 3    triamcinolone (KENALOG) 0.025 % cream Apply topically 2 times daily. (Patient taking differently: as needed Apply topically 2 times daily.) 80 g 1    diclofenac sodium (VOLTAREN) 1 % GEL Apply 2 g topically 4 times daily 2 Tube 1    amitriptyline (ELAVIL) 50 MG tablet TAKE ONE TABLET BY MOUTH IN THE EVENING 30 tablet 11     No current facility-administered medications for this visit.        Allergies   Allergen Reactions    Shellfish-Derived Products Anaphylaxis       Family History   Problem Relation Age of Onset    Diabetes Mother     Colon Cancer Neg Hx     Colon Polyps Neg Hx     Esophageal Cancer Neg Hx     Liver Cancer Neg Hx     Liver Disease Neg Hx     Rectal Cancer Neg Hx     Stomach Cancer Neg Hx                Subjective:      Review of Systems   Constitutional: Positive for fatigue. HENT: Negative. Eyes: Negative. Respiratory: Negative. Cardiovascular: Negative. Gastrointestinal: Negative. Endocrine: Negative. Genitourinary: Negative. Musculoskeletal: Positive for arthralgias, back pain and myalgias. Elbow bump   Skin: Negative. Neurological: Negative. Hematological: Negative. Psychiatric/Behavioral: Negative. Objective:     Physical Exam  Vitals and nursing note reviewed. Constitutional:       Appearance: Normal appearance. HENT:      Head: Normocephalic and atraumatic. Right Ear: Hearing, tympanic membrane, ear canal and external ear normal.      Left Ear: Hearing, tympanic membrane, ear canal and external ear normal.      Nose: Nose normal.      Mouth/Throat:      Lips: Pink. Mouth: Mucous membranes are moist.      Pharynx: Oropharynx is clear. Eyes:      General: Lids are normal.      Extraocular Movements: Extraocular movements intact. Conjunctiva/sclera: Conjunctivae normal.      Pupils: Pupils are equal, round, and reactive to light. Neck:      Thyroid: No thyromegaly. Cardiovascular:      Rate and Rhythm: Normal rate and regular rhythm. Pulses: Normal pulses. Dorsalis pedis pulses are 2+ on the right side and 2+ on the left side. Posterior tibial pulses are 2+ on the right side and 2+ on the left side. Heart sounds: Normal heart sounds. Pulmonary:      Effort: Pulmonary effort is normal.      Breath sounds: Normal breath sounds and air entry. Abdominal:      General: Bowel sounds are normal.      Palpations: Abdomen is soft. Musculoskeletal:        Arms:       Cervical back: Full passive range of motion without pain, normal range of motion and neck supple. Thoracic back: Deformity present. No tenderness. Decreased range of motion. Lumbar back: Deformity present. No tenderness. Decreased range of motion.         Back:    Lymphadenopathy:      Cervical: No cervical adenopathy. Skin:     General: Skin is warm and dry. Capillary Refill: Capillary refill takes less than 2 seconds. Neurological:      General: No focal deficit present. Mental Status: He is alert and oriented to person, place, and time. Mental status is at baseline. Coordination: Coordination is intact. Psychiatric:         Mood and Affect: Mood normal.         Speech: Speech normal.         Behavior: Behavior normal.         Thought Content: Thought content normal.         Cognition and Memory: Cognition and memory normal.         Judgment: Judgment normal.         BP (!) 140/78 (Site: Left Upper Arm, Position: Sitting)   Pulse 102   Temp 97.9 °F (36.6 °C)   Ht 5' 11\" (1.803 m)   Wt 182 lb (82.6 kg)   SpO2 97%   BMI 25.38 kg/m²     Assessment:      Diagnosis Orders   1. Lumbar pain  pregabalin (LYRICA) 150 MG capsule   2. MD (muscular dystrophy) ([facility]  pregabalin (LYRICA) 150 MG capsule   3. Left sciatic nerve pain  pregabalin (LYRICA) 150 MG capsule   4. Encounter for drug screening  POCT Rapid Drug Screen   5. Need for COVID-19 vaccine  COVID-19, Pfizer Vaccine 30MCG/0.3mL Dose   6. Olecranon bursitis of left elbow         Results for orders placed or performed in visit on 12/15/21   POCT Rapid Drug Screen   Result Value Ref Range    Alcohol, Urine -     Amphetamine Screen, Urine -     Barbiturate Screen, Urine -     Benzodiazepine Screen, Urine -     Buprenorphine Urine -     Cocaine Metabolite Screen, Urine -     FENTANYL SCREEN, URINE -     Gabapentin Screen, Urine -     MDMA, Urine -     Methadone Screen, Urine -     Methamphetamine, Urine -     Opiate Scrn, Ur -     Oxycodone Screen, Ur -     PCP Screen, Urine -     Propoxyphene Screen, Urine -     Synthetic Cannabinoids (K2) Screen, Urine -     THC Screen, Urine -     Tramadol Scrn, Ur -     Tricyclic Antidepressants, Urine -        Plan:     Increase Lyrica up to 150 mg TID.      When Hep A injection was given lower dose for adolescent was provided. Will need to give additional dose at follow up visit. COVID vaccine today. Discussed draining the olecranon bursitis of left elbow and patient was wanting to wait at this time. More than 50% of the time was spent counseling and coordinating care for a total time of 37 mins face to face. Return in about 3 months (around 3/15/2022) for Follow up chronic conditions, Refill with UDS. Orders Placed This Encounter   Procedures    COVID-19, Pfizer Vaccine 30MCG/0.3mL Dose    Hep A Vaccine Ped/Adol (HAVRIX)    POCT Rapid Drug Screen       Orders Placed This Encounter   Medications    pregabalin (LYRICA) 150 MG capsule     Sig: Take 1 capsule by mouth 3 times daily for 30 days. Dispense:  90 capsule     Refill:  0            Patient offered educational handouts and has had all questions answered. Patient voices understanding and agrees to plans along with risks and benefits of plan. Patient is instructed to continue prior meds, diet, and exercise plans as instructed. Patient agrees to follow up as instructed and sooner if needed. Patient agrees to go to ER if condition becomes emergent. EMR Dragon/transcription disclaimer: Some of this encounter note is an electronic transcription/translation of spoken language to printed text. The electronic translation of spoken language may permit erroneous, or at times, nonsensical words or phrases to be inadvertently transcribed.  Although I have reviewed the note for such errors, some may still exist.    Electronically signed by ANTHONY Arias on 12/16/2021 at 10:51 AM

## 2022-01-28 ENCOUNTER — NURSE ONLY (OUTPATIENT)
Dept: PRIMARY CARE CLINIC | Age: 57
End: 2022-01-28
Payer: MEDICARE

## 2022-01-28 ENCOUNTER — HOSPITAL ENCOUNTER (OUTPATIENT)
Dept: GENERAL RADIOLOGY | Age: 57
Discharge: HOME | End: 2022-01-28
Payer: MEDICARE

## 2022-01-28 DIAGNOSIS — Z23 NEED FOR COVID-19 VACCINE: Primary | ICD-10-CM

## 2022-01-28 DIAGNOSIS — M51.26 DISPLACEMENT OF LUMBAR INTERVERTEBRAL DISC WITHOUT MYELOPATHY: ICD-10-CM

## 2022-01-28 DIAGNOSIS — M54.6 PAIN IN THORACIC SPINE: ICD-10-CM

## 2022-01-28 DIAGNOSIS — M54.2 CERVICALGIA: ICD-10-CM

## 2022-01-28 PROCEDURE — 0002A COVID-19, PFIZER PURPLE TOP, DILUTE FOR USE, 12+ YRS, 30MCG/0.3ML DOSE: CPT | Performed by: NURSE PRACTITIONER

## 2022-01-28 PROCEDURE — 91300 COVID-19, PFIZER PURPLE TOP, DILUTE FOR USE, 12+ YRS, 30MCG/0.3ML DOSE: CPT | Performed by: NURSE PRACTITIONER

## 2022-01-28 PROCEDURE — 99999 PR OFFICE/OUTPT VISIT,PROCEDURE ONLY: CPT | Performed by: NURSE PRACTITIONER

## 2022-01-28 PROCEDURE — 72040 X-RAY EXAM NECK SPINE 2-3 VW: CPT

## 2022-01-28 PROCEDURE — 72072 X-RAY EXAM THORAC SPINE 3VWS: CPT

## 2022-01-28 PROCEDURE — 72202 X-RAY EXAM SI JOINTS 3/> VWS: CPT

## 2022-01-28 NOTE — PROGRESS NOTES
After obtaining consent, and per orders per [facility], injection of pfizer covid-19  given in Left arm by Tabatha Alexander MA. Patient instructed to remain in clinic for10 minutes afterwards, and to report any adverse reaction to me immediately.

## 2022-02-17 RX ORDER — BUSPIRONE HYDROCHLORIDE 10 MG/1
TABLET ORAL
Qty: 90 TABLET | Refills: 0 | Status: SHIPPED | OUTPATIENT
Start: 2022-02-17 | End: 2022-03-15 | Stop reason: SDUPTHER

## 2022-02-17 RX ORDER — ESCITALOPRAM OXALATE 10 MG/1
TABLET ORAL
Qty: 30 TABLET | Refills: 0 | Status: SHIPPED | OUTPATIENT
Start: 2022-02-17 | End: 2022-03-15 | Stop reason: SDUPTHER

## 2022-03-15 ENCOUNTER — OFFICE VISIT (OUTPATIENT)
Dept: PRIMARY CARE CLINIC | Age: 57
End: 2022-03-15
Payer: MEDICARE

## 2022-03-15 VITALS
DIASTOLIC BLOOD PRESSURE: 85 MMHG | TEMPERATURE: 97.6 F | OXYGEN SATURATION: 98 % | WEIGHT: 186 LBS | HEIGHT: 71 IN | SYSTOLIC BLOOD PRESSURE: 130 MMHG | HEART RATE: 76 BPM | BODY MASS INDEX: 26.04 KG/M2

## 2022-03-15 DIAGNOSIS — M54.32 LEFT SCIATIC NERVE PAIN: ICD-10-CM

## 2022-03-15 DIAGNOSIS — D68.4: ICD-10-CM

## 2022-03-15 DIAGNOSIS — R73.09 ELEVATED GLUCOSE: ICD-10-CM

## 2022-03-15 DIAGNOSIS — F41.9 ANXIETY: ICD-10-CM

## 2022-03-15 DIAGNOSIS — M54.50 LUMBAR PAIN: ICD-10-CM

## 2022-03-15 DIAGNOSIS — Z02.83 ENCOUNTER FOR DRUG SCREENING: ICD-10-CM

## 2022-03-15 DIAGNOSIS — K74.60 CIRRHOSIS OF LIVER WITHOUT ASCITES, UNSPECIFIED HEPATIC CIRRHOSIS TYPE (HCC): Primary | ICD-10-CM

## 2022-03-15 DIAGNOSIS — F32.5 MAJOR DEPRESSIVE DISORDER WITH SINGLE EPISODE, IN FULL REMISSION (HCC): ICD-10-CM

## 2022-03-15 DIAGNOSIS — G71.00 MD (MUSCULAR DYSTROPHY) (HCC): ICD-10-CM

## 2022-03-15 PROCEDURE — G8427 DOCREV CUR MEDS BY ELIG CLIN: HCPCS | Performed by: NURSE PRACTITIONER

## 2022-03-15 PROCEDURE — G8484 FLU IMMUNIZE NO ADMIN: HCPCS | Performed by: NURSE PRACTITIONER

## 2022-03-15 PROCEDURE — 80305 DRUG TEST PRSMV DIR OPT OBS: CPT | Performed by: NURSE PRACTITIONER

## 2022-03-15 PROCEDURE — G8419 CALC BMI OUT NRM PARAM NOF/U: HCPCS | Performed by: NURSE PRACTITIONER

## 2022-03-15 PROCEDURE — 3017F COLORECTAL CA SCREEN DOC REV: CPT | Performed by: NURSE PRACTITIONER

## 2022-03-15 PROCEDURE — 99214 OFFICE O/P EST MOD 30 MIN: CPT | Performed by: NURSE PRACTITIONER

## 2022-03-15 PROCEDURE — 1036F TOBACCO NON-USER: CPT | Performed by: NURSE PRACTITIONER

## 2022-03-15 RX ORDER — OXYCODONE AND ACETAMINOPHEN 7.5; 325 MG/1; MG/1
TABLET ORAL
COMMUNITY
Start: 2022-02-22

## 2022-03-15 RX ORDER — ESCITALOPRAM OXALATE 10 MG/1
TABLET ORAL
Qty: 30 TABLET | Refills: 3 | Status: SHIPPED | OUTPATIENT
Start: 2022-03-15 | End: 2022-06-07 | Stop reason: SDUPTHER

## 2022-03-15 RX ORDER — BUSPIRONE HYDROCHLORIDE 10 MG/1
TABLET ORAL
Qty: 90 TABLET | Refills: 3 | Status: SHIPPED | OUTPATIENT
Start: 2022-03-15 | End: 2022-04-22

## 2022-03-15 RX ORDER — PREGABALIN 150 MG/1
150 CAPSULE ORAL 3 TIMES DAILY
Qty: 90 CAPSULE | Refills: 2 | Status: SHIPPED | OUTPATIENT
Start: 2022-03-15 | End: 2022-06-07 | Stop reason: SDUPTHER

## 2022-03-15 ASSESSMENT — PATIENT HEALTH QUESTIONNAIRE - PHQ9
SUM OF ALL RESPONSES TO PHQ9 QUESTIONS 1 & 2: 0
SUM OF ALL RESPONSES TO PHQ QUESTIONS 1-9: 0
SUM OF ALL RESPONSES TO PHQ QUESTIONS 1-9: 0
2. FEELING DOWN, DEPRESSED OR HOPELESS: 0
SUM OF ALL RESPONSES TO PHQ QUESTIONS 1-9: 0
SUM OF ALL RESPONSES TO PHQ QUESTIONS 1-9: 0
1. LITTLE INTEREST OR PLEASURE IN DOING THINGS: 0

## 2022-03-15 ASSESSMENT — ENCOUNTER SYMPTOMS
EYES NEGATIVE: 1
GASTROINTESTINAL NEGATIVE: 1
BACK PAIN: 1
RESPIRATORY NEGATIVE: 1

## 2022-03-15 NOTE — PROGRESS NOTES
[address] [facility]  [address] [address]  [address] [address]  Dept: [phone]  Dept Fax: [phone]  Loc: [phone]    Demetra Desir is a 61 y.o. male who presents today for his medical conditions/complaints as noted below. Demetra Desir is c/o of Discuss Medications      Chief Complaint   Patient presents with    Discuss Medications       HPI:     HPI  Patient is here for chronic conditions follow up and would like to discuss medications. He states that his buspar isn't working. He isn't taking a medication anymore due to pharmacy issues, but he does not know name. Patient states he is seeing pain management and they are giving him injections as well. Lumbar pain has remained the same. He feels like the joint injections are not helping much. Patient continues to follow up with GI for cirrhosis. He reports that his is currently stable. Patient has been discharged from hematology from the vit k deficiency.         Past Medical History:   Diagnosis Date    Anxiety     Chronic back pain     Depression     MD [facility]mus[facility] ([facility]     Neuropathy 12/02/2020    Scoliosis         Past Surgical History:   Procedure Laterality Date    CHOLECYSTECTOMY, LAPAROSCOPIC N/A 04/27/2021    LAPAROSCOPIC CHOLECYSTECTOMY performed by Larissa Haas DO at [facility] COLONOSCOPY  01/08/2016    Dr Arturo Womack, normal, 10 yr recall    COLONOSCOPY N/A 06/30/2021    Dr Tommy Barbosa hemorrhids, RANDOM COLON BX NEGATIVE, Sub Prep Fair, 3 year recall    FOOT SURGERY Left     UPPER GASTROINTESTINAL ENDOSCOPY N/A 06/30/2021    Dr Lisa Stewart, duodenal villous blunting otherwise normal EGD; serum celiac panel NEG for celiac sprue    UPPER GASTROINTESTINAL ENDOSCOPY N/A 08/10/2021    Dr Lisa Stewart- mild gastritis, 2 small duodenal ulcers and 1 large cratered 3-4cm in posterior duodenal bulb, NEG h pylori       Social History     Tobacco Use    Smoking status: Former Smoker     Years: 30.00     Types: Cigarettes     Quit date: 2021     Years since quittin.6    Smokeless tobacco: Never Used   Substance Use Topics    Alcohol use: Yes     Comment: occas        Current Outpatient Medications   Medication Sig Dispense Refill    pregabalin (LYRICA) 150 MG capsule Take 1 capsule by mouth 3 times daily for 30 days. 90 capsule 2    busPIRone (BUSPAR) 10 MG tablet TAKE 1 TABLET BY MOUTH THREE TIMES DAILY 90 tablet 3    escitalopram (LEXAPRO) 10 MG tablet Take 1 tablet by mouth once daily 30 tablet 3    tiZANidine (ZANAFLEX) 4 MG tablet TAKE 1 TABLET BY MOUTH THREE TIMES DAILY 90 tablet 3    pantoprazole (PROTONIX) 40 MG tablet TAKE 1 TABLET BY MOUTH TWICE DAILY BEFORE MEAL(S) 60 tablet 2    triamcinolone (KENALOG) 0.025 % cream Apply topically 2 times daily. (Patient taking differently: as needed Apply topically 2 times daily.) 80 g 1    diclofenac sodium (VOLTAREN) 1 % GEL Apply 2 g topically 4 times daily 2 Tube 1    amitriptyline (ELAVIL) 50 MG tablet TAKE ONE TABLET BY MOUTH IN THE EVENING 30 tablet 11    oxyCODONE-acetaminophen (PERCOCET) 7.5-325 MG per tablet TAKE 1 TABLET BY MOUTH 4 TIMES DAILY DO NOT COMBINE WITH ALCOHOL AND DO NOT DRIVE AFTER TAKING. MUST BE SEEN FOR REFILLS       No current facility-administered medications for this visit. Allergies   Allergen Reactions    Shellfish-Derived Products Anaphylaxis       Family History   Problem Relation Age of Onset    Diabetes Mother     Colon Cancer Neg Hx     Colon Polyps Neg Hx     Esophageal Cancer Neg Hx     Liver Cancer Neg Hx     Liver Disease Neg Hx     Rectal Cancer Neg Hx     Stomach Cancer Neg Hx                Subjective:      Review of Systems   Constitutional: Negative. HENT: Negative. Eyes: Negative. Respiratory: Negative. Cardiovascular: Negative. Gastrointestinal: Negative. Endocrine: Negative. Genitourinary: Negative.     Musculoskeletal: Positive for back pain. Skin: Negative. Neurological: Negative. Hematological: Negative. Psychiatric/Behavioral: Positive for dysphoric mood. The patient is nervous/anxious. Objective:     Physical Exam  Vitals and nursing note reviewed. Constitutional:       Appearance: Normal appearance. HENT:      Head: Normocephalic and atraumatic. Right Ear: Hearing, tympanic membrane, ear canal and external ear normal.      Left Ear: Hearing, tympanic membrane, ear canal and external ear normal.      Nose: Nose normal.      Mouth/Throat:      Lips: Pink. Mouth: Mucous membranes are moist.      Pharynx: Oropharynx is clear. Eyes:      General: Lids are normal.      Extraocular Movements: Extraocular movements intact. Conjunctiva/sclera: Conjunctivae normal.      Pupils: Pupils are equal, round, and reactive to light. Neck:      Thyroid: No thyromegaly. Cardiovascular:      Rate and Rhythm: Normal rate and regular rhythm. Pulses: Normal pulses. Dorsalis pedis pulses are 2+ on the right side and 2+ on the left side. Posterior tibial pulses are 2+ on the right side and 2+ on the left side. Heart sounds: Normal heart sounds. Pulmonary:      Effort: Pulmonary effort is normal.      Breath sounds: Normal breath sounds and air entry. Abdominal:      General: Bowel sounds are normal.      Palpations: Abdomen is soft. Musculoskeletal:      Cervical back: Full passive range of motion without pain, normal range of motion and neck supple. Thoracic back: Deformity present. No tenderness. Decreased range of motion. Lumbar back: Deformity present. No tenderness. Decreased range of motion. Back:    Lymphadenopathy:      Cervical: No cervical adenopathy. Skin:     General: Skin is warm and dry. Capillary Refill: Capillary refill takes less than 2 seconds. Neurological:      General: No focal deficit present.       Mental Status: He is alert and oriented to person, place, and time. Mental status is at baseline. Coordination: Coordination is intact. Psychiatric:         Mood and Affect: Mood normal.         Speech: Speech normal.         Behavior: Behavior normal.         Thought Content: Thought content normal.         Cognition and Memory: Cognition and memory normal.         Judgment: Judgment normal.         /85 (Site: Left Upper Arm)   Pulse 76   Temp 97.6 °F (36.4 °C)   Ht 5' 11\" (1.803 m)   Wt 186 lb (84.4 kg)   SpO2 98%   BMI 25.94 kg/m²     Assessment:      Diagnosis Orders   1. Cirrhosis of liver without ascites, unspecified hepatic cirrhosis type (HCC)  CBC    Comprehensive Metabolic Panel    TSH with Reflex to FT4   2. MD (muscular dystrophy) ([facility]  pregabalin (LYRICA) 150 MG capsule    TSH with Reflex to FT4   3. Major depressive disorder with single episode, in full remission ([facility]     4. Vitamin K deficiency coagulation disorder ([facility]  Protime-INR    TSH with Reflex to FT4   5. Encounter for drug screening  POCT Rapid Drug Screen   6. Lumbar pain  pregabalin (LYRICA) 150 MG capsule   7. Left sciatic nerve pain  pregabalin (LYRICA) 150 MG capsule   8. Elevated glucose  Hemoglobin A1C   9. Anxiety         Results for orders placed or performed in visit on 03/15/22   POCT Rapid Drug Screen   Result Value Ref Range    Alcohol, Urine -     Amphetamine Screen, Urine -     Barbiturate Screen, Urine -     Benzodiazepine Screen, Urine -     Buprenorphine Urine -     Cocaine Metabolite Screen, Urine -     FENTANYL SCREEN, URINE -     Gabapentin Screen, Urine -     MDMA, Urine -     Methadone Screen, Urine -     Methamphetamine, Urine -     Opiate Scrn, Ur +     Oxycodone Screen, Ur +     PCP Screen, Urine -     Propoxyphene Screen, Urine -     Synthetic Cannabinoids (K2) Screen, Urine -     THC Screen, Urine -     Tramadol Scrn, Ur -     Tricyclic Antidepressants, Urine -        Plan:     1.  Cirrhosis of liver without ascites, unspecified hepatic cirrhosis type ([place] [address]  Keep follow up appointment scheduled with GI  - CBC; Future  - Comprehensive Metabolic Panel; Future  - TSH with Reflex to FT4; Future    2. MD (muscular dystrophy) ([place] [address].)  Will continue Lyrica 150 mg TID. He has not had a refill since December. I am hopeful this will help with increased anxiety as well. - pregabalin (LYRICA) 150 MG capsule; Take 1 capsule by mouth 3 times daily for 30 days. Dispense: 90 capsule; Refill: 2  - TSH with Reflex to FT4; Future    3. Major depressive disorder with single episode, in full remission ([place] [address].)      4. Vitamin K deficiency coagulation disorder ([place] [address]  Will recheck INR. No additional hematology appointments at this time. - Protime-INR; Future  - TSH with Reflex to FT4; Future    5. Encounter for drug screening    - POCT Rapid Drug Screen    6. Lumbar pain    - pregabalin (LYRICA) 150 MG capsule; Take 1 capsule by mouth 3 times daily for 30 days. Dispense: 90 capsule; Refill: 2    7. Left sciatic nerve pain    - pregabalin (LYRICA) 150 MG capsule; Take 1 capsule by mouth 3 times daily for 30 days. Dispense: 90 capsule; Refill: 2    8. Elevated glucose    - Hemoglobin A1C; Future    9. Anxiety         Return in about 4 months (around 7/15/2022) for Follow up chronic conditions, Refill with UDS. Orders Placed This Encounter   Procedures    CBC     Standing Status:   Future     Standing Expiration Date:   3/15/2023    Comprehensive Metabolic Panel     Standing Status:   Future     Standing Expiration Date:   3/15/2023    Protime-INR     Standing Status:   Future     Standing Expiration Date:   3/15/2023     Order Specific Question:   Daily Coumadin Dose?      Answer:   none    TSH with Reflex to FT4     Standing Status:   Future     Standing Expiration Date:   3/15/2023    Hemoglobin A1C     Standing Status:   Future     Standing Expiration Date:   3/15/2023    POCT Rapid Drug Screen       Orders Placed This Encounter   Medications    pregabalin (LYRICA) 150 MG capsule     Sig: Take 1 capsule by mouth 3 times daily for 30 days. Dispense:  90 capsule     Refill:  2    busPIRone (BUSPAR) 10 MG tablet     Sig: TAKE 1 TABLET BY MOUTH THREE TIMES DAILY     Dispense:  90 tablet     Refill:  3    escitalopram (LEXAPRO) 10 MG tablet     Sig: Take 1 tablet by mouth once daily     Dispense:  30 tablet     Refill:  3            Patient offered educational handouts and has had all questions answered. Patient voices understanding and agrees to plans along with risks and benefits of plan. Patient is instructed to continue prior meds, diet, and exercise plans as instructed. Patient agrees to follow up as instructed and sooner if needed. Patient agrees to go to ER if condition becomes emergent. EMR Dragon/transcription disclaimer: Some of this encounter note is an electronic transcription/translation of spoken language to printed text. The electronic translation of spoken language may permit erroneous, or at times, nonsensical words or phrases to be inadvertently transcribed.  Although I have reviewed the note for such errors, some may still exist.    Electronically signed by ANTHONY Rice on 3/15/2022 at 2:03 PM

## 2022-03-21 DIAGNOSIS — K29.90 GASTRITIS AND DUODENITIS: ICD-10-CM

## 2022-03-21 RX ORDER — PANTOPRAZOLE SODIUM 40 MG/1
TABLET, DELAYED RELEASE ORAL
Qty: 60 TABLET | Refills: 3 | Status: SHIPPED | OUTPATIENT
Start: 2022-03-21 | End: 2022-06-07

## 2022-03-21 NOTE — TELEPHONE ENCOUNTER
Lyndon Kovacs called to request a refill on his medication.       Last office visit : 3/15/2022   Next office visit : 7/15/2022     Requested Prescriptions     Pending Prescriptions Disp Refills    pantoprazole (PROTONIX) 40 MG tablet [Pharmacy Med Name: Pantoprazole Sodium 40 MG Oral Tablet Delayed Release] 60 tablet 0     Sig: TAKE 1 TABLET BY MOUTH TWICE DAILY BEFORE MEAL(S)            Alfredito Zacarias MA

## 2022-04-06 ENCOUNTER — TELEPHONE (OUTPATIENT)
Dept: PRIMARY CARE CLINIC | Age: 57
End: 2022-04-06

## 2022-04-22 RX ORDER — BUSPIRONE HYDROCHLORIDE 10 MG/1
TABLET ORAL
Qty: 90 TABLET | Refills: 1 | Status: SHIPPED | OUTPATIENT
Start: 2022-04-22

## 2022-04-22 NOTE — TELEPHONE ENCOUNTER
Arben Murphy called to request a refill on his medication.       Last office visit : 3/15/2022   Next office visit : Visit date not found     Requested Prescriptions     Pending Prescriptions Disp Refills    busPIRone (BUSPAR) 10 MG tablet [Pharmacy Med Name: busPIRone HCl 10 MG Oral Tablet] 90 tablet 0     Sig: TAKE 1 TABLET BY MOUTH THREE TIMES DAILY            Samia Phillips MA

## 2022-04-26 ENCOUNTER — TELEPHONE (OUTPATIENT)
Dept: GASTROENTEROLOGY | Age: 57
End: 2022-04-26

## 2022-04-26 DIAGNOSIS — K74.60 CIRRHOSIS OF LIVER WITHOUT ASCITES, UNSPECIFIED HEPATIC CIRRHOSIS TYPE (HCC): Primary | ICD-10-CM

## 2022-04-26 NOTE — TELEPHONE ENCOUNTER
Pt is on your schedule for cirrhosis w/ labs and US. Fransisco Frank will you drop orders and I will call to sched. Thanks.

## 2022-05-11 ENCOUNTER — HOSPITAL ENCOUNTER (OUTPATIENT)
Dept: ULTRASOUND IMAGING | Age: 57
Discharge: HOME | End: 2022-05-11

## 2022-05-11 DIAGNOSIS — D68.4: ICD-10-CM

## 2022-05-11 DIAGNOSIS — R73.09 ELEVATED GLUCOSE: ICD-10-CM

## 2022-05-11 DIAGNOSIS — K74.60 CIRRHOSIS OF LIVER WITHOUT ASCITES, UNSPECIFIED HEPATIC CIRRHOSIS TYPE (HCC): ICD-10-CM

## 2022-05-11 LAB
ALBUMIN SERPL-MCNC: 4 G/DL (ref 3.5–5.2)
ALP BLD-CCNC: 117 U/L (ref 40–130)
ALPHA FETOPROTEIN: 1.4 NG/ML (ref 0–8.3)
ALT SERPL-CCNC: 14 U/L (ref 5–41)
ANION GAP SERPL CALCULATED.3IONS-SCNC: 10 MMOL/L (ref 7–19)
AST SERPL-CCNC: 19 U/L (ref 5–40)
BILIRUB SERPL-MCNC: 0.3 MG/DL (ref 0.2–1.2)
BILIRUBIN DIRECT: 0.2 MG/DL (ref 0–0.3)
BILIRUBIN, INDIRECT: 0.1 MG/DL (ref 0.1–1)
BUN BLDV-MCNC: 15 MG/DL (ref 6–20)
CALCIUM SERPL-MCNC: 8.9 MG/DL (ref 8.6–10)
CHLORIDE BLD-SCNC: 103 MMOL/L (ref 98–111)
CO2: 28 MMOL/L (ref 22–29)
CREAT SERPL-MCNC: 0.5 MG/DL (ref 0.5–1.2)
GFR AFRICAN AMERICAN: >59
GFR NON-AFRICAN AMERICAN: >60
GLUCOSE BLD-MCNC: 92 MG/DL (ref 74–109)
HBA1C MFR BLD: 5.4 % (ref 4–6)
HCT VFR BLD CALC: 37.8 % (ref 42–52)
HEMOGLOBIN: 11.8 G/DL (ref 14–18)
INR BLD: 1.03 (ref 0.88–1.18)
MCH RBC QN AUTO: 29.1 PG (ref 27–31)
MCHC RBC AUTO-ENTMCNC: 31.2 G/DL (ref 33–37)
MCV RBC AUTO: 93.1 FL (ref 80–94)
PDW BLD-RTO: 14.6 % (ref 11.5–14.5)
PLATELET # BLD: 362 K/UL (ref 130–400)
PMV BLD AUTO: 9.6 FL (ref 9.4–12.4)
POTASSIUM SERPL-SCNC: 4.4 MMOL/L (ref 3.5–5)
PROTHROMBIN TIME: 13.4 SEC (ref 12–14.6)
RBC # BLD: 4.06 M/UL (ref 4.7–6.1)
SODIUM BLD-SCNC: 141 MMOL/L (ref 136–145)
TOTAL PROTEIN: 7 G/DL (ref 6.6–8.7)
WBC # BLD: 7 K/UL (ref 4.8–10.8)

## 2022-05-12 ENCOUNTER — HOSPITAL ENCOUNTER (OUTPATIENT)
Dept: ULTRASOUND IMAGING | Age: 57
Discharge: HOME | End: 2022-05-12
Payer: MEDICARE

## 2022-05-12 PROCEDURE — 76705 ECHO EXAM OF ABDOMEN: CPT

## 2022-05-23 DIAGNOSIS — M54.42 LEFT-SIDED LOW BACK PAIN WITH LEFT-SIDED SCIATICA, UNSPECIFIED CHRONICITY: ICD-10-CM

## 2022-05-24 ENCOUNTER — TELEPHONE (OUTPATIENT)
Dept: FAMILY MEDICINE CLINIC | Age: 57
End: 2022-05-24

## 2022-05-24 RX ORDER — TIZANIDINE 4 MG/1
TABLET ORAL
Qty: 90 TABLET | Refills: 0 | Status: SHIPPED | OUTPATIENT
Start: 2022-05-24 | End: 2022-06-07 | Stop reason: SDUPTHER

## 2022-05-24 NOTE — TELEPHONE ENCOUNTER
----- Message from ANTHONY Ramesh sent at 5/11/2022  3:12 PM CDT -----  Please let patient know that a1c has remained stable. Other labs were from hematology and they will call with those results.

## 2022-06-02 ENCOUNTER — TELEPHONE (OUTPATIENT)
Dept: GASTROENTEROLOGY | Age: 57
End: 2022-06-02

## 2022-06-02 ENCOUNTER — OFFICE VISIT (OUTPATIENT)
Dept: GASTROENTEROLOGY | Age: 57
End: 2022-06-02
Payer: MEDICARE

## 2022-06-02 VITALS
DIASTOLIC BLOOD PRESSURE: 70 MMHG | HEIGHT: 71 IN | WEIGHT: 195 LBS | HEART RATE: 82 BPM | SYSTOLIC BLOOD PRESSURE: 138 MMHG | BODY MASS INDEX: 27.3 KG/M2 | OXYGEN SATURATION: 98 %

## 2022-06-02 DIAGNOSIS — R41.3 POOR MEMORY: ICD-10-CM

## 2022-06-02 DIAGNOSIS — K74.60 CIRRHOSIS OF LIVER WITHOUT ASCITES, UNSPECIFIED HEPATIC CIRRHOSIS TYPE (HCC): Primary | ICD-10-CM

## 2022-06-02 DIAGNOSIS — K74.60 CIRRHOSIS OF LIVER WITHOUT ASCITES, UNSPECIFIED HEPATIC CIRRHOSIS TYPE (HCC): ICD-10-CM

## 2022-06-02 LAB — AMMONIA: 15 UMOL/L (ref 16–60)

## 2022-06-02 PROCEDURE — 99214 OFFICE O/P EST MOD 30 MIN: CPT | Performed by: NURSE PRACTITIONER

## 2022-06-02 PROCEDURE — 1036F TOBACCO NON-USER: CPT | Performed by: NURSE PRACTITIONER

## 2022-06-02 PROCEDURE — G8419 CALC BMI OUT NRM PARAM NOF/U: HCPCS | Performed by: NURSE PRACTITIONER

## 2022-06-02 PROCEDURE — 3017F COLORECTAL CA SCREEN DOC REV: CPT | Performed by: NURSE PRACTITIONER

## 2022-06-02 PROCEDURE — G8427 DOCREV CUR MEDS BY ELIG CLIN: HCPCS | Performed by: NURSE PRACTITIONER

## 2022-06-02 ASSESSMENT — ENCOUNTER SYMPTOMS
TROUBLE SWALLOWING: 0
COUGH: 0
BACK PAIN: 1
BLOOD IN STOOL: 0
CONSTIPATION: 0
ANAL BLEEDING: 0
DIARRHEA: 0
ABDOMINAL DISTENTION: 0
VOMITING: 0
SORE THROAT: 0
ABDOMINAL PAIN: 0
SHORTNESS OF BREATH: 0
VOICE CHANGE: 0
NAUSEA: 0
RECTAL PAIN: 0

## 2022-06-02 NOTE — TELEPHONE ENCOUNTER
Please let Yoan Rucker know his ammonia level is not elevated. His poor memory is not from his liver.

## 2022-06-02 NOTE — PROGRESS NOTES
Subjective:      Orlin Bello is a60 y.o. male  Chief Complaint   Patient presents with    Cirrhosis       HPI  PCP: ANTHONY Saravia  Pt made a 6 month appt for cirrhosis surveillance  Results of liver US and labs in [facility]. Liver US notes no liver lesion  MELD is 7  AFP is normal  He has no upper or lower GI complaints  EGD for variceal screening is current  Colonoscopy is current    He reports short term memory loss  He feels he might have dementia  He is very forgetful  No overt confusion. Making an appt with his PCP for this      Family HX:    Pt denies family hx of colon polyps, colon CA, inflammatory bowel dx, gastric CA and esophageal CA.     Past Medical History:   Diagnosis Date    Anxiety     Chronic back pain     Depression     [facility]     Neuropathy 2020    Scoliosis           Past Surgical History:   Procedure Laterality Date    CHOLECYSTECTOMY, LAPAROSCOPIC N/A 2021    LAPAROSCOPIC CHOLECYSTECTOMY performed by Donell Fang DO at [address] [place] [address] COLONOSCOPY  2016    Dr Marilynn Shahid, normal, 10 yr recall    COLONOSCOPY N/A 2021    Dr Brie Weinberg hemorrhids, RANDOM COLON BX NEGATIVE, Sub Prep Fair, 3 year recall    FOOT SURGERY Left     UPPER GASTROINTESTINAL ENDOSCOPY N/A 2021    Dr Karyn Jackson, duodenal villous blunting otherwise normal EGD; serum celiac panel NEG for celiac sprue    UPPER GASTROINTESTINAL ENDOSCOPY N/A 08/10/2021    Dr Karyn Jackson- mild gastritis, 2 small duodenal ulcers and 1 large cratered 3-4cm in posterior duodenal bulb, NEG h pylori       Social History     Socioeconomic History    Marital status: Single     Spouse name: None    Number of children: None    Years of education: None    Highest education level: None   Occupational History    None   Tobacco Use    Smoking status: Former Smoker     Years: 30.00     Types: Cigarettes     Quit date: 2021     Years since quittin.8    Smokeless tobacco: Never Used   Vaping Use    Vaping Use: Never used   Substance and Sexual Activity    Alcohol use: Yes     Comment: occas    Drug use: No    Sexual activity: None   Other Topics Concern    None   Social History Narrative    None     Social Determinants of Health     Financial Resource Strain: Low Risk     Difficulty of Paying Living Expenses: Not hard at all   Food Insecurity: No Food Insecurity    Worried About Running Out of Food in the Last Year: Never true    Rebecca of Food in the Last Year: Never true   Transportation Needs:     Lack of Transportation (Medical): Not on file    Lack of Transportation (Non-Medical):  Not on file   Physical Activity:     Days of Exercise per Week: Not on file    Minutes of Exercise per Session: Not on file   Stress:     Feeling of Stress : Not on file   Social Connections:     Frequency of Communication with Friends and Family: Not on file    Frequency of Social Gatherings with Friends and Family: Not on file    Attends Sabianist Services: Not on file    Active Member of [address] [place] [address] or Organizations: Not on file    Attends Club or Organization Meetings: Not on file    Marital Status: Not on file   Intimate Partner Violence:     Fear of Current or Ex-Partner: Not on file    Emotionally Abused: Not on file    Physically Abused: Not on file    Sexually Abused: Not on file   Housing Stability:     Unable to Pay for Housing in the Last Year: Not on file    Number of [place] in the Last Year: Not on file    Unstable Housing in the Last Year: Not on file       Allergies   Allergen Reactions    Shellfish-Derived Products Anaphylaxis       Current Outpatient Medications   Medication Sig Dispense Refill    tiZANidine (ZANAFLEX) 4 MG tablet TAKE 1 TABLET BY MOUTH THREE TIMES DAILY 90 tablet 0    busPIRone (BUSPAR) 10 MG tablet TAKE 1 TABLET BY MOUTH THREE TIMES DAILY 90 tablet 1    pantoprazole (PROTONIX) 40 MG tablet TAKE 1 TABLET BY MOUTH TWICE DAILY General: No scleral icterus. Conjunctiva/sclera: Conjunctivae normal.      Pupils: Pupils are equal, round, and reactive to light. Neck:      Thyroid: No thyromegaly. Cardiovascular:      Rate and Rhythm: Normal rate and regular rhythm. Heart sounds: Normal heart sounds. No murmur heard. No friction rub. No gallop. Pulmonary:      Effort: Pulmonary effort is normal. No respiratory distress. Breath sounds: Normal breath sounds. Abdominal:      General: Bowel sounds are normal. There is no distension. Palpations: Abdomen is soft. Tenderness: There is no abdominal tenderness. There is no rebound. Musculoskeletal:         General: No deformity. Normal range of motion. Cervical back: Normal range of motion and neck supple. Comments: Uses a cane for assistance with ambulation   Skin:     Coloration: Skin is not pale. Neurological:      Mental Status: He is alert and oriented to person, place, and time. Cranial Nerves: No cranial nerve deficit. Psychiatric:         Judgment: Judgment normal.           Assessment:       Diagnosis Orders   1. Cirrhosis of liver without ascites, unspecified hepatic cirrhosis type (HCC)  Ammonia   2. Poor memory  Ammonia         Plan:      1. F/u in 6 months for cirrhosis surveillance with labs/US prior. Will get an ammonia level today to r/o HE.

## 2022-06-07 ENCOUNTER — OFFICE VISIT (OUTPATIENT)
Dept: FAMILY MEDICINE CLINIC | Age: 57
End: 2022-06-07
Payer: MEDICARE

## 2022-06-07 ENCOUNTER — HOSPITAL ENCOUNTER (OUTPATIENT)
Dept: GENERAL RADIOLOGY | Age: 57
Discharge: HOME | End: 2022-06-07
Payer: MEDICARE

## 2022-06-07 VITALS
SYSTOLIC BLOOD PRESSURE: 124 MMHG | WEIGHT: 199 LBS | OXYGEN SATURATION: 97 % | BODY MASS INDEX: 27.75 KG/M2 | HEART RATE: 71 BPM | TEMPERATURE: 97.3 F | DIASTOLIC BLOOD PRESSURE: 70 MMHG

## 2022-06-07 DIAGNOSIS — Z12.5 ENCOUNTER FOR SPECIAL SCREENING EXAMINATION FOR NEOPLASM OF PROSTATE: ICD-10-CM

## 2022-06-07 DIAGNOSIS — M25.552 LEFT HIP PAIN: ICD-10-CM

## 2022-06-07 DIAGNOSIS — Z79.899 DRUG THERAPY: ICD-10-CM

## 2022-06-07 DIAGNOSIS — M54.32 LEFT SCIATIC NERVE PAIN: ICD-10-CM

## 2022-06-07 DIAGNOSIS — M25.551 RIGHT HIP PAIN: ICD-10-CM

## 2022-06-07 DIAGNOSIS — G71.00 MD (MUSCULAR DYSTROPHY) (HCC): ICD-10-CM
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
## 2022-06-07 DIAGNOSIS — M54.42 LEFT-SIDED LOW BACK PAIN WITH LEFT-SIDED SCIATICA, UNSPECIFIED CHRONICITY: ICD-10-CM

## 2022-06-07 DIAGNOSIS — M54.50 LUMBAR PAIN: ICD-10-CM

## 2022-06-07 LAB
ALCOHOL URINE: NORMAL
AMPHETAMINE SCREEN, URINE: NORMAL
BARBITURATE SCREEN, URINE: NORMAL
BENZODIAZEPINE SCREEN, URINE: NORMAL
BUPRENORPHINE URINE: NORMAL
COCAINE METABOLITE SCREEN URINE: NORMAL
FENTANYL SCREEN, URINE: NORMAL
GABAPENTIN SCREEN, URINE: NORMAL
MDMA URINE: NORMAL
METHADONE SCREEN, URINE: NORMAL
METHAMPHETAMINE, URINE: NORMAL
OPIATE SCREEN URINE: NORMAL
OXYCODONE SCREEN URINE: NORMAL
PHENCYCLIDINE SCREEN URINE: NORMAL
PROPOXYPHENE SCREEN, URINE: NORMAL
PROSTATE SPECIFIC ANTIGEN: 0.17 NG/ML (ref 0–4)
SYNTHETIC CANNABINOIDS(K2) SCREEN, URINE: NORMAL
THC SCREEN, URINE: NORMAL
TRAMADOL SCREEN URINE: NORMAL
TRICYCLIC ANTIDEPRESSANTS, UR: NORMAL

## 2022-06-07 PROCEDURE — 1036F TOBACCO NON-USER: CPT | Performed by: NURSE PRACTITIONER

## 2022-06-07 PROCEDURE — 73521 X-RAY EXAM HIPS BI 2 VIEWS: CPT

## 2022-06-07 PROCEDURE — G8427 DOCREV CUR MEDS BY ELIG CLIN: HCPCS | Performed by: NURSE PRACTITIONER

## 2022-06-07 PROCEDURE — 80305 DRUG TEST PRSMV DIR OPT OBS: CPT | Performed by: NURSE PRACTITIONER

## 2022-06-07 PROCEDURE — G8419 CALC BMI OUT NRM PARAM NOF/U: HCPCS | Performed by: NURSE PRACTITIONER

## 2022-06-07 PROCEDURE — 73521 X-RAY EXAM HIPS BI 2 VIEWS: CPT | Performed by: RADIOLOGY

## 2022-06-07 PROCEDURE — 99214 OFFICE O/P EST MOD 30 MIN: CPT | Performed by: NURSE PRACTITIONER

## 2022-06-07 PROCEDURE — 3017F COLORECTAL CA SCREEN DOC REV: CPT | Performed by: NURSE PRACTITIONER

## 2022-06-07 RX ORDER — PREDNISONE 20 MG/1
20 TABLET ORAL 2 TIMES DAILY
Qty: 10 TABLET | Refills: 0 | Status: SHIPPED | OUTPATIENT
Start: 2022-06-07 | End: 2022-06-12

## 2022-06-07 RX ORDER — PREGABALIN 150 MG/1
150 CAPSULE ORAL 3 TIMES DAILY
Qty: 90 CAPSULE | Refills: 2 | Status: SHIPPED | OUTPATIENT
Start: 2022-06-07 | End: 2022-07-07

## 2022-06-07 RX ORDER — BUSPIRONE HYDROCHLORIDE 10 MG/1
TABLET ORAL
Qty: 90 TABLET | Refills: 1 | Status: CANCELLED | OUTPATIENT
Start: 2022-06-07

## 2022-06-07 RX ORDER — ESCITALOPRAM OXALATE 20 MG/1
TABLET ORAL
Qty: 30 TABLET | Refills: 5 | Status: SHIPPED | OUTPATIENT
Start: 2022-06-07

## 2022-06-07 RX ORDER — TIZANIDINE 4 MG/1
TABLET ORAL
Qty: 90 TABLET | Refills: 1 | Status: SHIPPED | OUTPATIENT
Start: 2022-06-07 | End: 2022-09-09

## 2022-06-07 RX ORDER — ESCITALOPRAM OXALATE 10 MG/1
TABLET ORAL
Qty: 30 TABLET | Refills: 3 | Status: SHIPPED | OUTPATIENT
Start: 2022-06-07 | End: 2022-06-07

## 2022-06-07 ASSESSMENT — ENCOUNTER SYMPTOMS
EYES NEGATIVE: 1
GASTROINTESTINAL NEGATIVE: 1
RESPIRATORY NEGATIVE: 1

## 2022-06-07 NOTE — PROGRESS NOTES
[facility] PHYSICIAN SERVICES  [facility] [place]  [address] [id number]  Dept: [phone]  Dept Fax: [phone]  Loc: [phone]    Tommy Rosenberg is a 61 y.o. male who presents today for his medical conditions/complaints as noted below. Tommy Rosenberg is c/o of Medication Refill (check up) and Anxiety (doesn't feel like med is helping)      Chief Complaint   Patient presents with    Medication Refill     check up    Anxiety     doesn't feel like med is helping       HPI:     HPI  Patient here for follow up on chronic conditions. He feels like the anxiety meds are not helping. Patient is also having increased hip pain as well. He has had a few falls since last visit. He did fal back down some stairs on his porch. He has noticed some increased pain on the outside of his upper thigh areas. He feels like it is in the bones.       Past Medical History:   Diagnosis Date    Anxiety     Chronic back pain     Depression     [facility]     Neuropathy 2020    Scoliosis         Past Surgical History:   Procedure Laterality Date    CHOLECYSTECTOMY, LAPAROSCOPIC N/A 2021    LAPAROSCOPIC CHOLECYSTECTOMY performed by Irma Cheung, DO at [address] [place] [address] COLONOSCOPY  2016    Dr Hien Forman, normal, 10 yr recall    COLONOSCOPY N/A 2021    Dr Clai Pulse hemorrhids, RANDOM COLON BX NEGATIVE, Sub Prep Fair, 3 year recall    FOOT SURGERY Left     UPPER GASTROINTESTINAL ENDOSCOPY N/A 2021    Dr Ofe Rodriguez, duodenal villous blunting otherwise normal EGD; serum celiac panel NEG for celiac sprue    UPPER GASTROINTESTINAL ENDOSCOPY N/A 08/10/2021    Dr Ofe Rodriguez- mild gastritis, 2 small duodenal ulcers and 1 large cratered 3-4cm in posterior duodenal bulb, NEG h pylori       Social History     Tobacco Use    Smoking status: Former Smoker     Years: 30.00     Types: Cigarettes     Quit date: 2021     Years since quittin.8    Smokeless tobacco: Never Used   Substance Use Topics    Alcohol use: Yes     Comment: occas        Current Outpatient Medications   Medication Sig Dispense Refill    tiZANidine (ZANAFLEX) 4 MG tablet TAKE 1 TABLET BY MOUTH THREE TIMES DAILY 90 tablet 1    pregabalin (LYRICA) 150 MG capsule Take 1 capsule by mouth 3 times daily for 30 days. 90 capsule 2    escitalopram (LEXAPRO) 20 MG tablet Take 1 tablet by mouth once daily 30 tablet 5    busPIRone (BUSPAR) 10 MG tablet TAKE 1 TABLET BY MOUTH THREE TIMES DAILY 90 tablet 1    oxyCODONE-acetaminophen (PERCOCET) 7.5-325 MG per tablet TAKE 1 TABLET BY MOUTH 4 TIMES DAILY DO NOT COMBINE WITH ALCOHOL AND DO NOT DRIVE AFTER TAKING. MUST BE SEEN FOR REFILLS      triamcinolone (KENALOG) 0.025 % cream Apply topically 2 times daily. (Patient taking differently: as needed Apply topically 2 times daily.) 80 g 1    diclofenac sodium (VOLTAREN) 1 % GEL Apply 2 g topically 4 times daily 2 Tube 1    amitriptyline (ELAVIL) 50 MG tablet TAKE ONE TABLET BY MOUTH IN THE EVENING 30 tablet 11     No current facility-administered medications for this visit. Allergies   Allergen Reactions    Shellfish-Derived Products Anaphylaxis       Family History   Problem Relation Age of Onset    Diabetes Mother     Colon Cancer Neg Hx     Colon Polyps Neg Hx     Esophageal Cancer Neg Hx     Liver Cancer Neg Hx     Liver Disease Neg Hx     Rectal Cancer Neg Hx     Stomach Cancer Neg Hx          Subjective:      Review of Systems   Constitutional: Negative. HENT: Negative. Eyes: Negative. Respiratory: Negative. Cardiovascular: Negative. Gastrointestinal: Negative. Endocrine: Negative. Genitourinary: Negative. Musculoskeletal: Positive for arthralgias and gait problem (right and left hip pain). Skin: Negative. Hematological: Negative. Psychiatric/Behavioral: Negative. Objective:     Physical Exam  Vitals and nursing note reviewed. Constitutional:       Appearance: Normal appearance. HENT:      Head: Normocephalic and atraumatic. Right Ear: Hearing, tympanic membrane, ear canal and external ear normal.      Left Ear: Hearing, tympanic membrane, ear canal and external ear normal.      Nose: Nose normal.      Mouth/Throat:      Lips: Pink. Mouth: Mucous membranes are moist.      Pharynx: Oropharynx is clear. Eyes:      General: Lids are normal.      Extraocular Movements: Extraocular movements intact. Conjunctiva/sclera: Conjunctivae normal.      Pupils: Pupils are equal, round, and reactive to light. Neck:      Thyroid: No thyromegaly. Cardiovascular:      Rate and Rhythm: Normal rate and regular rhythm. Pulses: Normal pulses. Dorsalis pedis pulses are 2+ on the right side and 2+ on the left side. Posterior tibial pulses are 2+ on the right side and 2+ on the left side. Heart sounds: Normal heart sounds. Pulmonary:      Effort: Pulmonary effort is normal.      Breath sounds: Normal breath sounds and air entry. Abdominal:      General: Bowel sounds are normal.      Palpations: Abdomen is soft. Musculoskeletal:      Cervical back: Full passive range of motion without pain, normal range of motion and neck supple. Thoracic back: Deformity present. No tenderness. Decreased range of motion. Lumbar back: Deformity present. No tenderness. Decreased range of motion. Back:       Right hip: Tenderness present. Decreased range of motion. Left hip: Tenderness present. Decreased range of motion. Legs:    Lymphadenopathy:      Cervical: No cervical adenopathy. Skin:     General: Skin is warm and dry. Capillary Refill: Capillary refill takes less than 2 seconds. Neurological:      General: No focal deficit present. Mental Status: He is alert and oriented to person, place, and time. Mental status is at baseline. Coordination: Coordination is intact. Psychiatric:         Mood and Affect: Mood normal.         Speech: Speech normal.         Behavior: Behavior normal.         Thought Content: Thought content normal.         Cognition and Memory: Cognition and memory normal.         Judgment: Judgment normal.         /70 (Site: Right Upper Arm, Position: Sitting, Cuff Size: Large Adult)   Pulse 71   Temp 97.3 °F (36.3 °C) (Temporal)   Wt 199 lb (90.3 kg)   SpO2 97%   BMI 27.75 kg/m²     Assessment:      Diagnosis Orders   1. Fall, subsequent encounter  XR HIP BILATERAL W AP PELVIS (2 VIEWS)   2. Left-sided low back pain with left-sided sciatica, unspecified chronicity  tiZANidine (ZANAFLEX) 4 MG tablet    predniSONE (DELTASONE) 20 MG tablet   3. MD (muscular dystrophy) ([facility]  pregabalin (LYRICA) 150 MG capsule   4. Lumbar pain  pregabalin (LYRICA) 150 MG capsule   5. Left sciatic nerve pain  pregabalin (LYRICA) 150 MG capsule    predniSONE (DELTASONE) 20 MG tablet   6. Right hip pain  XR HIP BILATERAL W AP PELVIS (2 VIEWS)   7. Left hip pain  XR HIP BILATERAL W AP PELVIS (2 VIEWS)   8. Encounter for special screening examination for neoplasm of prostate  PSA Screening   9. Drug therapy  POCT Rapid Drug Screen       Results for orders placed or performed in visit on 06/07/22   POCT Rapid Drug Screen   Result Value Ref Range    Alcohol, Urine n/a     Amphetamine Screen, Urine -     Barbiturate Screen, Urine -     Benzodiazepine Screen, Urine -     Buprenorphine Urine -     Cocaine Metabolite Screen, Urine -     FENTANYL SCREEN, URINE n/a     Gabapentin Screen, Urine n/a     MDMA, Urine -     Methadone Screen, Urine -     Methamphetamine, Urine -     Opiate Scrn, Ur -     Oxycodone Screen, Ur +     PCP Screen, Urine -     Propoxyphene Screen, Urine n/a     Synthetic Cannabinoids (K2) Screen, Urine n/a     THC Screen, Urine -     Tramadol Scrn, Ur n/a     Tricyclic Antidepressants, Urine n/a        Plan:     1.  Left-sided low back pain with left-sided sciatica, unspecified chronicity  Muscle relaxer and steroids for inflammation. - tiZANidine (ZANAFLEX) 4 MG tablet; TAKE 1 TABLET BY MOUTH THREE TIMES DAILY  Dispense: 90 tablet; Refill: 1  - predniSONE (DELTASONE) 20 MG tablet; Take 1 tablet by mouth 2 times daily for 5 days  Dispense: 10 tablet; Refill: 0    2. [facility]    - pregabalin (LYRICA) 150 MG capsule; Take 1 capsule by mouth 3 times daily for 30 days. Dispense: 90 capsule; Refill: 2    3. Lumbar pain    - pregabalin (LYRICA) 150 MG capsule; Take 1 capsule by mouth 3 times daily for 30 days. Dispense: 90 capsule; Refill: 2    4. Left sciatic nerve pain    - pregabalin (LYRICA) 150 MG capsule; Take 1 capsule by mouth 3 times daily for 30 days. Dispense: 90 capsule; Refill: 2  - predniSONE (DELTASONE) 20 MG tablet; Take 1 tablet by mouth 2 times daily for 5 days  Dispense: 10 tablet; Refill: 0    5. Fall, subsequent encounter  Xray due to fall.    - XR HIP BILATERAL W AP PELVIS (2 VIEWS); Future    6. Right hip pain    - XR HIP BILATERAL W AP PELVIS (2 VIEWS); Future    7. Left hip pain    - XR HIP BILATERAL W AP PELVIS (2 VIEWS); Future    8. Encounter for special screening examination for neoplasm of prostate    - PSA Screening; Future    9. Drug therapy    - POCT Rapid Drug Screen      Increasing Lexapro to help with emotions. May need additional work up or referral based on xray results. Return in about 6 weeks (around 7/19/2022) for increase of Lexapro and hip pain.     Orders Placed This Encounter   Procedures    XR HIP BILATERAL W AP PELVIS (2 VIEWS)     Standing Status:   Future     Number of Occurrences:   1     Standing Expiration Date:   6/7/2023    PSA Screening     Standing Status:   Future     Number of Occurrences:   1     Standing Expiration Date:   6/7/2023    POCT Rapid Drug Screen       Orders Placed This Encounter   Medications    tiZANidine (ZANAFLEX) 4 MG tablet     Sig: TAKE 1 TABLET BY MOUTH THREE TIMES DAILY     Dispense:  90 tablet     Refill:  1    pregabalin (LYRICA) 150 MG capsule     Sig: Take 1 capsule by mouth 3 times daily for 30 days. Dispense:  90 capsule     Refill:  2    DISCONTD: escitalopram (LEXAPRO) 10 MG tablet     Sig: Take 1 tablet by mouth once daily     Dispense:  30 tablet     Refill:  3    predniSONE (DELTASONE) 20 MG tablet     Sig: Take 1 tablet by mouth 2 times daily for 5 days     Dispense:  10 tablet     Refill:  0    escitalopram (LEXAPRO) 20 MG tablet     Sig: Take 1 tablet by mouth once daily     Dispense:  30 tablet     Refill:  5            Patient offered educational handouts and has had all questions answered. Patient voices understanding and agrees to plans along with risks and benefits of plan. Patient is instructed to continue prior meds, diet, and exercise plans as instructed. Patient agrees to follow up as instructed and sooner if needed. Patient agrees to go to ER if condition becomes emergent. EMR Dragon/transcription disclaimer: Some of this encounter note is an electronic transcription/translation of spoken language to printed text. The electronic translation of spoken language may permit erroneous, or at times, nonsensical words or phrases to be inadvertently transcribed.  Although I have reviewed the note for such errors, some may still exist.    Electronically signed by ANTHONY Lara on 6/14/2022 at 3:49 PM

## 2022-06-09 DIAGNOSIS — M25.551 RIGHT HIP PAIN: Primary | ICD-10-CM

## 2022-06-09 DIAGNOSIS — M51.37 DEGENERATION OF LUMBAR OR LUMBOSACRAL INTERVERTEBRAL DISC: ICD-10-CM
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
## 2022-06-16 ENCOUNTER — HOSPITAL ENCOUNTER (OUTPATIENT)
Dept: MRI IMAGING | Age: 57
Discharge: HOME | End: 2022-06-16
Payer: MEDICARE

## 2022-06-16 DIAGNOSIS — M25.551 RIGHT HIP PAIN: ICD-10-CM
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
## 2022-06-16 PROCEDURE — 73721 MRI JNT OF LWR EXTRE W/O DYE: CPT | Performed by: RADIOLOGY

## 2022-06-16 PROCEDURE — 73721 MRI JNT OF LWR EXTRE W/O DYE: CPT

## 2022-07-07 ENCOUNTER — OFFICE VISIT (OUTPATIENT)
Dept: FAMILY MEDICINE CLINIC | Age: 57
End: 2022-07-07
Payer: MEDICARE

## 2022-07-07 VITALS
OXYGEN SATURATION: 94 % | HEART RATE: 63 BPM | BODY MASS INDEX: 27.75 KG/M2 | SYSTOLIC BLOOD PRESSURE: 114 MMHG | DIASTOLIC BLOOD PRESSURE: 70 MMHG | TEMPERATURE: 96.8 F | WEIGHT: 199 LBS

## 2022-07-07 DIAGNOSIS — G71.00 MD (MUSCULAR DYSTROPHY) (HCC): ICD-10-CM

## 2022-07-07 DIAGNOSIS — M54.50 LUMBAR PAIN: ICD-10-CM

## 2022-07-07 DIAGNOSIS — M54.32 LEFT SCIATIC NERVE PAIN: ICD-10-CM

## 2022-07-07 DIAGNOSIS — F32.5 MAJOR DEPRESSIVE DISORDER WITH SINGLE EPISODE, IN FULL REMISSION (HCC): Primary | ICD-10-CM

## 2022-07-07 DIAGNOSIS — M25.551 RIGHT HIP PAIN: ICD-10-CM

## 2022-07-07 DIAGNOSIS — K04.7 DENTAL ABSCESS: ICD-10-CM

## 2022-07-07 DIAGNOSIS — F41.9 ANXIETY: ICD-10-CM

## 2022-07-07 PROCEDURE — 99214 OFFICE O/P EST MOD 30 MIN: CPT | Performed by: NURSE PRACTITIONER

## 2022-07-07 PROCEDURE — 3017F COLORECTAL CA SCREEN DOC REV: CPT | Performed by: NURSE PRACTITIONER

## 2022-07-07 PROCEDURE — G8427 DOCREV CUR MEDS BY ELIG CLIN: HCPCS | Performed by: NURSE PRACTITIONER

## 2022-07-07 PROCEDURE — 1036F TOBACCO NON-USER: CPT | Performed by: NURSE PRACTITIONER

## 2022-07-07 PROCEDURE — G8419 CALC BMI OUT NRM PARAM NOF/U: HCPCS | Performed by: NURSE PRACTITIONER

## 2022-07-07 RX ORDER — PREGABALIN 150 MG/1
CAPSULE ORAL
Qty: 90 CAPSULE | Refills: 3 | Status: SHIPPED | OUTPATIENT
Start: 2022-07-07 | End: 2022-08-11

## 2022-07-07 RX ORDER — CLINDAMYCIN HYDROCHLORIDE 300 MG/1
300 CAPSULE ORAL 2 TIMES DAILY
Qty: 20 CAPSULE | Refills: 0 | Status: SHIPPED | OUTPATIENT
Start: 2022-07-07 | End: 2022-07-17

## 2022-07-07 ASSESSMENT — ENCOUNTER SYMPTOMS
EYES NEGATIVE: 1
GASTROINTESTINAL NEGATIVE: 1
RESPIRATORY NEGATIVE: 1

## 2022-07-07 NOTE — PROGRESS NOTES
[facility] PHYSICIAN SERVICES  [facility] [place]  [address] [address]  Dept: [phone]  Dept Fax: [phone]  Loc: [phone]    Georgine Severs is a 61 y.o. male who presents today for his medical conditions/complaints as noted below. Georgine Severs is c/o of Follow-up, Anxiety (started Lexapro last visit, can't tell if it's working or not), and Hip Pain (still waiting to see ortho, appt 7/13)      Chief Complaint   Patient presents with    Follow-up    Anxiety     started Lexapro last visit, can't tell if it's working or not    Hip Pain     still waiting to see ortho, appt 7/13       HPI:     HPI   Patient here for follow up on increase of Lexapro. He reports that the medication is not helping much. He is now having left sided dental pain. This pain has been ongoing for at least 1-2 weeks. He is not able to eat much right now because of the pain. He is also seeing ortho soon about his hip as well.       Past Medical History:   Diagnosis Date    Anxiety     Chronic back pain     Depression     MD [facility]muscular dystrophy) ([facility] [address]     Neuropathy 12/02/2020    Scoliosis         Past Surgical History:   Procedure Laterality Date    CHOLECYSTECTOMY, LAPAROSCOPIC N/A 04/27/2021    LAPAROSCOPIC CHOLECYSTECTOMY performed by Jose Miguel Henson DO at [address] [place] [address] COLONOSCOPY  01/08/2016    Dr Corinne Clack, normal, 10 yr recall    COLONOSCOPY N/A 06/30/2021    Dr Shanqiua Ryan hemorrhids, RANDOM COLON BX NEGATIVE, Sub Prep Fair, 3 year recall    FOOT SURGERY Left     UPPER GASTROINTESTINAL ENDOSCOPY N/A 06/30/2021    Dr Debbie Vera, duodenal villous blunting otherwise normal EGD; serum celiac panel NEG for celiac sprue    UPPER GASTROINTESTINAL ENDOSCOPY N/A 08/10/2021    Dr Debbie Vera- mild gastritis, 2 small duodenal ulcers and 1 large cratered 3-4cm in posterior duodenal bulb, NEG h pylori       Social History     Tobacco Use    Smoking status: Former Smoker     Packs/day: 0.00 Years: 30.00     Pack years: 0.00     Types: Cigarettes     Quit date: 2021     Years since quittin.9    Smokeless tobacco: Never Used   Substance Use Topics    Alcohol use: Yes     Comment: occas        Current Outpatient Medications   Medication Sig Dispense Refill    pregabalin (LYRICA) 150 MG capsule TAKE 1 CAPSULE BY MOUTH THREE TIMES DAILY 90 capsule 3    clindamycin (CLEOCIN) 300 MG capsule Take 1 capsule by mouth 2 times daily for 10 days 20 capsule 0    tiZANidine (ZANAFLEX) 4 MG tablet TAKE 1 TABLET BY MOUTH THREE TIMES DAILY 90 tablet 1    escitalopram (LEXAPRO) 20 MG tablet Take 1 tablet by mouth once daily 30 tablet 5    busPIRone (BUSPAR) 10 MG tablet TAKE 1 TABLET BY MOUTH THREE TIMES DAILY 90 tablet 1    oxyCODONE-acetaminophen (PERCOCET) 7.5-325 MG per tablet TAKE 1 TABLET BY MOUTH 4 TIMES DAILY DO NOT COMBINE WITH ALCOHOL AND DO NOT DRIVE AFTER TAKING. MUST BE SEEN FOR REFILLS      triamcinolone (KENALOG) 0.025 % cream Apply topically 2 times daily. (Patient taking differently: as needed Apply topically 2 times daily.) 80 g 1    diclofenac sodium (VOLTAREN) 1 % GEL Apply 2 g topically 4 times daily 2 Tube 1    amitriptyline (ELAVIL) 50 MG tablet TAKE ONE TABLET BY MOUTH IN THE EVENING 30 tablet 11     No current facility-administered medications for this visit. Allergies   Allergen Reactions    Shellfish-Derived Products Anaphylaxis       Family History   Problem Relation Age of Onset    Diabetes Mother     Colon Cancer Neg Hx     Colon Polyps Neg Hx     Esophageal Cancer Neg Hx     Liver Cancer Neg Hx     Liver Disease Neg Hx     Rectal Cancer Neg Hx     Stomach Cancer Neg Hx                Subjective:      Review of Systems   Constitutional: Negative. HENT: Positive for dental problem. Eyes: Negative. Respiratory: Negative. Cardiovascular: Negative. Gastrointestinal: Negative. Endocrine: Negative. Genitourinary: Negative. Musculoskeletal: Positive for arthralgias and gait problem (right and left hip pain). Skin: Negative. Hematological: Negative. Psychiatric/Behavioral: The patient is nervous/anxious. Objective:     Physical Exam  Vitals and nursing note reviewed. Constitutional:       Appearance: Normal appearance. HENT:      Head: Normocephalic and atraumatic. Right Ear: Hearing, tympanic membrane, ear canal and external ear normal.      Left Ear: Hearing, tympanic membrane, ear canal and external ear normal.      Nose: Nose normal.      Mouth/Throat:      Lips: Pink. Mouth: Mucous membranes are moist.      Dentition: Dental tenderness and dental caries present. Pharynx: Oropharynx is clear. Eyes:      General: Lids are normal.      Extraocular Movements: Extraocular movements intact. Conjunctiva/sclera: Conjunctivae normal.      Pupils: Pupils are equal, round, and reactive to light. Neck:      Thyroid: No thyromegaly. Cardiovascular:      Rate and Rhythm: Normal rate and regular rhythm. Pulses: Normal pulses. Dorsalis pedis pulses are 2+ on the right side and 2+ on the left side. Posterior tibial pulses are 2+ on the right side and 2+ on the left side. Heart sounds: Normal heart sounds. Pulmonary:      Effort: Pulmonary effort is normal.      Breath sounds: Normal breath sounds and air entry. Abdominal:      General: Bowel sounds are normal.      Palpations: Abdomen is soft. Musculoskeletal:      Cervical back: Full passive range of motion without pain, normal range of motion and neck supple. Thoracic back: Deformity present. No tenderness. Decreased range of motion. Lumbar back: Deformity present. No tenderness. Decreased range of motion. Back:       Right hip: Tenderness present. Decreased range of motion. Left hip: Tenderness present. Decreased range of motion.         Legs:    Lymphadenopathy:      Cervical: No cervical adenopathy. Skin:     General: Skin is warm and dry. Capillary Refill: Capillary refill takes less than 2 seconds. Neurological:      General: No focal deficit present. Mental Status: He is alert and oriented to person, place, and time. Mental status is at baseline. Coordination: Coordination is intact. Psychiatric:         Mood and Affect: Mood normal.         Speech: Speech normal.         Behavior: Behavior normal.         Thought Content: Thought content normal.         Cognition and Memory: Cognition and memory normal.         Judgment: Judgment normal.         /70 (Site: Left Upper Arm)   Pulse 63   Temp 96.8 °F (36 °C)   Wt 199 lb (90.3 kg)   SpO2 94%   BMI 27.75 kg/m²     Assessment:      Diagnosis Orders   1. Major depressive disorder with single episode, in full remission ([address]     2. Anxiety     3. Right hip pain     4. Dental abscess  clindamycin (CLEOCIN) 300 MG capsule       No results found for this visit on 07/07/22. Plan:     1. Major depressive disorder with single episode, in full remission ([address]  Continue Lexapro    2. Anxiety  continue Buspar    3. Right hip pain      4. Dental abscess  Treating dental abscess. He is to get appointment with dentist as discussed. - clindamycin (CLEOCIN) 300 MG capsule; Take 1 capsule by mouth 2 times daily for 10 days  Dispense: 20 capsule; Refill: 0       Return in about 2 months (around 9/7/2022) for Follow up chronic conditions. No orders of the defined types were placed in this encounter. Orders Placed This Encounter   Medications    clindamycin (CLEOCIN) 300 MG capsule     Sig: Take 1 capsule by mouth 2 times daily for 10 days     Dispense:  20 capsule     Refill:  0            Patient offered educational handouts and has had all questions answered. Patient voices understanding and agrees to plans along with risks and benefits of plan.  Patient is instructed to continue prior meds, diet, and exercise plans as instructed. Patient agrees to follow up as instructed and sooner if needed. Patient agrees to go to ER if condition becomes emergent. EMR Dragon/transcription disclaimer: Some of this encounter note is an electronic transcription/translation of spoken language to printed text. The electronic translation of spoken language may permit erroneous, or at times, nonsensical words or phrases to be inadvertently transcribed.  Although I have reviewed the note for such errors, some may still exist.    Electronically signed by ANTHONY Alexis on 7/7/2022 at 1:35 PM

## 2022-07-18 DIAGNOSIS — K04.7 DENTAL ABSCESS: ICD-10-CM

## 2022-07-19 RX ORDER — CLINDAMYCIN HYDROCHLORIDE 300 MG/1
CAPSULE ORAL
Qty: 20 CAPSULE | Refills: 0 | OUTPATIENT
Start: 2022-07-19

## 2022-08-05 DIAGNOSIS — K29.90 GASTRITIS AND DUODENITIS: ICD-10-CM

## 2022-08-09 RX ORDER — PANTOPRAZOLE SODIUM 40 MG/1
TABLET, DELAYED RELEASE ORAL
Qty: 60 TABLET | Refills: 5 | Status: SHIPPED | OUTPATIENT
Start: 2022-08-09

## 2022-08-09 NOTE — TELEPHONE ENCOUNTER
Last OV 3/15/2022  Next OV Visit date not found      Requested Prescriptions     Pending Prescriptions Disp Refills    pantoprazole (PROTONIX) 40 MG tablet [Pharmacy Med Name: Pantoprazole Sodium 40 MG Oral Tablet Delayed Release] 60 tablet 0     Sig: TAKE 1 TABLET BY MOUTH TWICE DAILY BEFORE MEAL(S)

## 2022-08-10 DIAGNOSIS — M54.32 LEFT SCIATIC NERVE PAIN: ICD-10-CM

## 2022-08-10 DIAGNOSIS — G71.00 MD (MUSCULAR DYSTROPHY) (HCC): ICD-10-CM

## 2022-08-10 DIAGNOSIS — M54.50 LUMBAR PAIN: ICD-10-CM

## 2022-08-11 RX ORDER — PREGABALIN 150 MG/1
CAPSULE ORAL
Qty: 90 CAPSULE | Refills: 0 | Status: SHIPPED | OUTPATIENT
Start: 2022-08-11 | End: 2022-09-10

## 2022-08-11 NOTE — TELEPHONE ENCOUNTER
Last OV 3/15/2022  Next OV Visit date not found  UDS -- 6/7/22      Requested Prescriptions     Pending Prescriptions Disp Refills    pregabalin (LYRICA) 150 MG capsule [Pharmacy Med Name: Pregabalin 150 MG Oral Capsule] 90 capsule 0     Sig: TAKE 1 CAPSULE BY MOUTH THREE TIMES DAILY

## 2022-09-09 DIAGNOSIS — M54.42 LEFT-SIDED LOW BACK PAIN WITH LEFT-SIDED SCIATICA, UNSPECIFIED CHRONICITY: ICD-10-CM

## 2022-09-09 RX ORDER — TIZANIDINE 4 MG/1
TABLET ORAL
Qty: 90 TABLET | Refills: 0 | Status: SHIPPED | OUTPATIENT
Start: 2022-09-09 | End: 2022-10-11

## 2022-09-09 NOTE — TELEPHONE ENCOUNTER
Last OV 7/7/2022  Next OV Visit date not found      Requested Prescriptions     Pending Prescriptions Disp Refills    tiZANidine (ZANAFLEX) 4 MG tablet [Pharmacy Med Name: tiZANidine HCl 4 MG Oral Tablet] 90 tablet 0     Sig: TAKE 1 TABLET BY MOUTH THREE TIMES DAILY

## 2022-10-08 DIAGNOSIS — M54.42 LEFT-SIDED LOW BACK PAIN WITH LEFT-SIDED SCIATICA, UNSPECIFIED CHRONICITY: ICD-10-CM

## 2022-10-10 NOTE — TELEPHONE ENCOUNTER
Teresa Rossi called requesting a refill of the below medication which has been pended for you:     Requested Prescriptions     Pending Prescriptions Disp Refills    tiZANidine (ZANAFLEX) 4 MG tablet [Pharmacy Med Name: tiZANidine HCl 4 MG Oral Tablet] 90 tablet 0     Sig: TAKE 1 TABLET BY MOUTH THREE TIMES DAILY       Last Appointment Date: 7/7/2022  Next Appointment Date: 10/12/2022    Allergies   Allergen Reactions    Shellfish-Derived Products Anaphylaxis DISPLAY PLAN FREE TEXT

## 2022-10-11 RX ORDER — TIZANIDINE 4 MG/1
TABLET ORAL
Qty: 90 TABLET | Refills: 0 | Status: SHIPPED | OUTPATIENT
Start: 2022-10-11

## 2022-10-14 ENCOUNTER — OFFICE VISIT (OUTPATIENT)
Dept: FAMILY MEDICINE CLINIC | Age: 57
End: 2022-10-14
Payer: MEDICARE

## 2022-10-14 VITALS
TEMPERATURE: 97 F | WEIGHT: 197 LBS | BODY MASS INDEX: 27.58 KG/M2 | HEIGHT: 71 IN | DIASTOLIC BLOOD PRESSURE: 84 MMHG | HEART RATE: 90 BPM | OXYGEN SATURATION: 98 % | SYSTOLIC BLOOD PRESSURE: 130 MMHG

## 2022-10-14 DIAGNOSIS — I73.9 CLAUDICATION (HCC): Primary | ICD-10-CM

## 2022-10-14 DIAGNOSIS — M54.42 LEFT-SIDED LOW BACK PAIN WITH LEFT-SIDED SCIATICA, UNSPECIFIED CHRONICITY: ICD-10-CM

## 2022-10-14 DIAGNOSIS — G71.00 MD (MUSCULAR DYSTROPHY) (HCC): ICD-10-CM

## 2022-10-14 DIAGNOSIS — I73.9 CLAUDICATION (HCC): ICD-10-CM

## 2022-10-14 LAB
ALBUMIN SERPL-MCNC: 4.6 G/DL (ref 3.5–5.2)
ALP BLD-CCNC: 102 U/L (ref 40–130)
ALT SERPL-CCNC: 17 U/L (ref 5–41)
ANION GAP SERPL CALCULATED.3IONS-SCNC: 8 MMOL/L (ref 7–19)
AST SERPL-CCNC: 22 U/L (ref 5–40)
BILIRUB SERPL-MCNC: 0.3 MG/DL (ref 0.2–1.2)
BUN BLDV-MCNC: 14 MG/DL (ref 6–20)
CALCIUM SERPL-MCNC: 9.1 MG/DL (ref 8.6–10)
CHLORIDE BLD-SCNC: 102 MMOL/L (ref 98–111)
CO2: 28 MMOL/L (ref 22–29)
CREAT SERPL-MCNC: 0.6 MG/DL (ref 0.5–1.2)
GFR AFRICAN AMERICAN: >59
GFR NON-AFRICAN AMERICAN: >60
GLUCOSE BLD-MCNC: 74 MG/DL (ref 74–109)
HCT VFR BLD CALC: 43 % (ref 42–52)
HEMOGLOBIN: 13.5 G/DL (ref 14–18)
INR BLD: 1.02 (ref 0.88–1.18)
MCH RBC QN AUTO: 29.7 PG (ref 27–31)
MCHC RBC AUTO-ENTMCNC: 31.4 G/DL (ref 33–37)
MCV RBC AUTO: 94.7 FL (ref 80–94)
PDW BLD-RTO: 13.7 % (ref 11.5–14.5)
PLATELET # BLD: 364 K/UL (ref 130–400)
PMV BLD AUTO: 9.6 FL (ref 9.4–12.4)
POTASSIUM SERPL-SCNC: 4.2 MMOL/L (ref 3.5–5)
PROTHROMBIN TIME: 13.4 SEC (ref 12–14.6)
RBC # BLD: 4.54 M/UL (ref 4.7–6.1)
SODIUM BLD-SCNC: 138 MMOL/L (ref 136–145)
TOTAL PROTEIN: 7.4 G/DL (ref 6.6–8.7)
TSH REFLEX FT4: 1.31 UIU/ML (ref 0.35–5.5)
WBC # BLD: 5.6 K/UL (ref 4.8–10.8)

## 2022-10-14 PROCEDURE — 99214 OFFICE O/P EST MOD 30 MIN: CPT | Performed by: NURSE PRACTITIONER

## 2022-10-14 PROCEDURE — 1036F TOBACCO NON-USER: CPT | Performed by: NURSE PRACTITIONER

## 2022-10-14 PROCEDURE — 3017F COLORECTAL CA SCREEN DOC REV: CPT | Performed by: NURSE PRACTITIONER

## 2022-10-14 PROCEDURE — G8427 DOCREV CUR MEDS BY ELIG CLIN: HCPCS | Performed by: NURSE PRACTITIONER

## 2022-10-14 PROCEDURE — G8484 FLU IMMUNIZE NO ADMIN: HCPCS | Performed by: NURSE PRACTITIONER

## 2022-10-14 PROCEDURE — G8419 CALC BMI OUT NRM PARAM NOF/U: HCPCS | Performed by: NURSE PRACTITIONER

## 2022-10-14 NOTE — PROGRESS NOTES
[facility] PHYSICIAN SERVICES  [facility] [place]  [address] [address]  Dept: [phone]  Dept Fax: [phone]  Loc: [phone]    Bridget Warner is a 61 y.o. male who presents today for his medical conditions/complaints as noted below. Bridget Warner is c/o of Follow-up (Chronic conditions)      Chief Complaint   Patient presents with    Follow-up     Chronic conditions       HPI:     HPI  Patient presents today for routine follow up of chronic conditions including anxiety/depression, MD, neuropathy. He has been taking his meds as prescribed. He is having issues with some pain in his lower legs. He feels like they are hurting all the time and aching. There are times were he has sharp stabbing pain in lower legs.       Past Medical History:   Diagnosis Date    Anxiety     Chronic back pain     Depression     MD (muscular dystrophy) ([facility] [address]     Neuropathy 2020    Scoliosis         Past Surgical History:   Procedure Laterality Date    CHOLECYSTECTOMY, LAPAROSCOPIC N/A 2021    LAPAROSCOPIC CHOLECYSTECTOMY performed by Hernan Alamo DO at [place] [address]  2016    Dr Nye Larger, normal, 10 yr recall    COLONOSCOPY N/A 2021    Dr Jayson Carty hemorrhids, RANDOM COLON BX NEGATIVE, Sub Prep Fair, 3 year recall    FOOT SURGERY Left     UPPER GASTROINTESTINAL ENDOSCOPY N/A 2021    Dr Tamia Broussard, duodenal villous blunting otherwise normal EGD; serum celiac panel NEG for celiac sprue    UPPER GASTROINTESTINAL ENDOSCOPY N/A 08/10/2021    Dr Tamia Broussard- mild gastritis, 2 small duodenal ulcers and 1 large cratered 3-4cm in posterior duodenal bulb, NEG h pylori       Social History     Tobacco Use    Smoking status: Former     Packs/day: 0.00     Years: 30.00     Pack years: 0.00     Types: Cigarettes     Quit date: 2021     Years since quittin.2    Smokeless tobacco: Never   Substance Use Topics    Alcohol use: Yes     Comment: occas Appearance: Normal appearance. HENT:      Head: Normocephalic and atraumatic. Right Ear: Hearing, tympanic membrane, ear canal and external ear normal.      Left Ear: Hearing, tympanic membrane, ear canal and external ear normal.      Nose: Nose normal.      Mouth/Throat:      Lips: Pink. Mouth: Mucous membranes are moist.      Pharynx: Oropharynx is clear. Eyes:      General: Lids are normal.      Extraocular Movements: Extraocular movements intact. Conjunctiva/sclera: Conjunctivae normal.      Pupils: Pupils are equal, round, and reactive to light. Neck:      Thyroid: No thyromegaly. Cardiovascular:      Rate and Rhythm: Normal rate and regular rhythm. Pulses: Normal pulses. Dorsalis pedis pulses are 2+ on the right side and 2+ on the left side. Posterior tibial pulses are 2+ on the right side and 2+ on the left side. Heart sounds: Normal heart sounds. Pulmonary:      Effort: Pulmonary effort is normal.      Breath sounds: Normal breath sounds and air entry. Abdominal:      General: Bowel sounds are normal.      Palpations: Abdomen is soft. Musculoskeletal:      Cervical back: Full passive range of motion without pain, normal range of motion and neck supple. Thoracic back: Deformity present. No tenderness. Decreased range of motion. Lumbar back: Deformity present. No tenderness. Decreased range of motion. Back:       Right hip: Tenderness present. Decreased range of motion. Left hip: Tenderness present. Decreased range of motion. Right lower leg: Tenderness present. Left lower leg: Tenderness present. Legs:    Lymphadenopathy:      Cervical: No cervical adenopathy. Skin:     General: Skin is warm and dry. Capillary Refill: Capillary refill takes less than 2 seconds. Neurological:      General: No focal deficit present. Mental Status: He is alert and oriented to person, place, and time.  Mental status is at baseline. Coordination: Coordination is intact. Psychiatric:         Mood and Affect: Mood normal.         Speech: Speech normal.         Behavior: Behavior normal.         Thought Content: Thought content normal.         Cognition and Memory: Cognition and memory normal.         Judgment: Judgment normal.       /84   Pulse 90   Temp 97 °F (36.1 °C)   Ht 5' 11\" (1.803 m)   Wt 197 lb (89.4 kg)   SpO2 98%   BMI 27.48 kg/m²     Assessment:      Diagnosis Orders   1. Claudication (HCC)  VL FELICIA BILATERAL LIMITED 1-2 LEVELS    CBC    Comprehensive Metabolic Panel    TSH with Reflex to FT4    Protime-INR      2. Left-sided low back pain with left-sided sciatica, unspecified chronicity        3. MD (muscular dystrophy) ([facility] [address]            No results found for this visit on 10/14/22. Plan:     1. Claudication ([facility] [address]  Checking ABIs. I am concerned that pain in legs can be caused by blockage. Checking labs. - VL FELICIA BILATERAL LIMITED 1-2 LEVELS; Future  - CBC; Future  - Comprehensive Metabolic Panel; Future  - TSH with Reflex to FT4; Future  - Protime-INR; Future    2. Left-sided low back pain with left-sided sciatica, unspecified chronicity      3. MD (muscular dystrophy) ([facility] [address]       No follow-ups on file. Orders Placed This Encounter   Procedures    VL FELICIA BILATERAL LIMITED 1-2 LEVELS     Standing Status:   Future     Standing Expiration Date:   10/14/2023    CBC     Standing Status:   Future     Number of Occurrences:   1     Standing Expiration Date:   10/14/2023    Comprehensive Metabolic Panel     Standing Status:   Future     Number of Occurrences:   1     Standing Expiration Date:   10/14/2023    TSH with Reflex to FT4     Standing Status:   Future     Number of Occurrences:   1     Standing Expiration Date:   10/14/2023    Protime-INR     Standing Status:   Future     Number of Occurrences:   1     Standing Expiration Date:   10/14/2023     Order Specific Question:   Daily Coumadin Dose? Answer:   none         No orders of the defined types were placed in this encounter. Patient offered educational handouts and has had all questions answered. Patient voices understanding and agrees to plans along with risks and benefits of plan. Patient is instructed to continue prior meds, diet, and exercise plans as instructed. Patient agrees to follow up as instructed and sooner if needed. Patient agrees to go to ER if condition becomes emergent. EMR Dragon/transcription disclaimer: Some of this encounter note is an electronic transcription/translation of spoken language to printed text. The electronic translation of spoken language may permit erroneous, or at times, nonsensical words or phrases to be inadvertently transcribed.  Although I have reviewed the note for such errors, some may still exist.    Electronically signed by ANTHONY Park on 10/17/2022 at 11:27 AM

## 2022-10-17 ASSESSMENT — ENCOUNTER SYMPTOMS
BACK PAIN: 1
EYES NEGATIVE: 1
RESPIRATORY NEGATIVE: 1
GASTROINTESTINAL NEGATIVE: 1

## 2022-11-14 DIAGNOSIS — I73.9 CLAUDICATION, INTERMITTENT (HCC): Primary | ICD-10-CM

## 2022-11-29 ENCOUNTER — HOSPITAL ENCOUNTER (OUTPATIENT)
Dept: MRI IMAGING | Age: 57
Discharge: HOME | End: 2022-11-29
Payer: MEDICARE

## 2022-11-29 DIAGNOSIS — M51.26 RUPTURED LUMBAR DISC: ICD-10-CM

## 2022-11-29 PROCEDURE — 72148 MRI LUMBAR SPINE W/O DYE: CPT

## 2022-12-01 ENCOUNTER — TELEPHONE (OUTPATIENT)
Dept: UROLOGY | Age: 57
End: 2022-12-01

## 2022-12-01 DIAGNOSIS — K74.60 CIRRHOSIS OF LIVER WITHOUT ASCITES, UNSPECIFIED HEPATIC CIRRHOSIS TYPE (HCC): Primary | ICD-10-CM

## 2022-12-01 NOTE — TELEPHONE ENCOUNTER
Patient EP of ANTHONY Pinzon has a 6 month follow up on 1/31 and is needing orders placed for lab and a u/s of liver. Thank you!

## 2022-12-01 NOTE — TELEPHONE ENCOUNTER
JUDI CRAMER HAS SOME LABS DONE IN OCT. WILL YOU LOOK TO SEE WHAT ELSE YOU NEED AND DROP THOSE ORDERS, PLEASE. WE WILL CALL TO SCHEDULE. THANKS.

## 2022-12-02 DIAGNOSIS — M54.42 LEFT-SIDED LOW BACK PAIN WITH LEFT-SIDED SCIATICA, UNSPECIFIED CHRONICITY: ICD-10-CM

## 2022-12-02 RX ORDER — TIZANIDINE 4 MG/1
TABLET ORAL
Qty: 90 TABLET | Refills: 0 | Status: SHIPPED | OUTPATIENT
Start: 2022-12-02

## 2022-12-05 ENCOUNTER — HOSPITAL ENCOUNTER (OUTPATIENT)
Dept: NON INVASIVE DIAGNOSTICS | Age: 57
Discharge: HOME | End: 2022-12-05
Payer: MEDICARE

## 2022-12-05 DIAGNOSIS — I73.9 CLAUDICATION, INTERMITTENT (HCC): ICD-10-CM

## 2022-12-05 PROCEDURE — 93923 UPR/LXTR ART STDY 3+ LVLS: CPT

## 2022-12-15 NOTE — TELEPHONE ENCOUNTER
Surya Rios called to request a refill on his medication. Last office visit : 4/2/2019   Next office visit : 7/2/2019     Last UDS:   Amphetamines   Date Value Ref Range Status   06/12/2013 NEGATIVE  Final     Amphetamine Screen, Urine   Date Value Ref Range Status   04/02/2019 Neg  Final     Comment:     MOP - Norco POS     Barbiturates   Date Value Ref Range Status   06/12/2013 NEGATIVE  Final     Barbiturate Screen, Urine   Date Value Ref Range Status   04/02/2019 Neg  Final     Benzodiazepines   Date Value Ref Range Status   06/12/2013 NEGATIVE  Final     Benzodiazepine Screen, Urine   Date Value Ref Range Status   04/02/2019 Neg  Final     Buprenorphine Urine   Date Value Ref Range Status   04/02/2019 Neg  Final     Cocaine Metabolite Screen, Urine   Date Value Ref Range Status   04/02/2019 Neg  Final     Gabapentin Screen, Urine   Date Value Ref Range Status   04/02/2019 Neg  Final     MDMA, Urine   Date Value Ref Range Status   04/02/2019 Neg  Final     Methamphetamine, Urine   Date Value Ref Range Status   04/02/2019 Neg  Final     Opiate Scrn, Ur   Date Value Ref Range Status   04/02/2019 Neg  Final     Oxycodone Screen, Ur   Date Value Ref Range Status   04/02/2019 Neg  Final     PCP Screen, Urine   Date Value Ref Range Status   04/02/2019 Neg  Final     Propoxyphene Screen, Urine   Date Value Ref Range Status   04/02/2019 Neg  Final     THC Screen, Urine   Date Value Ref Range Status   04/02/2019 Neg  Final     Tricyclic Antidepressants, Urine   Date Value Ref Range Status   04/02/2019 Neg  Final       Last Knoxvilleyary Hernandez: 05/13/19  Medication Contract: N/A     Requested Prescriptions      No prescriptions requested or ordered in this encounter         Please approve or refuse this medication.    Danae Teague Plastic Surgeon Procedure Text (A): After obtaining clear surgical margins the patient was sent to plastics for surgical repair.  The patient understands they will receive post-surgical care and follow-up from the referring physician's office.

## 2023-01-03 RX ORDER — ESCITALOPRAM OXALATE 20 MG/1
TABLET ORAL
Qty: 30 TABLET | Refills: 5 | Status: SHIPPED | OUTPATIENT
Start: 2023-01-03

## 2023-01-03 NOTE — TELEPHONE ENCOUNTER
Mai Damian called requesting a refill of the below medication which has been pended for you:     Requested Prescriptions     Pending Prescriptions Disp Refills    escitalopram (LEXAPRO) 20 MG tablet [Pharmacy Med Name: Escitalopram Oxalate 20 MG Oral Tablet] 30 tablet 0     Sig: Take 1 tablet by mouth once daily       Last Appointment Date: 10/14/2022  Next Appointment Date: 1/13/2023    Allergies   Allergen Reactions    Shellfish-Derived Products Anaphylaxis

## 2023-01-04 DIAGNOSIS — M54.42 LEFT-SIDED LOW BACK PAIN WITH LEFT-SIDED SCIATICA, UNSPECIFIED CHRONICITY: ICD-10-CM

## 2023-01-04 RX ORDER — TIZANIDINE 4 MG/1
TABLET ORAL
Qty: 90 TABLET | Refills: 0 | Status: SHIPPED | OUTPATIENT
Start: 2023-01-04

## 2023-01-04 NOTE — TELEPHONE ENCOUNTER
[facility] called requesting a refill of the below medication which has been pended for you:     Requested Prescriptions     Pending Prescriptions Disp Refills    tiZANidine (ZANAFLEX) 4 MG tablet [Pharmacy Med Name: tiZANidine HCl 4 MG Oral Tablet] 90 tablet 0     Sig: TAKE 1 TABLET BY MOUTH THREE TIMES DAILY       Last Appointment Date: 10/14/2022  Next Appointment Date: 1/13/2023    Allergies   Allergen Reactions    Shellfish-Derived Products Anaphylaxis

## 2023-01-16 ENCOUNTER — HOSPITAL ENCOUNTER (OUTPATIENT)
Dept: ULTRASOUND IMAGING | Age: 58
Discharge: HOME | End: 2023-01-16
Payer: MEDICARE

## 2023-01-16 DIAGNOSIS — K74.60 CIRRHOSIS OF LIVER WITHOUT ASCITES, UNSPECIFIED HEPATIC CIRRHOSIS TYPE (HCC): ICD-10-CM

## 2023-01-16 PROCEDURE — 76700 US EXAM ABDOM COMPLETE: CPT | Performed by: RADIOLOGY

## 2023-01-16 PROCEDURE — 76705 ECHO EXAM OF ABDOMEN: CPT

## 2023-01-17 ENCOUNTER — TELEPHONE (OUTPATIENT)
Dept: GASTROENTEROLOGY | Age: 58
End: 2023-01-17

## 2023-01-17 ENCOUNTER — OFFICE VISIT (OUTPATIENT)
Dept: FAMILY MEDICINE CLINIC | Age: 58
End: 2023-01-17
Payer: MEDICARE

## 2023-01-17 VITALS
OXYGEN SATURATION: 96 % | HEIGHT: 71 IN | HEART RATE: 68 BPM | SYSTOLIC BLOOD PRESSURE: 128 MMHG | BODY MASS INDEX: 27.86 KG/M2 | DIASTOLIC BLOOD PRESSURE: 88 MMHG | WEIGHT: 199 LBS | TEMPERATURE: 97.3 F

## 2023-01-17 DIAGNOSIS — D68.4: ICD-10-CM

## 2023-01-17 DIAGNOSIS — F32.5 MAJOR DEPRESSIVE DISORDER WITH SINGLE EPISODE, IN FULL REMISSION (HCC): Chronic | ICD-10-CM

## 2023-01-17 DIAGNOSIS — K74.60 CIRRHOSIS OF LIVER WITHOUT ASCITES, UNSPECIFIED HEPATIC CIRRHOSIS TYPE (HCC): ICD-10-CM

## 2023-01-17 DIAGNOSIS — I73.9 CLAUDICATION (HCC): ICD-10-CM

## 2023-01-17 DIAGNOSIS — F41.9 ANXIETY: ICD-10-CM

## 2023-01-17 DIAGNOSIS — Z51.81 MEDICATION MONITORING ENCOUNTER: ICD-10-CM

## 2023-01-17 DIAGNOSIS — G71.00 MD (MUSCULAR DYSTROPHY) (HCC): Primary | ICD-10-CM

## 2023-01-17 DIAGNOSIS — Z23 IMMUNIZATION DUE: ICD-10-CM

## 2023-01-17 DIAGNOSIS — L21.9 SEBORRHEIC DERMATITIS: ICD-10-CM

## 2023-01-17 LAB
ALCOHOL URINE: NORMAL
AMPHETAMINE SCREEN, URINE: NORMAL
BARBITURATE SCREEN, URINE: NORMAL
BENZODIAZEPINE SCREEN, URINE: NORMAL
BUPRENORPHINE URINE: NORMAL
COCAINE METABOLITE SCREEN URINE: NORMAL
FENTANYL SCREEN, URINE: NORMAL
GABAPENTIN SCREEN, URINE: NORMAL
MDMA URINE: NORMAL
METHADONE SCREEN, URINE: NORMAL
METHAMPHETAMINE, URINE: NORMAL
OPIATE SCREEN URINE: NORMAL
OXYCODONE SCREEN URINE: POSITIVE
PHENCYCLIDINE SCREEN URINE: NORMAL
PROPOXYPHENE SCREEN, URINE: NORMAL
SYNTHETIC CANNABINOIDS(K2) SCREEN, URINE: NORMAL
THC SCREEN, URINE: NORMAL
TRAMADOL SCREEN URINE: NORMAL
TRICYCLIC ANTIDEPRESSANTS, UR: NORMAL

## 2023-01-17 PROCEDURE — 3017F COLORECTAL CA SCREEN DOC REV: CPT | Performed by: NURSE PRACTITIONER

## 2023-01-17 PROCEDURE — 99214 OFFICE O/P EST MOD 30 MIN: CPT | Performed by: NURSE PRACTITIONER

## 2023-01-17 PROCEDURE — G8482 FLU IMMUNIZE ORDER/ADMIN: HCPCS | Performed by: NURSE PRACTITIONER

## 2023-01-17 PROCEDURE — 80305 DRUG TEST PRSMV DIR OPT OBS: CPT | Performed by: NURSE PRACTITIONER

## 2023-01-17 PROCEDURE — 1036F TOBACCO NON-USER: CPT | Performed by: NURSE PRACTITIONER

## 2023-01-17 PROCEDURE — G8427 DOCREV CUR MEDS BY ELIG CLIN: HCPCS | Performed by: NURSE PRACTITIONER

## 2023-01-17 PROCEDURE — G8419 CALC BMI OUT NRM PARAM NOF/U: HCPCS | Performed by: NURSE PRACTITIONER

## 2023-01-17 RX ORDER — KETOCONAZOLE 20 MG/ML
SHAMPOO TOPICAL
Qty: 120 ML | Refills: 1 | Status: SHIPPED | OUTPATIENT
Start: 2023-01-17

## 2023-01-17 RX ORDER — HYDROXYZINE HYDROCHLORIDE 25 MG/1
25 TABLET, FILM COATED ORAL NIGHTLY
Qty: 30 TABLET | Refills: 0 | Status: SHIPPED | OUTPATIENT
Start: 2023-01-17 | End: 2023-02-16

## 2023-01-17 ASSESSMENT — PATIENT HEALTH QUESTIONNAIRE - PHQ9
SUM OF ALL RESPONSES TO PHQ QUESTIONS 1-9: 0
SUM OF ALL RESPONSES TO PHQ9 QUESTIONS 1 & 2: 0
SUM OF ALL RESPONSES TO PHQ QUESTIONS 1-9: 0
6. FEELING BAD ABOUT YOURSELF - OR THAT YOU ARE A FAILURE OR HAVE LET YOURSELF OR YOUR FAMILY DOWN: 0
7. TROUBLE CONCENTRATING ON THINGS, SUCH AS READING THE NEWSPAPER OR WATCHING TELEVISION: 0
1. LITTLE INTEREST OR PLEASURE IN DOING THINGS: 0
SUM OF ALL RESPONSES TO PHQ QUESTIONS 1-9: 0
2. FEELING DOWN, DEPRESSED OR HOPELESS: 0
SUM OF ALL RESPONSES TO PHQ QUESTIONS 1-9: 0
9. THOUGHTS THAT YOU WOULD BE BETTER OFF DEAD, OR OF HURTING YOURSELF: 0
10. IF YOU CHECKED OFF ANY PROBLEMS, HOW DIFFICULT HAVE THESE PROBLEMS MADE IT FOR YOU TO DO YOUR WORK, TAKE CARE OF THINGS AT HOME, OR GET ALONG WITH OTHER PEOPLE: 0
8. MOVING OR SPEAKING SO SLOWLY THAT OTHER PEOPLE COULD HAVE NOTICED. OR THE OPPOSITE, BEING SO FIGETY OR RESTLESS THAT YOU HAVE BEEN MOVING AROUND A LOT MORE THAN USUAL: 0
5. POOR APPETITE OR OVEREATING: 0
3. TROUBLE FALLING OR STAYING ASLEEP: 0
4. FEELING TIRED OR HAVING LITTLE ENERGY: 0

## 2023-01-17 NOTE — TELEPHONE ENCOUNTER
PT has been advised of results and recommendations. 6 month recall added. ----- Message from ANTHONY Ramsey - NP sent at 1/17/2023  2:14 PM CST -----  US liver stable without mass or lesion.   Will discuss at OV    Repeat in 6 months

## 2023-01-17 NOTE — PROGRESS NOTES
Formerly [facility] PHYSICIAN SERVICES  [facility]  [place]  [address] [address]  Dept: [phone]  Dept Fax: [phone]  Loc: [phone]    Rain Das is a 60 y.o. male who presents today for his medical conditions/complaints as noted below. Rain Das is c/o of Follow-up Chronic Condition and Edema      Chief Complaint   Patient presents with    Follow-up Chronic Condition    Edema       HPI:     HPI  Patient presents today for routine follow up of chronic conditions including muscular dystrophy, cirrhosis, anxiety/depression, and chronic pain. He states that he is having significant edema in bilat LE. Last visit we did check work up on lower extremities for claudication and a slight drop in pressures were noted. He reports that depression symptoms are stable. He jensen shave some increased fatigue, but believes this is related to never getting up and doing things.       Past Medical History:   Diagnosis Date    Anxiety     Chronic back pain     Depression     MD (muscular dystrophy) ([facility]     Neuropathy 12/02/2020    Scoliosis         Past Surgical History:   Procedure Laterality Date    CHOLECYSTECTOMY, LAPAROSCOPIC N/A 04/27/2021    LAPAROSCOPIC CHOLECYSTECTOMY performed by Carisa Soto DO at [facility]  01/08/2016    Dr Jose Jerez, normal, 10 yr recall    COLONOSCOPY N/A 06/30/2021    Dr Js Squires hemorrhids, RANDOM COLON BX NEGATIVE, Sub Prep Fair, 3 year recall    FOOT SURGERY Left     UPPER GASTROINTESTINAL ENDOSCOPY N/A 06/30/2021    Dr Sandra Ashford, duodenal villous blunting otherwise normal EGD; serum celiac panel NEG for celiac sprue    UPPER GASTROINTESTINAL ENDOSCOPY N/A 08/10/2021    Dr Sandra Ashford- mild gastritis, 2 small duodenal ulcers and 1 large cratered 3-4cm in posterior duodenal bulb, NEG h pylori       Social History     Tobacco Use    Smoking status: Former     Packs/day: 0.00     Years: 30.00     Pack years: 0.00     Types: Cigarettes Quit date: 2021     Years since quittin.4    Smokeless tobacco: Never   Substance Use Topics    Alcohol use: Yes     Comment: occas        Current Outpatient Medications   Medication Sig Dispense Refill    Elastic Bandages & Supports (MEDICAL COMPRESSION STOCKINGS) MISC 1 each by Does not apply route daily as needed (swelling) 1 each 0    ketoconazole (NIZORAL) 2 % shampoo Apply topically daily as needed. 120 mL 1    hydrOXYzine HCl (ATARAX) 25 MG tablet Take 1 tablet by mouth nightly 30 tablet 0    tiZANidine (ZANAFLEX) 4 MG tablet TAKE 1 TABLET BY MOUTH THREE TIMES DAILY 90 tablet 0    escitalopram (LEXAPRO) 20 MG tablet Take 1 tablet by mouth once daily 30 tablet 5    pantoprazole (PROTONIX) 40 MG tablet TAKE 1 TABLET BY MOUTH TWICE DAILY BEFORE MEAL(S) 60 tablet 5    oxyCODONE-acetaminophen (PERCOCET) 7.5-325 MG per tablet TAKE 1 TABLET BY MOUTH 4 TIMES DAILY DO NOT COMBINE WITH ALCOHOL AND DO NOT DRIVE AFTER TAKING. MUST BE SEEN FOR REFILLS      triamcinolone (KENALOG) 0.025 % cream Apply topically 2 times daily. (Patient taking differently: as needed Apply topically 2 times daily.) 80 g 1    diclofenac sodium (VOLTAREN) 1 % GEL Apply 2 g topically 4 times daily 2 Tube 1    amitriptyline (ELAVIL) 50 MG tablet TAKE ONE TABLET BY MOUTH IN THE EVENING 30 tablet 11    busPIRone (BUSPAR) 10 MG tablet TAKE 1 TABLET BY MOUTH THREE TIMES DAILY 90 tablet 3    pregabalin (LYRICA) 150 MG capsule TAKE 1 CAPSULE BY MOUTH THREE TIMES DAILY 90 capsule 0     No current facility-administered medications for this visit.        Allergies   Allergen Reactions    Shellfish-Derived Products Anaphylaxis       Family History   Problem Relation Age of Onset    Diabetes Mother     Colon Cancer Neg Hx     Colon Polyps Neg Hx     Esophageal Cancer Neg Hx     Liver Cancer Neg Hx     Liver Disease Neg Hx     Rectal Cancer Neg Hx     Stomach Cancer Neg Hx                Subjective:      Review of Systems   Constitutional:  Positive for fatigue. HENT: Negative. Eyes: Negative. Respiratory: Negative. Cardiovascular: Negative. Gastrointestinal: Negative. Endocrine: Negative. Genitourinary: Negative. Musculoskeletal:  Positive for arthralgias, back pain and myalgias. Skin: Negative. Neurological:  Positive for weakness. Hematological: Negative. Psychiatric/Behavioral: Negative. Objective:     Physical Exam  Vitals and nursing note reviewed. Constitutional:       Appearance: Normal appearance. HENT:      Head: Normocephalic and atraumatic. Right Ear: Hearing, tympanic membrane, ear canal and external ear normal.      Left Ear: Hearing, tympanic membrane, ear canal and external ear normal.      Nose: Nose normal.      Mouth/Throat:      Lips: Pink. Mouth: Mucous membranes are moist.      Pharynx: Oropharynx is clear. Eyes:      General: Lids are normal.      Extraocular Movements: Extraocular movements intact. Conjunctiva/sclera: Conjunctivae normal.      Pupils: Pupils are equal, round, and reactive to light. Neck:      Thyroid: No thyromegaly. Cardiovascular:      Rate and Rhythm: Normal rate and regular rhythm. Pulses: Normal pulses. Dorsalis pedis pulses are 2+ on the right side and 2+ on the left side. Posterior tibial pulses are 2+ on the right side and 2+ on the left side. Heart sounds: Normal heart sounds. Pulmonary:      Effort: Pulmonary effort is normal.      Breath sounds: Normal breath sounds and air entry. Abdominal:      General: Bowel sounds are normal.      Palpations: Abdomen is soft. Musculoskeletal:      Cervical back: Full passive range of motion without pain, normal range of motion and neck supple. Thoracic back: Deformity present. No tenderness. Decreased range of motion. Lumbar back: Deformity present. No tenderness. Decreased range of motion. Back:    Lymphadenopathy:      Cervical: No cervical adenopathy. Skin:     General: Skin is warm and dry. Capillary Refill: Capillary refill takes less than 2 seconds. Neurological:      General: No focal deficit present. Mental Status: He is alert and oriented to person, place, and time. Mental status is at baseline. Coordination: Coordination is intact. Psychiatric:         Mood and Affect: Mood normal.         Speech: Speech normal.         Behavior: Behavior normal.         Thought Content: Thought content normal.         Cognition and Memory: Cognition and memory normal.         Judgment: Judgment normal.       /88   Pulse 68   Temp 97.3 °F (36.3 °C)   Ht 5' 11\" (1.803 m)   Wt 199 lb (90.3 kg)   SpO2 96%   BMI 27.75 kg/m²     Assessment:      Diagnosis Orders   1. MD (muscular dystrophy) ([facility]        2. Immunization due  Influenza, FLUCELVAX, (age 10 mo+), IM, Preservative Free, 0.5 mL      3. Medication monitoring encounter  POCT Rapid Drug Screen      4. Claudication [facility])  Elastic Bandages & Supports (MEDICAL COMPRESSION STOCKINGS) MISC      5. Cirrhosis of liver without ascites, unspecified hepatic cirrhosis type ([facility]      stable. following GI      6. Major depressive disorder with single episode, in full remission ([facility]      stable      7. Vitamin K deficiency coagulation disorder (HCC)      stable      8. Seborrheic dermatitis  ketoconazole (NIZORAL) 2 % shampoo      9.  Anxiety  hydrOXYzine HCl (ATARAX) 25 MG tablet          Results for orders placed or performed in visit on 01/17/23   POCT Rapid Drug Screen   Result Value Ref Range    Alcohol, Urine neg     Amphetamine Screen, Urine neg     Barbiturate Screen, Urine neg     Benzodiazepine Screen, Urine neg     Buprenorphine Urine neg     Cocaine Metabolite Screen, Urine neg     FENTANYL SCREEN, URINE neg     Gabapentin Screen, Urine neg     MDMA, Urine neg     Methadone Screen, Urine neg     Methamphetamine, Urine neg     Opiate Scrn, Ur neg     Oxycodone Screen, Ur positive     PCP Screen, Urine neg     Propoxyphene Screen, Urine neg     Synthetic Cannabinoids (K2) Screen, Urine neg     THC Screen, Urine neg     Tramadol Scrn, Ur neg     Tricyclic Antidepressants, Urine neg        Plan:     1. MD (muscular dystrophy) ([facility]  Worsening gait    2. Immunization due    - Influenza, FLUCELVAX, (age 10 mo+), IM, Preservative Free, 0.5 mL    3. Medication monitoring encounter    - POCT Rapid Drug Screen    4. Claudication [facility])  Discussed adding compression stockings  - Elastic Bandages & Supports (MEDICAL COMPRESSION STOCKINGS) MISC; 1 each by Does not apply route daily as needed (swelling)  Dispense: 1 each; Refill: 0    5. Cirrhosis of liver without ascites, unspecified hepatic cirrhosis type ([facility]  Continue follow up with GI    6. Major depressive disorder with single episode, in full remission ([facility]  stable    7. Vitamin K deficiency coagulation disorder (HCC)  Stable. Continue to monitor with labs    8. Seborrheic dermatitis    - ketoconazole (NIZORAL) 2 % shampoo; Apply topically daily as needed. Dispense: 120 mL; Refill: 1    9. Anxiety  Adding atarax to help with anxiety and insomnia. - hydrOXYzine HCl (ATARAX) 25 MG tablet; Take 1 tablet by mouth nightly  Dispense: 30 tablet; Refill: 0       He is currently on Lyrica, but we can try changing to Gabapentin if patient wishes to see if this will help with nerve pain. Return in about 4 weeks (around 2023) for Medication Follow Up. Orders Placed This Encounter   Procedures    Influenza, FLUCELVAX, (age 10 mo+), IM, Preservative Free, 0.5 mL    POCT Rapid Drug Screen       Orders Placed This Encounter   Medications    Elastic Bandages & Supports (MEDICAL COMPRESSION STOCKINGS) MISC     Si each by Does not apply route daily as needed (swelling)     Dispense:  1 each     Refill:  0    ketoconazole (NIZORAL) 2 % shampoo     Sig: Apply topically daily as needed.      Dispense:  120 mL     Refill:  1    hydrOXYzine HCl (ATARAX) 25 MG tablet     Sig: Take 1 tablet by mouth nightly     Dispense:  30 tablet     Refill:  0            Patient offered educational handouts and has had all questions answered. Patient voices understanding and agrees to plans along with risks and benefits of plan. Patient is instructed to continue prior meds, diet, and exercise plans as instructed. Patient agrees to follow up as instructed and sooner if needed. Patient agrees to go to ER if condition becomes emergent. EMR Dragon/transcription disclaimer: Some of this encounter note is an electronic transcription/translation of spoken language to printed text. The electronic translation of spoken language may permit erroneous, or at times, nonsensical words or phrases to be inadvertently transcribed.  Although I have reviewed the note for such errors, some may still exist.    Electronically signed by ANTHONY Powell on 1/18/2023 at 3:51 PM

## 2023-01-18 PROCEDURE — 90674 CCIIV4 VAC NO PRSV 0.5 ML IM: CPT | Performed by: NURSE PRACTITIONER

## 2023-01-18 PROCEDURE — G0008 ADMIN INFLUENZA VIRUS VAC: HCPCS | Performed by: NURSE PRACTITIONER

## 2023-01-18 RX ORDER — BUSPIRONE HYDROCHLORIDE 10 MG/1
TABLET ORAL
Qty: 90 TABLET | Refills: 3 | Status: SHIPPED | OUTPATIENT
Start: 2023-01-18

## 2023-01-18 ASSESSMENT — ENCOUNTER SYMPTOMS
GASTROINTESTINAL NEGATIVE: 1
RESPIRATORY NEGATIVE: 1
EYES NEGATIVE: 1
BACK PAIN: 1

## 2023-01-22 DIAGNOSIS — M54.50 LUMBAR PAIN: ICD-10-CM

## 2023-01-22 DIAGNOSIS — M54.32 LEFT SCIATIC NERVE PAIN: ICD-10-CM

## 2023-01-22 DIAGNOSIS — G71.00 MD (MUSCULAR DYSTROPHY) (HCC): ICD-10-CM

## 2023-01-23 RX ORDER — PREGABALIN 150 MG/1
CAPSULE ORAL
Qty: 90 CAPSULE | Refills: 0 | Status: SHIPPED | OUTPATIENT
Start: 2023-01-23 | End: 2023-02-22

## 2023-01-23 NOTE — TELEPHONE ENCOUNTER
Katherine Jones called to request a refill on his medication. Last office visit : 3/15/2022   Next office visit : Visit date not found     Last UDS:   Amphetamines   Date Value Ref Range Status   06/12/2013 NEGATIVE  Final     Amphetamine Screen, Urine   Date Value Ref Range Status   01/17/2023 neg  Final     Barbiturates   Date Value Ref Range Status   06/12/2013 NEGATIVE  Final     Barbiturate Screen, Urine   Date Value Ref Range Status   01/17/2023 neg  Final     Benzodiazepines   Date Value Ref Range Status   06/12/2013 NEGATIVE  Final     Benzodiazepine Screen, Urine   Date Value Ref Range Status   01/17/2023 neg  Final     Buprenorphine Urine   Date Value Ref Range Status   01/17/2023 neg  Final     Cocaine Metabolite Screen, Urine   Date Value Ref Range Status   01/17/2023 neg  Final     Gabapentin Screen, Urine   Date Value Ref Range Status   01/17/2023 neg  Final     MDMA, Urine   Date Value Ref Range Status   01/17/2023 neg  Final     Methamphetamine, Urine   Date Value Ref Range Status   01/17/2023 neg  Final     Opiate Scrn, Ur   Date Value Ref Range Status   01/17/2023 neg  Final     Oxycodone Screen, Ur   Date Value Ref Range Status   01/17/2023 positive  Final     PCP Screen, Urine   Date Value Ref Range Status   01/17/2023 neg  Final     Propoxyphene Screen, Urine   Date Value Ref Range Status   01/17/2023 neg  Final     THC Screen, Urine   Date Value Ref Range Status   01/17/2023 neg  Final     Tricyclic Antidepressants, Urine   Date Value Ref Range Status   01/17/2023 neg  Final       Last Dorlene Alexy: 1/20/23  Medication Contract: 3/15/22   Last Fill: 12/22/22    Requested Prescriptions     Pending Prescriptions Disp Refills    pregabalin (LYRICA) 150 MG capsule [Pharmacy Med Name: Pregabalin 150 MG Oral Capsule] 90 capsule 0     Sig: TAKE 1 CAPSULE BY MOUTH THREE TIMES DAILY         Please approve or refuse this medication.    Shakila Lui MA

## 2023-01-24 ENCOUNTER — TELEPHONE (OUTPATIENT)
Dept: GASTROENTEROLOGY | Age: 58
End: 2023-01-24

## 2023-01-24 DIAGNOSIS — K74.60 CIRRHOSIS OF LIVER WITHOUT ASCITES, UNSPECIFIED HEPATIC CIRRHOSIS TYPE (HCC): ICD-10-CM

## 2023-01-24 LAB — ALPHA FETOPROTEIN: 1.7 NG/ML (ref 0–8.3)

## 2023-02-06 DIAGNOSIS — M54.42 LEFT-SIDED LOW BACK PAIN WITH LEFT-SIDED SCIATICA, UNSPECIFIED CHRONICITY: ICD-10-CM

## 2023-02-07 RX ORDER — TIZANIDINE 4 MG/1
TABLET ORAL
Qty: 90 TABLET | Refills: 2 | Status: SHIPPED | OUTPATIENT
Start: 2023-02-07

## 2023-02-07 NOTE — TELEPHONE ENCOUNTER
Nae Cormier called requesting a refill of the below medication which has been pended for you:     Requested Prescriptions     Pending Prescriptions Disp Refills    tiZANidine (ZANAFLEX) 4 MG tablet [Pharmacy Med Name: tiZANidine HCl 4 MG Oral Tablet] 90 tablet 0     Sig: TAKE 1 TABLET BY MOUTH THREE TIMES DAILY       Last Appointment Date: 1/17/2023  Next Appointment Date: 2/14/2023    Allergies   Allergen Reactions    Shellfish-Derived Products Anaphylaxis

## 2023-02-13 ENCOUNTER — HOSPITAL ENCOUNTER (OUTPATIENT)
Dept: MRI IMAGING | Age: 58
Discharge: HOME | End: 2023-02-13
Payer: MEDICARE

## 2023-02-13 DIAGNOSIS — M50.321 OTHER CERVICAL DISC DEGENERATION AT C4-C5 LEVEL: ICD-10-CM

## 2023-02-13 PROCEDURE — 72141 MRI NECK SPINE W/O DYE: CPT

## 2023-02-14 ENCOUNTER — OFFICE VISIT (OUTPATIENT)
Dept: FAMILY MEDICINE CLINIC | Age: 58
End: 2023-02-14
Payer: MEDICARE

## 2023-02-14 ENCOUNTER — HOSPITAL ENCOUNTER (OUTPATIENT)
Dept: GENERAL RADIOLOGY | Age: 58
Discharge: HOME | End: 2023-02-14
Payer: MEDICARE

## 2023-02-14 VITALS
TEMPERATURE: 97 F | BODY MASS INDEX: 28.42 KG/M2 | HEIGHT: 71 IN | DIASTOLIC BLOOD PRESSURE: 82 MMHG | WEIGHT: 203 LBS | OXYGEN SATURATION: 98 % | SYSTOLIC BLOOD PRESSURE: 124 MMHG | HEART RATE: 78 BPM

## 2023-02-14 DIAGNOSIS — R53.83 DECREASED STAMINA: ICD-10-CM

## 2023-02-14 DIAGNOSIS — R07.89 CHEST TIGHTNESS: ICD-10-CM

## 2023-02-14 DIAGNOSIS — R06.02 SOB (SHORTNESS OF BREATH) ON EXERTION: Primary | ICD-10-CM

## 2023-02-14 DIAGNOSIS — R06.09 DYSPNEA ON EXERTION: ICD-10-CM

## 2023-02-14 DIAGNOSIS — R06.02 SOB (SHORTNESS OF BREATH) ON EXERTION: ICD-10-CM

## 2023-02-14 PROCEDURE — 99214 OFFICE O/P EST MOD 30 MIN: CPT | Performed by: NURSE PRACTITIONER

## 2023-02-14 PROCEDURE — 71046 X-RAY EXAM CHEST 2 VIEWS: CPT

## 2023-02-14 PROCEDURE — G8482 FLU IMMUNIZE ORDER/ADMIN: HCPCS | Performed by: NURSE PRACTITIONER

## 2023-02-14 PROCEDURE — 1036F TOBACCO NON-USER: CPT | Performed by: NURSE PRACTITIONER

## 2023-02-14 PROCEDURE — G8427 DOCREV CUR MEDS BY ELIG CLIN: HCPCS | Performed by: NURSE PRACTITIONER

## 2023-02-14 PROCEDURE — 3017F COLORECTAL CA SCREEN DOC REV: CPT | Performed by: NURSE PRACTITIONER

## 2023-02-14 PROCEDURE — 71046 X-RAY EXAM CHEST 2 VIEWS: CPT | Performed by: RADIOLOGY

## 2023-02-14 PROCEDURE — G8419 CALC BMI OUT NRM PARAM NOF/U: HCPCS | Performed by: NURSE PRACTITIONER

## 2023-02-14 ASSESSMENT — ENCOUNTER SYMPTOMS
BACK PAIN: 1
CHEST TIGHTNESS: 1
GASTROINTESTINAL NEGATIVE: 1
EYES NEGATIVE: 1
SHORTNESS OF BREATH: 1

## 2023-02-14 NOTE — PROGRESS NOTES
[facility] PHYSICIAN SERVICES  [facility]  [place]  [address] [address]  Dept: [phone]  Dept Fax: [phone]  Loc: [phone]    Selby Primrose is a 60 y.o. male who presents today for his medical conditions/complaints as noted below. Selby Primrose is c/o of Follow-up (Medication changes ) and Shortness of Breath      Chief Complaint   Patient presents with    Follow-up     Medication changes     Shortness of Breath       HPI:     HPI  Patient presents today to follow up on medication changes. He states that nothing is working. He feels that he will just have to \"deal with things the way they are\"  Patient also states that he is having issues with SOA, O2 sat 98%. He states that he was working in his yard two days ago and he got so short of breath he was worried something would happen and he would not be found. Patient states that this has been an ongoing issue, but is getting worse lately. He reports not being able to finish a task at home without being SOB. Patient reports that the atarax did not help much when he did try it a few times.         Past Medical History:   Diagnosis Date    Anxiety     Chronic back pain     Depression     [facility]     Neuropathy 12/02/2020    Scoliosis         Past Surgical History:   Procedure Laterality Date    CHOLECYSTECTOMY, LAPAROSCOPIC N/A 04/27/2021    LAPAROSCOPIC CHOLECYSTECTOMY performed by Alla Walker DO at [facility]  01/08/2016    Dr Rosa Valentine, normal, 10 yr recall    COLONOSCOPY N/A 06/30/2021    Dr Katia Daugherty hemorrhids, RANDOM COLON BX NEGATIVE, Sub Prep Fair, 3 year recall    FOOT SURGERY Left     UPPER GASTROINTESTINAL ENDOSCOPY N/A 06/30/2021    Dr Cait Aguilar, duodenal villous blunting otherwise normal EGD; serum celiac panel NEG for celiac sprue    UPPER GASTROINTESTINAL ENDOSCOPY N/A 08/10/2021    Dr Cait Aguilar- mild gastritis, 2 small duodenal ulcers and 1 large cratered 3-4cm in posterior duodenal bulb, NEG h pylori       Social History     Tobacco Use    Smoking status: Former     Packs/day: 0.00     Years: 30.00     Pack years: 0.00     Types: Cigarettes     Quit date: 2021     Years since quittin.5    Smokeless tobacco: Never   Substance Use Topics    Alcohol use: Yes     Comment: occas        Current Outpatient Medications   Medication Sig Dispense Refill    tiZANidine (ZANAFLEX) 4 MG tablet TAKE 1 TABLET BY MOUTH THREE TIMES DAILY 90 tablet 2    pregabalin (LYRICA) 150 MG capsule TAKE 1 CAPSULE BY MOUTH THREE TIMES DAILY 90 capsule 0    busPIRone (BUSPAR) 10 MG tablet TAKE 1 TABLET BY MOUTH THREE TIMES DAILY 90 tablet 3    Elastic Bandages & Supports (MEDICAL COMPRESSION STOCKINGS) MISC 1 each by Does not apply route daily as needed (swelling) 1 each 0    ketoconazole (NIZORAL) 2 % shampoo Apply topically daily as needed. 120 mL 1    hydrOXYzine HCl (ATARAX) 25 MG tablet Take 1 tablet by mouth nightly 30 tablet 0    escitalopram (LEXAPRO) 20 MG tablet Take 1 tablet by mouth once daily 30 tablet 5    pantoprazole (PROTONIX) 40 MG tablet TAKE 1 TABLET BY MOUTH TWICE DAILY BEFORE MEAL(S) 60 tablet 5    oxyCODONE-acetaminophen (PERCOCET) 7.5-325 MG per tablet TAKE 1 TABLET BY MOUTH 4 TIMES DAILY DO NOT COMBINE WITH ALCOHOL AND DO NOT DRIVE AFTER TAKING. MUST BE SEEN FOR REFILLS      triamcinolone (KENALOG) 0.025 % cream Apply topically 2 times daily. (Patient taking differently: as needed Apply topically 2 times daily.) 80 g 1    diclofenac sodium (VOLTAREN) 1 % GEL Apply 2 g topically 4 times daily 2 Tube 1    amitriptyline (ELAVIL) 50 MG tablet TAKE ONE TABLET BY MOUTH IN THE EVENING 30 tablet 11     No current facility-administered medications for this visit.        Allergies   Allergen Reactions    Shellfish-Derived Products Anaphylaxis       Family History   Problem Relation Age of Onset    Diabetes Mother     Colon Cancer Neg Hx     Colon Polyps Neg Hx     Esophageal Cancer Neg Hx     Liver Cancer Neg Hx     Liver Disease Neg Hx     Rectal Cancer Neg Hx     Stomach Cancer Neg Hx                Subjective:      Review of Systems   Constitutional:  Positive for fatigue. HENT: Negative. Eyes: Negative. Respiratory:  Positive for chest tightness and shortness of breath. Cardiovascular: Negative. Gastrointestinal: Negative. Endocrine: Negative. Genitourinary: Negative. Musculoskeletal:  Positive for arthralgias, back pain and myalgias. Skin: Negative. Neurological:  Positive for weakness. Hematological: Negative. Psychiatric/Behavioral: Negative. Objective:     Physical Exam  Vitals and nursing note reviewed. Constitutional:       Appearance: Normal appearance. HENT:      Head: Normocephalic and atraumatic. Right Ear: Hearing, tympanic membrane, ear canal and external ear normal.      Left Ear: Hearing, tympanic membrane, ear canal and external ear normal.      Nose: Nose normal.      Mouth/Throat:      Lips: Pink. Mouth: Mucous membranes are moist.      Pharynx: Oropharynx is clear. Eyes:      General: Lids are normal.      Extraocular Movements: Extraocular movements intact. Conjunctiva/sclera: Conjunctivae normal.      Pupils: Pupils are equal, round, and reactive to light. Neck:      Thyroid: No thyromegaly. Cardiovascular:      Rate and Rhythm: Normal rate and regular rhythm. Pulses: Normal pulses. Dorsalis pedis pulses are 2+ on the right side and 2+ on the left side. Posterior tibial pulses are 2+ on the right side and 2+ on the left side. Heart sounds: Normal heart sounds. Pulmonary:      Effort: Pulmonary effort is normal.      Breath sounds: Normal breath sounds and air entry. Abdominal:      General: Bowel sounds are normal.      Palpations: Abdomen is soft. Musculoskeletal:      Cervical back: Full passive range of motion without pain, normal range of motion and neck supple. Thoracic back: Deformity present. No tenderness. Decreased range of motion. Lumbar back: Deformity present. No tenderness. Decreased range of motion. Back:       Right hip: Tenderness present. Decreased range of motion. Left hip: Tenderness present. Decreased range of motion. Right lower leg: Tenderness present. Left lower leg: Tenderness present. Lymphadenopathy:      Cervical: No cervical adenopathy. Skin:     General: Skin is warm and dry. Capillary Refill: Capillary refill takes less than 2 seconds. Neurological:      General: No focal deficit present. Mental Status: He is alert and oriented to person, place, and time. Mental status is at baseline. Coordination: Coordination is intact. Psychiatric:         Mood and Affect: Mood normal.         Speech: Speech normal.         Behavior: Behavior normal.         Thought Content: Thought content normal.         Cognition and Memory: Cognition and memory normal.         Judgment: Judgment normal.       /82   Pulse 78   Temp 97 °F (36.1 °C)   Ht 5' 11\" (1.803 m)   Wt 203 lb (92.1 kg)   SpO2 98%   BMI 28.31 kg/m²     Assessment:      Diagnosis Orders   1. SOB (shortness of breath) on exertion  ECHO (Dobutamine) Pharmacological Stress Test    XR CHEST STANDARD (2 VW)      2. Dyspnea on exertion  ECHO (Dobutamine) Pharmacological Stress Test      3. Chest tightness  ECHO (Dobutamine) Pharmacological Stress Test      4. Decreased stamina  ECHO (Dobutamine) Pharmacological Stress Test          No results found for this visit on 02/14/23. Plan:     1. SOB (shortness of breath) on exertion  I am concerned with increased SOB checking CXR. Stress test due to worsening SOB and decreased stamina.     - ECHO (Dobutamine) Pharmacological Stress Test; Future  - XR CHEST STANDARD (2 VW); Future    2. Dyspnea on exertion    - ECHO (Dobutamine) Pharmacological Stress Test; Future    3.  Chest tightness    - ECHO (Dobutamine) Pharmacological Stress Test; Future    4. Decreased stamina    - ECHO (Dobutamine) Pharmacological Stress Test; Future     Return in about 2 months (around 4/14/2023) for Follow up chronic conditions, SOB. Orders Placed This Encounter   Procedures    XR CHEST STANDARD (2 VW)     Standing Status:   Future     Standing Expiration Date:   2/14/2024    ECHO (Dobutamine) Pharmacological Stress Test     Standing Status:   Future     Standing Expiration Date:   4/15/2023     Order Specific Question:   Reason for exam:     Answer:   sob and decreased stamina       No orders of the defined types were placed in this encounter. Patient offered educational handouts and has had all questions answered. Patient voices understanding and agrees to plans along with risks and benefits of plan. Patient is instructed to continue prior meds, diet, and exercise plans as instructed. Patient agrees to follow up as instructed and sooner if needed. Patient agrees to go to ER if condition becomes emergent. EMR Dragon/transcription disclaimer: Some of this encounter note is an electronic transcription/translation of spoken language to printed text. The electronic translation of spoken language may permit erroneous, or at times, nonsensical words or phrases to be inadvertently transcribed.  Although I have reviewed the note for such errors, some may still exist.    Electronically signed by ANTHONY Olmstead on 2/14/2023 at 2:08 PM

## 2023-02-15 ENCOUNTER — TELEPHONE (OUTPATIENT)
Dept: FAMILY MEDICINE CLINIC | Age: 58
End: 2023-02-15

## 2023-02-15 NOTE — TELEPHONE ENCOUNTER
----- Message from ANTHONY Pro sent at 2/15/2023 10:05 AM CST -----  Please let patient know that CXR has returned and was negative for any acute pulmonary process. Thanks!

## 2023-02-22 DIAGNOSIS — K29.90 GASTRITIS AND DUODENITIS: ICD-10-CM

## 2023-02-22 RX ORDER — PANTOPRAZOLE SODIUM 40 MG/1
40 TABLET, DELAYED RELEASE ORAL DAILY
Qty: 60 TABLET | Refills: 0 | Status: SHIPPED | OUTPATIENT
Start: 2023-02-22

## 2023-02-22 NOTE — TELEPHONE ENCOUNTER
Received fax from pharmacy requesting refill on pts medication(s). Pt was last seen in office on 3/15/2022  and has a follow up scheduled for Visit date not found. Will send request to  Elizabeth Blackwood  for authorization.      Requested Prescriptions     Pending Prescriptions Disp Refills    pantoprazole (PROTONIX) 40 MG tablet [Pharmacy Med Name: Pantoprazole Sodium 40 MG Oral Tablet Delayed Release] 60 tablet 0     Sig: TAKE 1 TABLET BY MOUTH TWICE DAILY BEFORE MEAL(S)

## 2023-02-23 ENCOUNTER — HOSPITAL ENCOUNTER (OUTPATIENT)
Dept: NON INVASIVE DIAGNOSTICS | Age: 58
Discharge: HOME | End: 2023-02-23
Payer: MEDICARE

## 2023-02-23 ENCOUNTER — TELEPHONE (OUTPATIENT)
Dept: FAMILY MEDICINE CLINIC | Age: 58
End: 2023-02-23

## 2023-02-23 VITALS — WEIGHT: 203 LBS | BODY MASS INDEX: 28.31 KG/M2

## 2023-02-23 DIAGNOSIS — R94.39 POSITIVE CARDIAC STRESS TEST: Primary | ICD-10-CM

## 2023-02-23 DIAGNOSIS — R07.89 CHEST TIGHTNESS: ICD-10-CM

## 2023-02-23 DIAGNOSIS — R06.02 SOB (SHORTNESS OF BREATH) ON EXERTION: ICD-10-CM

## 2023-02-23 DIAGNOSIS — R53.83 DECREASED STAMINA: ICD-10-CM

## 2023-02-23 DIAGNOSIS — R06.09 DYSPNEA ON EXERTION: ICD-10-CM

## 2023-02-23 PROCEDURE — 2500000003 HC RX 250 WO HCPCS: Performed by: NURSE PRACTITIONER

## 2023-02-23 PROCEDURE — 6360000002 HC RX W HCPCS: Performed by: NURSE PRACTITIONER

## 2023-02-23 PROCEDURE — 2580000003 HC RX 258: Performed by: NURSE PRACTITIONER

## 2023-02-23 PROCEDURE — 93350 STRESS TTE ONLY: CPT

## 2023-02-23 RX ORDER — DOBUTAMINE HYDROCHLORIDE 200 MG/100ML
10 INJECTION INTRAVENOUS CONTINUOUS PRN
Status: DISCONTINUED | OUTPATIENT
Start: 2023-02-23 | End: 2023-02-24 | Stop reason: HOSPADM

## 2023-02-23 RX ORDER — ATROPINE SULFATE 0.1 MG/ML
1 INJECTION INTRAVENOUS PRN
Status: DISCONTINUED | OUTPATIENT
Start: 2023-02-23 | End: 2023-02-24 | Stop reason: HOSPADM

## 2023-02-23 RX ORDER — SODIUM CHLORIDE 9 MG/ML
INJECTION, SOLUTION INTRAVENOUS
Status: COMPLETED | OUTPATIENT
Start: 2023-02-23 | End: 2023-02-23

## 2023-02-23 RX ORDER — METOPROLOL TARTRATE 5 MG/5ML
5 INJECTION INTRAVENOUS PRN
Status: DISCONTINUED | OUTPATIENT
Start: 2023-02-23 | End: 2023-02-24 | Stop reason: HOSPADM

## 2023-02-23 RX ADMIN — DOBUTAMINE HYDROCHLORIDE 10 MCG/KG/MIN: 200 INJECTION INTRAVENOUS at 11:49

## 2023-02-23 RX ADMIN — METOPROLOL TARTRATE 5 MG: 5 INJECTION, SOLUTION INTRAVENOUS at 15:46

## 2023-02-23 RX ADMIN — SODIUM CHLORIDE: 9 INJECTION, SOLUTION INTRAVENOUS at 11:30

## 2023-02-23 NOTE — TELEPHONE ENCOUNTER
Cath lab called to make PCP aware that stress test was positive and Dr. Satnam Hager recommended cath. Report is being sent.

## 2023-02-23 NOTE — PROGRESS NOTES
Pt had a positive dobutamine stress echo per Dr Dom Kyle. He recommends an angiogram. I called dr Stevo Fisher office in [place] and spoke to Catina Leahy. Dr Debi Bosch is out sick but Catina Leahy said she  would relay the message to another provider.  Zoila Collins RN

## 2023-02-24 ENCOUNTER — TELEPHONE (OUTPATIENT)
Dept: CARDIOLOGY CLINIC | Age: 58
End: 2023-02-24

## 2023-02-24 ENCOUNTER — OFFICE VISIT (OUTPATIENT)
Dept: CARDIOLOGY CLINIC | Age: 58
End: 2023-02-24

## 2023-02-24 VITALS
WEIGHT: 203 LBS | OXYGEN SATURATION: 94 % | SYSTOLIC BLOOD PRESSURE: 128 MMHG | BODY MASS INDEX: 28.42 KG/M2 | HEIGHT: 71 IN | HEART RATE: 61 BPM | DIASTOLIC BLOOD PRESSURE: 84 MMHG

## 2023-02-24 DIAGNOSIS — R06.09 DYSPNEA ON EXERTION: ICD-10-CM

## 2023-02-24 DIAGNOSIS — Z01.818 PRE-OP TESTING: Primary | ICD-10-CM

## 2023-02-24 DIAGNOSIS — Z01.818 PRE-OP TESTING: ICD-10-CM

## 2023-02-24 DIAGNOSIS — R94.39 ABNORMAL STRESS TEST: Primary | ICD-10-CM

## 2023-02-24 LAB
ALBUMIN SERPL-MCNC: 4.3 G/DL (ref 3.5–5.2)
ALP BLD-CCNC: 115 U/L (ref 40–130)
ALT SERPL-CCNC: 11 U/L (ref 5–41)
ANION GAP SERPL CALCULATED.3IONS-SCNC: 5 MMOL/L (ref 7–19)
AST SERPL-CCNC: 19 U/L (ref 5–40)
BILIRUB SERPL-MCNC: 0.6 MG/DL (ref 0.2–1.2)
BUN BLDV-MCNC: 20 MG/DL (ref 6–20)
CALCIUM SERPL-MCNC: 9 MG/DL (ref 8.6–10)
CHLORIDE BLD-SCNC: 105 MMOL/L (ref 98–111)
CO2: 31 MMOL/L (ref 22–29)
CREAT SERPL-MCNC: 0.5 MG/DL (ref 0.5–1.2)
GFR SERPL CREATININE-BSD FRML MDRD: >60 ML/MIN/{1.73_M2}
GLUCOSE BLD-MCNC: 95 MG/DL (ref 74–109)
HCT VFR BLD CALC: 42.3 % (ref 42–52)
HEMOGLOBIN: 12.8 G/DL (ref 14–18)
MCH RBC QN AUTO: 29.8 PG (ref 27–31)
MCHC RBC AUTO-ENTMCNC: 30.3 G/DL (ref 33–37)
MCV RBC AUTO: 98.6 FL (ref 80–94)
PDW BLD-RTO: 14.7 % (ref 11.5–14.5)
PLATELET # BLD: 296 K/UL (ref 130–400)
PMV BLD AUTO: 9.1 FL (ref 9.4–12.4)
POTASSIUM SERPL-SCNC: 4.3 MMOL/L (ref 3.5–5)
RBC # BLD: 4.29 M/UL (ref 4.7–6.1)
SODIUM BLD-SCNC: 141 MMOL/L (ref 136–145)
TOTAL PROTEIN: 7.2 G/DL (ref 6.6–8.7)
WBC # BLD: 6.4 K/UL (ref 4.8–10.8)

## 2023-02-24 RX ORDER — PREDNISONE 50 MG/1
TABLET ORAL
Qty: 3 TABLET | Refills: 0 | Status: SHIPPED | OUTPATIENT
Start: 2023-02-24

## 2023-02-24 ASSESSMENT — ENCOUNTER SYMPTOMS
WHEEZING: 0
SORE THROAT: 0
CHEST TIGHTNESS: 0
SHORTNESS OF BREATH: 1
COUGH: 0

## 2023-02-24 NOTE — PROGRESS NOTES
[id number] [facility]  [address] [facility] [facility]  [phone]      Chief Complaint / Reason for Being Seen: Abnormal dobutamine stress echocardiogram    1. Abnormal stress test    2. Dyspnea on exertion      Patient with a history of hepatitis C, muscular dystrophy, neuropathy, depression. Patient had had complaints of shortness of breath for about 2 months now. It has worsened over the last couple weeks. With minimal activity he gets very short of breath. Relieved with rest.  He has really noticed it since he has a lot of animals and he is sweeping and mopping the floor a lot. He is having difficulty doing that now due to the shortness of breath with activity. Occasionally he will have some chest discomfort pain is located on left side of his chest with no radiation. Primary care provider did order a dobutamine stress echocardiogram that was positive both clinically and EKG. Patient has a negative family history of coronary artery disease. He smoked as a juvenile but has not obviously for years. DSE        Summary   Test was supervised by Dr. Saul Varela. Clinically and electrocardiographically positive dobutamine stress echo. No evidence of myocardial ischemia echocardiographically. Recommendation   Angiographic assessment. Signature      ----------------------------------------------------------------   Electronically signed by Hunter Beebe MD(Interpreting physician)   on 02/23/2023 02:28 PM        Subjective: Old records have been obtained from the referring providers. Those records have been reviewed and summarized.       Sandy Daniels is a 60 y.o. male with the following history as recorded in [facility]:  Patient Active Problem List    Diagnosis Date Noted    Claudication ([facility] 01/17/2023    Poor memory 06/02/2022    Cirrhosis of liver without ascites, unspecified hepatic cirrhosis type ([facility] 03/15/2022    Vitamin K deficiency coagulation disorder ([facility] 11/17/2021    [facility] 09/23/2021    Obstruction of left ureter 09/23/2021    Duodenal ulcer 09/03/2021    Upper abdominal pain 05/28/2021    Nausea 05/28/2021    Diarrhea 05/28/2021    S/P cholecystectomy 05/28/2021    Gastritis and duodenitis 05/27/2021    Sludge in gallbladder 04/27/2021    Chronic cholecystitis 04/27/2021    Chronic pain of right knee 12/02/2020    Neuropathy 12/02/2020    Low back pain 07/07/2019    Lumbar spondylosis 07/07/2019    Major depressive disorder with single episode, in full remission ([facility] 09/20/2018    MD (muscular dystrophy) ([facility] 06/13/2018    History of hepatitis C virus infection 03/21/2017    History of hepatitis C 06/29/2016    History of hepatitis A vaccination 06/29/2016    History of hepatitis B vaccination 06/29/2016    Transaminitis 08/06/2015    Hepatitis C antibody test positive 08/06/2015     Current Outpatient Medications   Medication Sig Dispense Refill    pantoprazole (PROTONIX) 40 MG tablet Take 1 tablet by mouth daily 60 tablet 0    tiZANidine (ZANAFLEX) 4 MG tablet TAKE 1 TABLET BY MOUTH THREE TIMES DAILY 90 tablet 2    pregabalin (LYRICA) 150 MG capsule TAKE 1 CAPSULE BY MOUTH THREE TIMES DAILY 90 capsule 0    busPIRone (BUSPAR) 10 MG tablet TAKE 1 TABLET BY MOUTH THREE TIMES DAILY 90 tablet 3    Elastic Bandages & Supports (MEDICAL COMPRESSION STOCKINGS) MISC 1 each by Does not apply route daily as needed (swelling) 1 each 0    ketoconazole (NIZORAL) 2 % shampoo Apply topically daily as needed. 120 mL 1    escitalopram (LEXAPRO) 20 MG tablet Take 1 tablet by mouth once daily 30 tablet 5    oxyCODONE-acetaminophen (PERCOCET) 7.5-325 MG per tablet TAKE 1 TABLET BY MOUTH 4 TIMES DAILY DO NOT COMBINE WITH ALCOHOL AND DO NOT DRIVE AFTER TAKING. MUST BE SEEN FOR REFILLS      triamcinolone (KENALOG) 0.025 % cream Apply topically 2 times daily.  (Patient taking differently: as needed Apply topically 2 times daily.) 80 g 1    diclofenac sodium (VOLTAREN) 1 % GEL Apply 2 g topically 4 times daily 2 Tube 1    amitriptyline (ELAVIL) 50 MG tablet TAKE ONE TABLET BY MOUTH IN THE EVENING 30 tablet 11     No current facility-administered medications for this visit.      Facility-Administered Medications Ordered in Other Visits   Medication Dose Route Frequency Provider Last Rate Last Admin    DOBUTamine (DOBUTREX) 500 mg in dextrose 5 % 250 mL infusion  10 mcg/kg/min IntraVENous Continuous PRN Georgia Heys, APRN   Stopped at 02/23/23 1158    perflutren lipid microspheres (DEFINITY) injection 1.5 mL  1.5 mL IntraVENous ONCE PRN Georgia Heys, APRN        metoprolol (LOPRESSOR) injection 5 mg  5 mg IntraVENous PRN [place] Heys, APRN   5 mg at 02/23/23 1546    atropine injection 1 mg  1 mg IntraVENous PRN [place] Heys, APRN         Allergies: Shellfish-derived products  Past Medical History:   Diagnosis Date    Anxiety     Chronic back pain     Depression     MD [facility]mus[facility]     Neuropathy 12/02/2020    Scoliosis      Past Surgical History:   Procedure Laterality Date    CHOLECYSTECTOMY, LAPAROSCOPIC N/A 04/27/2021    LAPAROSCOPIC CHOLECYSTECTOMY performed by Jamal Valdivia DO at [facility]  01/08/2016    Dr Domingo Warren, normal, 10 yr recall    COLONOSCOPY N/A 06/30/2021    Dr Roz Celestin hemorrhids, RANDOM COLON BX NEGATIVE, Sub Prep Fair, 3 year recall    FOOT SURGERY Left     UPPER GASTROINTESTINAL ENDOSCOPY N/A 06/30/2021    Dr Kalee Desai, duodenal villous blunting otherwise normal EGD; serum celiac panel NEG for celiac sprue    UPPER GASTROINTESTINAL ENDOSCOPY N/A 08/10/2021    Dr Kalee Desai- mild gastritis, 2 small duodenal ulcers and 1 large cratered 3-4cm in posterior duodenal bulb, NEG h pylori     Family History   Problem Relation Age of Onset    Diabetes Mother     Colon Cancer Neg Hx     Colon Polyps Neg Hx     Esophageal Cancer Neg Hx     Liver Cancer Neg Hx     Liver Disease Neg Hx     Rectal Cancer Neg Hx     Stomach Cancer Neg Hx      Social History     Tobacco Use    Smoking status: Former     Packs/day: 0.00     Years: 30.00     Pack years: 0.00     Types: Cigarettes     Quit date: 2021     Years since quittin.5    Smokeless tobacco: Never   Substance Use Topics    Alcohol use: Yes     Comment: occas          Review of System:    Review of Systems   Constitutional:  Positive for fatigue. Negative for activity change, chills, diaphoresis and fever. HENT:  Negative for congestion and sore throat. Respiratory:  Positive for shortness of breath. Negative for cough, chest tightness and wheezing. Cardiovascular:  Positive for chest pain. Negative for palpitations and leg swelling. Neurological:  Negative for dizziness, syncope, light-headedness and headaches. Psychiatric/Behavioral:  Negative for confusion. The patient is not nervous/anxious. Objective:    /84   Pulse 61   Ht 5' 11\" (1.803 m)   Wt 203 lb (92.1 kg)   SpO2 94%   BMI 28.31 kg/m²     GENERAL - well developed and well nourished    HEENT -  PERRLA, Hearing appears normal, conjunctive and lids are normal.  External inspection of ears and nose appear normal.  NECK - no thyromegaly, no JVD, trachea is in the midline  CARDIOVASCULAR - PMI is in the mid line clavicular position, Normal S1 and S2 with no systolic murmur. No S3 or S4    PULMONARY - no respiratory distress. No wheezes or rales. Lungs are clear to ausculation, normal respiratory effort. ABDOMEN  - soft, non tender, no rebound  MUSCULOSKELETAL  - range of motion of the upper and lower extermites appears normal and equal and is without pain   EXTREMITIES - no significant edema   NEUROLOGIC - gait and station are normal  SKIN - turgor is normal, skin warm and dry.   PSYCHIATRIC - normal mood and affect, alert and orientated x 3,      ASSESSMENT:    ALL THE CARDIOLOGY PROBLEMS ARE LISTED ABOVE; HOWEVER, THE FOLLOWING SPECIFIC CARDIAC PROBLEMS / CONDITIONS WERE ADDRESSED AND TREATED DURING THE OFFICE VISIT TODAY:       Cardiac Specific Problem  Discussion and Plan         1. Shortness of breath with minimal activity Initial encounter   EKG in our office today sinus rhythm with a heart rate of 61 bpm.  O2 sat 94%. Patient had a dobutamine stress echocardiogram positive both clinically and EKG. Recommendation from Dr. Erin Saint was to schedule cardiac catheterization. We will schedule cardiac catheterization with Dr. David Pina. PLAN:    Orders Placed This Encounter   Procedures    EKG 12 lead     Order Specific Question:   Reason for Exam?     Answer: Other     No orders of the defined types were placed in this encounter. Return for TBD post cath . Discussed with patient. I greatly appreciate the opportunity to meet Froylanbossman Neftali and your confidence in allowing me to participate in his cardiovascular care. ANTHONY Bingham - NP 2/24/2023 9:40 AM CST                    This dictation was generated by voice recognition computer software. Although all attempts are made to edit dictation for accuracy, there may be errors in the transcription that are not intended.

## 2023-02-24 NOTE — TELEPHONE ENCOUNTER
Spoke with patient in office, have [facility] scheduled for 2/28/2023 with a tentative time of 8am with arrival of [address] [facility] we will contact patient if time/date changes. Patient is to be NPO after midnight. Patient instructed to arrive through front entrance of hospital and make immediate left. Patient advised they can have one person with them. Patient advised may take morning medications with sip of water. Patient does not have IV dye allergy, but does have a Shellfish allergy. I called in Prednisone 50 mg -3 tablets- to [facility] on [address]. Patient verbally understood. Lab orders placed.

## 2023-02-25 DIAGNOSIS — M54.32 LEFT SCIATIC NERVE PAIN: ICD-10-CM

## 2023-02-25 DIAGNOSIS — M54.50 LUMBAR PAIN: ICD-10-CM

## 2023-02-25 DIAGNOSIS — G71.00 MD (MUSCULAR DYSTROPHY) (HCC): ICD-10-CM

## 2023-02-26 NOTE — H&P
Patient:  Nancy Vaughn                  1965  MRN: [id number]    PROBLEM LIST:    Patient Active Problem List    Diagnosis Date Noted    Claudication ([facility] 01/17/2023     Priority: Medium    Poor memory 06/02/2022     Priority: Medium    Cirrhosis of liver without ascites, unspecified hepatic cirrhosis type ([facility] 03/15/2022     Priority: Low    Vitamin K deficiency coagulation disorder ([facility] 11/17/2021     Priority: Low    Coagulation defect ([facility] 09/23/2021     Priority: Low    Obstruction of left ureter 09/23/2021     Priority: Low    Duodenal ulcer 09/03/2021     Priority: Low    Upper abdominal pain 05/28/2021     Priority: Low    Nausea 05/28/2021     Priority: Low    Diarrhea 05/28/2021     Priority: Low    S/P cholecystectomy 05/28/2021     Priority: Low    Gastritis and duodenitis 05/27/2021     Priority: Low    Sludge in gallbladder 04/27/2021     Priority: Low    Chronic cholecystitis 04/27/2021     Priority: Low    Chronic pain of right knee 12/02/2020     Priority: Low    Neuropathy 12/02/2020     Priority: Low    Low back pain 07/07/2019     Priority: Low    Lumbar spondylosis 07/07/2019     Priority: Low    Major depressive disorder with single episode, in full remission ([facility] 09/20/2018     Priority: Low    MD (muscular dystrophy) ([facility] 06/13/2018     Priority: Low    History of hepatitis C virus infection 03/21/2017     Priority: Low    History of hepatitis C 06/29/2016     Priority: Low    History of hepatitis A vaccination 06/29/2016     Priority: Low    History of hepatitis B vaccination 06/29/2016     Priority: Low    Transaminitis 08/06/2015     Priority: Low    Hepatitis C antibody test positive 08/06/2015     Priority: Low       PRESENTATION: Nancy Vaughn is a 60y.o. year old male who presents with complaints of shortness of breath over the last 2 months that increases with activity and decreases with rest.  Occasional chest discomfort.   Patient underwent a dobutamine stress echo which was positive clinically and by EKG criteria for myocardial ischemia eliciting chest pain with dobutamine and greater than 1.5 mm ST depressions in inferior leads, being referred for cardiac catheterization. Patient has a history of HCV infection, muscular dystrophy and neuropathy. Prior longstanding tobacco use quit 7/2021. REVIEW OF SYSTEMS:  Review of Systems   Constitutional:  Negative for activity change, diaphoresis and fatigue. HENT:  Negative for hearing loss, nosebleeds and tinnitus. Eyes:  Negative for visual disturbance. Respiratory:  Positive for chest tightness and shortness of breath. Negative for cough and wheezing. Cardiovascular:  Negative for chest pain, palpitations and leg swelling. Gastrointestinal:  Negative for abdominal distention, abdominal pain, blood in stool, diarrhea and vomiting. Endocrine: Negative for cold intolerance, heat intolerance, polydipsia, polyphagia and polyuria. Genitourinary:  Negative for difficulty urinating, flank pain and hematuria. Musculoskeletal:  Negative for arthralgias, back pain, joint swelling and myalgias. Skin:  Negative for pallor and rash. Neurological:  Negative for dizziness, seizures, syncope and headaches. Psychiatric/Behavioral:  Negative for behavioral problems and dysphoric mood. The patient is not nervous/anxious.       Past Medical History:      Diagnosis Date    Anxiety     Chronic back pain     Depression     [facility]     Neuropathy 12/02/2020    Scoliosis        Past Surgical History:      Procedure Laterality Date    CHOLECYSTECTOMY, LAPAROSCOPIC N/A 04/27/2021    LAPAROSCOPIC CHOLECYSTECTOMY performed by Noel Aldridge DO at [facility]  01/08/2016    Dr David Veloz, normal, 10 yr recall    COLONOSCOPY N/A 06/30/2021    Dr Tammie Ding hemorrhids, RANDOM COLON BX NEGATIVE, Sub Prep Fair, 3 year recall    FOOT SURGERY Left     UPPER GASTROINTESTINAL ENDOSCOPY N/A 06/30/2021     Francisco, duodenal villous blunting otherwise normal EGD; serum celiac panel NEG for celiac sprue    UPPER GASTROINTESTINAL ENDOSCOPY N/A 08/10/2021    Dr Geoffrey Reyes- mild gastritis, 2 small duodenal ulcers and 1 large cratered 3-4cm in posterior duodenal bulb, NEG h pylori       Medications Prior to Admission:    Prior to Admission medications    Medication Sig Start Date End Date Taking? Authorizing Provider   predniSONE (DELTASONE) 50 MG tablet Take 1 tablet BID day prior and 1 tablet morning of procedure 2/24/23   ANTHONY Black - NP   pantoprazole (PROTONIX) 40 MG tablet Take 1 tablet by mouth daily 2/22/23   Orrspelsv [address], APRN   tiZANidine (ZANAFLEX) 4 MG tablet TAKE 1 TABLET BY MOUTH THREE TIMES DAILY 2/7/23 June File, APRN   pregabalin (LYRICA) 150 MG capsule TAKE 1 CAPSULE BY MOUTH THREE TIMES DAILY 1/23/23 2/24/23 June File, APRN   busPIRone (BUSPAR) 10 MG tablet TAKE 1 TABLET BY MOUTH THREE TIMES DAILY 1/18/23 June File, APRN   Elastic Bandages & Supports (MEDICAL COMPRESSION STOCKINGS) [address] 1 each by Does not apply route daily as needed (swelling) 1/17/23 June File, APRN   ketoconazole (NIZORAL) 2 % shampoo Apply topically daily as needed. 1/17/23 June File, APRN   escitalopram (LEXAPRO) 20 MG tablet Take 1 tablet by mouth once daily 1/3/23   Erin File, APRN   oxyCODONE-acetaminophen (PERCOCET) 7.5-325 MG per tablet TAKE 1 TABLET BY MOUTH 4 TIMES DAILY DO NOT COMBINE WITH ALCOHOL AND DO NOT DRIVE AFTER TAKING. MUST BE SEEN FOR REFILLS 2/22/22   Historical Provider, MD   triamcinolone (KENALOG) 0.025 % cream Apply topically 2 times daily. Patient taking differently: as needed Apply topically 2 times daily.  10/21/20   Erin File, APRN   diclofenac sodium (VOLTAREN) 1 % GEL Apply 2 g topically 4 times daily 6/18/20 June File, APRN   amitriptyline (ELAVIL) 50 MG tablet TAKE ONE TABLET BY MOUTH IN THE EVENING 10/19/18   Kat Tillman MD Allergies:  Shellfish-derived products    Past Social History:  Social History     Socioeconomic History    Marital status: Single     Spouse name: Not on file    Number of children: Not on file    Years of education: Not on file    Highest education level: Not on file   Occupational History    Not on file   Tobacco Use    Smoking status: Former     Packs/day: 0.00     Years: 30.00     Pack years: 0.00     Types: Cigarettes     Quit date: 2021     Years since quittin.5    Smokeless tobacco: Never   Vaping Use    Vaping Use: Never used   Substance and Sexual Activity    Alcohol use: Yes     Comment: occas    Drug use: No    Sexual activity: Not on file   Other Topics Concern    Not on file   Social History Narrative    Not on file     Social Determinants of Health     Financial Resource Strain: Not on file   Food Insecurity: Not on file   Transportation Needs: Not on file   Physical Activity: Not on file   Stress: Not on file   Social Connections: Not on file   Intimate Partner Violence: Not on file   Housing Stability: Not on file       Family History:       Problem Relation Age of Onset    Diabetes Mother     Colon Cancer Neg Hx     Colon Polyps Neg Hx     Esophageal Cancer Neg Hx     Liver Cancer Neg Hx     Liver Disease Neg Hx     Rectal Cancer Neg Hx     Stomach Cancer Neg Hx      Physical Exam:    Vitals: There were no vitals taken for this visit. 24HR INTAKE/OUTPUT:  No intake or output data in the 24 hours ending 23 0818    Physical Exam  HENT:      Mouth/Throat:      Pharynx: No oropharyngeal exudate. Eyes:      General: No scleral icterus. Right eye: No discharge. Left eye: No discharge. Neck:      Thyroid: No thyromegaly. Vascular: No JVD. Cardiovascular:      Rate and Rhythm: Normal rate and regular rhythm. Heart sounds: No murmur heard. No friction rub. No gallop. Pulmonary:      Effort: No respiratory distress. Breath sounds: No stridor.  No wheezing or rales. Abdominal:      General: Bowel sounds are normal. There is no distension. Palpations: Abdomen is soft. There is no mass. Tenderness: There is no abdominal tenderness. There is no guarding or rebound. Musculoskeletal:         General: No deformity. Skin:     General: Skin is warm. Coloration: Skin is not pale. Findings: No erythema or rash. Neurological:      Mental Status: He is alert and oriented to person, place, and time. Motor: No abnormal muscle tone. Coordination: Coordination normal.      Deep Tendon Reflexes: Reflexes normal.       LAB DATA:  CBC:   Recent Labs     02/24/23  0957   WBC 6.4   HGB 12.8*        BMP:    Recent Labs     02/24/23 0957      K 4.3      CO2 31*   BUN 20   CREATININE 0.5   GLUCOSE 95     Hepatic:   Recent Labs     02/24/23 0957   AST 19   ALT 11   BILITOT 0.6   ALKPHOS 115     CK, CKMB, Troponin: @LABRCNT (CKTOTAL:3, CKMB:3, TROPONINI:3)@  Pro-BNP: No results for input(s): BNP in the last 72 hours. Lipids: No results for input(s): CHOL, HDL, LDL in the last 72 hours. ABGs: No results for input(s): PHART, AYU6YCD, PO2ART, YPZ0GSG, BEART, HGBAE, R5EUAJHB, CARBOXHGBART, 02THERAPY in the last 72 hours. INR: No results for input(s): INR in the last 72 hours. A1c:Invalid input(s): HEMOGLOBIN A1C  URINALYSIS:   Lab Results   Component Value Date/Time    NITRU POSITIVE 09/03/2021 12:08 PM    WBCUA 10-15 09/03/2021 12:08 PM    RBCUA TNTC 09/03/2021 12:08 PM    BLOODU negative 10/21/2021 12:00 AM    BLOODU MODERATE 09/03/2021 12:08 PM    SPECGRAV 1.010 10/21/2021 12:00 AM    SPECGRAV 1.029 09/03/2021 12:08 PM    GLUCOSEU negative 10/21/2021 12:00 AM    GLUCOSEU Negative 09/03/2021 12:08 PM     -----------------------------------------------------------------  IMAGING:  No orders to display      ECHOCARDIOGRAM STRESS TEST:ECHOCARDIOGRAM PHARMACOLOGICAL STRESS TEST (aka   DO.      Study Location: Echo Lab  Technical Quality: Adequate visualization     Patient Status: Outpatient     Rhythm: Within normal limits HR: 66 bpm BP: 142/90 mmHg     Indications:Dyspnea/SOB. Conclusions      Summary   Test was supervised by Dr. Marco A Thao. Clinically and electrocardiographically positive dobutamine stress echo. No evidence of myocardial ischemia echocardiographically. Recommendation   Angiographic assessment. Signature      ----------------------------------------------------------------   Electronically signed by Lenny Moore MD(Interpreting physician)   on 02/23/2023 02:28 PM   ----------------------------------------------------------------      Rest      ECG   Normal sinus rhythm. Standing HR:60 bpm      Stress      Stress Type: Pharmacologic      Peak HR: 155 bpm                HR Response: Appropriate   Peak BP: 158/36 mmHg            BP Response: Resting hypertension -   Predicted HR: 161 bpm           appropriate response   % of predicted HR: 96           HR BP Product: [id number]   Test Duration: 9:33 min         Max Infusion: 50 mcg/kg/min   Reason for Termination: Target   heart rate      Stress Interpretation      Overall Impression:   Chest pain during exercise   Significant > 1.5mm ST depression upsloping to inferior leads   Following exercise, there was noted to be uniform increase in   contractility without echocardiographic evidence of myocardial ischemia. Results      ECG   > 1.5 mm ST depression upsloping in INFERIOR leads with peak exercise. Arrhythmias   Frequent PVC's. Frequent PAC's. Symptoms   Chest pain. Assessment and Recommendations:     This is a 60y.o. year old male with past medical history of prior longstanding tobacco use quit 7/2021, HCV infection, muscular dystrophy/neuropathy with recent onset over the last 2 months of exertional shortness of breath with occasional chest discomfort with a positive dobutamine stress echo eliciting chest pain with ST depressions in inferior leads suggestive of myocardial ischemia, being referred for cardiac catheterization. Risks, benefits, alternatives of cardiac catheterization/PCI discussed with the patient and full informed consent obtained.   Acceptable Mallampati score  Consent for moderate conscious sedation  ASA 3             Electronically signed by Cheryl Morris MD on 2/26/2023 at 8:18 AM

## 2023-02-28 ENCOUNTER — HOSPITAL ENCOUNTER (OUTPATIENT)
Dept: CARDIAC CATH/INVASIVE PROCEDURES | Age: 58
Discharge: HOME | End: 2023-02-28
Attending: INTERNAL MEDICINE | Admitting: INTERNAL MEDICINE
Payer: MEDICARE

## 2023-02-28 VITALS
RESPIRATION RATE: 20 BRPM | BODY MASS INDEX: 28 KG/M2 | WEIGHT: 200 LBS | OXYGEN SATURATION: 97 % | HEART RATE: 90 BPM | SYSTOLIC BLOOD PRESSURE: 145 MMHG | DIASTOLIC BLOOD PRESSURE: 77 MMHG | TEMPERATURE: 98.4 F | HEIGHT: 71 IN

## 2023-02-28 LAB
EKG P AXIS: 35 DEGREES
EKG P-R INTERVAL: 172 MS
EKG Q-T INTERVAL: 456 MS
EKG QRS DURATION: 98 MS
EKG QTC CALCULATION (BAZETT): 461 MS
EKG T AXIS: 31 DEGREES

## 2023-02-28 PROCEDURE — 99153 MOD SED SAME PHYS/QHP EA: CPT

## 2023-02-28 PROCEDURE — 92928 PRQ TCAT PLMT NTRAC ST 1 LES: CPT

## 2023-02-28 PROCEDURE — C1887 CATHETER, GUIDING: HCPCS

## 2023-02-28 PROCEDURE — 2580000003 HC RX 258: Performed by: INTERNAL MEDICINE

## 2023-02-28 PROCEDURE — 6360000002 HC RX W HCPCS: Performed by: INTERNAL MEDICINE

## 2023-02-28 PROCEDURE — 99152 MOD SED SAME PHYS/QHP 5/>YRS: CPT

## 2023-02-28 PROCEDURE — C1725 CATH, TRANSLUMIN NON-LASER: HCPCS

## 2023-02-28 PROCEDURE — 99152 MOD SED SAME PHYS/QHP 5/>YRS: CPT | Performed by: INTERNAL MEDICINE

## 2023-02-28 PROCEDURE — 93458 L HRT ARTERY/VENTRICLE ANGIO: CPT

## 2023-02-28 PROCEDURE — 6370000000 HC RX 637 (ALT 250 FOR IP): Performed by: INTERNAL MEDICINE

## 2023-02-28 PROCEDURE — C1894 INTRO/SHEATH, NON-LASER: HCPCS

## 2023-02-28 PROCEDURE — 2500000003 HC RX 250 WO HCPCS

## 2023-02-28 PROCEDURE — C1769 GUIDE WIRE: HCPCS

## 2023-02-28 PROCEDURE — 93458 L HRT ARTERY/VENTRICLE ANGIO: CPT | Performed by: INTERNAL MEDICINE

## 2023-02-28 PROCEDURE — 6360000002 HC RX W HCPCS

## 2023-02-28 PROCEDURE — 92928 PRQ TCAT PLMT NTRAC ST 1 LES: CPT | Performed by: INTERNAL MEDICINE

## 2023-02-28 PROCEDURE — 2709999900 HC NON-CHARGEABLE SUPPLY

## 2023-02-28 PROCEDURE — 6370000000 HC RX 637 (ALT 250 FOR IP)

## 2023-02-28 PROCEDURE — C1874 STENT, COATED/COV W/DEL SYS: HCPCS

## 2023-02-28 PROCEDURE — 6360000004 HC RX CONTRAST MEDICATION: Performed by: INTERNAL MEDICINE

## 2023-02-28 RX ORDER — SODIUM CHLORIDE 0.9 % (FLUSH) 0.9 %
5-40 SYRINGE (ML) INJECTION PRN
Status: DISCONTINUED | OUTPATIENT
Start: 2023-02-28 | End: 2023-02-28 | Stop reason: HOSPADM

## 2023-02-28 RX ORDER — ASPIRIN 81 MG/1
81 TABLET ORAL DAILY
Qty: 90 TABLET | Refills: 3 | Status: SHIPPED | OUTPATIENT
Start: 2023-02-28

## 2023-02-28 RX ORDER — CLOPIDOGREL BISULFATE 75 MG/1
75 TABLET ORAL DAILY
Status: DISCONTINUED | OUTPATIENT
Start: 2023-03-01 | End: 2023-02-28 | Stop reason: SDUPTHER

## 2023-02-28 RX ORDER — ASPIRIN 325 MG
325 TABLET ORAL ONCE
Status: COMPLETED | OUTPATIENT
Start: 2023-02-28 | End: 2023-02-28

## 2023-02-28 RX ORDER — VALSARTAN 40 MG/1
40 TABLET ORAL ONCE
Status: COMPLETED | OUTPATIENT
Start: 2023-02-28 | End: 2023-02-28

## 2023-02-28 RX ORDER — SODIUM CHLORIDE 9 MG/ML
INJECTION, SOLUTION INTRAVENOUS PRN
Status: DISCONTINUED | OUTPATIENT
Start: 2023-02-28 | End: 2023-02-28 | Stop reason: HOSPADM

## 2023-02-28 RX ORDER — ATORVASTATIN CALCIUM 40 MG/1
40 TABLET, FILM COATED ORAL DAILY
Qty: 90 TABLET | Refills: 3 | Status: SHIPPED | OUTPATIENT
Start: 2023-02-28

## 2023-02-28 RX ORDER — OXYCODONE AND ACETAMINOPHEN 10; 325 MG/1; MG/1
1 TABLET ORAL EVERY 4 HOURS PRN
Status: DISCONTINUED | OUTPATIENT
Start: 2023-02-28 | End: 2023-02-28 | Stop reason: HOSPADM

## 2023-02-28 RX ORDER — CLOPIDOGREL 300 MG/1
600 TABLET, FILM COATED ORAL ONCE
Status: DISCONTINUED | OUTPATIENT
Start: 2023-02-28 | End: 2023-02-28 | Stop reason: HOSPADM

## 2023-02-28 RX ORDER — ACETAMINOPHEN 325 MG/1
650 TABLET ORAL EVERY 4 HOURS PRN
Status: DISCONTINUED | OUTPATIENT
Start: 2023-02-28 | End: 2023-02-28 | Stop reason: HOSPADM

## 2023-02-28 RX ORDER — VALSARTAN 80 MG/1
40 TABLET ORAL 2 TIMES DAILY
Qty: 90 TABLET | Refills: 3 | Status: SHIPPED | OUTPATIENT
Start: 2023-02-28

## 2023-02-28 RX ORDER — CLOPIDOGREL BISULFATE 75 MG/1
75 TABLET ORAL DAILY
Status: DISCONTINUED | OUTPATIENT
Start: 2023-03-01 | End: 2023-02-28 | Stop reason: HOSPADM

## 2023-02-28 RX ORDER — HYDRALAZINE HYDROCHLORIDE 20 MG/ML
10 INJECTION INTRAMUSCULAR; INTRAVENOUS ONCE
Status: COMPLETED | OUTPATIENT
Start: 2023-02-28 | End: 2023-02-28

## 2023-02-28 RX ORDER — AMLODIPINE BESYLATE 5 MG/1
5 TABLET ORAL 2 TIMES DAILY
Qty: 180 TABLET | Refills: 3 | Status: SHIPPED | OUTPATIENT
Start: 2023-02-28

## 2023-02-28 RX ORDER — SODIUM CHLORIDE 9 MG/ML
INJECTION, SOLUTION INTRAVENOUS CONTINUOUS
Status: ACTIVE | OUTPATIENT
Start: 2023-02-28 | End: 2023-02-28

## 2023-02-28 RX ORDER — HYDROCODONE BITARTRATE AND ACETAMINOPHEN 5; 325 MG/1; MG/1
2 TABLET ORAL EVERY 4 HOURS PRN
Status: DISCONTINUED | OUTPATIENT
Start: 2023-02-28 | End: 2023-02-28 | Stop reason: HOSPADM

## 2023-02-28 RX ORDER — CLOPIDOGREL BISULFATE 75 MG/1
75 TABLET ORAL DAILY
Qty: 90 TABLET | Refills: 3 | Status: SHIPPED | OUTPATIENT
Start: 2023-02-28

## 2023-02-28 RX ORDER — HYDROCODONE BITARTRATE AND ACETAMINOPHEN 5; 325 MG/1; MG/1
1 TABLET ORAL EVERY 4 HOURS PRN
Status: DISCONTINUED | OUTPATIENT
Start: 2023-02-28 | End: 2023-02-28 | Stop reason: HOSPADM

## 2023-02-28 RX ORDER — AMLODIPINE BESYLATE 5 MG/1
5 TABLET ORAL DAILY
Status: DISCONTINUED | OUTPATIENT
Start: 2023-02-28 | End: 2023-02-28 | Stop reason: HOSPADM

## 2023-02-28 RX ORDER — OXYCODONE AND ACETAMINOPHEN 10; 325 MG/1; MG/1
TABLET ORAL
COMMUNITY
Start: 2023-02-22

## 2023-02-28 RX ORDER — ONDANSETRON 2 MG/ML
4 INJECTION INTRAMUSCULAR; INTRAVENOUS EVERY 6 HOURS PRN
Status: DISCONTINUED | OUTPATIENT
Start: 2023-02-28 | End: 2023-02-28 | Stop reason: HOSPADM

## 2023-02-28 RX ORDER — AMLODIPINE BESYLATE 5 MG/1
5 TABLET ORAL ONCE
Status: COMPLETED | OUTPATIENT
Start: 2023-02-28 | End: 2023-02-28

## 2023-02-28 RX ORDER — NITROGLYCERIN 0.4 MG/1
0.4 TABLET SUBLINGUAL EVERY 5 MIN PRN
Status: DISCONTINUED | OUTPATIENT
Start: 2023-02-28 | End: 2023-02-28 | Stop reason: HOSPADM

## 2023-02-28 RX ORDER — SODIUM CHLORIDE 0.9 % (FLUSH) 0.9 %
5-40 SYRINGE (ML) INJECTION EVERY 12 HOURS SCHEDULED
Status: DISCONTINUED | OUTPATIENT
Start: 2023-02-28 | End: 2023-02-28 | Stop reason: HOSPADM

## 2023-02-28 RX ADMIN — HYDRALAZINE HYDROCHLORIDE 10 MG: 20 INJECTION INTRAMUSCULAR; INTRAVENOUS at 14:55

## 2023-02-28 RX ADMIN — SODIUM CHLORIDE 75 ML/HR: 9 INJECTION, SOLUTION INTRAVENOUS at 07:19

## 2023-02-28 RX ADMIN — AMLODIPINE BESYLATE 5 MG: 5 TABLET ORAL at 15:31

## 2023-02-28 RX ADMIN — IOPAMIDOL 220 ML: 612 INJECTION, SOLUTION INTRAVENOUS at 09:21

## 2023-02-28 RX ADMIN — AMLODIPINE BESYLATE 5 MG: 5 TABLET ORAL at 10:30

## 2023-02-28 RX ADMIN — ASPIRIN 325 MG: 325 TABLET ORAL at 07:25

## 2023-02-28 RX ADMIN — OXYCODONE HYDROCHLORIDE AND ACETAMINOPHEN 1 TABLET: 10; 325 TABLET ORAL at 16:46

## 2023-02-28 RX ADMIN — VALSARTAN 40 MG: 40 TABLET ORAL at 10:30

## 2023-02-28 ASSESSMENT — ENCOUNTER SYMPTOMS
WHEEZING: 0
ABDOMINAL PAIN: 0
COUGH: 0
ABDOMINAL DISTENTION: 0
CHEST TIGHTNESS: 1
VOMITING: 0
BLOOD IN STOOL: 0
BACK PAIN: 0
DIARRHEA: 0
SHORTNESS OF BREATH: 1

## 2023-02-28 ASSESSMENT — PAIN SCALES - GENERAL: PAINLEVEL_OUTOF10: 6

## 2023-02-28 ASSESSMENT — PAIN DESCRIPTION - DESCRIPTORS: DESCRIPTORS: ACHING

## 2023-02-28 ASSESSMENT — PAIN DESCRIPTION - LOCATION: LOCATION: BACK

## 2023-02-28 NOTE — FLOWSHEET NOTE
Notified Dr. Leonor Villafuerte of pt BP and difficulty in removing vasc band. Dr. Leonor Villafuerte removed vasc band after placing TR band above vasc band and inflating. Coco Mutter placed to radial puncture site and vasc band reinflated over cath site. TR band deflated. Orders received for bp meds.

## 2023-02-28 NOTE — PROGRESS NOTES
Cardiac catheterization preliminary note:    Single-vessel disease with LAD mid to distal 70% stenosis treated with LEONARD x1. Normal LV systolic function. Systemic hypertension. Plan: Hypertensive management. Plavix uninterrupted for 6 months. Add Norvasc 5 mg twice daily. Add Diovan 40 mg twice daily. Aspirin and statin therapy. Holding off beta-blocker due to low heart rates. Follow-up with cardiology in 2 to 4 weeks.

## 2023-03-01 RX ORDER — PREGABALIN 150 MG/1
CAPSULE ORAL
Qty: 90 CAPSULE | Refills: 0 | Status: SHIPPED | OUTPATIENT
Start: 2023-03-01 | End: 2023-03-31

## 2023-03-01 NOTE — TELEPHONE ENCOUNTER
Jesus Magallon called to request a refill on his medication. Last office visit : 3/15/2022   Next office visit : Visit date not found     Last UDS:   Amphetamines   Date Value Ref Range Status   06/12/2013 NEGATIVE  Final     Amphetamine Screen, Urine   Date Value Ref Range Status   01/17/2023 neg  Final     Barbiturates   Date Value Ref Range Status   06/12/2013 NEGATIVE  Final     Barbiturate Screen, Urine   Date Value Ref Range Status   01/17/2023 neg  Final     Benzodiazepines   Date Value Ref Range Status   06/12/2013 NEGATIVE  Final     Benzodiazepine Screen, Urine   Date Value Ref Range Status   01/17/2023 neg  Final     Buprenorphine Urine   Date Value Ref Range Status   01/17/2023 neg  Final     Cocaine Metabolite Screen, Urine   Date Value Ref Range Status   01/17/2023 neg  Final     Gabapentin Screen, Urine   Date Value Ref Range Status   01/17/2023 neg  Final     MDMA, Urine   Date Value Ref Range Status   01/17/2023 neg  Final     Methamphetamine, Urine   Date Value Ref Range Status   01/17/2023 neg  Final     Opiate Scrn, Ur   Date Value Ref Range Status   01/17/2023 neg  Final     Oxycodone Screen, Ur   Date Value Ref Range Status   01/17/2023 positive  Final     PCP Screen, Urine   Date Value Ref Range Status   01/17/2023 neg  Final     Propoxyphene Screen, Urine   Date Value Ref Range Status   01/17/2023 neg  Final     THC Screen, Urine   Date Value Ref Range Status   01/17/2023 neg  Final     Tricyclic Antidepressants, Urine   Date Value Ref Range Status   01/17/2023 neg  Final       Last Joan Lies: 1/20/23  Medication Contract: 3/15/22   Last Fill: 1/23/23    Requested Prescriptions     Pending Prescriptions Disp Refills    pregabalin (LYRICA) 150 MG capsule [Pharmacy Med Name: Pregabalin 150 MG Oral Capsule] 90 capsule 0     Sig: TAKE 1 CAPSULE BY MOUTH THREE TIMES DAILY         Please approve or refuse this medication.    Vanessa Raza MA

## 2023-03-02 NOTE — CONSULTS
Cardiac Rehab PTCA/Stent education packet was sent to the patient's address on record.  Handouts titled; \"Home Instructions Following a Cardiac Event\", \"A Healthy Heart: Care Instructions\",  \"Cardiac Diet/Low Cholesterol\", \"Cardiac Rehabilitation: An Individualized Supervised Program For You\" and a [facility] Cardiac & Pulmonary Rehabilitation brochure were included.  Patient was instructed to contact [facility] to enroll in Phase II Outpatient Cardiac Rehab.

## 2023-03-08 ENCOUNTER — OFFICE VISIT (OUTPATIENT)
Dept: GASTROENTEROLOGY | Age: 58
End: 2023-03-08
Payer: MEDICARE

## 2023-03-08 VITALS
BODY MASS INDEX: 28.14 KG/M2 | HEART RATE: 98 BPM | OXYGEN SATURATION: 92 % | SYSTOLIC BLOOD PRESSURE: 100 MMHG | DIASTOLIC BLOOD PRESSURE: 80 MMHG | WEIGHT: 201 LBS | HEIGHT: 71 IN

## 2023-03-08 DIAGNOSIS — K74.60 CIRRHOSIS OF LIVER WITHOUT ASCITES, UNSPECIFIED HEPATIC CIRRHOSIS TYPE (HCC): Primary | ICD-10-CM

## 2023-03-08 PROCEDURE — 99214 OFFICE O/P EST MOD 30 MIN: CPT | Performed by: NURSE PRACTITIONER

## 2023-03-08 PROCEDURE — 1036F TOBACCO NON-USER: CPT | Performed by: NURSE PRACTITIONER

## 2023-03-08 PROCEDURE — G8427 DOCREV CUR MEDS BY ELIG CLIN: HCPCS | Performed by: NURSE PRACTITIONER

## 2023-03-08 PROCEDURE — G8482 FLU IMMUNIZE ORDER/ADMIN: HCPCS | Performed by: NURSE PRACTITIONER

## 2023-03-08 PROCEDURE — 3017F COLORECTAL CA SCREEN DOC REV: CPT | Performed by: NURSE PRACTITIONER

## 2023-03-08 PROCEDURE — G8419 CALC BMI OUT NRM PARAM NOF/U: HCPCS | Performed by: NURSE PRACTITIONER

## 2023-03-08 ASSESSMENT — ENCOUNTER SYMPTOMS
ABDOMINAL DISTENTION: 0
DIARRHEA: 0
CONSTIPATION: 0
ANAL BLEEDING: 0
TROUBLE SWALLOWING: 0
NAUSEA: 0
BLOOD IN STOOL: 0
ABDOMINAL PAIN: 0
CHOKING: 0
RECTAL PAIN: 0
VOMITING: 0
COUGH: 0
SHORTNESS OF BREATH: 0

## 2023-03-08 NOTE — PROGRESS NOTES
Subjective:     Patient ID: Reagan Diallo is a 58 y.o. male  PCP: ANTHONY Hernandez  Referring Provider: No ref. provider found    HPI  Patient presents to the office today with the following complaints: Follow-up      Pt seen today for follow up.  Hx cirrhosis.  Previously followed by ALVINO CRUZ.  US liver UTD without hepatic mass or lesion, normal AFP.  Pt admits to sleep disturbances.  Denies any melena, hematemesis.  Pt had recent heart cath with stent placement.              MELD 7 points 10/2022    Last EGD 8/2021 - mild gastritis, 2 small duodenal ulcers and 1 large cratered 3-4cm in posterior duodenal bulb, NEG h pylori  Last Colonoscopy 6/2021 - int hemorrhids, RANDOM COLON BX NEGATIVE, Sub Prep Fair, 3 year recall    Narrative   Ultrasound of the right upper quadrant of the abdomen:   Technique: Multiple grayscale color Doppler images of the right upper quadrant   of the abdomen were obtained.   Comparison: None available   History: Cirrhosis of liver without ascites, unspecified hepatic cirrhosis type   (HCC)   Findings: The visualized portions of the hepatic parenchyma demonstrates a   coarse echotexture.   There is normal direction of flow seen in the main portal vein.   The gallbladder surgically absent.   The common duct measures 2-3 mm.   The right kidney measures up to 10.5 cm in length.The right kidney demonstrates   normal cortical echogenicity with no evidence to suggest hydronephrosis.There   are echogenic foci seen at the left kidney consistent with nonobstructive renal   calculi.These measure up to 6 to 7 mm.   The spleen is not visualized.   The abdominal aorta, IVC, and pancreatic head are obscured by overlying bowel   gas.   No free intraperitoneal fluid is seen.       Impression   Impression: The gallbladder surgically absent.The common duct is within normal   limits.   The liver has a coarse echotexture which can be seen with underlying hepatic   parenchymal disease.   There are  nonobstructive right renal calculi present. WBC (K/uL)   Date Value   02/24/2023 6.4     Hemoglobin (g/dL)   Date Value   02/24/2023 12.8 (L)     Hematocrit (%)   Date Value   02/24/2023 42.3   05/10/2012 39.9 (L)     Platelet Count (no units)   Date Value   05/10/2012 278     Platelets (K/uL)   Date Value   02/24/2023 296     Lab Results   Component Value Date     02/24/2023    K 4.3 02/24/2023     02/24/2023    CO2 31 (H) 02/24/2023    BUN 20 02/24/2023    CREATININE 0.5 02/24/2023    GLUCOSE 95 02/24/2023    CALCIUM 9.0 02/24/2023    PROT 7.2 02/24/2023    LABALBU 4.3 02/24/2023    BILITOT 0.6 02/24/2023    ALKPHOS 115 02/24/2023    AST 19 02/24/2023    ALT 11 02/24/2023    LABGLOM >60 02/24/2023    GFRAA >59 10/14/2022       Lab Results   Component Value Date    INR 1.02 10/14/2022    INR 1.03 05/11/2022    INR 1.07 10/06/2021    PROTIME 13.4 10/14/2022    PROTIME 13.4 05/11/2022    PROTIME 14.1 10/06/2021       Assessment:     1. Cirrhosis of liver without ascites, unspecified hepatic cirrhosis type (HCC)            Plan:   - Check CMP, CBC, AFP, Pt/INR every 6 months   - Check US liver in 6 months   - Follow up in 6 months or sooner if needed  - Call with any questions or concerns      Orders  No orders of the defined types were placed in this encounter. Medications  No orders of the defined types were placed in this encounter.         Patient History:     Past Medical History:   Diagnosis Date    Anxiety     Arthritis     Chronic back pain     Depression     MD (muscular dystrophy) ([facility] [address]     Neuropathy 12/02/2020    Scoliosis        Past Surgical History:   Procedure Laterality Date    CHOLECYSTECTOMY, LAPAROSCOPIC N/A 04/27/2021    LAPAROSCOPIC CHOLECYSTECTOMY performed by Roger Fitzpatrick DO at [facility]  01/08/2016    Dr Nasreen Saldana, normal, 10 yr recall    COLONOSCOPY N/A 06/30/2021    Dr Audrey Glez hemorrhids, Prema Louie, 3 year recall FOOT SURGERY Left     UPPER GASTROINTESTINAL ENDOSCOPY N/A 2021    Dr Singh, duodenal villous blunting otherwise normal EGD; serum celiac panel NEG for celiac sprue    UPPER GASTROINTESTINAL ENDOSCOPY N/A 08/10/2021    Dr Singh- mild gastritis, 2 small duodenal ulcers and 1 large cratered 3-4cm in posterior duodenal bulb, NEG h pylori       Family History   Problem Relation Age of Onset    Diabetes Mother     Colon Cancer Neg Hx     Colon Polyps Neg Hx     Esophageal Cancer Neg Hx     Liver Cancer Neg Hx     Liver Disease Neg Hx     Rectal Cancer Neg Hx     Stomach Cancer Neg Hx        Social History     Socioeconomic History    Marital status: Single   Tobacco Use    Smoking status: Former     Packs/day: 0.00     Years: 30.00     Pack years: 0.00     Types: Cigarettes     Quit date: 2021     Years since quittin.6    Smokeless tobacco: Never   Vaping Use    Vaping Use: Never used   Substance and Sexual Activity    Alcohol use: Yes     Comment: occas    Drug use: No       Current Outpatient Medications   Medication Sig Dispense Refill    pregabalin (LYRICA) 150 MG capsule TAKE 1 CAPSULE BY MOUTH THREE TIMES DAILY 90 capsule 0    oxyCODONE-acetaminophen (PERCOCET)  MG per tablet TAKE 1 TABLET BY MOUTH 4 TIMES DAILY -- DO NOT COMBINE WITH ALCOHOL AND DO NOT DRIVE AFTER TAKING      amLODIPine (NORVASC) 5 MG tablet Take 1 tablet by mouth in the morning and at bedtime 180 tablet 3    clopidogrel (PLAVIX) 75 MG tablet Take 1 tablet by mouth daily 90 tablet 3    valsartan (DIOVAN) 80 MG tablet Take 0.5 tablets by mouth 2 times daily 90 tablet 3    atorvastatin (LIPITOR) 40 MG tablet Take 1 tablet by mouth daily 90 tablet 3    aspirin EC 81 MG EC tablet Take 1 tablet by mouth daily 90 tablet 3    predniSONE (DELTASONE) 50 MG tablet Take 1 tablet BID day prior and 1 tablet morning of procedure 3 tablet 0    pantoprazole (PROTONIX) 40 MG tablet Take 1 tablet by mouth daily 60 tablet 0     tiZANidine (ZANAFLEX) 4 MG tablet TAKE 1 TABLET BY MOUTH THREE TIMES DAILY 90 tablet 2    Elastic Bandages & Supports (MEDICAL COMPRESSION STOCKINGS) MISC 1 each by Does not apply route daily as needed (swelling) 1 each 0    ketoconazole (NIZORAL) 2 % shampoo Apply topically daily as needed. 120 mL 1    escitalopram (LEXAPRO) 20 MG tablet Take 1 tablet by mouth once daily 30 tablet 5    triamcinolone (KENALOG) 0.025 % cream Apply topically 2 times daily. (Patient taking differently: as needed Apply topically 2 times daily.) 80 g 1    diclofenac sodium (VOLTAREN) 1 % GEL Apply 2 g topically 4 times daily 2 Tube 1    amitriptyline (ELAVIL) 50 MG tablet TAKE ONE TABLET BY MOUTH IN THE EVENING 30 tablet 11     No current facility-administered medications for this visit. Allergies   Allergen Reactions    Shellfish-Derived Products Anaphylaxis       Review of Systems   Constitutional:  Negative for activity change, appetite change, fatigue, fever and unexpected weight change. HENT:  Negative for trouble swallowing. Respiratory:  Negative for cough, choking and shortness of breath. Cardiovascular:  Negative for chest pain. Gastrointestinal:  Negative for abdominal distention, abdominal pain, anal bleeding, blood in stool, constipation, diarrhea, nausea, rectal pain and vomiting. Musculoskeletal:  Positive for gait problem. Allergic/Immunologic: Negative for food allergies. All other systems reviewed and are negative. Objective:     /80 (Site: Left Upper Arm)   Pulse 98   Ht 5' 11\" (1.803 m)   Wt 201 lb (91.2 kg)   SpO2 92%   BMI 28.03 kg/m²     Physical Exam  Vitals reviewed. Constitutional:       General: He is not in acute distress. Appearance: He is well-developed. HENT:      Head: Normocephalic and atraumatic. Right Ear: External ear normal.      Left Ear: External ear normal.      Nose: Nose normal.   Eyes:      General: No scleral icterus.         Right eye: No discharge. Left eye: No discharge. Conjunctiva/sclera: Conjunctivae normal.      Pupils: Pupils are equal, round, and reactive to light. Cardiovascular:      Rate and Rhythm: Normal rate and regular rhythm. Heart sounds: Normal heart sounds. No murmur heard. Pulmonary:      Effort: Pulmonary effort is normal. No respiratory distress. Breath sounds: Normal breath sounds. No wheezing or rales. Abdominal:      General: Bowel sounds are normal. There is no distension. Palpations: Abdomen is soft. There is no mass. Tenderness: There is no abdominal tenderness. There is no guarding or rebound. Musculoskeletal:         General: Normal range of motion. Cervical back: Normal range of motion and neck supple. Skin:     General: Skin is warm and dry. Coloration: Skin is not pale. Neurological:      Mental Status: He is alert and oriented to person, place, and time. Gait: Gait abnormal (uses cane).    Psychiatric:         Behavior: Behavior normal.

## 2023-04-04 ENCOUNTER — HOSPITAL ENCOUNTER (OUTPATIENT)
Dept: GENERAL RADIOLOGY | Age: 58
Discharge: HOME | End: 2023-04-04
Payer: MEDICARE

## 2023-04-04 ENCOUNTER — OFFICE VISIT (OUTPATIENT)
Dept: FAMILY MEDICINE CLINIC | Age: 58
End: 2023-04-04
Payer: MEDICARE

## 2023-04-04 ENCOUNTER — TELEPHONE (OUTPATIENT)
Dept: NEUROSURGERY | Age: 58
End: 2023-04-04

## 2023-04-04 VITALS
WEIGHT: 201 LBS | HEART RATE: 74 BPM | TEMPERATURE: 97.8 F | SYSTOLIC BLOOD PRESSURE: 120 MMHG | HEIGHT: 71 IN | DIASTOLIC BLOOD PRESSURE: 76 MMHG | OXYGEN SATURATION: 97 % | BODY MASS INDEX: 28.14 KG/M2
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
## 2023-04-04 DIAGNOSIS — R60.0 LOWER LEG EDEMA: ICD-10-CM

## 2023-04-04 DIAGNOSIS — M54.50 LUMBAR PAIN: Primary | ICD-10-CM

## 2023-04-04 DIAGNOSIS — R55 SYNCOPE, UNSPECIFIED SYNCOPE TYPE: ICD-10-CM

## 2023-04-04 DIAGNOSIS — M54.50 LUMBAR PAIN: ICD-10-CM

## 2023-04-04 DIAGNOSIS — G71.00 MD (MUSCULAR DYSTROPHY) (HCC): ICD-10-CM

## 2023-04-04 DIAGNOSIS — S22.080A COMPRESSION FRACTURE OF T12 VERTEBRA, INITIAL ENCOUNTER (HCC): ICD-10-CM

## 2023-04-04 PROCEDURE — 3017F COLORECTAL CA SCREEN DOC REV: CPT | Performed by: NURSE PRACTITIONER

## 2023-04-04 PROCEDURE — 99214 OFFICE O/P EST MOD 30 MIN: CPT | Performed by: NURSE PRACTITIONER

## 2023-04-04 PROCEDURE — G8427 DOCREV CUR MEDS BY ELIG CLIN: HCPCS | Performed by: NURSE PRACTITIONER

## 2023-04-04 PROCEDURE — G8419 CALC BMI OUT NRM PARAM NOF/U: HCPCS | Performed by: NURSE PRACTITIONER

## 2023-04-04 PROCEDURE — 1036F TOBACCO NON-USER: CPT | Performed by: NURSE PRACTITIONER

## 2023-04-04 PROCEDURE — 72131 CT LUMBAR SPINE W/O DYE: CPT

## 2023-04-04 ASSESSMENT — ENCOUNTER SYMPTOMS
EYES NEGATIVE: 1
GASTROINTESTINAL NEGATIVE: 1
RESPIRATORY NEGATIVE: 1
BACK PAIN: 1

## 2023-04-04 NOTE — PROGRESS NOTES
Protime-INR; Future  - CT LUMBAR SPINE WO CONTRAST; Future  - Magnesium; Future  - Hemoglobin A1C; Future  - CK; Future  - Phosphorus; Future  - Urine Drug Screen; Future    5. Lower leg edema    - CBC with Auto Differential; Future  - Comprehensive Metabolic Panel; Future  - Urinalysis; Future  - Sedimentation Rate; Future  - C-Reactive Protein; Future  - Protime-INR; Future  - CT LUMBAR SPINE WO CONTRAST; Future  - Magnesium; Future  - Hemoglobin A1C; Future  - CK; Future  - Phosphorus; Future  - Urine Drug Screen; Future    6. Compression fracture of T12 vertebra, initial encounter ([facility] [address]  Stat CT returned showing compression fracture  Referral to neurosurgery  TLSO brace to [facility] clinic.    - Jose Antonio Garcia MD, Neurosurgery, [facility] mound       Will need DEXA scan    Return in about 10 days (around 4/14/2023) for frequest falls. Orders Placed This Encounter   Procedures    CT LUMBAR SPINE WO CONTRAST     Standing Status:   Future     Number of Occurrences:   1     Standing Expiration Date:   4/4/2024    CBC with Auto Differential     Standing Status:   Future     Standing Expiration Date:   4/4/2024    Comprehensive Metabolic Panel     Standing Status:   Future     Standing Expiration Date:   4/4/2024    Urinalysis     Standing Status:   Future     Standing Expiration Date:   4/4/2024    Sedimentation Rate     Standing Status:   Future     Standing Expiration Date:   4/4/2024    C-Reactive Protein     Standing Status:   Future     Standing Expiration Date:   4/4/2024    Protime-INR     Standing Status:   Future     Standing Expiration Date:   4/4/2024     Order Specific Question:   Daily Coumadin Dose?      Answer:   none    Magnesium     Standing Status:   Future     Standing Expiration Date:   4/4/2024    Hemoglobin A1C     Standing Status:   Future     Standing Expiration Date:   4/4/2024    CK     Standing Status:   Future     Standing Expiration Date:   4/4/2024    Phosphorus     Standing Status:

## 2023-04-04 NOTE — TELEPHONE ENCOUNTER
[facility] Neurosurgery New Patient Questionnaire    Diagnosis/Reason for Referral?    Compression fracture of T12 vertebra, initial encounter    2. Who is completing questionnaire? Patient  Caregiver Family      3. Has the patient had any previous spinal/brain surgeries? NO      A. If yes, what is the name of the facility in which the surgery was performed? B. Procedure/Surgery performed? C. Who was the surgeon? D. When was the surgery? MM/YY       E. Did the patient improve after the surgery? 4. Is this a second opinion? If yes, Dr. Bebo Tian would like to review patient first before making the appointment. NO    5. Have MRI Images been obtain within the last year? Yes  No      XR  CT     If yes, where was the imaging performed? [facility]    If yes, what part of the body? Lumbar  Cervical  Thoracic  Brain     If yes, when was it obtained? 4/4/23    Note: if the scan was performed at a facility other than [facility], the disc will need to be brought to the appointment or we need to reach out to obtain the disc. A. Was the patient instructed to provide the disc? Yes   No      8. Has the patient had a NCV/EMG within the last year? Yes  No     If yes, where was it performed and date? MM/YY  Location:      9. Has the patient been to Physical Therapy? Yes  No     If yes, what location, how long attended, and last visit? Location:        Therapy Lasted:    Date of Last Visit:      10. Has the patient been to Pain Management? Yes  No     If yes, what location and last visit     Location: [address]   Last Visit:   Is it helping?

## 2023-04-06 ENCOUNTER — OFFICE VISIT (OUTPATIENT)
Dept: NEUROSURGERY | Age: 58
End: 2023-04-06
Payer: MEDICARE

## 2023-04-06 VITALS
BODY MASS INDEX: 28.14 KG/M2 | HEART RATE: 80 BPM | SYSTOLIC BLOOD PRESSURE: 140 MMHG | WEIGHT: 201 LBS | RESPIRATION RATE: 18 BRPM | HEIGHT: 71 IN | DIASTOLIC BLOOD PRESSURE: 80 MMHG

## 2023-04-06 DIAGNOSIS — G89.29 CHRONIC MIDLINE LOW BACK PAIN WITH LEFT-SIDED SCIATICA: ICD-10-CM

## 2023-04-06 DIAGNOSIS — M54.42 CHRONIC MIDLINE LOW BACK PAIN WITH LEFT-SIDED SCIATICA: ICD-10-CM

## 2023-04-06 DIAGNOSIS — S22.080D COMPRESSION FRACTURE OF T12 VERTEBRA WITH ROUTINE HEALING, SUBSEQUENT ENCOUNTER: ICD-10-CM

## 2023-04-06 DIAGNOSIS — S32.010D COMPRESSION FRACTURE OF L1 VERTEBRA WITH ROUTINE HEALING, SUBSEQUENT ENCOUNTER: ICD-10-CM

## 2023-04-06 DIAGNOSIS — M51.36 DDD (DEGENERATIVE DISC DISEASE), LUMBAR: Primary | ICD-10-CM

## 2023-04-06 DIAGNOSIS — M54.16 LUMBAR RADICULOPATHY: ICD-10-CM

## 2023-04-06 DIAGNOSIS — G71.00 MD (MUSCULAR DYSTROPHY) (HCC): ICD-10-CM

## 2023-04-06 PROBLEM — M51.369 DDD (DEGENERATIVE DISC DISEASE), LUMBAR: Status: ACTIVE | Noted: 2023-04-06

## 2023-04-06 PROBLEM — S32.010A COMPRESSION FRACTURE OF L1 LUMBAR VERTEBRA (HCC): Status: ACTIVE | Noted: 2023-04-06

## 2023-04-06 PROBLEM — S22.080A COMPRESSION FRACTURE OF T12 VERTEBRA (HCC): Status: ACTIVE | Noted: 2023-04-06

## 2023-04-06 PROCEDURE — 1036F TOBACCO NON-USER: CPT | Performed by: NEUROLOGICAL SURGERY

## 2023-04-06 PROCEDURE — G8427 DOCREV CUR MEDS BY ELIG CLIN: HCPCS | Performed by: NEUROLOGICAL SURGERY

## 2023-04-06 PROCEDURE — 3017F COLORECTAL CA SCREEN DOC REV: CPT | Performed by: NEUROLOGICAL SURGERY

## 2023-04-06 PROCEDURE — 99204 OFFICE O/P NEW MOD 45 MIN: CPT | Performed by: NEUROLOGICAL SURGERY

## 2023-04-06 PROCEDURE — G8419 CALC BMI OUT NRM PARAM NOF/U: HCPCS | Performed by: NEUROLOGICAL SURGERY

## 2023-04-06 ASSESSMENT — ENCOUNTER SYMPTOMS
RESPIRATORY NEGATIVE: 1
GASTROINTESTINAL NEGATIVE: 1
EYES NEGATIVE: 1
BACK PAIN: 1

## 2023-04-06 NOTE — PROGRESS NOTES
Review of Systems   Constitutional: Negative. HENT: Negative. Eyes: Negative. Respiratory: Negative. Cardiovascular: Negative. Gastrointestinal: Negative. Genitourinary: Negative. Musculoskeletal:  Positive for back pain, falls, joint pain and myalgias. Skin: Negative. Neurological:  Positive for tingling and weakness. Endo/Heme/Allergies: Negative. Psychiatric/Behavioral: Negative.

## 2023-04-06 NOTE — PROGRESS NOTES
[facility]  Office Visit        Chief Complaint   Patient presents with    New Patient     Establishing care    Results     [facility]    Back Pain     Patient states that his back is constantly hurting. He does not take anything OTC but prescription for percocet and states that it does not help. He states that it feels like \"his bones are bruised. \" In his legs. Numbness     Patient states that he does have BLE numbness and weakness. Other     Patient states that he cannot stand up completely. He is easily exhausted and states that he screams in pain with activity. HISTORY OF PRESENT ILLNESS:      The patient is a 60 y.o. male who presents as a referral to neurosurgery to discuss worsening lower back pain. This has been a longstanding problem for him for many years and he states it continues to worsen. He has a complex medical history, including muscular dystrophy, coronary artery disease (s/p LEONARD placement in 2/2023), Hep C/cirrhosis and neuropathy. He has seen Dr. Clifton Mazariegos for this in the past and no surgery was recommended and he was encouraged to see pain management. He is currently seeing Dr. Li Leal in pain management and he states that he has been told \"there is nothing more they can do\" for his pain. He describes severe lower back pain at the lumbosacral junction. He states he will frequently have severe pain radiating from his lower back into his left hip and leg. He also complains of diffuse generalized weakness. He also states that he cannot stand up straight to walk. He reports multiple falls. He has attempted physical therapy in the past and this has not helped. He is currently taking Lyrica and percocet 10/325 to manage his pain. He is on dual antiplatelet therapy with ASA and Plavix due to his recent cardiac stenting procedure.   His PCP recently ordered a CT scan of his lumbar spine to evaluate his worsening back pain and this revealed a T12 compression fracture that

## 2023-04-09 DIAGNOSIS — M54.50 LUMBAR PAIN: ICD-10-CM

## 2023-04-09 DIAGNOSIS — G71.00 MD (MUSCULAR DYSTROPHY) (HCC): ICD-10-CM

## 2023-04-09 DIAGNOSIS — M54.32 LEFT SCIATIC NERVE PAIN: ICD-10-CM

## 2023-04-11 RX ORDER — PREGABALIN 150 MG/1
150 CAPSULE ORAL 3 TIMES DAILY
Qty: 90 CAPSULE | Refills: 2 | Status: SHIPPED | OUTPATIENT
Start: 2023-04-11 | End: 2023-05-11

## 2023-04-24 DIAGNOSIS — F41.9 ANXIETY: ICD-10-CM

## 2023-04-24 DIAGNOSIS — K29.90 GASTRITIS AND DUODENITIS: ICD-10-CM

## 2023-04-24 NOTE — TELEPHONE ENCOUNTER
Received fax from pharmacy requesting refill on pts medication(s). Pt was last seen in office on 4/4/2023  and has a follow up scheduled for 4/27/2023. Will send request to  Alyssa Mora  for authorization.      Requested Prescriptions     Pending Prescriptions Disp Refills    hydrOXYzine HCl (ATARAX) 25 MG tablet [Pharmacy Med Name: hydrOXYzine HCl 25 MG Oral Tablet] 30 tablet 3     Sig: Take 1 tablet by mouth nightly

## 2023-04-25 ENCOUNTER — TELEPHONE (OUTPATIENT)
Dept: FAMILY MEDICINE CLINIC | Age: 58
End: 2023-04-25

## 2023-04-25 RX ORDER — PANTOPRAZOLE SODIUM 40 MG/1
TABLET, DELAYED RELEASE ORAL
Qty: 60 TABLET | Refills: 0 | Status: SHIPPED | OUTPATIENT
Start: 2023-04-25

## 2023-04-25 RX ORDER — HYDROXYZINE HYDROCHLORIDE 25 MG/1
25 TABLET, FILM COATED ORAL NIGHTLY
Qty: 30 TABLET | Refills: 3 | Status: SHIPPED | OUTPATIENT
Start: 2023-04-25 | End: 2023-05-25

## 2023-04-25 NOTE — TELEPHONE ENCOUNTER
Iain Kaur called requesting a refill of the below medication which has been pended for you:     Requested Prescriptions     Pending Prescriptions Disp Refills    pantoprazole (PROTONIX) 40 MG tablet [Pharmacy Med Name: Pantoprazole Sodium 40 MG Oral Tablet Delayed Release] 60 tablet 0     Sig: Take 1 tablet by mouth once daily       Last Appointment Date: Visit date not found  Next Appointment Date: Visit date not found    Allergies   Allergen Reactions    Shellfish-Derived Products Anaphylaxis

## 2023-04-26 DIAGNOSIS — G71.00 MD (MUSCULAR DYSTROPHY) (HCC): ICD-10-CM

## 2023-04-26 DIAGNOSIS — R55 SYNCOPE, UNSPECIFIED SYNCOPE TYPE: ICD-10-CM
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
## 2023-04-26 DIAGNOSIS — R60.0 LOWER LEG EDEMA: ICD-10-CM

## 2023-04-26 LAB
ALBUMIN SERPL-MCNC: 4.3 G/DL (ref 3.5–5.2)
ALP SERPL-CCNC: 107 U/L (ref 40–130)
ALT SERPL-CCNC: 22 U/L (ref 5–41)
AMPHET UR QL SCN: NEGATIVE
ANION GAP SERPL CALCULATED.3IONS-SCNC: 13 MMOL/L (ref 7–19)
AST SERPL-CCNC: 23 U/L (ref 5–40)
BARBITURATES UR QL SCN: NEGATIVE
BASOPHILS # BLD: 0 K/UL (ref 0–0.2)
BASOPHILS NFR BLD: 0.4 % (ref 0–1)
BENZODIAZ UR QL SCN: NEGATIVE
BILIRUB SERPL-MCNC: 0.3 MG/DL (ref 0.2–1.2)
BILIRUB UR QL STRIP: NEGATIVE
BUN SERPL-MCNC: 18 MG/DL (ref 6–20)
BUPRENORPHINE URINE: NEGATIVE
CALCIUM SERPL-MCNC: 9.1 MG/DL (ref 8.6–10)
CANNABINOIDS UR QL SCN: NEGATIVE
CHLORIDE SERPL-SCNC: 102 MMOL/L (ref 98–111)
CK SERPL-CCNC: 221 U/L (ref 39–308)
CLARITY UR: CLEAR
CO2 SERPL-SCNC: 25 MMOL/L (ref 22–29)
COCAINE UR QL SCN: NEGATIVE
COLOR UR: YELLOW
CREAT SERPL-MCNC: 0.5 MG/DL (ref 0.5–1.2)
CRP SERPL HS-MCNC: 0.81 MG/DL (ref 0–0.5)
DRUG SCREEN COMMENT UR-IMP: ABNORMAL
EOSINOPHIL # BLD: 0.1 K/UL (ref 0–0.6)
EOSINOPHIL NFR BLD: 1.7 % (ref 0–5)
ERYTHROCYTE [DISTWIDTH] IN BLOOD BY AUTOMATED COUNT: 13.4 % (ref 11.5–14.5)
ERYTHROCYTE [SEDIMENTATION RATE] IN BLOOD BY WESTERGREN METHOD: 11 MM/HR (ref 0–15)
GLUCOSE SERPL-MCNC: 82 MG/DL (ref 74–109)
GLUCOSE UR STRIP.AUTO-MCNC: NEGATIVE MG/DL
HBA1C MFR BLD: 5.4 % (ref 4–6)
HCT VFR BLD AUTO: 37.5 % (ref 42–52)
HGB BLD-MCNC: 12 G/DL (ref 14–18)
HGB UR STRIP.AUTO-MCNC: NEGATIVE MG/L
IMM GRANULOCYTES # BLD: 0 K/UL
INR PPP: 1.08 (ref 0.88–1.18)
KETONES UR STRIP.AUTO-MCNC: NEGATIVE MG/DL
LEUKOCYTE ESTERASE UR QL STRIP.AUTO: NEGATIVE
LYMPHOCYTES # BLD: 1.4 K/UL (ref 1.1–4.5)
LYMPHOCYTES NFR BLD: 16.3 % (ref 20–40)
MAGNESIUM SERPL-MCNC: 1.9 MG/DL (ref 1.6–2.6)
MCH RBC QN AUTO: 31 PG (ref 27–31)
MCHC RBC AUTO-ENTMCNC: 32 G/DL (ref 33–37)
MCV RBC AUTO: 96.9 FL (ref 80–94)
METHADONE UR QL SCN: NEGATIVE
METHAMPHETAMINE, URINE: NEGATIVE
MONOCYTES # BLD: 0.8 K/UL (ref 0–0.9)
MONOCYTES NFR BLD: 9.1 % (ref 0–10)
NEUTROPHILS # BLD: 6.1 K/UL (ref 1.5–7.5)
NEUTS SEG NFR BLD: 72.4 % (ref 50–65)
NITRITE UR QL STRIP.AUTO: NEGATIVE
OPIATES UR QL SCN: NEGATIVE
OXYCODONE UR QL SCN: POSITIVE
PCP UR QL SCN: NEGATIVE
PH UR STRIP.AUTO: 5.5 [PH] (ref 5–8)
PHOSPHATE SERPL-MCNC: 3.6 MG/DL (ref 2.5–4.5)
PLATELET # BLD AUTO: 255 K/UL (ref 130–400)
PMV BLD AUTO: 10.1 FL (ref 9.4–12.4)
POTASSIUM SERPL-SCNC: 3.9 MMOL/L (ref 3.5–5)
PROPOXYPH UR QL SCN: NEGATIVE
PROT SERPL-MCNC: 6.9 G/DL (ref 6.6–8.7)
PROT UR STRIP.AUTO-MCNC: ABNORMAL MG/DL
PROTHROMBIN TIME: 14 SEC (ref 12–14.6)
RBC # BLD AUTO: 3.87 M/UL (ref 4.7–6.1)
SODIUM SERPL-SCNC: 140 MMOL/L (ref 136–145)
SP GR UR STRIP.AUTO: 1.02 (ref 1–1.03)
TRICYCLIC, URINE: NEGATIVE
UROBILINOGEN UR STRIP.AUTO-MCNC: 0.2 E.U./DL
WBC # BLD AUTO: 8.4 K/UL (ref 4.8–10.8)

## 2023-05-02 ENCOUNTER — OFFICE VISIT (OUTPATIENT)
Dept: NEUROLOGY | Age: 58
End: 2023-05-02
Payer: MEDICARE

## 2023-05-02 ENCOUNTER — OFFICE VISIT (OUTPATIENT)
Dept: FAMILY MEDICINE CLINIC | Age: 58
End: 2023-05-02
Payer: MEDICARE

## 2023-05-02 VITALS — HEART RATE: 92 BPM | OXYGEN SATURATION: 98 % | BODY MASS INDEX: 28.14 KG/M2 | WEIGHT: 201 LBS | HEIGHT: 71 IN

## 2023-05-02 VITALS
DIASTOLIC BLOOD PRESSURE: 80 MMHG | HEIGHT: 71 IN | WEIGHT: 204 LBS | BODY MASS INDEX: 28.56 KG/M2 | OXYGEN SATURATION: 96 % | HEART RATE: 96 BPM | TEMPERATURE: 97 F | SYSTOLIC BLOOD PRESSURE: 122 MMHG

## 2023-05-02 DIAGNOSIS — G71.00 MUSCULAR DYSTROPHY (HCC): ICD-10-CM

## 2023-05-02 DIAGNOSIS — H02.403 PTOSIS OF BOTH EYELIDS: ICD-10-CM

## 2023-05-02 DIAGNOSIS — R60.0 PEDAL EDEMA: ICD-10-CM

## 2023-05-02 DIAGNOSIS — R29.6 FALLS FREQUENTLY: Primary | ICD-10-CM

## 2023-05-02 DIAGNOSIS — R05.3 CHRONIC COUGH: ICD-10-CM

## 2023-05-02 DIAGNOSIS — H53.2 DIPLOPIA: ICD-10-CM

## 2023-05-02 DIAGNOSIS — R29.6 FALLS FREQUENTLY: ICD-10-CM

## 2023-05-02 DIAGNOSIS — R06.02 SHORTNESS OF BREATH: ICD-10-CM

## 2023-05-02 DIAGNOSIS — R60.0 LOWER LEG EDEMA: Primary | ICD-10-CM

## 2023-05-02 DIAGNOSIS — M54.50 LUMBAR PAIN: ICD-10-CM

## 2023-05-02 DIAGNOSIS — G71.00 MD (MUSCULAR DYSTROPHY) (HCC): ICD-10-CM

## 2023-05-02 LAB
BNP BLD-MCNC: 182 PG/ML (ref 0–124)
VIT B12 SERPL-MCNC: 630 PG/ML (ref 211–946)

## 2023-05-02 PROCEDURE — G8427 DOCREV CUR MEDS BY ELIG CLIN: HCPCS | Performed by: NURSE PRACTITIONER

## 2023-05-02 PROCEDURE — 3017F COLORECTAL CA SCREEN DOC REV: CPT | Performed by: NURSE PRACTITIONER

## 2023-05-02 PROCEDURE — 1036F TOBACCO NON-USER: CPT | Performed by: NURSE PRACTITIONER

## 2023-05-02 PROCEDURE — G8419 CALC BMI OUT NRM PARAM NOF/U: HCPCS | Performed by: NURSE PRACTITIONER

## 2023-05-02 PROCEDURE — 99214 OFFICE O/P EST MOD 30 MIN: CPT | Performed by: NURSE PRACTITIONER

## 2023-05-02 PROCEDURE — 99204 OFFICE O/P NEW MOD 45 MIN: CPT | Performed by: NURSE PRACTITIONER

## 2023-05-02 RX ORDER — POTASSIUM CHLORIDE 750 MG/1
10 TABLET, EXTENDED RELEASE ORAL DAILY
Qty: 30 TABLET | Refills: 0 | Status: SHIPPED | OUTPATIENT
Start: 2023-05-02

## 2023-05-02 RX ORDER — FUROSEMIDE 40 MG/1
40 TABLET ORAL DAILY
Qty: 30 TABLET | Refills: 0 | Status: SHIPPED | OUTPATIENT
Start: 2023-05-02

## 2023-05-02 NOTE — PATIENT INSTRUCTIONS
Stop Norvasc or Amlodipine. Start Lasix 40 mg daily with Potassium 10 mEq daily  Make sure you get the Echo or the ultrasound of the heart once scheduled.

## 2023-05-02 NOTE — PROGRESS NOTES
[facility]  [place] [place]  [address] [address]  Dept: [phone]  Dept Fax: [phone]  Loc: [phone]    Tobin Harris is a 60 y.o. male who presents today for his medical conditions/complaints as noted below. Tobin Harris is c/o of Follow-up (Frequent falls )      Chief Complaint   Patient presents with    Follow-up     Frequent falls        HPI:     HPI  Patient presents today for routine follow up of frequent falls. Has fallen several times since he was last seen. Sister, Sasha Solorio, is at bedside today. He is having issues with significant edema. Upper & Lower ext are affected with this. All of the swelling started after he had the stents placed in Feb.  He was started on Norvasc during that time as well.       Past Medical History:   Diagnosis Date    Anxiety     Arthritis     Chronic back pain     Depression     [facility]     Neuropathy 2020    Scoliosis         Past Surgical History:   Procedure Laterality Date    CHOLECYSTECTOMY, LAPAROSCOPIC N/A 2021    LAPAROSCOPIC CHOLECYSTECTOMY performed by Richie Sanders, DO at [address]  2016    Dr Jesus Manuel Kim, normal, 10 yr recall    COLONOSCOPY N/A 2021    Dr Maame Dill hemorrhids, RANDOM COLON BX NEGATIVE, Sub Prep Fair, 3 year recall    FOOT SURGERY Left     UPPER GASTROINTESTINAL ENDOSCOPY N/A 2021    Dr Maegan Barcenas, duodenal villous blunting otherwise normal EGD; serum celiac panel NEG for celiac sprue    UPPER GASTROINTESTINAL ENDOSCOPY N/A 08/10/2021    Dr Maegan Barcenas- mild gastritis, 2 small duodenal ulcers and 1 large cratered 3-4cm in posterior duodenal bulb, NEG h pylori       Social History     Tobacco Use    Smoking status: Former     Packs/day: 0.00     Years: 30.00     Pack years: 0.00     Types: Cigarettes     Quit date: 2021     Years since quittin.7    Smokeless tobacco: Never   Substance Use Topics    Alcohol use:

## 2023-05-02 NOTE — PROGRESS NOTES
[address] [facility] Neurology Office Note      Patient:   Shaye Ryan  MR#:    [id number]  Account Number:                         YOB: 1965  Date of Evaluation:  5/2/2023  Time of Note:                          1:06 PM  Primary/Referring Physician:  ANTHONY Niño   Consulting Physician:  Timur Thompson DNP, ANTHONY    NEW PATIENT CONSULTATION    Chief Complaint   Patient presents with    New Patient    Muscular Dystrophy     Swelling     C/o swelling in lower extremities      HISTORY OF PRESENT ILLNESS    Shaye Ryan is a 60y.o. year old male here for evaluation of frequent falls, weakness. Has a history of muscular dystrophy in his 25s. He is unsure of the type of MD. Has noted increase in frequency of falls over the past 2-3 years. When he falls he will fall backwards typically. He notes weakness in the RUE and BLE. Chronic bilateral ptosis noted. Notes diplopia, typically worse in the morning. Denies dysphagia, choking episodes. States he has a chronic cough. He is using a cane intermittently, using a wheelchair for longer distances. He had a heart stent in February 2023, has noted more BLE swelling since that time. Noting some shortness of breath as well. Notes chronic neck/back pain. Has seen neurosurgery, conservative therapy recommended. Following with pain management as well, on chronic opioid therapy and has gotten injections.  Family history with grandmother and sisters having similar symptoms, diagnosed with MD.    Past Medical History:   Diagnosis Date    Anxiety     Arthritis     Chronic back pain     Depression     MD (muscular dystrophy) ([facility] [address]     Neuropathy 12/02/2020    Scoliosis        Past Surgical History:   Procedure Laterality Date    CHOLECYSTECTOMY, LAPAROSCOPIC N/A 04/27/2021    LAPAROSCOPIC CHOLECYSTECTOMY performed by Milind Santiago DO at [facility]  01/08/2016    Dr Homero Pettit, normal, 10 yr recall    COLONOSCOPY N/A 06/30/2021    Dr Arnold José

## 2023-05-02 NOTE — PROGRESS NOTES

## 2023-05-03 ASSESSMENT — ENCOUNTER SYMPTOMS
RESPIRATORY NEGATIVE: 1
EYES NEGATIVE: 1
BACK PAIN: 1
GASTROINTESTINAL NEGATIVE: 1

## 2023-05-06 LAB
ALBUMIN SERPL-MCNC: 3.42 G/DL (ref 3.75–5.01)
ALPHA1 GLOB SERPL ELPH-MCNC: 0.46 G/DL (ref 0.19–0.46)
ALPHA2 GLOB SERPL ELPH-MCNC: 1.04 G/DL (ref 0.48–1.05)
B-GLOBULIN SERPL ELPH-MCNC: 0.92 G/DL (ref 0.48–1.1)
GAMMA GLOB SERPL ELPH-MCNC: 0.96 G/DL (ref 0.62–1.51)
INTERPRETATION SERPL IFE-IMP: ABNORMAL
PROT SERPL-MCNC: 6.8 G/DL (ref 6.3–8.2)
PROTEIN ELECTROPHORESIS, SERUM: ABNORMAL

## 2023-05-08 LAB
ACHR BIND AB SER-SCNC: 0 NMOL/L (ref 0–0.4)
ACHR BLOCK AB/ACHR TOTAL SFR SER: 0 % (ref 0–26)
STRIA MUS IGG SER QL IF: NORMAL
TITIN ANTIBODY: 0.11 IV (ref 0–0.45)

## 2023-05-11 ENCOUNTER — OFFICE VISIT (OUTPATIENT)
Dept: FAMILY MEDICINE CLINIC | Age: 58
End: 2023-05-11
Payer: MEDICARE

## 2023-05-11 VITALS
TEMPERATURE: 97.1 F | WEIGHT: 199 LBS | HEART RATE: 76 BPM | SYSTOLIC BLOOD PRESSURE: 116 MMHG | HEIGHT: 71 IN | DIASTOLIC BLOOD PRESSURE: 70 MMHG | BODY MASS INDEX: 27.86 KG/M2 | OXYGEN SATURATION: 98 %

## 2023-05-11 DIAGNOSIS — G71.00 MD (MUSCULAR DYSTROPHY) (HCC): ICD-10-CM

## 2023-05-11 DIAGNOSIS — R29.898 WEAKNESS OF BOTH LEGS: ICD-10-CM

## 2023-05-11 DIAGNOSIS — I10 PRIMARY HYPERTENSION: ICD-10-CM

## 2023-05-11 DIAGNOSIS — R26.9 ABNORMAL GAIT: Primary | ICD-10-CM

## 2023-05-11 DIAGNOSIS — S22.080S COMPRESSION FRACTURE OF T12 VERTEBRA, SEQUELA: ICD-10-CM

## 2023-05-11 DIAGNOSIS — R60.0 LOWER LEG EDEMA: ICD-10-CM

## 2023-05-11 PROCEDURE — 99214 OFFICE O/P EST MOD 30 MIN: CPT | Performed by: NURSE PRACTITIONER

## 2023-05-11 PROCEDURE — 3078F DIAST BP <80 MM HG: CPT | Performed by: NURSE PRACTITIONER

## 2023-05-11 PROCEDURE — G8427 DOCREV CUR MEDS BY ELIG CLIN: HCPCS | Performed by: NURSE PRACTITIONER

## 2023-05-11 PROCEDURE — 3017F COLORECTAL CA SCREEN DOC REV: CPT | Performed by: NURSE PRACTITIONER

## 2023-05-11 PROCEDURE — G8419 CALC BMI OUT NRM PARAM NOF/U: HCPCS | Performed by: NURSE PRACTITIONER

## 2023-05-11 PROCEDURE — 1036F TOBACCO NON-USER: CPT | Performed by: NURSE PRACTITIONER

## 2023-05-11 PROCEDURE — 3074F SYST BP LT 130 MM HG: CPT | Performed by: NURSE PRACTITIONER

## 2023-05-11 ASSESSMENT — ENCOUNTER SYMPTOMS
GASTROINTESTINAL NEGATIVE: 1
BACK PAIN: 1
RESPIRATORY NEGATIVE: 1
EYES NEGATIVE: 1

## 2023-05-11 NOTE — PROGRESS NOTES
formerly [facility] PHYSICIAN SERVICES  [facility]  [place]  [address] [address]  Dept: [phone]  Dept Fax: [phone]  Loc: [phone]    Edilia Lazo is a 60 y.o. male who presents today for his medical conditions/complaints as noted below. Edilia Lazo is c/o of Follow-up (HTN and edema)      Chief Complaint   Patient presents with    Follow-up     HTN and edema       HPI:     HPI  Patient presents today for follow up of HTN and edema. He states that edema is slightly improved. He has brought a log of his BP with him. He would like to discuss going back to physical therapy. He would like to go to [facility] in [place]. Patient does report that he is getting around better. He has lost approx 6 lbs since last visit. Patient has not had the echo yet that was ordered last visit.      Past Medical History:   Diagnosis Date    Anxiety     Arthritis     Chronic back pain     Depression     MD (muscular dystrophy) ([address]     Neuropathy 2020    Scoliosis         Past Surgical History:   Procedure Laterality Date    CHOLECYSTECTOMY, LAPAROSCOPIC N/A 2021    LAPAROSCOPIC CHOLECYSTECTOMY performed by Jayy Arriaga DO at [facility]  2016    Dr Layne Zapien, normal, 10 yr recall    COLONOSCOPY N/A 2021    Dr Eduin Lui hemorrhids, RANDOM COLON BX NEGATIVE, Sub Prep Fair, 3 year recall    FOOT SURGERY Left     UPPER GASTROINTESTINAL ENDOSCOPY N/A 2021    Dr Beth Downs, duodenal villous blunting otherwise normal EGD; serum celiac panel NEG for celiac sprue    UPPER GASTROINTESTINAL ENDOSCOPY N/A 08/10/2021    Dr Beth Downs- mild gastritis, 2 small duodenal ulcers and 1 large cratered 3-4cm in posterior duodenal bulb, NEG h pylori       Social History     Tobacco Use    Smoking status: Former     Packs/day: 0.00     Years: 30.00     Pack years: 0.00     Types: Cigarettes     Quit date: 2021     Years since quittin.7    Smokeless

## 2023-05-31 DIAGNOSIS — R60.0 LOWER LEG EDEMA: ICD-10-CM

## 2023-05-31 RX ORDER — FUROSEMIDE 40 MG/1
TABLET ORAL
Qty: 30 TABLET | Refills: 0 | Status: SHIPPED | OUTPATIENT
Start: 2023-05-31

## 2023-05-31 RX ORDER — POTASSIUM CHLORIDE 750 MG/1
TABLET, EXTENDED RELEASE ORAL
Qty: 30 TABLET | Refills: 0 | Status: SHIPPED | OUTPATIENT
Start: 2023-05-31

## 2023-05-31 NOTE — TELEPHONE ENCOUNTER
Zen Rojas called requesting a refill of the below medication which has been pended for you:     Requested Prescriptions     Pending Prescriptions Disp Refills    potassium chloride (KLOR-CON M) 10 MEQ extended release tablet [Pharmacy Med Name: Potassium Chloride Jennifer ER 10 MEQ Oral Tablet Extended Release] 30 tablet 0     Sig: Take 1 tablet by mouth once daily    furosemide (LASIX) 40 MG tablet [Pharmacy Med Name: Furosemide 40 MG Oral Tablet] 30 tablet 0     Sig: Take 1 tablet by mouth once daily       Last Appointment Date: 5/11/2023  Next Appointment Date: 6/8/2023    Allergies   Allergen Reactions    Shellfish-Derived Products Anaphylaxis    Norvasc [Amlodipine]      Lower leg edema

## 2023-06-02 DIAGNOSIS — M54.42 LEFT-SIDED LOW BACK PAIN WITH LEFT-SIDED SCIATICA, UNSPECIFIED CHRONICITY: ICD-10-CM

## 2023-06-06 RX ORDER — TIZANIDINE 4 MG/1
TABLET ORAL
Qty: 90 TABLET | Refills: 0 | Status: SHIPPED | OUTPATIENT
Start: 2023-06-06

## 2023-06-23 ENCOUNTER — HOSPITAL ENCOUNTER (OUTPATIENT)
Dept: MRI IMAGING | Age: 58
Discharge: HOME | End: 2023-06-23
Payer: MEDICARE

## 2023-06-23 ENCOUNTER — OFFICE VISIT (OUTPATIENT)
Dept: FAMILY MEDICINE CLINIC | Age: 58
End: 2023-06-23
Payer: MEDICARE

## 2023-06-23 VITALS
HEIGHT: 71 IN | DIASTOLIC BLOOD PRESSURE: 72 MMHG | BODY MASS INDEX: 27.72 KG/M2 | SYSTOLIC BLOOD PRESSURE: 120 MMHG | OXYGEN SATURATION: 96 % | WEIGHT: 198 LBS | TEMPERATURE: 97.2 F | HEART RATE: 74 BPM
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
## 2023-06-23 DIAGNOSIS — J01.90 ACUTE BACTERIAL SINUSITIS: ICD-10-CM

## 2023-06-23 DIAGNOSIS — B96.89 ACUTE BACTERIAL SINUSITIS: ICD-10-CM

## 2023-06-23 DIAGNOSIS — M54.42 LEFT-SIDED LOW BACK PAIN WITH LEFT-SIDED SCIATICA, UNSPECIFIED CHRONICITY: ICD-10-CM

## 2023-06-23 DIAGNOSIS — R60.9 EDEMA, UNSPECIFIED TYPE: ICD-10-CM

## 2023-06-23 DIAGNOSIS — R29.6 FALLS FREQUENTLY: ICD-10-CM

## 2023-06-23 DIAGNOSIS — R60.0 LOWER LEG EDEMA: ICD-10-CM

## 2023-06-23 DIAGNOSIS — G71.00 MD (MUSCULAR DYSTROPHY) (HCC): Primary | ICD-10-CM

## 2023-06-23 DIAGNOSIS — G71.00 MUSCULAR DYSTROPHY (HCC): ICD-10-CM

## 2023-06-23 PROCEDURE — 1036F TOBACCO NON-USER: CPT | Performed by: NURSE PRACTITIONER

## 2023-06-23 PROCEDURE — G8419 CALC BMI OUT NRM PARAM NOF/U: HCPCS | Performed by: NURSE PRACTITIONER

## 2023-06-23 PROCEDURE — 3017F COLORECTAL CA SCREEN DOC REV: CPT | Performed by: NURSE PRACTITIONER

## 2023-06-23 PROCEDURE — A9577 INJ MULTIHANCE: HCPCS | Performed by: NURSE PRACTITIONER

## 2023-06-23 PROCEDURE — 6360000004 HC RX CONTRAST MEDICATION: Performed by: NURSE PRACTITIONER

## 2023-06-23 PROCEDURE — 70553 MRI BRAIN STEM W/O & W/DYE: CPT

## 2023-06-23 PROCEDURE — G8427 DOCREV CUR MEDS BY ELIG CLIN: HCPCS | Performed by: NURSE PRACTITIONER

## 2023-06-23 PROCEDURE — 99214 OFFICE O/P EST MOD 30 MIN: CPT | Performed by: NURSE PRACTITIONER

## 2023-06-23 RX ORDER — FUROSEMIDE 40 MG/1
TABLET ORAL
Qty: 30 TABLET | Refills: 0 | Status: SHIPPED | OUTPATIENT
Start: 2023-06-23

## 2023-06-23 RX ORDER — AMOXICILLIN AND CLAVULANATE POTASSIUM 875; 125 MG/1; MG/1
1 TABLET, FILM COATED ORAL 2 TIMES DAILY
Qty: 20 TABLET | Refills: 0 | Status: SHIPPED | OUTPATIENT
Start: 2023-06-23 | End: 2023-07-03

## 2023-06-23 RX ORDER — TIZANIDINE 4 MG/1
TABLET ORAL
Qty: 90 TABLET | Refills: 0 | Status: SHIPPED | OUTPATIENT
Start: 2023-06-23

## 2023-06-23 RX ORDER — ESCITALOPRAM OXALATE 20 MG/1
TABLET ORAL
Qty: 30 TABLET | Refills: 0 | Status: SHIPPED | OUTPATIENT
Start: 2023-06-23

## 2023-06-23 RX ORDER — POTASSIUM CHLORIDE 750 MG/1
TABLET, EXTENDED RELEASE ORAL
Qty: 30 TABLET | Refills: 0 | Status: SHIPPED | OUTPATIENT
Start: 2023-06-23

## 2023-06-23 RX ADMIN — GADOBENATE DIMEGLUMINE 17 ML: 529 INJECTION, SOLUTION INTRAVENOUS at 14:27

## 2023-06-23 SDOH — ECONOMIC STABILITY: FOOD INSECURITY: WITHIN THE PAST 12 MONTHS, YOU WORRIED THAT YOUR FOOD WOULD RUN OUT BEFORE YOU GOT MONEY TO BUY MORE.: PATIENT DECLINED

## 2023-06-23 SDOH — ECONOMIC STABILITY: INCOME INSECURITY: HOW HARD IS IT FOR YOU TO PAY FOR THE VERY BASICS LIKE FOOD, HOUSING, MEDICAL CARE, AND HEATING?: PATIENT DECLINED

## 2023-06-23 SDOH — ECONOMIC STABILITY: HOUSING INSECURITY
IN THE LAST 12 MONTHS, WAS THERE A TIME WHEN YOU DID NOT HAVE A STEADY PLACE TO SLEEP OR SLEPT IN A SHELTER (INCLUDING NOW)?: PATIENT REFUSED

## 2023-06-23 SDOH — ECONOMIC STABILITY: FOOD INSECURITY: WITHIN THE PAST 12 MONTHS, THE FOOD YOU BOUGHT JUST DIDN'T LAST AND YOU DIDN'T HAVE MONEY TO GET MORE.: PATIENT DECLINED

## 2023-06-23 ASSESSMENT — ENCOUNTER SYMPTOMS
BLOOD IN STOOL: 0
SINUS PAIN: 1
CHOKING: 0
RHINORRHEA: 0
DIARRHEA: 0
WHEEZING: 0
CONSTIPATION: 0
COUGH: 1
SINUS PRESSURE: 1
EYE REDNESS: 0
EYE DISCHARGE: 0
BACK PAIN: 1
SORE THROAT: 0

## 2023-06-23 NOTE — TELEPHONE ENCOUNTER
Received fax from pharmacy requesting refill on pts medication(s). Pt was last seen in office on 6/23/2023  and has a follow up scheduled for Visit date not found. Will send request to  Merary Cano  for authorization.      Requested Prescriptions     Pending Prescriptions Disp Refills    escitalopram (LEXAPRO) 20 MG tablet [Pharmacy Med Name: Escitalopram Oxalate 20 MG Oral Tablet] 30 tablet 0     Sig: Take 1 tablet by mouth once daily    tiZANidine (ZANAFLEX) 4 MG tablet [Pharmacy Med Name: tiZANidine HCl 4 MG Oral Tablet] 90 tablet 0     Sig: TAKE 1 TABLET BY MOUTH THREE TIMES DAILY    furosemide (LASIX) 40 MG tablet [Pharmacy Med Name: Furosemide 40 MG Oral Tablet] 30 tablet 0     Sig: Take 1 tablet by mouth once daily    potassium chloride (KLOR-CON M) 10 MEQ extended release tablet [Pharmacy Med Name: Potassium Chloride Jennifer ER 10 MEQ Oral Tablet Extended Release] 30 tablet 0     Sig: Take 1 tablet by mouth once daily

## 2023-06-23 NOTE — PROGRESS NOTES
Gabriella Burgess (:  1965) is a 60 y.o. male,Established patient, here for evaluation of the following chief complaint(s):  Leg Swelling (Patient states he was bed ridden for a while and did not know what was wrong with him, patient was falling a lot in February. Patient states he feels like he has gone down hill the last few years and cannot get himself up. Patient states leg has been swollen (left leg) for a few months. Patient states that he fell yesterday. ) and Fall      ASSESSMENT/PLAN:    ICD-10-CM    1. MD (muscular dystrophy) ([facility]  G71.00       2. Edema, unspecified type  R60.9       3. Fall, sequela  W19. XXXS       4. Acute bacterial sinusitis  J01.90 amoxicillin-clavulanate (AUGMENTIN) 875-125 MG per tablet    B96.89           If blood in sputum continues and bloody nose has stopped rec Ct scan. MRI scheduled today. Will let gimra rodriguez know his echo wasn't scheduled. Return in about 1 month (around 2023) for with vero. cough. SUBJECTIVE/OBJECTIVE:  HPI  Leg Swelling (Patient states he was bed ridden for a while and did not know what was wrong with him, patient was falling a lot in February. Patient states he feels like he has gone down hill the last few years and cannot get himself up. Patient states leg has been swollen (left leg) for a few months. Patient states that he fell yesterday. ) and Fall. Has andres MRI scheduled today by neurology.  vero stopped norvasc and added lasix ordered echo  Echo of heart has not been scheduled. States swelling has been better since he has stopped norvasc. Also referral to PT on . He has been going to PT    Also having congestion. For the past 2 weeks. Has coughed and had blood in sputum 3 times in the past 2 weeks. Has had some bloody noses as well. Review of Systems   Constitutional:  Negative for appetite change and unexpected weight change. HENT:  Positive for congestion, sinus pressure and sinus pain.  Negative for ear pain,

## 2023-06-24 DIAGNOSIS — K29.90 GASTRITIS AND DUODENITIS: ICD-10-CM

## 2023-06-26 RX ORDER — PANTOPRAZOLE SODIUM 40 MG/1
TABLET, DELAYED RELEASE ORAL
Qty: 60 TABLET | Refills: 0 | Status: SHIPPED | OUTPATIENT
Start: 2023-06-26

## 2023-06-27 ENCOUNTER — HOSPITAL ENCOUNTER (OUTPATIENT)
Dept: NON INVASIVE DIAGNOSTICS | Age: 58
Discharge: HOME | End: 2023-06-27
Payer: MEDICARE

## 2023-06-27 ENCOUNTER — TELEPHONE (OUTPATIENT)
Dept: FAMILY MEDICINE CLINIC | Age: 58
End: 2023-06-27

## 2023-06-27 DIAGNOSIS — R60.0 LOWER LEG EDEMA: ICD-10-CM

## 2023-06-27 DIAGNOSIS — R05.3 CHRONIC COUGH: ICD-10-CM

## 2023-06-27 LAB
LV EF: 58 %
LVEF MODALITY: NORMAL

## 2023-06-27 PROCEDURE — 93306 TTE W/DOPPLER COMPLETE: CPT

## 2023-06-28 DIAGNOSIS — R60.0 LOWER LEG EDEMA: ICD-10-CM

## 2023-06-29 RX ORDER — FUROSEMIDE 40 MG/1
TABLET ORAL
Qty: 30 TABLET | Refills: 0 | OUTPATIENT
Start: 2023-06-29

## 2023-06-29 RX ORDER — POTASSIUM CHLORIDE 750 MG/1
TABLET, EXTENDED RELEASE ORAL
Qty: 30 TABLET | Refills: 0 | OUTPATIENT
Start: 2023-06-29

## 2023-07-05 DIAGNOSIS — B96.89 ACUTE BACTERIAL SINUSITIS: ICD-10-CM

## 2023-07-05 DIAGNOSIS — J01.90 ACUTE BACTERIAL SINUSITIS: ICD-10-CM

## 2023-07-05 RX ORDER — AMOXICILLIN AND CLAVULANATE POTASSIUM 875; 125 MG/1; MG/1
TABLET, FILM COATED ORAL
Qty: 20 TABLET | Refills: 0 | OUTPATIENT
Start: 2023-07-05

## 2023-07-20 ENCOUNTER — OFFICE VISIT (OUTPATIENT)
Dept: CARDIOLOGY CLINIC | Age: 58
End: 2023-07-20
Payer: MEDICARE

## 2023-07-20 ENCOUNTER — OFFICE VISIT (OUTPATIENT)
Dept: FAMILY MEDICINE CLINIC | Age: 58
End: 2023-07-20
Payer: MEDICARE

## 2023-07-20 VITALS
SYSTOLIC BLOOD PRESSURE: 118 MMHG | BODY MASS INDEX: 26.04 KG/M2 | DIASTOLIC BLOOD PRESSURE: 70 MMHG | HEIGHT: 71 IN | OXYGEN SATURATION: 97 % | TEMPERATURE: 98.7 F | HEART RATE: 68 BPM | WEIGHT: 186 LBS

## 2023-07-20 VITALS
WEIGHT: 184 LBS | BODY MASS INDEX: 25.76 KG/M2 | DIASTOLIC BLOOD PRESSURE: 84 MMHG | SYSTOLIC BLOOD PRESSURE: 122 MMHG | HEART RATE: 68 BPM | HEIGHT: 71 IN | OXYGEN SATURATION: 98 %

## 2023-07-20 DIAGNOSIS — I10 ESSENTIAL HYPERTENSION: ICD-10-CM

## 2023-07-20 DIAGNOSIS — G47.14 HYPERSOMNIA DUE TO MEDICAL CONDITION: ICD-10-CM

## 2023-07-20 DIAGNOSIS — I25.10 CORONARY ARTERY DISEASE INVOLVING NATIVE CORONARY ARTERY OF NATIVE HEART WITHOUT ANGINA PECTORIS: Primary | ICD-10-CM

## 2023-07-20 DIAGNOSIS — E78.5 DYSLIPIDEMIA: ICD-10-CM

## 2023-07-20 DIAGNOSIS — G71.00 MD (MUSCULAR DYSTROPHY) (HCC): Primary | ICD-10-CM

## 2023-07-20 DIAGNOSIS — R00.0 TACHYCARDIA: ICD-10-CM

## 2023-07-20 PROCEDURE — G8419 CALC BMI OUT NRM PARAM NOF/U: HCPCS | Performed by: NURSE PRACTITIONER

## 2023-07-20 PROCEDURE — 3017F COLORECTAL CA SCREEN DOC REV: CPT | Performed by: NURSE PRACTITIONER

## 2023-07-20 PROCEDURE — 1036F TOBACCO NON-USER: CPT | Performed by: NURSE PRACTITIONER

## 2023-07-20 PROCEDURE — 99214 OFFICE O/P EST MOD 30 MIN: CPT | Performed by: NURSE PRACTITIONER

## 2023-07-20 PROCEDURE — G8427 DOCREV CUR MEDS BY ELIG CLIN: HCPCS | Performed by: NURSE PRACTITIONER

## 2023-07-20 PROCEDURE — 3074F SYST BP LT 130 MM HG: CPT | Performed by: NURSE PRACTITIONER

## 2023-07-20 PROCEDURE — 93242 EXT ECG>48HR<7D RECORDING: CPT | Performed by: NURSE PRACTITIONER

## 2023-07-20 PROCEDURE — 3078F DIAST BP <80 MM HG: CPT | Performed by: NURSE PRACTITIONER

## 2023-07-20 ASSESSMENT — ENCOUNTER SYMPTOMS
SORE THROAT: 0
EYES NEGATIVE: 1
SHORTNESS OF BREATH: 1
GASTROINTESTINAL NEGATIVE: 1
RESPIRATORY NEGATIVE: 1
COUGH: 0
CHEST TIGHTNESS: 0
WHEEZING: 0

## 2023-07-20 NOTE — PROGRESS NOTES
[facility]   Established Patient Office Visit  [address] [place] [address] Drive [address] [place] [address]  [phone]        OFFICE VISIT:  2023    Marisol Reno - : 1965    Reason For Visit:  Ramirez Ott is a 60 y.o. male who is here for No chief complaint on file. 1. Coronary artery disease involving native coronary artery of native heart without angina pectoris    2. Dyslipidemia    3. Tachycardia    4. Essential hypertension        Patient underwent a dobutamine stress echocardiogram which was positive clinically and by EKG. He did undergo cardiac catheterization on 2023. Single-vessel disease with focal mid to distal LAD stenosis and a large   LAD vessel. Tortuous coronary arteries. Normal LV systolic function with LVH noted. Hypertensive heart disease. Successful PCI to mid to distal LAD (2.5 x 14 mm resolute integrity)   utilizing drug-eluting stent. Recommendations      Medical management. Plavix uninterrupted for 6 months. Hypertensive management. Signatures      ----------------------------------------------------------------   Electronically signed by Carmel Shukla MD(Performing Physician) on   2023 20:07   ----------------------------------------------------------------      Patient presents to clinic today for follow-up. Patient had several no show appts prior to today. Patient states he has been feeling a lot of palpitations and tachycardia times a week. He does get short of breath with the tachycardia. There is no lightheadedness dizziness or syncope. Primary care provider is working up his shortness of breath that he might also have some sleep apnea. Those tests have been ordered by PCP.         Dale Sanchez is a 60 y.o. male with the [facility]:    Patient Active Problem List    Diagnosis Date Noted    Claudication [facility]) 2023    Poor memory 2022    Hypersomnia due to medical condition 2023

## 2023-07-20 NOTE — PROGRESS NOTES
Formerly [facility] PHYSICIAN SERVICES  [facility] [place]  [address] [address]  Dept: [phone]  Dept Fax: [phone]  Loc: [phone]    Hilario Magallon is a 60 y.o. male who presents today for his medical conditions/complaints as noted below. Hilario Magallon is c/o of Follow-up Chronic Condition      Chief Complaint   Patient presents with    Follow-up Chronic Condition       HPI:     HPI  Patient presents today for routine follow up of chronic conditions. He has appt with pain management and cardio today as well. Patient is doing PT as well. He does report that this is helping some. He is feeling tired and fatigues all the time. He denies snoring, but just feels like he does not every get enough sleep. He also reports waking all all through the night due to the pain and discomfort. Patient is seeing pain management today. He reports that he has had some issues with not having enough meds per month.       Past Medical History:   Diagnosis Date    Anxiety     Arthritis     Chronic back pain     Depression     MD (muscular dystrophy) ([address] [facility])     Neuropathy 12/02/2020    Scoliosis         Past Surgical History:   Procedure Laterality Date    CHOLECYSTECTOMY, LAPAROSCOPIC N/A 04/27/2021    LAPAROSCOPIC CHOLECYSTECTOMY performed by Lacey Lemus DO at [address] [facility]  01/08/2016    Dr Marcela Fish, normal, 10 yr recall    COLONOSCOPY N/A 06/30/2021    Dr Roxanne Griffith hemorrhids, RANDOM COLON BX NEGATIVE, Sub Prep Fair, 3 year recall    FOOT SURGERY Left     UPPER GASTROINTESTINAL ENDOSCOPY N/A 06/30/2021    Dr Jhonny Espitia, duodenal villous blunting otherwise normal EGD; serum celiac panel NEG for celiac sprue    UPPER GASTROINTESTINAL ENDOSCOPY N/A 08/10/2021    Dr Jhonny Espitia- mild gastritis, 2 small duodenal ulcers and 1 large cratered 3-4cm in posterior duodenal bulb, NEG h pylori       Social History     Tobacco Use    Smoking status: Former     Packs/day: 0.00     Years:

## 2023-07-25 ENCOUNTER — TELEPHONE (OUTPATIENT)
Dept: PULMONOLOGY | Age: 58
End: 2023-07-25

## 2023-07-25 NOTE — TELEPHONE ENCOUNTER
Spoke with patient reminding him of his appointment. While going through his prescreening questionnaire, he had several adverse answers that made it so we had to reschedule him. He stated his understanding and we were able to reschedule him for 8/31/23 @ 1 PM. I told him that I would also be sending out a [employer] reminder and be calling before his appointment.

## 2023-07-31 NOTE — TELEPHONE ENCOUNTER
Izabel Tong called requesting a refill of the below medication which has been pended for you:     Requested Prescriptions     Pending Prescriptions Disp Refills    escitalopram (LEXAPRO) 20 MG tablet [Pharmacy Med Name: Escitalopram Oxalate 20 MG Oral Tablet] 30 tablet 0     Sig: Take 1 tablet by mouth once daily       Last Appointment Date: Visit date not found  Next Appointment Date: 9/20/2023    Allergies   Allergen Reactions    Shellfish-Derived Products Anaphylaxis    Norvasc [Amlodipine]      Lower leg edema

## 2023-08-01 DIAGNOSIS — R60.0 LOWER LEG EDEMA: ICD-10-CM

## 2023-08-01 RX ORDER — FUROSEMIDE 40 MG/1
TABLET ORAL
Qty: 30 TABLET | Refills: 0 | Status: SHIPPED | OUTPATIENT
Start: 2023-08-01

## 2023-08-01 RX ORDER — ESCITALOPRAM OXALATE 20 MG/1
TABLET ORAL
Qty: 30 TABLET | Refills: 0 | Status: SHIPPED | OUTPATIENT
Start: 2023-08-01

## 2023-08-01 RX ORDER — POTASSIUM CHLORIDE 750 MG/1
TABLET, EXTENDED RELEASE ORAL
Qty: 30 TABLET | Refills: 0 | Status: SHIPPED | OUTPATIENT
Start: 2023-08-01

## 2023-08-01 NOTE — TELEPHONE ENCOUNTER
Izabel Tong called requesting a refill of the below medication which has been pended for you:     Requested Prescriptions     Pending Prescriptions Disp Refills    potassium chloride (KLOR-CON M) 10 MEQ extended release tablet [Pharmacy Med Name: Potassium Chloride Jennifer ER 10 MEQ Oral Tablet Extended Release] 30 tablet 0     Sig: Take 1 tablet by mouth once daily    furosemide (LASIX) 40 MG tablet [Pharmacy Med Name: Furosemide 40 MG Oral Tablet] 30 tablet 0     Sig: Take 1 tablet by mouth once daily       Last Appointment Date: Visit date not found  Next Appointment Date: 9/20/2023    Allergies   Allergen Reactions    Shellfish-Derived Products Anaphylaxis    Norvasc [Amlodipine]      Lower leg edema

## 2023-08-11 DIAGNOSIS — M54.32 LEFT SCIATIC NERVE PAIN: ICD-10-CM

## 2023-08-11 DIAGNOSIS — M54.50 LUMBAR PAIN: ICD-10-CM

## 2023-08-11 DIAGNOSIS — M54.42 LEFT-SIDED LOW BACK PAIN WITH LEFT-SIDED SCIATICA, UNSPECIFIED CHRONICITY: ICD-10-CM

## 2023-08-11 DIAGNOSIS — K29.90 GASTRITIS AND DUODENITIS: ICD-10-CM

## 2023-08-11 DIAGNOSIS — G71.00 MD (MUSCULAR DYSTROPHY) (HCC): ICD-10-CM

## 2023-08-14 NOTE — TELEPHONE ENCOUNTER
Received fax from pharmacy requesting refill on pts medication(s). Pt was last seen in office on 7/20/2023  and has a follow up scheduled for 9/20/2023. Will send request to  Sharad Pierre  for patient.      Requested Prescriptions     Pending Prescriptions Disp Refills    tiZANidine (ZANAFLEX) 4 MG tablet [Pharmacy Med Name: tiZANidine HCl 4 MG Oral Tablet] 90 tablet 3     Sig: TAKE 1 TABLET BY MOUTH THREE TIMES DAILY

## 2023-08-15 RX ORDER — TIZANIDINE 4 MG/1
TABLET ORAL
Qty: 90 TABLET | Refills: 3 | Status: SHIPPED | OUTPATIENT
Start: 2023-08-15

## 2023-08-15 RX ORDER — PREGABALIN 150 MG/1
CAPSULE ORAL
Qty: 90 CAPSULE | Refills: 2 | Status: SHIPPED | OUTPATIENT
Start: 2023-08-15 | End: 2023-11-13

## 2023-08-15 RX ORDER — PANTOPRAZOLE SODIUM 40 MG/1
TABLET, DELAYED RELEASE ORAL
Qty: 60 TABLET | Refills: 0 | Status: SHIPPED | OUTPATIENT
Start: 2023-08-15

## 2023-09-06 DIAGNOSIS — K74.60 CIRRHOSIS OF LIVER WITHOUT ASCITES, UNSPECIFIED HEPATIC CIRRHOSIS TYPE (HCC): Primary | ICD-10-CM

## 2023-09-11 ENCOUNTER — TELEPHONE (OUTPATIENT)
Dept: PULMONOLOGY | Age: 58
End: 2023-09-11

## 2023-09-11 NOTE — TELEPHONE ENCOUNTER
LVM reminding patient of appointment and asking that he call back to go over pre-screening questionnaire.

## 2023-09-12 ENCOUNTER — TELEPHONE (OUTPATIENT)
Dept: CARDIOLOGY CLINIC | Age: 58
End: 2023-09-12

## 2023-09-14 DIAGNOSIS — F41.9 ANXIETY: ICD-10-CM

## 2023-09-14 NOTE — TELEPHONE ENCOUNTER
Received fax from pharmacy requesting refill on pts medication(s). Pt was last seen in office on 7/20/2023  and has a follow up scheduled for 9/20/2023. Will send request to  Dale Noriega  for patient.      Requested Prescriptions     Pending Prescriptions Disp Refills    hydrOXYzine HCl (ATARAX) 25 MG tablet [Pharmacy Med Name: hydrOXYzine HCl 25 MG Oral Tablet] 90 tablet 3     Sig: Take 1 tablet by mouth nightly

## 2023-09-15 RX ORDER — HYDROXYZINE HYDROCHLORIDE 25 MG/1
25 TABLET, FILM COATED ORAL NIGHTLY
Qty: 90 TABLET | Refills: 3 | Status: SHIPPED | OUTPATIENT
Start: 2023-09-15 | End: 2023-10-15

## 2023-09-19 ENCOUNTER — TELEPHONE (OUTPATIENT)
Dept: CARDIOLOGY CLINIC | Age: 58
End: 2023-09-19

## 2023-09-19 NOTE — TELEPHONE ENCOUNTER
I checked on the web site and there is still not a report to print, will check again in the morning.

## 2023-09-19 NOTE — TELEPHONE ENCOUNTER
WENDY called to report abnormal findings for the 7/20/2023 8:50PM of Rapid Afib- 181 BPM x 60 seconds. Report will be on website to print in about 10 minutes. I did inquire why we were just getting this finding and she stated it could have been due to patient not mailing monitor in.

## 2023-09-20 DIAGNOSIS — R00.0 TACHYCARDIA: Primary | ICD-10-CM

## 2023-09-20 PROCEDURE — 93244 EXT ECG>48HR<7D REV&INTERPJ: CPT | Performed by: NURSE PRACTITIONER

## 2023-09-20 NOTE — TELEPHONE ENCOUNTER
Due to patient's new onset of AFIB. Please schedule patient with me today or tomorrow to start Eliquis.

## 2023-09-26 ENCOUNTER — TELEPHONE (OUTPATIENT)
Dept: GASTROENTEROLOGY | Age: 58
End: 2023-09-26

## 2023-09-26 NOTE — TELEPHONE ENCOUNTER
09- Called and notified patient that he will need his liver us and labs done prior to his office apt with [facility].     Rescheduled office visit from  til 10-    Transferred to central scheduling to get liver us scheduled

## 2023-09-27 ENCOUNTER — OFFICE VISIT (OUTPATIENT)
Dept: FAMILY MEDICINE CLINIC | Age: 58
End: 2023-09-27
Payer: MEDICARE

## 2023-09-27 VITALS
BODY MASS INDEX: 26.88 KG/M2 | OXYGEN SATURATION: 99 % | TEMPERATURE: 97 F | SYSTOLIC BLOOD PRESSURE: 136 MMHG | HEART RATE: 60 BPM | HEIGHT: 71 IN | WEIGHT: 192 LBS | DIASTOLIC BLOOD PRESSURE: 82 MMHG

## 2023-09-27 DIAGNOSIS — G71.00 MD (MUSCULAR DYSTROPHY) (HCC): Primary | ICD-10-CM

## 2023-09-27 DIAGNOSIS — R06.02 SOB (SHORTNESS OF BREATH) ON EXERTION: ICD-10-CM

## 2023-09-27 DIAGNOSIS — F33.41 RECURRENT MAJOR DEPRESSIVE DISORDER, IN PARTIAL REMISSION (HCC): ICD-10-CM

## 2023-09-27 PROCEDURE — 1036F TOBACCO NON-USER: CPT | Performed by: NURSE PRACTITIONER

## 2023-09-27 PROCEDURE — G8419 CALC BMI OUT NRM PARAM NOF/U: HCPCS | Performed by: NURSE PRACTITIONER

## 2023-09-27 PROCEDURE — 3017F COLORECTAL CA SCREEN DOC REV: CPT | Performed by: NURSE PRACTITIONER

## 2023-09-27 PROCEDURE — G8427 DOCREV CUR MEDS BY ELIG CLIN: HCPCS | Performed by: NURSE PRACTITIONER

## 2023-09-27 PROCEDURE — 99214 OFFICE O/P EST MOD 30 MIN: CPT | Performed by: NURSE PRACTITIONER

## 2023-09-27 RX ORDER — ESCITALOPRAM OXALATE 20 MG/1
20 TABLET ORAL DAILY
Qty: 30 TABLET | Refills: 5 | Status: SHIPPED | OUTPATIENT
Start: 2023-09-27

## 2023-09-29 ASSESSMENT — ENCOUNTER SYMPTOMS
SHORTNESS OF BREATH: 1
EYES NEGATIVE: 1
BACK PAIN: 1
GASTROINTESTINAL NEGATIVE: 1

## 2023-10-17 ENCOUNTER — OFFICE VISIT (OUTPATIENT)
Dept: CARDIOLOGY CLINIC | Age: 58
End: 2023-10-17
Payer: MEDICARE

## 2023-10-17 VITALS
BODY MASS INDEX: 25.9 KG/M2 | WEIGHT: 185 LBS | HEIGHT: 71 IN | HEART RATE: 77 BPM | DIASTOLIC BLOOD PRESSURE: 88 MMHG | SYSTOLIC BLOOD PRESSURE: 138 MMHG | OXYGEN SATURATION: 96 %

## 2023-10-17 DIAGNOSIS — I10 ESSENTIAL HYPERTENSION: ICD-10-CM

## 2023-10-17 DIAGNOSIS — I25.10 CORONARY ARTERY DISEASE INVOLVING NATIVE CORONARY ARTERY OF NATIVE HEART WITHOUT ANGINA PECTORIS: Primary | ICD-10-CM

## 2023-10-17 DIAGNOSIS — E78.5 DYSLIPIDEMIA: ICD-10-CM

## 2023-10-17 PROCEDURE — 3075F SYST BP GE 130 - 139MM HG: CPT | Performed by: NURSE PRACTITIONER

## 2023-10-17 PROCEDURE — G8484 FLU IMMUNIZE NO ADMIN: HCPCS | Performed by: NURSE PRACTITIONER

## 2023-10-17 PROCEDURE — 3017F COLORECTAL CA SCREEN DOC REV: CPT | Performed by: NURSE PRACTITIONER

## 2023-10-17 PROCEDURE — G8427 DOCREV CUR MEDS BY ELIG CLIN: HCPCS | Performed by: NURSE PRACTITIONER

## 2023-10-17 PROCEDURE — G8419 CALC BMI OUT NRM PARAM NOF/U: HCPCS | Performed by: NURSE PRACTITIONER

## 2023-10-17 PROCEDURE — 1036F TOBACCO NON-USER: CPT | Performed by: NURSE PRACTITIONER

## 2023-10-17 PROCEDURE — 3079F DIAST BP 80-89 MM HG: CPT | Performed by: NURSE PRACTITIONER

## 2023-10-17 PROCEDURE — 99214 OFFICE O/P EST MOD 30 MIN: CPT | Performed by: NURSE PRACTITIONER

## 2023-10-17 RX ORDER — ASPIRIN 325 MG
TABLET, DELAYED RELEASE (ENTERIC COATED) ORAL
Qty: 90 TABLET | Refills: 3 | Status: SHIPPED | OUTPATIENT
Start: 2023-10-17

## 2023-10-17 ASSESSMENT — ENCOUNTER SYMPTOMS
COUGH: 0
SORE THROAT: 0
WHEEZING: 0
CHEST TIGHTNESS: 0
SHORTNESS OF BREATH: 0

## 2023-10-19 ENCOUNTER — HOSPITAL ENCOUNTER (OUTPATIENT)
Dept: ULTRASOUND IMAGING | Age: 58
Discharge: HOME | End: 2023-10-19
Payer: MEDICARE

## 2023-10-19 DIAGNOSIS — K74.60 CIRRHOSIS OF LIVER WITHOUT ASCITES, UNSPECIFIED HEPATIC CIRRHOSIS TYPE (HCC): ICD-10-CM

## 2023-10-19 PROCEDURE — 76705 ECHO EXAM OF ABDOMEN: CPT

## 2023-10-26 ENCOUNTER — OFFICE VISIT (OUTPATIENT)
Dept: GASTROENTEROLOGY | Age: 58
End: 2023-10-26
Payer: MEDICARE

## 2023-10-26 VITALS
BODY MASS INDEX: 26.46 KG/M2 | HEIGHT: 71 IN | WEIGHT: 189 LBS | SYSTOLIC BLOOD PRESSURE: 135 MMHG | OXYGEN SATURATION: 98 % | DIASTOLIC BLOOD PRESSURE: 80 MMHG | HEART RATE: 69 BPM

## 2023-10-26 DIAGNOSIS — N20.0 KIDNEY STONE: ICD-10-CM

## 2023-10-26 DIAGNOSIS — K74.60 CIRRHOSIS OF LIVER WITHOUT ASCITES, UNSPECIFIED HEPATIC CIRRHOSIS TYPE (HCC): Primary | ICD-10-CM

## 2023-10-26 DIAGNOSIS — K74.60 CIRRHOSIS OF LIVER WITHOUT ASCITES, UNSPECIFIED HEPATIC CIRRHOSIS TYPE (HCC): ICD-10-CM

## 2023-10-26 LAB
AFP SERPL-MCNC: 1.8 NG/ML (ref 0–8.3)
ALBUMIN SERPL-MCNC: 4.1 G/DL (ref 3.5–5.2)
ALP SERPL-CCNC: 141 U/L (ref 40–130)
ALT SERPL-CCNC: 11 U/L (ref 5–41)
ANION GAP SERPL CALCULATED.3IONS-SCNC: 9 MMOL/L (ref 7–19)
AST SERPL-CCNC: 17 U/L (ref 5–40)
BILIRUB SERPL-MCNC: 0.3 MG/DL (ref 0.2–1.2)
BUN SERPL-MCNC: 13 MG/DL (ref 6–20)
CALCIUM SERPL-MCNC: 8.9 MG/DL (ref 8.6–10)
CHLORIDE SERPL-SCNC: 100 MMOL/L (ref 98–111)
CO2 SERPL-SCNC: 32 MMOL/L (ref 22–29)
CREAT SERPL-MCNC: 0.5 MG/DL (ref 0.5–1.2)
ERYTHROCYTE [DISTWIDTH] IN BLOOD BY AUTOMATED COUNT: 12.5 % (ref 11.5–14.5)
GLUCOSE SERPL-MCNC: 91 MG/DL (ref 74–109)
HCT VFR BLD AUTO: 38.7 % (ref 42–52)
HGB BLD-MCNC: 12.1 G/DL (ref 14–18)
INR PPP: 1.07 (ref 0.88–1.18)
MCH RBC QN AUTO: 30.3 PG (ref 27–31)
MCHC RBC AUTO-ENTMCNC: 31.3 G/DL (ref 33–37)
MCV RBC AUTO: 96.8 FL (ref 80–94)
PLATELET # BLD AUTO: 384 K/UL (ref 130–400)
PMV BLD AUTO: 9.5 FL (ref 9.4–12.4)
POTASSIUM SERPL-SCNC: 3.5 MMOL/L (ref 3.5–5)
PROT SERPL-MCNC: 7.5 G/DL (ref 6.6–8.7)
PROTHROMBIN TIME: 13.6 SEC (ref 12–14.6)
RBC # BLD AUTO: 4 M/UL (ref 4.7–6.1)
SODIUM SERPL-SCNC: 141 MMOL/L (ref 136–145)
WBC # BLD AUTO: 6 K/UL (ref 4.8–10.8)

## 2023-10-26 PROCEDURE — 3017F COLORECTAL CA SCREEN DOC REV: CPT | Performed by: NURSE PRACTITIONER

## 2023-10-26 PROCEDURE — 1036F TOBACCO NON-USER: CPT | Performed by: NURSE PRACTITIONER

## 2023-10-26 PROCEDURE — G8484 FLU IMMUNIZE NO ADMIN: HCPCS | Performed by: NURSE PRACTITIONER

## 2023-10-26 PROCEDURE — G8419 CALC BMI OUT NRM PARAM NOF/U: HCPCS | Performed by: NURSE PRACTITIONER

## 2023-10-26 PROCEDURE — 99213 OFFICE O/P EST LOW 20 MIN: CPT | Performed by: NURSE PRACTITIONER

## 2023-10-26 PROCEDURE — G8427 DOCREV CUR MEDS BY ELIG CLIN: HCPCS | Performed by: NURSE PRACTITIONER

## 2023-10-26 ASSESSMENT — ENCOUNTER SYMPTOMS
ANAL BLEEDING: 0
DIARRHEA: 0
COUGH: 0
CHOKING: 0
TROUBLE SWALLOWING: 0
ABDOMINAL DISTENTION: 0
RECTAL PAIN: 0
SHORTNESS OF BREATH: 0
NAUSEA: 0
BLOOD IN STOOL: 0
ABDOMINAL PAIN: 0
CONSTIPATION: 0
VOMITING: 0

## 2023-10-26 NOTE — PROGRESS NOTES
Visits Requested:   1     Medications  No orders of the defined types were placed in this encounter.         Patient History:     Past Medical History:   Diagnosis Date    Anxiety     Arthritis     Chronic back pain     Depression     MD (muscular dystrophy) ([address] [facility])     Neuropathy 2020    Scoliosis        Past Surgical History:   Procedure Laterality Date    CHOLECYSTECTOMY, LAPAROSCOPIC N/A 2021    LAPAROSCOPIC CHOLECYSTECTOMY performed by Ko Blanco DO at [address] [facility]  2016    Dr Radha Hauser, normal, 10 yr recall    COLONOSCOPY N/A 2021    Dr Gio Clark hemorrhids, RANDOM COLON BX NEGATIVE, Sub Prep Fair, 3 year recall    FOOT SURGERY Left     UPPER GASTROINTESTINAL ENDOSCOPY N/A 2021    Dr Asia Luna, duodenal villous blunting otherwise normal EGD; serum celiac panel NEG for celiac sprue    UPPER GASTROINTESTINAL ENDOSCOPY N/A 08/10/2021    Dr Asia Luna- mild gastritis, 2 small duodenal ulcers and 1 large cratered 3-4cm in posterior duodenal bulb, NEG h pylori       Family History   Problem Relation Age of Onset    Diabetes Mother     Liver Disease Brother     Alcohol Abuse Brother     Colon Cancer Neg Hx     Colon Polyps Neg Hx     Esophageal Cancer Neg Hx     Liver Cancer Neg Hx     Rectal Cancer Neg Hx     Stomach Cancer Neg Hx        Social History     Socioeconomic History    Marital status: Single   Tobacco Use    Smoking status: Former     Packs/day: 0.00     Years: 30.00     Additional pack years: 0.00     Total pack years: 0.00     Types: Cigarettes     Quit date: 2021     Years since quittin.2    Smokeless tobacco: Current    Tobacco comments:     Nicotine pouch   Vaping Use    Vaping Use: Never used   Substance and Sexual Activity    Alcohol use: Yes     Comment: occas    Drug use: No     Social Determinants of Health     Financial Resource Strain: Unknown (2023)    Overall Financial Resource Strain (CARDIA)     Difficulty of Paying

## 2023-10-27 DIAGNOSIS — N20.0 KIDNEY STONE: Primary | ICD-10-CM

## 2023-11-02 DIAGNOSIS — K29.90 GASTRITIS AND DUODENITIS: ICD-10-CM

## 2023-11-02 RX ORDER — PANTOPRAZOLE SODIUM 40 MG/1
TABLET, DELAYED RELEASE ORAL
Qty: 60 TABLET | Refills: 0 | Status: SHIPPED | OUTPATIENT
Start: 2023-11-02

## 2023-11-08 ENCOUNTER — OFFICE VISIT (OUTPATIENT)
Dept: FAMILY MEDICINE CLINIC | Age: 58
End: 2023-11-08
Payer: MEDICARE

## 2023-11-08 VITALS
HEART RATE: 80 BPM | HEIGHT: 71 IN | WEIGHT: 187 LBS | SYSTOLIC BLOOD PRESSURE: 118 MMHG | TEMPERATURE: 97.8 F | DIASTOLIC BLOOD PRESSURE: 80 MMHG | BODY MASS INDEX: 26.18 KG/M2 | OXYGEN SATURATION: 98 %

## 2023-11-08 DIAGNOSIS — G71.00 MD (MUSCULAR DYSTROPHY) (HCC): Primary | ICD-10-CM

## 2023-11-08 DIAGNOSIS — F41.9 ANXIETY: ICD-10-CM

## 2023-11-08 DIAGNOSIS — F33.41 RECURRENT MAJOR DEPRESSIVE DISORDER, IN PARTIAL REMISSION (HCC): ICD-10-CM

## 2023-11-08 PROCEDURE — 99214 OFFICE O/P EST MOD 30 MIN: CPT | Performed by: NURSE PRACTITIONER

## 2023-11-08 PROCEDURE — 3017F COLORECTAL CA SCREEN DOC REV: CPT | Performed by: NURSE PRACTITIONER

## 2023-11-08 PROCEDURE — G8419 CALC BMI OUT NRM PARAM NOF/U: HCPCS | Performed by: NURSE PRACTITIONER

## 2023-11-08 PROCEDURE — 4004F PT TOBACCO SCREEN RCVD TLK: CPT | Performed by: NURSE PRACTITIONER

## 2023-11-08 PROCEDURE — G8427 DOCREV CUR MEDS BY ELIG CLIN: HCPCS | Performed by: NURSE PRACTITIONER

## 2023-11-08 PROCEDURE — G8484 FLU IMMUNIZE NO ADMIN: HCPCS | Performed by: NURSE PRACTITIONER

## 2023-11-08 RX ORDER — LAMOTRIGINE 25 MG/1
TABLET ORAL
Qty: 49 TABLET | Refills: 0 | Status: SHIPPED | OUTPATIENT
Start: 2023-11-08 | End: 2023-12-06

## 2023-11-08 ASSESSMENT — ENCOUNTER SYMPTOMS
GASTROINTESTINAL NEGATIVE: 1
BACK PAIN: 1
RESPIRATORY NEGATIVE: 1
EYES NEGATIVE: 1

## 2023-11-08 NOTE — PROGRESS NOTES
Formerly [facility] PHYSICIAN SERVICES  [facility] [place]  [address] [address]  Dept: [phone]  Dept Fax: [phone]  Loc: [phone]    Jacob Sim is a 60 y.o. male who presents today for his medical conditions/complaints as noted below. Jacob Sim is c/o of Follow-up (depression)      Chief Complaint   Patient presents with    Follow-up     depression       HPI:     HPI  Patient presents today for follow up of depression. He states that he does not feel that lexapro is working at all for him. He would like to discuss pain medication. He states that he is seeing pain management. He states that he is down to 1 percocet 10mg daily and they are weaning him off. He states that this is not working for him. He states that he was taking 4 percocet 10mg daily before they began weaning him off. Patient has not had follow up with neurology since the spring.      Past Medical History:   Diagnosis Date    Anxiety     Arthritis     Chronic back pain     Depression     MD (muscular dystrophy) ([address] [facility])     Neuropathy 12/02/2020    Scoliosis         Past Surgical History:   Procedure Laterality Date    CHOLECYSTECTOMY, LAPAROSCOPIC N/A 04/27/2021    LAPAROSCOPIC CHOLECYSTECTOMY performed by Cali Kebede DO at [address] [facility]  01/08/2016    Dr Ashley Carty, normal, 10 yr recall    COLONOSCOPY N/A 06/30/2021    Dr Tahmina Duncan hemorrhids, RANDOM COLON BX NEGATIVE, Sub Prep Fair, 3 year recall    FOOT SURGERY Left     UPPER GASTROINTESTINAL ENDOSCOPY N/A 06/30/2021    Dr Ashleigh Singh, duodenal villous blunting otherwise normal EGD; serum celiac panel NEG for celiac sprue    UPPER GASTROINTESTINAL ENDOSCOPY N/A 08/10/2021    Dr Ashleigh Singh- mild gastritis, 2 small duodenal ulcers and 1 large cratered 3-4cm in posterior duodenal bulb, NEG h pylori       Social History     Tobacco Use    Smoking status: Former     Packs/day: 0.00     Years: 30.00     Additional pack years: 0.00

## 2023-11-10 ENCOUNTER — TELEPHONE (OUTPATIENT)
Dept: CARDIOLOGY CLINIC | Age: 58
End: 2023-11-10

## 2023-11-10 NOTE — TELEPHONE ENCOUNTER
Doc ANTHONY Rowland - NP  You 3 minutes ago (9:30 AM)       Patient had drug-eluting stent 2/28/2023. Be best to wait the year before coming off of the Plavix. However if it is necessary to do the endoscopy prior to, would be okay to hold since he has been on Plavix for at least 6 months.

## 2023-11-10 NOTE — TELEPHONE ENCOUNTER
Date: 11-20-23    Cardiologist: Dr. Bee Tavares    Procedure: Endoscopy     Surgeon: Dr. Cierra Galan     Last Office Visit: 10-17-23    Reason for office visit and medical concerns addressed at this office visit: CAD, HTN, dyslipidemia, musclar dystrophy, Hep C    Testing Performed and Date of Service:  Abbi Gonzalez 7-20-23  Echo 6-27-23  LEONARD 2-28-23    Does the patient have a stent? If so, what type? LEONARD 2-28-23    Current Medications: lamictal, protonix, ASA, leapro, lyrica, zanaflex, K+, lasix, percocet, plavix, diovan, lipitor, nizoral, voltaren     Is the patient currently taking an anticoagulant? If so, what is the diagnosis the patient has been given to warrant the need for the anticoagulant?  Plavix and ASA     Additional Notes: Med hold on Plavix and ASA

## 2023-11-15 ENCOUNTER — HOSPITAL ENCOUNTER (OUTPATIENT)
Dept: GENERAL RADIOLOGY | Age: 58
Discharge: HOME | End: 2023-11-15
Payer: MEDICARE

## 2023-11-15 ENCOUNTER — TELEPHONE (OUTPATIENT)
Dept: GASTROENTEROLOGY | Age: 58
End: 2023-11-15

## 2023-11-15 ENCOUNTER — OFFICE VISIT (OUTPATIENT)
Dept: UROLOGY | Age: 58
End: 2023-11-15

## 2023-11-15 VITALS — BODY MASS INDEX: 26.96 KG/M2 | HEIGHT: 71 IN | WEIGHT: 192.6 LBS | TEMPERATURE: 97.9 F

## 2023-11-15 DIAGNOSIS — N20.0 KIDNEY STONE: ICD-10-CM

## 2023-11-15 DIAGNOSIS — N20.0 RIGHT NEPHROLITHIASIS: Primary | ICD-10-CM

## 2023-11-15 LAB
APPEARANCE FLUID: CLEAR
BILIRUBIN, POC: NORMAL
BLOOD URINE, POC: NORMAL
CLARITY, POC: CLEAR
COLOR, POC: YELLOW
GLUCOSE URINE, POC: NORMAL
KETONES, POC: NORMAL
LEUKOCYTE EST, POC: NORMAL
NITRITE, POC: NORMAL
PH, POC: 6.5
PROTEIN, POC: NORMAL
SPECIFIC GRAVITY, POC: 1
UROBILINOGEN, POC: 0.2

## 2023-11-15 PROCEDURE — 74176 CT ABD & PELVIS W/O CONTRAST: CPT

## 2023-11-15 NOTE — PROGRESS NOTES
Abdominal:      General: There is no distension. Tenderness: There is no abdominal tenderness. There is no right CVA tenderness or left CVA tenderness. Neurological:      Mental Status: He is alert and oriented to person, place, and time. Mental status is at baseline. Motor: Weakness present. Gait: Gait abnormal.   Psychiatric:         Mood and Affect: Mood normal.         Behavior: Behavior normal.       DATA:    Results for orders placed or performed in visit on 11/15/23   POCT Urinalysis no Micro   Result Value Ref Range    Color, UA YELLOW     Clarity, UA CLEAR     Glucose, UA POC NEG     Bilirubin, UA NEG     Ketones, UA NEG     Spec Grav, UA 1.005     Blood, UA POC NEG     pH, UA 6.5     Protein, UA POC NEG     Urobilinogen, UA 0.2     Leukocytes, UA NEG     Nitrite, UA NEG     Appearance, Fluid Clear Clear, Slightly Cloudy     Lab Results   Component Value Date    PSA 0.17 06/07/2022        IMAGING:  Narrative & Impression  EXAM: CT ABDOMEN AND PELVIS WITHOUT CONTRAST     TECHNIQUE: CT acquisition of the abdomen and pelvis from the lower thorax through the pelvis without IV contrast administration. 2-D coronal and sagittal reformatted images were obtained from the axial source images. Oral Contrast: None. CT Dose Reduction Techniques Performed: Yes. COMPARISON: 05/24/2021     HISTORY: Kidney stone     FINDINGS:  LUNG BASES: Mild right lower lobe atelectasis is noted. LIVER: No suspicious mass or biliary ductal dilatation. GALLBLADDER:  Status post cholecystectomy. PANCREAS: No mass or evidence of pancreatitis. No duct dilation. SPLEEN: No mass. No splenomegaly. ADRENALS: Normal.  KIDNEYS/URETERS: No suspicious mass, or hydronephrosis. A cluster of calcified stones is identified in the lower pole of the right kidney, the largest measuring up to 9 mm in diameter. GI TRACT:  No bowel wall thickening or dilation.  The appendix is normal.  URINARY BLADDER: Normal.  REPRODUCTIVE

## 2023-11-15 NOTE — TELEPHONE ENCOUNTER
Patient: Gina Stein    YOB: 1965      Clearance was received on November 15, 2023. for Endoscopy / Colonoscopy scheduled for: 11/20/23    Patient may discontinue the use of PLAVIX/ASA  for 5  days prior to the procedure.     IS Lovenox required:  NO    PATIENT NOTIFIED ON:  11/15/23      Clearance scanned into media

## 2023-11-19 ASSESSMENT — ENCOUNTER SYMPTOMS
ABDOMINAL DISTENTION: 0
NAUSEA: 0
VOMITING: 0
ABDOMINAL PAIN: 0
BACK PAIN: 0

## 2023-11-20 ENCOUNTER — HOSPITAL ENCOUNTER (OUTPATIENT)
Age: 58
Setting detail: OUTPATIENT SURGERY
Discharge: HOME | End: 2023-11-20
Attending: INTERNAL MEDICINE | Admitting: INTERNAL MEDICINE
Payer: MEDICARE

## 2023-11-20 ENCOUNTER — ANESTHESIA EVENT (OUTPATIENT)
Dept: ENDOSCOPY | Age: 58
End: 2023-11-20
Payer: MEDICARE

## 2023-11-20 ENCOUNTER — ANESTHESIA (OUTPATIENT)
Dept: ENDOSCOPY | Age: 58
End: 2023-11-20
Payer: MEDICARE

## 2023-11-20 VITALS
OXYGEN SATURATION: 96 % | TEMPERATURE: 98.8 F | HEART RATE: 62 BPM | SYSTOLIC BLOOD PRESSURE: 173 MMHG | HEIGHT: 71 IN | BODY MASS INDEX: 25.9 KG/M2 | RESPIRATION RATE: 18 BRPM | WEIGHT: 185 LBS | DIASTOLIC BLOOD PRESSURE: 101 MMHG

## 2023-11-20 DIAGNOSIS — Z87.19 HISTORY OF ESOPHAGEAL VARICES: ICD-10-CM

## 2023-11-20 PROCEDURE — 43239 EGD BIOPSY SINGLE/MULTIPLE: CPT | Performed by: INTERNAL MEDICINE

## 2023-11-20 PROCEDURE — 3700000000 HC ANESTHESIA ATTENDED CARE: Performed by: INTERNAL MEDICINE

## 2023-11-20 PROCEDURE — 2709999900 HC NON-CHARGEABLE SUPPLY: Performed by: INTERNAL MEDICINE

## 2023-11-20 PROCEDURE — 3609012400 HC EGD TRANSORAL BIOPSY SINGLE/MULTIPLE: Performed by: INTERNAL MEDICINE

## 2023-11-20 PROCEDURE — 2580000003 HC RX 258: Performed by: NURSE ANESTHETIST, CERTIFIED REGISTERED

## 2023-11-20 PROCEDURE — 7100000011 HC PHASE II RECOVERY - ADDTL 15 MIN: Performed by: INTERNAL MEDICINE

## 2023-11-20 PROCEDURE — 6360000002 HC RX W HCPCS: Performed by: NURSE ANESTHETIST, CERTIFIED REGISTERED

## 2023-11-20 PROCEDURE — 88305 TISSUE EXAM BY PATHOLOGIST: CPT

## 2023-11-20 PROCEDURE — 7100000010 HC PHASE II RECOVERY - FIRST 15 MIN: Performed by: INTERNAL MEDICINE

## 2023-11-20 PROCEDURE — 3700000001 HC ADD 15 MINUTES (ANESTHESIA): Performed by: INTERNAL MEDICINE

## 2023-11-20 PROCEDURE — 2500000003 HC RX 250 WO HCPCS: Performed by: NURSE ANESTHETIST, CERTIFIED REGISTERED

## 2023-11-20 PROCEDURE — 2580000003 HC RX 258: Performed by: INTERNAL MEDICINE

## 2023-11-20 PROCEDURE — 88342 IMHCHEM/IMCYTCHM 1ST ANTB: CPT

## 2023-11-20 RX ORDER — SODIUM CHLORIDE, SODIUM LACTATE, POTASSIUM CHLORIDE, CALCIUM CHLORIDE 600; 310; 30; 20 MG/100ML; MG/100ML; MG/100ML; MG/100ML
INJECTION, SOLUTION INTRAVENOUS CONTINUOUS PRN
Status: DISCONTINUED | OUTPATIENT
Start: 2023-11-20 | End: 2023-11-20 | Stop reason: SDUPTHER

## 2023-11-20 RX ORDER — FENTANYL CITRATE 50 UG/ML
INJECTION, SOLUTION INTRAMUSCULAR; INTRAVENOUS PRN
Status: DISCONTINUED | OUTPATIENT
Start: 2023-11-20 | End: 2023-11-20 | Stop reason: SDUPTHER

## 2023-11-20 RX ORDER — LIDOCAINE HYDROCHLORIDE 10 MG/ML
INJECTION, SOLUTION EPIDURAL; INFILTRATION; INTRACAUDAL; PERINEURAL PRN
Status: DISCONTINUED | OUTPATIENT
Start: 2023-11-20 | End: 2023-11-20 | Stop reason: SDUPTHER

## 2023-11-20 RX ORDER — SODIUM CHLORIDE, SODIUM LACTATE, POTASSIUM CHLORIDE, CALCIUM CHLORIDE 600; 310; 30; 20 MG/100ML; MG/100ML; MG/100ML; MG/100ML
INJECTION, SOLUTION INTRAVENOUS CONTINUOUS
Status: DISCONTINUED | OUTPATIENT
Start: 2023-11-20 | End: 2023-11-20 | Stop reason: HOSPADM

## 2023-11-20 RX ORDER — PROPOFOL 10 MG/ML
INJECTION, EMULSION INTRAVENOUS CONTINUOUS PRN
Status: DISCONTINUED | OUTPATIENT
Start: 2023-11-20 | End: 2023-11-20 | Stop reason: SDUPTHER

## 2023-11-20 RX ADMIN — PROPOFOL 150 MCG/KG/MIN: 10 INJECTION, EMULSION INTRAVENOUS at 12:39

## 2023-11-20 RX ADMIN — LIDOCAINE HYDROCHLORIDE 50 MG: 10 INJECTION, SOLUTION EPIDURAL; INFILTRATION; INTRACAUDAL; PERINEURAL at 12:40

## 2023-11-20 RX ADMIN — SODIUM CHLORIDE, POTASSIUM CHLORIDE, SODIUM LACTATE AND CALCIUM CHLORIDE: 600; 310; 30; 20 INJECTION, SOLUTION INTRAVENOUS at 11:42

## 2023-11-20 RX ADMIN — SODIUM CHLORIDE, POTASSIUM CHLORIDE, SODIUM LACTATE AND CALCIUM CHLORIDE: 600; 310; 30; 20 INJECTION, SOLUTION INTRAVENOUS at 12:35

## 2023-11-20 RX ADMIN — FENTANYL CITRATE 50 MCG: 50 INJECTION INTRAMUSCULAR; INTRAVENOUS at 12:41

## 2023-11-20 RX ADMIN — FENTANYL CITRATE 50 MCG: 50 INJECTION INTRAMUSCULAR; INTRAVENOUS at 12:36

## 2023-11-20 ASSESSMENT — PAIN - FUNCTIONAL ASSESSMENT: PAIN_FUNCTIONAL_ASSESSMENT: 0-10

## 2023-11-20 ASSESSMENT — PAIN SCALES - GENERAL: PAINLEVEL_OUTOF10: 0

## 2023-11-20 NOTE — H&P
examined and appropriate for planned sedation and procedure. Plan:  Proceed with planned sedation and procedure as above.          Catherine Darby MD

## 2023-11-20 NOTE — ANESTHESIA PRE PROCEDURE
Department of Anesthesiology  Preprocedure Note       Name:  Markos Mckinney   Age:  60 y.o.  :  1965                                          MRN:  [id number]         Date:  2023      Surgeon: Nathaniel Ferreira):  Leslie Rajput MD    Procedure: Procedure(s):  EGD BIOPSY    Medications prior to admission:   Prior to Admission medications    Medication Sig Start Date End Date Taking? Authorizing Provider   lamoTRIgine (LAMICTAL) 25 MG tablet Take 1 tablet by mouth daily for 7 days, THEN 1 tablet 2 times daily for 21 days. 23  ANTHONY Mast   pantoprazole (PROTONIX) 40 MG tablet Take 1 tablet by mouth once daily 23   ANTHONY Mast   aspirin 325 MG EC tablet Take one tablet of 325 mg Aspirin daily.  10/17/23   ANTHONY Long - NP   escitalopram (LEXAPRO) 20 MG tablet Take 1 tablet by mouth daily 23   ANTHONY Mast   pregabalin (LYRICA) 150 MG capsule TAKE 1 CAPSULE BY MOUTH IN THE MORNING AND 1 AT NOON AND 1 AT BEDTIME FOR 30 DAYS 8/15/23 11/15/23  ANTHONY Mast   tiZANidine (ZANAFLEX) 4 MG tablet TAKE 1 TABLET BY MOUTH THREE TIMES DAILY 8/15/23   ANTHONY Mast   potassium chloride (KLOR-CON M) 10 MEQ extended release tablet Take 1 tablet by mouth once daily 23   ANTHONY Mast   furosemide (LASIX) 40 MG tablet Take 1 tablet by mouth once daily 23   ANTHONY Mast   oxyCODONE-acetaminophen (PERCOCET)  MG per tablet TAKE 1 TABLET BY MOUTH 4 TIMES DAILY -- DO NOT COMBINE WITH ALCOHOL AND DO NOT DRIVE AFTER TAKING  Patient not taking: Reported on 2023   Provider, MD Delilah   clopidogrel (PLAVIX) 75 MG tablet Take 1 tablet by mouth daily 23   Mauricio Gtz MD   valsartan (DIOVAN) 80 MG tablet Take 0.5 tablets by mouth 2 times daily 23   Mauricio Gtz MD   atorvastatin (LIPITOR) 40 MG tablet Take 1 tablet by mouth daily 23   Mauricio Gtz MD   [employer] ([address] ) Olga Polanco

## 2023-11-20 NOTE — OP NOTE
pylori infection. NO ulcers or masses or gastric outlet obstruction or retained food or fluid. Rugae were normal and lumen distended well with insufflation. Retroflexed views otherwise revealed a normal GE junction, fundus and cardia as well. Duodenum: normal       RECOMMENDATIONS:    1. Await path results, the patient will be contacted in 7-10 days with biopsy results. 2.  Patient should abstain from any use of alcohol ASAP  3.  - Repeat colonoscopy along with an EGD exam for surveillance against varices due to his history of cirrhosis: Next year based on his previous colonoscopy results    - Resume previous meds and diet  - GI clinic f/u 6 weeks with Ms. Federico Hudson scheduled f/u appts with other MDs     -Avoid NSAIDs indefinitely in light of his liver disease and history of ulcers; may use Tylenol for 100 mg p.o. every 4 hours as needed instead    The results were discussed with the patient and family. A copy of the images obtained were given to the patient.      (Please note that portions of this note were completed with a voice recognition program. Efforts were made to edit the dictations but occasionally words may be mis-transcribed.)     Mitchell Hand MD, MD  11/20/2023  12:40 PM

## 2023-11-20 NOTE — DISCHARGE INSTRUCTIONS
1. Await path results, the patient will be contacted in 7-10 days with biopsy results. 2.  Patient should abstain from any use of alcohol ASAP  3.  - Repeat colonoscopy along with an EGD exam for surveillance against varices due to his history of cirrhosis: Next year based on his previous colonoscopy results    - Resume previous meds and diet  - GI clinic f/u 6 weeks with Ms. Leticia Be scheduled f/u appts with other MDs     -Avoid NSAIDs indefinitely in light of his liver disease and history of ulcers; may use Tylenol for 100 mg p.o. every 4 hours as needed instead    Upper GI Endoscopy: What to Expect at [address] [place] [address] Seguin had an upper GI endoscopy. Your doctor used a thin, lighted tube that bends to look at the inside of your esophagus, your stomach, and the first part of the small intestine, called the duodenum. How can you care for yourself at home? Activity   Rest as much as you need to after you go home. You should be able to go back to your usual activities the day after the test.  Due to anesthesia, no driving or operating equipment for 24 hours. Diet   Follow your doctor's directions for eating after the test.  Drink plenty of fluids (unless your doctor has told you not to). Medications   If you have a sore throat the day after the test, use an over-the-counter spray to numb your throat. When should you call for help? Call 911 anytime you think you may need emergency care. For example, call if:    You passed out (lost consciousness). You have trouble breathing. You pass maroon or bloody stools. Call your doctor now or seek immediate medical care if:    You have pain that does not get better after your take pain medicine. You have new or worse belly pain. You have blood in your stools. You are sick to your stomach and cannot keep fluids down. You have a fever. You cannot pass stools or gas.    Watch closely for changes in your health, and be sure to

## 2023-11-20 NOTE — ANESTHESIA POSTPROCEDURE EVALUATION
Department of Anesthesiology  Postprocedure Note    Patient: Darian Lazo  MRN: [id number]  YOB: 1965  Date of evaluation: 11/20/2023      Procedure Summary     Date: 11/20/23 Room / Location: [address]    Anesthesia Start: 1233 Anesthesia Stop: 2360    Procedure: EGD BIOPSY (Abdomen) Diagnosis:       History of esophageal varices      (History of esophageal varices [Z87.19])    Surgeons: Dawn Figueroa MD Responsible Provider:     Anesthesia Type: general, TIVA ASA Status: 2          Anesthesia Type: No value filed.     Manuel Phase I: Manuel Score: 10    Manuel Phase II:        Anesthesia Post Evaluation    Patient location during evaluation: bedside  Patient participation: complete - patient participated  Pain score: 0  Airway patency: patent  Nausea & Vomiting: no vomiting and no nausea  Complications: no  Cardiovascular status: blood pressure returned to baseline  Respiratory status: acceptable  Hydration status: stable  Pain management: adequate

## 2023-11-28 ENCOUNTER — OFFICE VISIT (OUTPATIENT)
Dept: NEUROLOGY | Age: 58
End: 2023-11-28
Payer: MEDICARE

## 2023-11-28 VITALS
BODY MASS INDEX: 25.9 KG/M2 | SYSTOLIC BLOOD PRESSURE: 152 MMHG | WEIGHT: 185 LBS | HEIGHT: 71 IN | DIASTOLIC BLOOD PRESSURE: 93 MMHG | HEART RATE: 88 BPM | OXYGEN SATURATION: 100 %

## 2023-11-28 DIAGNOSIS — G71.00 MUSCULAR DYSTROPHY (HCC): Primary | ICD-10-CM

## 2023-11-28 DIAGNOSIS — R29.6 FALLS FREQUENTLY: ICD-10-CM

## 2023-11-28 PROCEDURE — G8427 DOCREV CUR MEDS BY ELIG CLIN: HCPCS | Performed by: NURSE PRACTITIONER

## 2023-11-28 PROCEDURE — 3017F COLORECTAL CA SCREEN DOC REV: CPT | Performed by: NURSE PRACTITIONER

## 2023-11-28 PROCEDURE — 99213 OFFICE O/P EST LOW 20 MIN: CPT | Performed by: NURSE PRACTITIONER

## 2023-11-28 PROCEDURE — 4004F PT TOBACCO SCREEN RCVD TLK: CPT | Performed by: NURSE PRACTITIONER

## 2023-11-28 PROCEDURE — G8484 FLU IMMUNIZE NO ADMIN: HCPCS | Performed by: NURSE PRACTITIONER

## 2023-11-28 PROCEDURE — G8419 CALC BMI OUT NRM PARAM NOF/U: HCPCS | Performed by: NURSE PRACTITIONER

## 2023-11-28 NOTE — PROGRESS NOTES
[facility] Neurology Office Note      Patient:   Darian Lazo  MR#:    [id number]  Account Number:                         YOB: 1965  Date of Evaluation:  11/28/2023  Time of Note:                          1:07 PM  Primary/Referring Physician:  ANTHONY Edwards   Consulting Physician:  Ryan Aleman DNP, ANTHONY    FOLLOW UP    Chief Complaint   Patient presents with    Follow-up    Fall     Most resent fall: 11/25/23  Denied hitting head     HISTORY OF PRESENT ILLNESS    Darian Lazo is a 60y.o. year old male here for follow up of falls, weakness. Has a history of muscular dystrophy in his 25s. He is unsure of the type of MD. Has noted increase in frequency of falls over the past 2-3 years. Has been doing PT at [facility] since last visit and overall feels this has been beneficial. When he falls he will fall backwards typically. He notes weakness in the RUE and BLE. Chronic bilateral ptosis noted. Notes diplopia, typically worse in the morning. Denies dysphagia, choking episodes. States he has a chronic cough. He is using a cane, using a wheelchair for longer distances. He had a heart stent in February 2023. Notes chronic neck/back pain. Has seen neurosurgery, conservative therapy recommended. Following with pain management as well, on chronic opioid therapy and has gotten injections.  Family history with grandmother and sisters having similar symptoms, diagnosed with MD.    Past Medical History:   Diagnosis Date    Anxiety     Arthritis     Chronic back pain     Depression     MD (muscular dystrophy) ([facility])     Neuropathy 12/02/2020    Scoliosis        Past Surgical History:   Procedure Laterality Date    CHOLECYSTECTOMY, LAPAROSCOPIC N/A 04/27/2021    LAPAROSCOPIC CHOLECYSTECTOMY performed by Marylee Rounds, DO at [address]  01/08/2016    Dr Modesta Snyder, normal, 10 yr recall    COLONOSCOPY N/A 06/30/2021    Dr Benjamin Novak hemorrhids, Sarah Rosales, [address], 3

## 2023-11-29 ENCOUNTER — HOSPITAL ENCOUNTER (OUTPATIENT)
Dept: GENERAL RADIOLOGY | Age: 58
Discharge: HOME | End: 2023-11-29
Payer: MEDICARE

## 2023-11-29 ENCOUNTER — OFFICE VISIT (OUTPATIENT)
Dept: UROLOGY | Age: 58
End: 2023-11-29
Payer: MEDICARE

## 2023-11-29 VITALS — TEMPERATURE: 97.4 F | BODY MASS INDEX: 26.26 KG/M2 | WEIGHT: 187.6 LBS | HEIGHT: 71 IN

## 2023-11-29 DIAGNOSIS — N20.0 RIGHT NEPHROLITHIASIS: ICD-10-CM

## 2023-11-29 DIAGNOSIS — N20.0 RIGHT NEPHROLITHIASIS: Primary | ICD-10-CM

## 2023-11-29 PROCEDURE — G8427 DOCREV CUR MEDS BY ELIG CLIN: HCPCS | Performed by: NURSE PRACTITIONER

## 2023-11-29 PROCEDURE — 99214 OFFICE O/P EST MOD 30 MIN: CPT | Performed by: NURSE PRACTITIONER

## 2023-11-29 PROCEDURE — G8419 CALC BMI OUT NRM PARAM NOF/U: HCPCS | Performed by: NURSE PRACTITIONER

## 2023-11-29 PROCEDURE — 4004F PT TOBACCO SCREEN RCVD TLK: CPT | Performed by: NURSE PRACTITIONER

## 2023-11-29 PROCEDURE — G8484 FLU IMMUNIZE NO ADMIN: HCPCS | Performed by: NURSE PRACTITIONER

## 2023-11-29 PROCEDURE — 3017F COLORECTAL CA SCREEN DOC REV: CPT | Performed by: NURSE PRACTITIONER

## 2023-11-29 PROCEDURE — 74018 RADEX ABDOMEN 1 VIEW: CPT

## 2023-11-29 ASSESSMENT — ENCOUNTER SYMPTOMS
ABDOMINAL DISTENTION: 0
VOMITING: 0
ABDOMINAL PAIN: 0
NAUSEA: 0
BACK PAIN: 0

## 2023-11-29 NOTE — PROGRESS NOTES
Marilin Jaffe is a 60 y.o. male who presents today   Chief Complaint   Patient presents with    Follow-up     I am here today for my 2 wk stone follow up       Kidney calculus:  Patient is here today for a kidney calculus which was first noted year(s) ago. Location: Right upper and lower pole  Size: 11 mm, 5mm  Recently the renal calculus symptoms: show no change  Current medical Rx for renal calculus: none  Flank pain? No, ;   Hematuria?  none  Passed recent calculus? No  Personal History of Kidney Stones: yes    Stone composition: unknown  Family History of Kidney Stones: no     Has never required surgical intervention. 1 previous stone episode passed the stone on his own.     Comes in to discuss ESWL.  KUB prior to appointment today        Past Medical History:   Diagnosis Date    Anxiety     Arthritis     Chronic back pain     Depression     MD (muscular dystrophy) ([address] [facility])     Neuropathy 12/02/2020    Scoliosis        Past Surgical History:   Procedure Laterality Date    CHOLECYSTECTOMY, LAPAROSCOPIC N/A 04/27/2021    LAPAROSCOPIC CHOLECYSTECTOMY performed by Deejay Maciel DO at [address]  01/08/2016    Dr Babette Hashimoto, normal, 10 yr recall    COLONOSCOPY N/A 06/30/2021    Dr Raul Mccain hemorrhids, RANDOM COLON BX NEGATIVE, Sub Prep Fair, 3 year recall    FOOT SURGERY Left     UPPER GASTROINTESTINAL ENDOSCOPY N/A 06/30/2021    Dr Sully Fox, duodenal villous blunting otherwise normal EGD; serum celiac panel NEG for celiac sprue    UPPER GASTROINTESTINAL ENDOSCOPY N/A 08/10/2021    Dr Sully Fox- mild gastritis, 2 small duodenal ulcers and 1 large cratered 3-4cm in posterior duodenal bulb, NEG h pylori    UPPER GASTROINTESTINAL ENDOSCOPY N/A 11/20/2023    Dr Sully Fox, (-)Hpylori, (-)Obvious esoph varices, 1 year recall       Current Outpatient Medications   Medication Sig Dispense Refill    lamoTRIgine (LAMICTAL) 25 MG tablet Take 1 tablet by mouth daily for 7 days, THEN 1 tablet 2

## 2023-11-29 NOTE — PROGRESS NOTES
Veronica Huerta is a 60 y.o. male who presents today   Chief Complaint   Patient presents with    Follow-up     I am here today for my 2 wk stone follow up       {ZAQ:[id number]}    Past Medical History:   Diagnosis Date    Anxiety     Arthritis     Chronic back pain     Depression     MD (muscular dystrophy) ([facility])     Neuropathy 12/02/2020    Scoliosis        Past Surgical History:   Procedure Laterality Date    CHOLECYSTECTOMY, LAPAROSCOPIC N/A 04/27/2021    LAPAROSCOPIC CHOLECYSTECTOMY performed by Roman Angeles,  at [address]  01/08/2016    Dr Regina De La Cruz, normal, 10 yr recall    COLONOSCOPY N/A 06/30/2021    Dr Denae Nelson hemorrhids, RANDOM COLON BX NEGATIVE, [address], 3 year recall    FOOT SURGERY Left     UPPER GASTROINTESTINAL ENDOSCOPY N/A 06/30/2021    Dr Nancy Blount, duodenal villous blunting otherwise normal EGD; serum celiac panel NEG for celiac sprue    UPPER GASTROINTESTINAL ENDOSCOPY N/A 08/10/2021    Dr Nancy Blount- mild gastritis, 2 small duodenal ulcers and 1 large cratered 3-4cm in posterior duodenal bulb, NEG h pylori    UPPER GASTROINTESTINAL ENDOSCOPY N/A 11/20/2023    Dr Nancy Blount, (-)Hpylori, (-)Obvious esoph varices, 1 year recall       Current Outpatient Medications   Medication Sig Dispense Refill    lamoTRIgine (LAMICTAL) 25 MG tablet Take 1 tablet by mouth daily for 7 days, THEN 1 tablet 2 times daily for 21 days. 49 tablet 0    pantoprazole (PROTONIX) 40 MG tablet Take 1 tablet by mouth once daily 60 tablet 0    aspirin 325 MG EC tablet Take one tablet of 325 mg Aspirin daily.  90 tablet 3    escitalopram (LEXAPRO) 20 MG tablet Take 1 tablet by mouth daily 30 tablet 5    pregabalin (LYRICA) 150 MG capsule TAKE 1 CAPSULE BY MOUTH IN THE MORNING AND 1 AT NOON AND 1 AT BEDTIME FOR 30 DAYS 90 capsule 2    tiZANidine (ZANAFLEX) 4 MG tablet TAKE 1 TABLET BY MOUTH THREE TIMES DAILY 90 tablet 3    potassium chloride (KLOR-CON M) 10 MEQ extended release tablet Take 1

## 2023-12-07 ENCOUNTER — OFFICE VISIT (OUTPATIENT)
Dept: FAMILY MEDICINE CLINIC | Age: 58
End: 2023-12-07
Payer: MEDICARE

## 2023-12-07 VITALS
SYSTOLIC BLOOD PRESSURE: 136 MMHG | WEIGHT: 195.25 LBS | BODY MASS INDEX: 27.33 KG/M2 | HEART RATE: 85 BPM | DIASTOLIC BLOOD PRESSURE: 72 MMHG | OXYGEN SATURATION: 96 % | HEIGHT: 71 IN | TEMPERATURE: 96.9 F

## 2023-12-07 DIAGNOSIS — F41.9 ANXIETY: Primary | ICD-10-CM

## 2023-12-07 DIAGNOSIS — F33.41 RECURRENT MAJOR DEPRESSIVE DISORDER, IN PARTIAL REMISSION (HCC): ICD-10-CM

## 2023-12-07 DIAGNOSIS — G71.00 MD (MUSCULAR DYSTROPHY) (HCC): ICD-10-CM

## 2023-12-07 PROCEDURE — 99214 OFFICE O/P EST MOD 30 MIN: CPT | Performed by: NURSE PRACTITIONER

## 2023-12-07 PROCEDURE — 3017F COLORECTAL CA SCREEN DOC REV: CPT | Performed by: NURSE PRACTITIONER

## 2023-12-07 PROCEDURE — G8419 CALC BMI OUT NRM PARAM NOF/U: HCPCS | Performed by: NURSE PRACTITIONER

## 2023-12-07 PROCEDURE — G8427 DOCREV CUR MEDS BY ELIG CLIN: HCPCS | Performed by: NURSE PRACTITIONER

## 2023-12-07 PROCEDURE — 4004F PT TOBACCO SCREEN RCVD TLK: CPT | Performed by: NURSE PRACTITIONER

## 2023-12-07 PROCEDURE — 90674 CCIIV4 VAC NO PRSV 0.5 ML IM: CPT | Performed by: NURSE PRACTITIONER

## 2023-12-07 PROCEDURE — G8482 FLU IMMUNIZE ORDER/ADMIN: HCPCS | Performed by: NURSE PRACTITIONER

## 2023-12-07 PROCEDURE — G0008 ADMIN INFLUENZA VIRUS VAC: HCPCS | Performed by: NURSE PRACTITIONER

## 2023-12-07 RX ORDER — LAMOTRIGINE 100 MG/1
100 TABLET ORAL DAILY
Qty: 30 TABLET | Refills: 2 | Status: SHIPPED | OUTPATIENT
Start: 2023-12-07 | End: 2024-03-06

## 2023-12-07 ASSESSMENT — ENCOUNTER SYMPTOMS
EYES NEGATIVE: 1
RESPIRATORY NEGATIVE: 1
GASTROINTESTINAL NEGATIVE: 1
BACK PAIN: 1

## 2023-12-07 NOTE — PROGRESS NOTES
major depressive disorder, in partial remission (HCC)  Adjusting Lamictal.  - lamoTRIgine (LAMICTAL) 100 MG tablet; Take 1 tablet by mouth daily  Dispense: 30 tablet; Refill: 2    3. MD (muscular dystrophy) ([facility] [address] [facility])  Not worsening at this time. Will follow-up with neurology as discussed. Return in about 6 weeks (around 1/18/2024), or if symptoms worsen or fail to improve, for Medicare AWV, Medication Follow Up. Orders Placed This Encounter   Procedures    Influenza, FLUCELVAX, (age 10 mo+), IM, PF, 0.5 mL       Orders Placed This Encounter   Medications    lamoTRIgine (LAMICTAL) 100 MG tablet     Sig: Take 1 tablet by mouth daily     Dispense:  30 tablet     Refill:  2            Patient offered educational handouts and has had all questions answered. Patient voices understanding and agrees to plans along with risks and benefits of plan. Patient is instructed to continue prior meds, diet, and exercise plans as instructed. Patient agrees to follow up as instructed and sooner if needed. Patient agrees to go to ER if condition becomes emergent. EMR Dragon/transcription disclaimer: Some of this encounter note is an electronic transcription/translation of spoken language to printed text. The electronic translation of spoken language may permit erroneous, or at times, nonsensical words or phrases to be inadvertently transcribed.  Although I have reviewed the note for such errors, some may still exist.    Electronically signed by ANTHONY Guzman on 12/7/2023 at 12:46 PM

## 2023-12-08 DIAGNOSIS — M54.32 LEFT SCIATIC NERVE PAIN: ICD-10-CM

## 2023-12-08 DIAGNOSIS — M54.50 LUMBAR PAIN: ICD-10-CM

## 2023-12-08 DIAGNOSIS — G71.00 MD (MUSCULAR DYSTROPHY) (HCC): ICD-10-CM

## 2023-12-12 RX ORDER — PREGABALIN 150 MG/1
150 CAPSULE ORAL 3 TIMES DAILY
Qty: 90 CAPSULE | Refills: 2 | Status: SHIPPED | OUTPATIENT
Start: 2023-12-12 | End: 2024-03-11

## 2023-12-30 DIAGNOSIS — K29.90 GASTRITIS AND DUODENITIS: ICD-10-CM

## 2024-01-02 RX ORDER — PANTOPRAZOLE SODIUM 40 MG/1
TABLET, DELAYED RELEASE ORAL
Qty: 60 TABLET | Refills: 0 | Status: SHIPPED | OUTPATIENT
Start: 2024-01-02

## 2024-01-02 NOTE — TELEPHONE ENCOUNTER
Reagan called requesting a refill of the below medication which has been pended for you:     Requested Prescriptions     Pending Prescriptions Disp Refills    pantoprazole (PROTONIX) 40 MG tablet [Pharmacy Med Name: Pantoprazole Sodium 40 MG Oral Tablet Delayed Release] 60 tablet 0     Sig: Take 1 tablet by mouth once daily       Last Appointment Date: 12/7/2023  Next Appointment Date: Visit date not found    Allergies   Allergen Reactions    Shellfish-Derived Products Anaphylaxis    Norvasc [Amlodipine]      Lower leg edema

## 2024-01-07 DIAGNOSIS — M54.42 LEFT-SIDED LOW BACK PAIN WITH LEFT-SIDED SCIATICA, UNSPECIFIED CHRONICITY: ICD-10-CM

## 2024-01-08 NOTE — TELEPHONE ENCOUNTER
Received fax from pharmacy requesting refill on pts medication(s). Pt was last seen in office on 12/7/2023  and has a follow up scheduled for 1/18/2024. Will send request to  Manjula Acevedo  for authorization.     Requested Prescriptions     Pending Prescriptions Disp Refills    tiZANidine (ZANAFLEX) 4 MG tablet [Pharmacy Med Name: tiZANidine HCl 4 MG Oral Tablet] 90 tablet 0     Sig: TAKE 1 TABLET BY MOUTH THREE TIMES DAILY

## 2024-01-09 RX ORDER — TIZANIDINE 4 MG/1
TABLET ORAL
Qty: 90 TABLET | Refills: 0 | Status: SHIPPED | OUTPATIENT
Start: 2024-01-09

## 2024-01-24 ENCOUNTER — OFFICE VISIT (OUTPATIENT)
Dept: FAMILY MEDICINE CLINIC | Age: 59
End: 2024-01-24
Payer: MEDICARE

## 2024-01-24 ENCOUNTER — TELEPHONE (OUTPATIENT)
Dept: FAMILY MEDICINE CLINIC | Age: 59
End: 2024-01-24

## 2024-01-24 VITALS
DIASTOLIC BLOOD PRESSURE: 76 MMHG | WEIGHT: 193 LBS | OXYGEN SATURATION: 98 % | HEART RATE: 64 BPM | HEIGHT: 71 IN | TEMPERATURE: 97.7 F | BODY MASS INDEX: 27.02 KG/M2 | SYSTOLIC BLOOD PRESSURE: 120 MMHG

## 2024-01-24 DIAGNOSIS — G71.00 MD (MUSCULAR DYSTROPHY) (HCC): ICD-10-CM

## 2024-01-24 DIAGNOSIS — F33.41 RECURRENT MAJOR DEPRESSIVE DISORDER, IN PARTIAL REMISSION (HCC): ICD-10-CM

## 2024-01-24 DIAGNOSIS — I73.9 CLAUDICATION (HCC): ICD-10-CM

## 2024-01-24 DIAGNOSIS — D68.4: ICD-10-CM

## 2024-01-24 DIAGNOSIS — Z00.00 MEDICARE ANNUAL WELLNESS VISIT, SUBSEQUENT: Primary | ICD-10-CM

## 2024-01-24 DIAGNOSIS — R73.09 ELEVATED GLUCOSE: ICD-10-CM

## 2024-01-24 DIAGNOSIS — G89.4 CHRONIC PAIN SYNDROME: ICD-10-CM

## 2024-01-24 DIAGNOSIS — K74.60 CIRRHOSIS OF LIVER WITHOUT ASCITES, UNSPECIFIED HEPATIC CIRRHOSIS TYPE (HCC): ICD-10-CM

## 2024-01-24 LAB
ALBUMIN SERPL-MCNC: 4.4 G/DL (ref 3.5–5.2)
ALP SERPL-CCNC: 103 U/L (ref 40–130)
ALT SERPL-CCNC: 15 U/L (ref 5–41)
ANION GAP SERPL CALCULATED.3IONS-SCNC: 10 MMOL/L (ref 7–19)
AST SERPL-CCNC: 26 U/L (ref 5–40)
BASOPHILS # BLD: 0 K/UL (ref 0–0.2)
BASOPHILS NFR BLD: 0.3 % (ref 0–1)
BILIRUB SERPL-MCNC: 0.4 MG/DL (ref 0.2–1.2)
BILIRUB UR QL STRIP: NEGATIVE
BUN SERPL-MCNC: 14 MG/DL (ref 8–23)
CALCIUM SERPL-MCNC: 9.1 MG/DL (ref 8.8–10.2)
CHLORIDE SERPL-SCNC: 101 MMOL/L (ref 98–111)
CHOLEST SERPL-MCNC: 129 MG/DL (ref 160–199)
CLARITY UR: CLEAR
CO2 SERPL-SCNC: 29 MMOL/L (ref 22–29)
COLOR UR: YELLOW
CREAT SERPL-MCNC: 0.5 MG/DL (ref 0.5–1.2)
EOSINOPHIL # BLD: 0.1 K/UL (ref 0–0.6)
EOSINOPHIL NFR BLD: 2 % (ref 0–5)
ERYTHROCYTE [DISTWIDTH] IN BLOOD BY AUTOMATED COUNT: 13.6 % (ref 11.5–14.5)
GLUCOSE SERPL-MCNC: 79 MG/DL (ref 74–109)
GLUCOSE UR STRIP.AUTO-MCNC: NEGATIVE MG/DL
HBA1C MFR BLD: 5.1 % (ref 4–6)
HCT VFR BLD AUTO: 40.8 % (ref 42–52)
HDLC SERPL-MCNC: 63 MG/DL (ref 55–121)
HGB BLD-MCNC: 13.2 G/DL (ref 14–18)
HGB UR STRIP.AUTO-MCNC: NEGATIVE MG/L
IMM GRANULOCYTES # BLD: 0 K/UL
KETONES UR STRIP.AUTO-MCNC: NEGATIVE MG/DL
LDLC SERPL CALC-MCNC: 50 MG/DL
LEUKOCYTE ESTERASE UR QL STRIP.AUTO: NEGATIVE
LYMPHOCYTES # BLD: 1.6 K/UL (ref 1.1–4.5)
LYMPHOCYTES NFR BLD: 25.8 % (ref 20–40)
MCH RBC QN AUTO: 30.6 PG (ref 27–31)
MCHC RBC AUTO-ENTMCNC: 32.4 G/DL (ref 33–37)
MCV RBC AUTO: 94.7 FL (ref 80–94)
MICROALBUMIN UR-MCNC: 4.6 MG/DL (ref 0–19)
MICROALBUMIN/CREAT UR-RTO: 47.1 MG/G
MONOCYTES # BLD: 0.6 K/UL (ref 0–0.9)
MONOCYTES NFR BLD: 8.8 % (ref 0–10)
NEUTROPHILS # BLD: 4 K/UL (ref 1.5–7.5)
NEUTS SEG NFR BLD: 62.9 % (ref 50–65)
NITRITE UR QL STRIP.AUTO: NEGATIVE
PH UR STRIP.AUTO: 5 [PH] (ref 5–8)
PLATELET # BLD AUTO: 275 K/UL (ref 130–400)
PMV BLD AUTO: 10.2 FL (ref 9.4–12.4)
POTASSIUM SERPL-SCNC: 4.9 MMOL/L (ref 3.5–5)
PROT SERPL-MCNC: 7.1 G/DL (ref 6.6–8.7)
PROT UR STRIP.AUTO-MCNC: NEGATIVE MG/DL
RBC # BLD AUTO: 4.31 M/UL (ref 4.7–6.1)
SODIUM SERPL-SCNC: 140 MMOL/L (ref 136–145)
SP GR UR STRIP.AUTO: 1.02 (ref 1–1.03)
TRIGL SERPL-MCNC: 82 MG/DL (ref 0–149)
TSH SERPL DL<=0.005 MIU/L-ACNC: 2.38 UIU/ML (ref 0.35–5.5)
UROBILINOGEN UR STRIP.AUTO-MCNC: 0.2 E.U./DL
WBC # BLD AUTO: 6.4 K/UL (ref 4.8–10.8)

## 2024-01-24 PROCEDURE — G8482 FLU IMMUNIZE ORDER/ADMIN: HCPCS | Performed by: NURSE PRACTITIONER

## 2024-01-24 PROCEDURE — 3017F COLORECTAL CA SCREEN DOC REV: CPT | Performed by: NURSE PRACTITIONER

## 2024-01-24 PROCEDURE — G0439 PPPS, SUBSEQ VISIT: HCPCS | Performed by: NURSE PRACTITIONER

## 2024-01-24 RX ORDER — HYDROCODONE BITARTRATE AND ACETAMINOPHEN 5; 325 MG/1; MG/1
1 TABLET ORAL EVERY 6 HOURS PRN
COMMUNITY

## 2024-01-24 ASSESSMENT — PATIENT HEALTH QUESTIONNAIRE - PHQ9
5. POOR APPETITE OR OVEREATING: 2
4. FEELING TIRED OR HAVING LITTLE ENERGY: 3
SUM OF ALL RESPONSES TO PHQ QUESTIONS 1-9: 17
SUM OF ALL RESPONSES TO PHQ QUESTIONS 1-9: 17
2. FEELING DOWN, DEPRESSED OR HOPELESS: 1
8. MOVING OR SPEAKING SO SLOWLY THAT OTHER PEOPLE COULD HAVE NOTICED. OR THE OPPOSITE, BEING SO FIGETY OR RESTLESS THAT YOU HAVE BEEN MOVING AROUND A LOT MORE THAN USUAL: 3
9. THOUGHTS THAT YOU WOULD BE BETTER OFF DEAD, OR OF HURTING YOURSELF: 0
6. FEELING BAD ABOUT YOURSELF - OR THAT YOU ARE A FAILURE OR HAVE LET YOURSELF OR YOUR FAMILY DOWN: 2
3. TROUBLE FALLING OR STAYING ASLEEP: 2
10. IF YOU CHECKED OFF ANY PROBLEMS, HOW DIFFICULT HAVE THESE PROBLEMS MADE IT FOR YOU TO DO YOUR WORK, TAKE CARE OF THINGS AT HOME, OR GET ALONG WITH OTHER PEOPLE: 2
SUM OF ALL RESPONSES TO PHQ QUESTIONS 1-9: 17
1. LITTLE INTEREST OR PLEASURE IN DOING THINGS: 2
7. TROUBLE CONCENTRATING ON THINGS, SUCH AS READING THE NEWSPAPER OR WATCHING TELEVISION: 2
SUM OF ALL RESPONSES TO PHQ QUESTIONS 1-9: 17

## 2024-01-24 ASSESSMENT — LIFESTYLE VARIABLES
HOW OFTEN DO YOU HAVE A DRINK CONTAINING ALCOHOL: MONTHLY OR LESS
HOW MANY STANDARD DRINKS CONTAINING ALCOHOL DO YOU HAVE ON A TYPICAL DAY: 1 OR 2

## 2024-01-24 NOTE — TELEPHONE ENCOUNTER
----- Message from ANTHONY Hernandez sent at 1/24/2024  3:47 PM CST -----  Please let patient know that labs have returned and are looking great.  Lipid panel was a lot better than last check.  Continue current regimen  CBC was stable.  Hemoglobin has improved since last check  A1c was even better at 5.1%  No thyroid concerns   CMP was normal electrolytes and renal AND liver functions.

## 2024-01-24 NOTE — PATIENT INSTRUCTIONS
or both shoulders or arms.     Lightheadedness or sudden weakness.     A fast or irregular heartbeat.   After you call 911, the  may tell you to chew 1 adult-strength or 2 to 4 low-dose aspirin. Wait for an ambulance. Do not try to drive yourself.  Watch closely for changes in your health, and be sure to contact your doctor if you have any problems.  Where can you learn more?  Go to https://www.Pzoom.net/patientEd and enter F075 to learn more about \"A Healthy Heart: Care Instructions.\"  Current as of: June 25, 2023               Content Version: 13.9  © 8964-5799 [employer].   Care instructions adapted under license by [employer]. If you have questions about a medical condition or this instruction, always ask your healthcare professional. [employer] disclaims any warranty or liability for your use of this information.      Personalized Preventive Plan for Reagan COMBS Diallo - 1/24/2024  Medicare offers a range of preventive health benefits. Some of the tests and screenings are paid in full while other may be subject to a deductible, co-insurance, and/or copay.    Some of these benefits include a comprehensive review of your medical history including lifestyle, illnesses that may run in your family, and various assessments and screenings as appropriate.    After reviewing your medical record and screening and assessments performed today your provider may have ordered immunizations, labs, imaging, and/or referrals for you.  A list of these orders (if applicable) as well as your Preventive Care list are included within your After Visit Summary for your review.    Other Preventive Recommendations:    A preventive eye exam performed by an eye specialist is recommended every 1-2 years to screen for glaucoma; cataracts, macular degeneration, and other eye disorders.  A preventive dental visit is recommended every 6 months.  Try to get at least 150 minutes of exercise per week or 10,000

## 2024-01-26 NOTE — TELEPHONE ENCOUNTER
Called patient, spoke with: Patient regarding the results of the patients most recent labs.  I advised Patient of Manjula Acevedo recommendations.   Patient did voice understanding

## 2024-01-30 ENCOUNTER — TRANSCRIBE ORDERS (OUTPATIENT)
Dept: ADMINISTRATIVE | Facility: HOSPITAL | Age: 59
End: 2024-01-30
Payer: MEDICARE

## 2024-01-30 ENCOUNTER — HOSPITAL ENCOUNTER (OUTPATIENT)
Dept: GENERAL RADIOLOGY | Facility: HOSPITAL | Age: 59
Discharge: HOME | End: 2024-01-30
Payer: MEDICARE

## 2024-01-30 ENCOUNTER — LAB (OUTPATIENT)
Dept: LAB | Facility: HOSPITAL | Age: 59
End: 2024-01-30
Payer: MEDICARE

## 2024-01-30 DIAGNOSIS — M47.816 LUMBAR SPONDYLOSIS: ICD-10-CM

## 2024-01-30 DIAGNOSIS — M47.816 LUMBAR SPONDYLOSIS: Primary | ICD-10-CM

## 2024-01-30 PROCEDURE — 72110 X-RAY EXAM L-2 SPINE 4/>VWS: CPT

## 2024-01-30 PROCEDURE — 36415 COLL VENOUS BLD VENIPUNCTURE: CPT

## 2024-01-30 PROCEDURE — 84403 ASSAY OF TOTAL TESTOSTERONE: CPT

## 2024-01-31 LAB — TESTOST SERPL-MCNC: 40.1 NG/DL (ref 193–740)

## 2024-02-12 DIAGNOSIS — M54.42 LEFT-SIDED LOW BACK PAIN WITH LEFT-SIDED SCIATICA, UNSPECIFIED CHRONICITY: ICD-10-CM

## 2024-02-12 RX ORDER — HYDROGEN PEROXIDE 2.65 ML/100ML
81 LIQUID ORAL; TOPICAL DAILY
Qty: 90 TABLET | Refills: 0 | OUTPATIENT
Start: 2024-02-12

## 2024-02-12 NOTE — TELEPHONE ENCOUNTER
Received fax from pharmacy requesting refill on pts medication(s). Pt was last seen in office on 1/24/2024  and has a follow up scheduled for Visit date not found. Will send request to  Manjula Acevedo  for authorization.     Requested Prescriptions     Pending Prescriptions Disp Refills    tiZANidine (ZANAFLEX) 4 MG tablet [Pharmacy Med Name: tiZANidine HCl 4 MG Oral Tablet] 90 tablet 0     Sig: TAKE 1 TABLET BY MOUTH THREE TIMES DAILY

## 2024-02-13 RX ORDER — TIZANIDINE 4 MG/1
TABLET ORAL
Qty: 90 TABLET | Refills: 1 | Status: SHIPPED | OUTPATIENT
Start: 2024-02-13

## 2024-02-19 ENCOUNTER — TRANSCRIBE ORDERS (OUTPATIENT)
Dept: ADMINISTRATIVE | Facility: HOSPITAL | Age: 59
End: 2024-02-19
Payer: MEDICARE

## 2024-02-19 ENCOUNTER — HOSPITAL ENCOUNTER (OUTPATIENT)
Dept: GENERAL RADIOLOGY | Facility: HOSPITAL | Age: 59
Discharge: HOME | End: 2024-02-19
Payer: MEDICARE

## 2024-02-19 ENCOUNTER — LAB (OUTPATIENT)
Dept: LAB | Facility: HOSPITAL | Age: 59
End: 2024-02-19
Payer: MEDICARE

## 2024-02-19 ENCOUNTER — OFFICE VISIT (OUTPATIENT)
Dept: CARDIOLOGY CLINIC | Age: 59
End: 2024-02-19
Payer: MEDICARE

## 2024-02-19 VITALS
SYSTOLIC BLOOD PRESSURE: 140 MMHG | WEIGHT: 197 LBS | HEART RATE: 73 BPM | HEIGHT: 71 IN | OXYGEN SATURATION: 94 % | BODY MASS INDEX: 27.58 KG/M2 | DIASTOLIC BLOOD PRESSURE: 82 MMHG

## 2024-02-19 DIAGNOSIS — M47.816 LUMBAR SPONDYLOSIS: ICD-10-CM

## 2024-02-19 DIAGNOSIS — I10 ESSENTIAL HYPERTENSION: ICD-10-CM

## 2024-02-19 DIAGNOSIS — I25.10 CORONARY ARTERY DISEASE INVOLVING NATIVE CORONARY ARTERY OF NATIVE HEART WITHOUT ANGINA PECTORIS: Primary | ICD-10-CM

## 2024-02-19 DIAGNOSIS — M47.816 LUMBAR SPONDYLOSIS: Primary | ICD-10-CM

## 2024-02-19 DIAGNOSIS — E78.5 DYSLIPIDEMIA: ICD-10-CM

## 2024-02-19 PROCEDURE — 72110 X-RAY EXAM L-2 SPINE 4/>VWS: CPT

## 2024-02-19 PROCEDURE — 3077F SYST BP >= 140 MM HG: CPT | Performed by: NURSE PRACTITIONER

## 2024-02-19 PROCEDURE — G8482 FLU IMMUNIZE ORDER/ADMIN: HCPCS | Performed by: NURSE PRACTITIONER

## 2024-02-19 PROCEDURE — G8427 DOCREV CUR MEDS BY ELIG CLIN: HCPCS | Performed by: NURSE PRACTITIONER

## 2024-02-19 PROCEDURE — 93000 ELECTROCARDIOGRAM COMPLETE: CPT | Performed by: NURSE PRACTITIONER

## 2024-02-19 PROCEDURE — 36415 COLL VENOUS BLD VENIPUNCTURE: CPT

## 2024-02-19 PROCEDURE — 82306 VITAMIN D 25 HYDROXY: CPT

## 2024-02-19 PROCEDURE — 99214 OFFICE O/P EST MOD 30 MIN: CPT | Performed by: NURSE PRACTITIONER

## 2024-02-19 PROCEDURE — G8419 CALC BMI OUT NRM PARAM NOF/U: HCPCS | Performed by: NURSE PRACTITIONER

## 2024-02-19 PROCEDURE — 3017F COLORECTAL CA SCREEN DOC REV: CPT | Performed by: NURSE PRACTITIONER

## 2024-02-19 PROCEDURE — 4004F PT TOBACCO SCREEN RCVD TLK: CPT | Performed by: NURSE PRACTITIONER

## 2024-02-19 PROCEDURE — 3079F DIAST BP 80-89 MM HG: CPT | Performed by: NURSE PRACTITIONER

## 2024-02-19 ASSESSMENT — ENCOUNTER SYMPTOMS
WHEEZING: 0
CHEST TIGHTNESS: 0
COUGH: 0
SHORTNESS OF BREATH: 0
SORE THROAT: 0

## 2024-02-19 NOTE — PROGRESS NOTES
[facility] Cardiology   Established Patient Office Visit  [address]  [address]  [facility] [id number] [phone]        OFFICE VISIT:  2024    Reagan Diallo - : 1965    Reason For Visit:  Reagan is a 59 y.o. male who is here for Follow-up    1. Coronary artery disease involving native coronary artery of native heart without angina pectoris    2. Essential hypertension    3. Dyslipidemia        Patient with a history of coronary artery disease, dyslipidemia, hypertension and tachycardia.        Patient underwent a dobutamine stress echocardiogram which was positive clinically and by EKG.  He did undergo cardiac catheterization on 2023.   Single-vessel disease with focal mid to distal LAD stenosis and a large   LAD vessel.   Tortuous coronary arteries.   Normal LV systolic function with LVH noted.   Hypertensive heart disease.   Successful PCI to mid to distal LAD (2.5 x 14 mm resolute integrity)   utilizing drug-eluting stent.      Recommendations      Medical management. Plavix uninterrupted for 6 months.   Hypertensive management.      Signatures      ----------------------------------------------------------------   Electronically signed by Binu Shukla MD(Performing Physician) on   2023 20:07   ----------------------------------------------------------------    His ZIO patch showed sinus rhythm with a minimum heart rate of 54 bpm.  Maximum 146 bpm.  Average 81 bpm.  A-fib burden 6%.  24 episodes of SVT.       Patient presents to clinic today for routine follow-up.  Patient denies any complaints of chest pain, pressure or tightness.  There is no shortness of breath, orthopnea or PND.  Patient denies any lightheadedness, dizziness or syncope.      Subjective    Reagan Diallo is a 59 y.o. male with the following history as recorded in [facility]:    Patient Active Problem List    Diagnosis Date Noted    Claudication (HCC) 2023    Poor memory 2022    Hypersomnia due to medical

## 2024-02-20 LAB — 25(OH)D3 SERPL-MCNC: 22.7 NG/ML (ref 30–100)

## 2024-03-06 DIAGNOSIS — F33.41 RECURRENT MAJOR DEPRESSIVE DISORDER, IN PARTIAL REMISSION (HCC): ICD-10-CM

## 2024-03-06 DIAGNOSIS — K74.60 CIRRHOSIS OF LIVER WITHOUT ASCITES, UNSPECIFIED HEPATIC CIRRHOSIS TYPE (HCC): Primary | ICD-10-CM

## 2024-03-06 DIAGNOSIS — F41.9 ANXIETY: ICD-10-CM

## 2024-03-06 DIAGNOSIS — K29.90 GASTRITIS AND DUODENITIS: ICD-10-CM

## 2024-03-07 RX ORDER — PANTOPRAZOLE SODIUM 40 MG/1
TABLET, DELAYED RELEASE ORAL
Qty: 60 TABLET | Refills: 0 | Status: SHIPPED | OUTPATIENT
Start: 2024-03-07

## 2024-03-07 RX ORDER — CLOPIDOGREL BISULFATE 75 MG/1
75 TABLET ORAL DAILY
Qty: 90 TABLET | Refills: 1 | Status: SHIPPED | OUTPATIENT
Start: 2024-03-07

## 2024-03-07 RX ORDER — LAMOTRIGINE 100 MG/1
100 TABLET ORAL DAILY
Qty: 90 TABLET | Refills: 0 | Status: SHIPPED | OUTPATIENT
Start: 2024-03-07

## 2024-03-07 RX ORDER — ATORVASTATIN CALCIUM 40 MG/1
40 TABLET, FILM COATED ORAL DAILY
Qty: 90 TABLET | Refills: 1 | Status: SHIPPED | OUTPATIENT
Start: 2024-03-07

## 2024-03-14 ENCOUNTER — HOSPITAL ENCOUNTER (OUTPATIENT)
Dept: GENERAL RADIOLOGY | Age: 59
Discharge: HOME | End: 2024-03-14
Payer: MEDICARE

## 2024-03-14 DIAGNOSIS — K74.60 CIRRHOSIS OF LIVER WITHOUT ASCITES, UNSPECIFIED HEPATIC CIRRHOSIS TYPE (HCC): ICD-10-CM

## 2024-03-14 PROCEDURE — 76705 ECHO EXAM OF ABDOMEN: CPT

## 2024-03-14 RX ORDER — VALSARTAN 80 MG/1
TABLET ORAL
Qty: 90 TABLET | Refills: 1 | Status: SHIPPED | OUTPATIENT
Start: 2024-03-14

## 2024-03-18 ENCOUNTER — TELEPHONE (OUTPATIENT)
Dept: CARDIOLOGY CLINIC | Age: 59
End: 2024-03-18

## 2024-03-18 NOTE — TELEPHONE ENCOUNTER
Date: TBD    Cardiologist: Dr. Shukla     Procedure: Back inj    Surgeon: [facility]     Last Office Visit: 2-19-24    Reason for office visit and medical concerns addressed at this office visit: CAD, HTN, dyslipidemia, Hep C    Testing Performed and Date of Service:   EKG 2-19-24    Does the patient have a stent? If so, what type?  Not in last year     Current Medications: ASA, lipitor, plavix, voltaren, lexapro, lasix, norco, protonix, K+, lyrica, zanaflex, diovan    Is the patient currently taking an anticoagulant? If so, what is the diagnosis the patient has been given to warrant the need for the anticoagulant? Plavix    Additional Notes: Med hold on Plavix

## 2024-03-19 DIAGNOSIS — M54.32 LEFT SCIATIC NERVE PAIN: ICD-10-CM

## 2024-03-19 DIAGNOSIS — G71.00 MD (MUSCULAR DYSTROPHY) (HCC): ICD-10-CM

## 2024-03-19 DIAGNOSIS — M54.50 LUMBAR PAIN: ICD-10-CM

## 2024-03-20 ENCOUNTER — TELEPHONE (OUTPATIENT)
Dept: GASTROENTEROLOGY | Age: 59
End: 2024-03-20

## 2024-03-20 NOTE — TELEPHONE ENCOUNTER
----- Message from ANTHONY Gifford - NP sent at 3/20/2024 12:56 PM CDT -----  Stable US liver, without mass or lesion

## 2024-03-22 DIAGNOSIS — K74.60 CIRRHOSIS OF LIVER WITHOUT ASCITES, UNSPECIFIED HEPATIC CIRRHOSIS TYPE (HCC): Primary | ICD-10-CM

## 2024-04-05 DIAGNOSIS — K74.60 CIRRHOSIS OF LIVER WITHOUT ASCITES, UNSPECIFIED HEPATIC CIRRHOSIS TYPE (HCC): ICD-10-CM

## 2024-04-05 LAB
AFP SERPL-MCNC: 1.8 NG/ML (ref 0–8.3)
ALBUMIN SERPL-MCNC: 4.5 G/DL (ref 3.5–5.2)
ALP SERPL-CCNC: 80 U/L (ref 40–130)
ALT SERPL-CCNC: 14 U/L (ref 5–41)
ANION GAP SERPL CALCULATED.3IONS-SCNC: 10 MMOL/L (ref 7–19)
AST SERPL-CCNC: 22 U/L (ref 5–40)
BASOPHILS # BLD: 0 K/UL (ref 0–0.2)
BASOPHILS NFR BLD: 0.7 % (ref 0–1)
BILIRUB SERPL-MCNC: 0.8 MG/DL (ref 0.2–1.2)
BUN SERPL-MCNC: 15 MG/DL (ref 8–23)
CALCIUM SERPL-MCNC: 9.2 MG/DL (ref 8.8–10.2)
CHLORIDE SERPL-SCNC: 104 MMOL/L (ref 98–111)
CO2 SERPL-SCNC: 28 MMOL/L (ref 22–29)
CREAT SERPL-MCNC: 0.4 MG/DL (ref 0.5–1.2)
EOSINOPHIL # BLD: 0.1 K/UL (ref 0–0.6)
EOSINOPHIL NFR BLD: 1.6 % (ref 0–5)
ERYTHROCYTE [DISTWIDTH] IN BLOOD BY AUTOMATED COUNT: 13.8 % (ref 11.5–14.5)
GLUCOSE SERPL-MCNC: 95 MG/DL (ref 74–109)
HCT VFR BLD AUTO: 39.3 % (ref 42–52)
HGB BLD-MCNC: 12.4 G/DL (ref 14–18)
IMM GRANULOCYTES # BLD: 0 K/UL
INR PPP: 1.1 (ref 0.88–1.18)
LYMPHOCYTES # BLD: 1.3 K/UL (ref 1.1–4.5)
LYMPHOCYTES NFR BLD: 29.2 % (ref 20–40)
MCH RBC QN AUTO: 29.7 PG (ref 27–31)
MCHC RBC AUTO-ENTMCNC: 31.6 G/DL (ref 33–37)
MCV RBC AUTO: 94 FL (ref 80–94)
MONOCYTES # BLD: 0.5 K/UL (ref 0–0.9)
MONOCYTES NFR BLD: 10.4 % (ref 0–10)
NEUTROPHILS # BLD: 2.6 K/UL (ref 1.5–7.5)
NEUTS SEG NFR BLD: 57.9 % (ref 50–65)
PLATELET # BLD AUTO: 312 K/UL (ref 130–400)
PMV BLD AUTO: 9.2 FL (ref 9.4–12.4)
POTASSIUM SERPL-SCNC: 3.7 MMOL/L (ref 3.5–5)
PROT SERPL-MCNC: 7 G/DL (ref 6.6–8.7)
PROTHROMBIN TIME: 13.9 SEC (ref 12–14.6)
RBC # BLD AUTO: 4.18 M/UL (ref 4.7–6.1)
SODIUM SERPL-SCNC: 142 MMOL/L (ref 136–145)
WBC # BLD AUTO: 4.4 K/UL (ref 4.8–10.8)

## 2024-04-08 DIAGNOSIS — F33.41 RECURRENT MAJOR DEPRESSIVE DISORDER, IN PARTIAL REMISSION (HCC): ICD-10-CM

## 2024-04-08 RX ORDER — ESCITALOPRAM OXALATE 20 MG/1
20 TABLET ORAL DAILY
Qty: 30 TABLET | Refills: 0 | Status: SHIPPED | OUTPATIENT
Start: 2024-04-08

## 2024-04-08 NOTE — TELEPHONE ENCOUNTER
Reagan called requesting a refill of the below medication which has been pended for you:     Requested Prescriptions     Pending Prescriptions Disp Refills    escitalopram (LEXAPRO) 20 MG tablet [Pharmacy Med Name: Escitalopram Oxalate 20 MG Oral Tablet] 30 tablet 0     Sig: Take 1 tablet by mouth once daily       Last Appointment Date: 1/24/2024  Next Appointment Date: Visit date not found    Allergies   Allergen Reactions    Shellfish-Derived Products Anaphylaxis    Norvasc [Amlodipine]      Lower leg edema

## 2024-04-12 ENCOUNTER — LAB (OUTPATIENT)
Dept: LAB | Facility: HOSPITAL | Age: 59
End: 2024-04-12
Payer: MEDICARE

## 2024-04-12 ENCOUNTER — TRANSCRIBE ORDERS (OUTPATIENT)
Dept: ADMINISTRATIVE | Facility: HOSPITAL | Age: 59
End: 2024-04-12
Payer: MEDICARE

## 2024-04-12 DIAGNOSIS — Z79.891 ENCOUNTER FOR LONG-TERM METHADONE USE: ICD-10-CM

## 2024-04-12 DIAGNOSIS — M47.816 LUMBAR SPONDYLOSIS: ICD-10-CM

## 2024-04-12 DIAGNOSIS — M47.816 LUMBAR SPONDYLOSIS: Primary | ICD-10-CM

## 2024-04-12 DIAGNOSIS — M81.0 SENILE OSTEOPOROSIS: ICD-10-CM

## 2024-04-12 DIAGNOSIS — G71.039 LIMB-GIRDLE MUSCULAR DYSTROPHY: ICD-10-CM

## 2024-04-12 DIAGNOSIS — G89.29 OTHER CHRONIC PAIN: ICD-10-CM

## 2024-04-12 PROCEDURE — 36415 COLL VENOUS BLD VENIPUNCTURE: CPT

## 2024-04-12 PROCEDURE — 84153 ASSAY OF PSA TOTAL: CPT

## 2024-04-13 LAB — PSA SERPL-MCNC: 0.25 NG/ML (ref 0–4)

## 2024-05-01 DIAGNOSIS — F41.9 ANXIETY: ICD-10-CM

## 2024-05-01 DIAGNOSIS — F33.41 RECURRENT MAJOR DEPRESSIVE DISORDER, IN PARTIAL REMISSION (HCC): ICD-10-CM

## 2024-05-01 RX ORDER — LAMOTRIGINE 100 MG/1
100 TABLET ORAL DAILY
Qty: 90 TABLET | Refills: 3 | Status: SHIPPED | OUTPATIENT
Start: 2024-05-01

## 2024-05-02 ENCOUNTER — OFFICE VISIT (OUTPATIENT)
Dept: GASTROENTEROLOGY | Age: 59
End: 2024-05-02
Payer: MEDICARE

## 2024-05-02 VITALS
BODY MASS INDEX: 25.76 KG/M2 | WEIGHT: 184 LBS | HEIGHT: 71 IN | OXYGEN SATURATION: 95 % | HEART RATE: 83 BPM | SYSTOLIC BLOOD PRESSURE: 150 MMHG | DIASTOLIC BLOOD PRESSURE: 90 MMHG

## 2024-05-02 DIAGNOSIS — K74.60 CIRRHOSIS OF LIVER WITHOUT ASCITES, UNSPECIFIED HEPATIC CIRRHOSIS TYPE (HCC): Primary | ICD-10-CM

## 2024-05-02 PROCEDURE — 3017F COLORECTAL CA SCREEN DOC REV: CPT | Performed by: NURSE PRACTITIONER

## 2024-05-02 PROCEDURE — 4004F PT TOBACCO SCREEN RCVD TLK: CPT | Performed by: NURSE PRACTITIONER

## 2024-05-02 PROCEDURE — 99214 OFFICE O/P EST MOD 30 MIN: CPT | Performed by: NURSE PRACTITIONER

## 2024-05-02 PROCEDURE — G8419 CALC BMI OUT NRM PARAM NOF/U: HCPCS | Performed by: NURSE PRACTITIONER

## 2024-05-02 PROCEDURE — G8427 DOCREV CUR MEDS BY ELIG CLIN: HCPCS | Performed by: NURSE PRACTITIONER

## 2024-05-02 RX ORDER — TESTOSTERONE CYPIONATE 1000 MG/10ML
100 INJECTION, SOLUTION INTRAMUSCULAR ONCE
COMMUNITY

## 2024-05-02 ASSESSMENT — ENCOUNTER SYMPTOMS
TROUBLE SWALLOWING: 0
CONSTIPATION: 0
ABDOMINAL DISTENTION: 0
ANAL BLEEDING: 0
BLOOD IN STOOL: 0
ABDOMINAL PAIN: 0
VOMITING: 0
CHOKING: 0
COUGH: 0
DIARRHEA: 0
RECTAL PAIN: 0
NAUSEA: 0
SHORTNESS OF BREATH: 0

## 2024-05-02 NOTE — PROGRESS NOTES
declined   Physical Activity: Inactive (1/24/2024)    Exercise Vital Sign     Days of Exercise per Week: 0 days     Minutes of Exercise per Session: 0 min   Housing Stability: Unknown (6/23/2023)    Housing Stability Vital Sign     Unstable Housing in the Last Year: Patient refused       Current Outpatient Medications   Medication Sig Dispense Refill    testosterone cypionate (DEPOTESTOTERONE CYPIONATE) 100 MG/ML injection Inject 1 mL into the muscle once. Max Daily Amount: 100 mg      lamoTRIgine (LAMICTAL) 100 MG tablet Take 1 tablet by mouth once daily 90 tablet 3    escitalopram (LEXAPRO) 20 MG tablet Take 1 tablet by mouth once daily 30 tablet 0    valsartan (DIOVAN) 80 MG tablet Take 1/2 (one-half) tablet by mouth twice daily 90 tablet 1    pantoprazole (PROTONIX) 40 MG tablet Take 1 tablet by mouth once daily 60 tablet 0    clopidogrel (PLAVIX) 75 MG tablet Take 1 tablet by mouth once daily 90 tablet 1    atorvastatin (LIPITOR) 40 MG tablet Take 1 tablet by mouth once daily 90 tablet 1    tiZANidine (ZANAFLEX) 4 MG tablet TAKE 1 TABLET BY MOUTH THREE TIMES DAILY 90 tablet 1    HYDROcodone-acetaminophen (NORCO) 5-325 MG per tablet Take 1 tablet by mouth every 6 hours as needed for Pain.      pregabalin (LYRICA) 150 MG capsule Take 1 capsule by mouth in the morning, at noon, and at bedtime for 90 days. Max Daily Amount: 450 mg 90 capsule 2    aspirin 325 MG EC tablet Take one tablet of 325 mg Aspirin daily. 90 tablet 3    potassium chloride (KLOR-CON M) 10 MEQ extended release tablet Take 1 tablet by mouth once daily 30 tablet 0    furosemide (LASIX) 40 MG tablet Take 1 tablet by mouth once daily 30 tablet 0    Elastic Bandages & Supports (MEDICAL COMPRESSION STOCKINGS) MISC 1 each by Does not apply route daily as needed (swelling) 1 each 0    ketoconazole (NIZORAL) 2 % shampoo Apply topically daily as needed. 120 mL 1    triamcinolone (KENALOG) 0.025 % cream Apply topically 2 times daily. (Patient taking

## 2024-05-05 DIAGNOSIS — K29.90 GASTRITIS AND DUODENITIS: ICD-10-CM

## 2024-05-07 DIAGNOSIS — F33.41 RECURRENT MAJOR DEPRESSIVE DISORDER, IN PARTIAL REMISSION (HCC): ICD-10-CM

## 2024-05-07 RX ORDER — ESCITALOPRAM OXALATE 20 MG/1
20 TABLET ORAL DAILY
Qty: 30 TABLET | Refills: 5 | Status: SHIPPED | OUTPATIENT
Start: 2024-05-07

## 2024-05-07 RX ORDER — PANTOPRAZOLE SODIUM 40 MG/1
TABLET, DELAYED RELEASE ORAL
Qty: 60 TABLET | Refills: 5 | Status: SHIPPED | OUTPATIENT
Start: 2024-05-07

## 2024-05-07 NOTE — TELEPHONE ENCOUNTER
Reagan called requesting a refill of the below medication which has been pended for you:     Requested Prescriptions     Pending Prescriptions Disp Refills    escitalopram (LEXAPRO) 20 MG tablet [Pharmacy Med Name: Escitalopram Oxalate 20 MG Oral Tablet] 30 tablet 0     Sig: Take 1 tablet by mouth once daily       Last Appointment Date: Visit date not found  Next Appointment Date: Visit date not found    Allergies   Allergen Reactions    Shellfish-Derived Products Anaphylaxis    Norvasc [Amlodipine]      Lower leg edema

## 2024-05-15 ENCOUNTER — OFFICE VISIT (OUTPATIENT)
Dept: FAMILY MEDICINE CLINIC | Age: 59
End: 2024-05-15
Payer: MEDICARE

## 2024-05-15 VITALS
HEIGHT: 71 IN | SYSTOLIC BLOOD PRESSURE: 136 MMHG | OXYGEN SATURATION: 98 % | DIASTOLIC BLOOD PRESSURE: 88 MMHG | WEIGHT: 189 LBS | TEMPERATURE: 97.7 F | HEART RATE: 82 BPM | BODY MASS INDEX: 26.46 KG/M2

## 2024-05-15 DIAGNOSIS — M54.50 LUMBAR PAIN: ICD-10-CM

## 2024-05-15 DIAGNOSIS — F33.41 RECURRENT MAJOR DEPRESSIVE DISORDER, IN PARTIAL REMISSION (HCC): Primary | ICD-10-CM

## 2024-05-15 DIAGNOSIS — M54.32 LEFT SCIATIC NERVE PAIN: ICD-10-CM

## 2024-05-15 DIAGNOSIS — G71.00 MD (MUSCULAR DYSTROPHY) (HCC): ICD-10-CM

## 2024-05-15 PROCEDURE — G8419 CALC BMI OUT NRM PARAM NOF/U: HCPCS | Performed by: NURSE PRACTITIONER

## 2024-05-15 PROCEDURE — 4004F PT TOBACCO SCREEN RCVD TLK: CPT | Performed by: NURSE PRACTITIONER

## 2024-05-15 PROCEDURE — 3017F COLORECTAL CA SCREEN DOC REV: CPT | Performed by: NURSE PRACTITIONER

## 2024-05-15 PROCEDURE — 99214 OFFICE O/P EST MOD 30 MIN: CPT | Performed by: NURSE PRACTITIONER

## 2024-05-15 PROCEDURE — G8427 DOCREV CUR MEDS BY ELIG CLIN: HCPCS | Performed by: NURSE PRACTITIONER

## 2024-05-15 RX ORDER — ERGOCALCIFEROL 1.25 MG/1
50000 CAPSULE ORAL WEEKLY
COMMUNITY

## 2024-05-15 RX ORDER — PREGABALIN 150 MG/1
CAPSULE ORAL
Qty: 90 CAPSULE | Refills: 0 | OUTPATIENT
Start: 2024-05-15

## 2024-05-15 RX ORDER — PREGABALIN 150 MG/1
150 CAPSULE ORAL 3 TIMES DAILY
Qty: 90 CAPSULE | Refills: 2 | Status: SHIPPED | OUTPATIENT
Start: 2024-05-15 | End: 2024-08-13

## 2024-05-15 ASSESSMENT — ENCOUNTER SYMPTOMS
BACK PAIN: 1
GASTROINTESTINAL NEGATIVE: 1
RESPIRATORY NEGATIVE: 1

## 2024-05-28 ENCOUNTER — OFFICE VISIT (OUTPATIENT)
Dept: NEUROLOGY | Age: 59
End: 2024-05-28
Payer: MEDICARE

## 2024-05-28 VITALS
HEART RATE: 90 BPM | HEIGHT: 71 IN | WEIGHT: 189 LBS | BODY MASS INDEX: 26.46 KG/M2 | DIASTOLIC BLOOD PRESSURE: 102 MMHG | SYSTOLIC BLOOD PRESSURE: 160 MMHG | OXYGEN SATURATION: 100 %

## 2024-05-28 DIAGNOSIS — R29.6 FALLS FREQUENTLY: ICD-10-CM

## 2024-05-28 DIAGNOSIS — G71.00 MUSCULAR DYSTROPHY (HCC): Primary | ICD-10-CM

## 2024-05-28 PROCEDURE — G8427 DOCREV CUR MEDS BY ELIG CLIN: HCPCS | Performed by: NURSE PRACTITIONER

## 2024-05-28 PROCEDURE — G8419 CALC BMI OUT NRM PARAM NOF/U: HCPCS | Performed by: NURSE PRACTITIONER

## 2024-05-28 PROCEDURE — 4004F PT TOBACCO SCREEN RCVD TLK: CPT | Performed by: NURSE PRACTITIONER

## 2024-05-28 PROCEDURE — 99213 OFFICE O/P EST LOW 20 MIN: CPT | Performed by: NURSE PRACTITIONER

## 2024-05-28 PROCEDURE — 3017F COLORECTAL CA SCREEN DOC REV: CPT | Performed by: NURSE PRACTITIONER

## 2024-05-28 RX ORDER — HYDROXYZINE HYDROCHLORIDE 25 MG/1
25 TABLET, FILM COATED ORAL NIGHTLY
COMMUNITY
Start: 2024-03-31

## 2024-05-28 NOTE — PROGRESS NOTES
[facility] Office Note      Patient:   Reagan Diallo  MR#:    [id number]  Account Number:                         YOB: 1965  Date of Evaluation:  5/28/2024  Time of Note:                          11:55 AM  Primary/Referring Physician:  Manjula Acevedo APRN   Consulting Physician:  ANTHONY Hightower DNP    FOLLOW UP    Chief Complaint   Patient presents with    Follow-up     Pt states things are about the same. Episode of freezing then suddenly felt like he was cold with sob. Denies these symptoms now.    Dizziness     C/o dizziness and lightheadedness       HISTORY OF PRESENT ILLNESS    Reagan Diallo is a 59 y.o. year old male here for follow up of falls, weakness. Has a history of muscular dystrophy in his 20s. He is unsure of the type of MD. He has had several falls since the last visit. Overall has noted an increase in falls over the past 2-3 years. He has done PT in the past with benefit.  When he falls he will fall backwards typically. He notes weakness in the RUE and BLE. Chronic bilateral ptosis noted. Notes diplopia, typically worse in the morning. Denies dysphagia, choking episodes. States he has a chronic cough.  He is using a cane, using a wheelchair for longer distances. Notes chronic neck/back pain. Has seen neurosurgery, conservative therapy recommended. Following with pain management as well, on chronic opioid therapy and has gotten injections. Has noted bilateral upper extremity tremor. Worsens with fine motor tasks, intention. Notes an internal tremor as well. Denies resting tremor. Has noted tremor for quite some time but worsened over the past few months. Tremor is not interfering with ADLs. Family history with grandmother and sisters having similar symptoms, diagnosed with MD.  Reports an episode of shortness of air, feeling really cold and then felt really hot. Some associated chest tightness/pressure with this episode. States these symptoms woke him up in the middle of

## 2024-06-03 DIAGNOSIS — M54.42 LEFT-SIDED LOW BACK PAIN WITH LEFT-SIDED SCIATICA, UNSPECIFIED CHRONICITY: ICD-10-CM

## 2024-06-03 NOTE — TELEPHONE ENCOUNTER
Received fax from pharmacy requesting refill on pts medication(s). Pt was last seen in office on 5/15/2024  and has a follow up scheduled for Visit date not found. Will send request to  Manjula Acevedo  for authorization.     Requested Prescriptions     Pending Prescriptions Disp Refills    tiZANidine (ZANAFLEX) 4 MG tablet [Pharmacy Med Name: tiZANidine HCl 4 MG Oral Tablet] 90 tablet 3     Sig: TAKE 1 TABLET BY MOUTH THREE TIMES DAILY

## 2024-06-04 RX ORDER — TIZANIDINE 4 MG/1
TABLET ORAL
Qty: 90 TABLET | Refills: 3 | Status: SHIPPED | OUTPATIENT
Start: 2024-06-04

## 2024-08-19 ENCOUNTER — OFFICE VISIT (OUTPATIENT)
Dept: CARDIOLOGY CLINIC | Age: 59
End: 2024-08-19
Payer: MEDICARE

## 2024-08-19 VITALS
SYSTOLIC BLOOD PRESSURE: 130 MMHG | WEIGHT: 193 LBS | DIASTOLIC BLOOD PRESSURE: 78 MMHG | OXYGEN SATURATION: 96 % | BODY MASS INDEX: 27.02 KG/M2 | HEIGHT: 71 IN | HEART RATE: 85 BPM

## 2024-08-19 DIAGNOSIS — I10 ESSENTIAL HYPERTENSION: ICD-10-CM

## 2024-08-19 DIAGNOSIS — I25.10 CORONARY ARTERY DISEASE INVOLVING NATIVE CORONARY ARTERY OF NATIVE HEART WITHOUT ANGINA PECTORIS: Primary | ICD-10-CM

## 2024-08-19 DIAGNOSIS — E78.5 DYSLIPIDEMIA: ICD-10-CM

## 2024-08-19 PROCEDURE — G8427 DOCREV CUR MEDS BY ELIG CLIN: HCPCS | Performed by: NURSE PRACTITIONER

## 2024-08-19 PROCEDURE — G8419 CALC BMI OUT NRM PARAM NOF/U: HCPCS | Performed by: NURSE PRACTITIONER

## 2024-08-19 PROCEDURE — 4004F PT TOBACCO SCREEN RCVD TLK: CPT | Performed by: NURSE PRACTITIONER

## 2024-08-19 PROCEDURE — 3078F DIAST BP <80 MM HG: CPT | Performed by: NURSE PRACTITIONER

## 2024-08-19 PROCEDURE — 3075F SYST BP GE 130 - 139MM HG: CPT | Performed by: NURSE PRACTITIONER

## 2024-08-19 PROCEDURE — 3017F COLORECTAL CA SCREEN DOC REV: CPT | Performed by: NURSE PRACTITIONER

## 2024-08-19 PROCEDURE — 99214 OFFICE O/P EST MOD 30 MIN: CPT | Performed by: NURSE PRACTITIONER

## 2024-08-19 ASSESSMENT — ENCOUNTER SYMPTOMS
CHEST TIGHTNESS: 0
WHEEZING: 0
SORE THROAT: 0
COUGH: 0
SHORTNESS OF BREATH: 0

## 2024-08-19 NOTE — PROGRESS NOTES
[facility]   Established Patient Office Visit  [address]  [address]  [facility] [id number] [phone]        OFFICE VISIT:  2024    Reagan Diallo - : 1965    Reason For Visit:  Reagan is a 59 y.o. male who is here for 6 Month Follow-Up    1. Coronary artery disease involving native coronary artery of native heart without angina pectoris    2. Essential hypertension    3. Dyslipidemia          Patient with a history of coronary artery disease, dyslipidemia, hypertension and tachycardia.        Patient underwent a dobutamine stress echocardiogram which was positive clinically and by EKG.  He did undergo cardiac catheterization on 2023.   Single-vessel disease with focal mid to distal LAD stenosis and a large   LAD vessel.   Tortuous coronary arteries.   Normal LV systolic function with LVH noted.   Hypertensive heart disease.   Successful PCI to mid to distal LAD (2.5 x 14 mm resolute integrity)   utilizing drug-eluting stent.      Recommendations      Medical management. Plavix uninterrupted for 6 months.   Hypertensive management.      Signatures      ----------------------------------------------------------------   Electronically signed by Binu Shukla MD(Performing Physician) on   2023 20:07   ----------------------------------------------------------------     His ZIO patch showed sinus rhythm with a minimum heart rate of 54 bpm.  Maximum 146 bpm.  Average 81 bpm.  A-fib burden 6%.  24 episodes of SVT.         Patient presents to clinic today for routine follow-up.  Patient denies any complaints of chest pain, pressure or tightness.  There is no shortness of breath, orthopnea or PND.  Patient denies any lightheadedness, dizziness or syncope.       Subjective    Reagan Diallo is a 59 y.o. male with the following history as recorded in [facility]:    Patient Active Problem List    Diagnosis Date Noted    Claudication (HCC) 2023    Poor memory 2022    Hypersomnia due to

## 2024-09-05 RX ORDER — CLOPIDOGREL BISULFATE 75 MG/1
75 TABLET ORAL DAILY
Qty: 90 TABLET | Refills: 3 | Status: SHIPPED | OUTPATIENT
Start: 2024-09-05

## 2024-09-05 RX ORDER — VALSARTAN 80 MG/1
TABLET ORAL
Qty: 90 TABLET | Refills: 3 | Status: SHIPPED | OUTPATIENT
Start: 2024-09-05

## 2024-09-05 RX ORDER — ATORVASTATIN CALCIUM 40 MG/1
40 TABLET, FILM COATED ORAL DAILY
Qty: 90 TABLET | Refills: 3 | Status: SHIPPED | OUTPATIENT
Start: 2024-09-05

## 2024-09-28 DIAGNOSIS — G71.00 MD (MUSCULAR DYSTROPHY) (HCC): ICD-10-CM

## 2024-09-28 DIAGNOSIS — M54.50 LUMBAR PAIN: ICD-10-CM

## 2024-09-28 DIAGNOSIS — M54.32 LEFT SCIATIC NERVE PAIN: ICD-10-CM

## 2024-09-30 ENCOUNTER — TELEPHONE (OUTPATIENT)
Dept: GASTROENTEROLOGY | Age: 59
End: 2024-09-30

## 2024-09-30 NOTE — TELEPHONE ENCOUNTER
----- Message from KATHERINE HELM MA sent at 4/19/2024 11:07 AM CDT -----  Regarding: labs  4.19.24  Per JG -- Repeat CBC, CMP, Pt/INR, AFP in 6 months -- due October 2024 9.30.24  Called patient to let him know it is time for us liver and lab work. Told patient he can get lab work done the same day as us liver. Transferred patient to scheduling department to get us liver schedule. Patient voiced understanding

## 2024-09-30 NOTE — TELEPHONE ENCOUNTER
Reagan Diallo called to request a refill on his medication.      Last office visit : 5/15/2024   Next office visit : Visit date not found     Last UDS:   Amphetamines   Date Value Ref Range Status   06/12/2013 NEGATIVE  Final     Benzodiazepines   Date Value Ref Range Status   07/31/2014 NEGATIVE  Final     Benzodiazepine Screen, Urine   Date Value Ref Range Status   04/26/2023 Negative Negative <150 ng/mL Final     Buprenorphine Urine   Date Value Ref Range Status   04/26/2023 Negative Negative <10 ng/mL Final     Cocaine Metabolite Screen, Urine   Date Value Ref Range Status   04/26/2023 Negative Negative <150 ng/mL Final     Gabapentin Screen, Urine   Date Value Ref Range Status   01/17/2023 neg  Final     Oxycodone Screen, Ur   Date Value Ref Range Status   01/17/2023 positive  Final     Propoxyphene Screen, Urine   Date Value Ref Range Status   01/17/2023 neg  Final     THC Screen, Urine   Date Value Ref Range Status   01/17/2023 neg  Final     Tricyclic Antidepressants, Urine   Date Value Ref Range Status   04/26/2023 Negative Negative <300 ng/mL Final       Last Riley: 5/15/24  Last Fill: 5/15/24    Requested Prescriptions     Pending Prescriptions Disp Refills    pregabalin (LYRICA) 150 MG capsule [Pharmacy Med Name: Pregabalin 150 MG Oral Capsule] 90 capsule 0     Sig: TAKE 1 CAPSULE BY MOUTH IN THE MORNING AND 1 AT NOON AND 1 AT BEDTIME         Please approve or refuse this medication.   Jena Velez LPN

## 2024-10-01 RX ORDER — PREGABALIN 150 MG/1
CAPSULE ORAL
Qty: 90 CAPSULE | Refills: 0 | Status: SHIPPED | OUTPATIENT
Start: 2024-10-01 | End: 2024-10-31

## 2024-10-14 ENCOUNTER — HOSPITAL ENCOUNTER (OUTPATIENT)
Dept: GENERAL RADIOLOGY | Age: 59
Discharge: HOME | End: 2024-10-14
Payer: MEDICARE

## 2024-10-14 DIAGNOSIS — K74.60 CIRRHOSIS OF LIVER WITHOUT ASCITES, UNSPECIFIED HEPATIC CIRRHOSIS TYPE (HCC): ICD-10-CM

## 2024-10-14 PROCEDURE — 76705 ECHO EXAM OF ABDOMEN: CPT

## 2024-10-28 DIAGNOSIS — K74.60 CIRRHOSIS OF LIVER WITHOUT ASCITES, UNSPECIFIED HEPATIC CIRRHOSIS TYPE (HCC): ICD-10-CM

## 2024-10-28 LAB
AFP SERPL-MCNC: 3 NG/ML (ref 0–8.3)
ALBUMIN SERPL-MCNC: 4.6 G/DL (ref 3.5–5.2)
ALP SERPL-CCNC: 80 U/L (ref 40–129)
ALT SERPL-CCNC: 27 U/L (ref 5–41)
ANION GAP SERPL CALCULATED.3IONS-SCNC: 9 MMOL/L (ref 7–19)
AST SERPL-CCNC: 41 U/L (ref 5–40)
BASOPHILS # BLD: 0 K/UL (ref 0–0.2)
BASOPHILS NFR BLD: 0.6 % (ref 0–1)
BILIRUB SERPL-MCNC: 0.5 MG/DL (ref 0.2–1.2)
BUN SERPL-MCNC: 8 MG/DL (ref 8–23)
CALCIUM SERPL-MCNC: 9.4 MG/DL (ref 8.8–10.2)
CHLORIDE SERPL-SCNC: 100 MMOL/L (ref 98–111)
CO2 SERPL-SCNC: 29 MMOL/L (ref 22–29)
CREAT SERPL-MCNC: 0.5 MG/DL (ref 0.7–1.2)
EOSINOPHIL # BLD: 0.1 K/UL (ref 0–0.6)
EOSINOPHIL NFR BLD: 0.9 % (ref 0–5)
ERYTHROCYTE [DISTWIDTH] IN BLOOD BY AUTOMATED COUNT: 14.7 % (ref 11.5–14.5)
GLUCOSE SERPL-MCNC: 84 MG/DL (ref 70–99)
HCT VFR BLD AUTO: 46.3 % (ref 42–52)
HGB BLD-MCNC: 14.2 G/DL (ref 14–18)
IMM GRANULOCYTES # BLD: 0 K/UL
INR PPP: 0.99 (ref 0.88–1.18)
LYMPHOCYTES # BLD: 1.6 K/UL (ref 1.1–4.5)
LYMPHOCYTES NFR BLD: 23.5 % (ref 20–40)
MCH RBC QN AUTO: 28.3 PG (ref 27–31)
MCHC RBC AUTO-ENTMCNC: 30.7 G/DL (ref 33–37)
MCV RBC AUTO: 92.2 FL (ref 80–94)
MONOCYTES # BLD: 0.9 K/UL (ref 0–0.9)
MONOCYTES NFR BLD: 13.3 % (ref 0–10)
NEUTROPHILS # BLD: 4.2 K/UL (ref 1.5–7.5)
NEUTS SEG NFR BLD: 61.6 % (ref 50–65)
PLATELET # BLD AUTO: 278 K/UL (ref 130–400)
PMV BLD AUTO: 9.7 FL (ref 9.4–12.4)
POTASSIUM SERPL-SCNC: 4 MMOL/L (ref 3.5–5)
PROT SERPL-MCNC: 7.7 G/DL (ref 6.4–8.3)
PROTHROMBIN TIME: 12.8 SEC (ref 12–14.6)
RBC # BLD AUTO: 5.02 M/UL (ref 4.7–6.1)
SODIUM SERPL-SCNC: 138 MMOL/L (ref 136–145)
WBC # BLD AUTO: 6.9 K/UL (ref 4.8–10.8)

## 2024-10-31 DIAGNOSIS — M54.42 LEFT-SIDED LOW BACK PAIN WITH LEFT-SIDED SCIATICA, UNSPECIFIED CHRONICITY: ICD-10-CM

## 2024-11-01 NOTE — TELEPHONE ENCOUNTER
Reagan called requesting a refill of the below medication which has been pended for you:     Requested Prescriptions     Pending Prescriptions Disp Refills    tiZANidine (ZANAFLEX) 4 MG tablet [Pharmacy Med Name: tiZANidine HCl 4 MG Oral Tablet] 90 tablet 0     Sig: TAKE 1 TABLET BY MOUTH THREE TIMES DAILY       Last Appointment Date: 5/15/2024  Next Appointment Date: Visit date not found    Allergies   Allergen Reactions    Shellfish-Derived Products Anaphylaxis    Norvasc [Amlodipine]      Lower leg edema

## 2024-11-04 ENCOUNTER — TELEPHONE (OUTPATIENT)
Dept: CARDIOLOGY CLINIC | Age: 59
End: 2024-11-04

## 2024-11-04 ENCOUNTER — OFFICE VISIT (OUTPATIENT)
Dept: GASTROENTEROLOGY | Age: 59
End: 2024-11-04
Payer: MEDICARE

## 2024-11-04 VITALS
HEIGHT: 71 IN | WEIGHT: 182 LBS | OXYGEN SATURATION: 98 % | HEART RATE: 100 BPM | BODY MASS INDEX: 25.48 KG/M2 | DIASTOLIC BLOOD PRESSURE: 85 MMHG | SYSTOLIC BLOOD PRESSURE: 160 MMHG

## 2024-11-04 DIAGNOSIS — K74.60 CIRRHOSIS OF LIVER WITHOUT ASCITES, UNSPECIFIED HEPATIC CIRRHOSIS TYPE (HCC): Primary | ICD-10-CM

## 2024-11-04 DIAGNOSIS — R07.9 CHEST PAIN, UNSPECIFIED TYPE: ICD-10-CM

## 2024-11-04 PROCEDURE — 4004F PT TOBACCO SCREEN RCVD TLK: CPT | Performed by: NURSE PRACTITIONER

## 2024-11-04 PROCEDURE — 99214 OFFICE O/P EST MOD 30 MIN: CPT | Performed by: NURSE PRACTITIONER

## 2024-11-04 PROCEDURE — 3017F COLORECTAL CA SCREEN DOC REV: CPT | Performed by: NURSE PRACTITIONER

## 2024-11-04 PROCEDURE — G8419 CALC BMI OUT NRM PARAM NOF/U: HCPCS | Performed by: NURSE PRACTITIONER

## 2024-11-04 PROCEDURE — G8484 FLU IMMUNIZE NO ADMIN: HCPCS | Performed by: NURSE PRACTITIONER

## 2024-11-04 PROCEDURE — G8428 CUR MEDS NOT DOCUMENT: HCPCS | Performed by: NURSE PRACTITIONER

## 2024-11-04 ASSESSMENT — ENCOUNTER SYMPTOMS
RECTAL PAIN: 0
NAUSEA: 0
ABDOMINAL DISTENTION: 0
CHOKING: 0
VOMITING: 0
ABDOMINAL PAIN: 0
COUGH: 0
CONSTIPATION: 0
BLOOD IN STOOL: 0
SHORTNESS OF BREATH: 0
TROUBLE SWALLOWING: 0
DIARRHEA: 0
ANAL BLEEDING: 0

## 2024-11-04 NOTE — PROGRESS NOTES
Subjective:     Patient ID: Reagan Diallo is a 59 y.o. male  PCP: Manjula Acevedo APRN  Referring Provider: No ref. provider found    HPI  Patient presents to the office today with the following complaints: 6 Month Follow-Up      Pt seen today for follow up.  Hx cirrhosis.  US liver UTD without hepatic mass or lesions.  MELD score stable, AFP WNL.  He denies any melena, hematemesis, ascites, lower extremity edema.  Chronic issues with sleep.  BM daily.  Today, pt states he is doing well.  Denies any current issues or concerns.  He is due for EGD and Colonoscopy.                Today, pt has c/o occasional chest pains.  Hx CAD with stent 2/2023.      MELD 6 points (10/28/2024)    Last EGD 11/20/2023 - (-)Hpylori, (-)Obvious esoph varices, 1 year recall   Last Colonoscopy 6/2021 - int hemorrhids, RANDOM COLON BX NEGATIVE, Sub Prep Fair, 3 year recall    Hx CAD with stent on ASA and Plavix     WBC (K/uL)   Date Value   10/28/2024 6.9     Hemoglobin (g/dL)   Date Value   10/28/2024 14.2     Hematocrit (%)   Date Value   10/28/2024 46.3   05/10/2012 39.9 (L)     Platelet Count (no units)   Date Value   05/10/2012 278     Platelets (K/uL)   Date Value   10/28/2024 278     Lab Results   Component Value Date     10/28/2024    K 4.0 10/28/2024     10/28/2024    CO2 29 10/28/2024    BUN 8 10/28/2024    CREATININE 0.5 (L) 10/28/2024    GLUCOSE 84 10/28/2024    CALCIUM 9.4 10/28/2024    BILITOT 0.5 10/28/2024    ALKPHOS 80 10/28/2024    AST 41 (H) 10/28/2024    ALT 27 10/28/2024    LABGLOM >90 10/28/2024    GFRAA >59 10/14/2022       Lab Results   Component Value Date    INR 0.99 10/28/2024    INR 1.10 04/05/2024    INR 1.07 10/26/2023    PROTIME 12.8 10/28/2024    PROTIME 13.9 04/05/2024    PROTIME 13.6 10/26/2023       US Result (most recent):  US LIVER 10/14/2024    Narrative  EXAM:  ULTRASOUND ABDOMEN LIMITED.    HISTORY:  Cirrhosis.    COMPARISON:  03/14/2024.  CT 11/15/2023.    TECHNIQUE:  Abdominal, real time

## 2024-11-04 NOTE — TELEPHONE ENCOUNTER
Natalia Mayorga, APRN - NP  You4 minutes ago (2:00 PM)       Okay to hold Plavix and aspirin for EGD

## 2024-11-04 NOTE — TELEPHONE ENCOUNTER
Date: 11/25/24    Cardiologist: Vazquez    Procedure: Esophagogastroduodenoscopy Biopsy    Surgeon: Сергей    Last Office Visit: 8/19/24  Reason for office visit and medical concerns addressed at this office visit: cad, htn, hyperlipidemia    Testing Performed and Date of Service:  6/27/23 Echo Mildly thickened mitral valve with normal leaflet mobility. No evidence of   mitral valve stenosis, trivial mitral regurgitation.   Aortic valve appears to be tricuspid.   Structurally normal aortic valve.   No significant aortic regurgitation or stenosis is noted.   Tricuspid valve is structurally normal.   No evidence of tricuspid regurgitation. Cannot estimate RVSP.   Normal left ventricular size with preserved LV function and an estimated   ejection fraction of approximately 55-60%.   Normal left ventricular wall thickness.   No regional wall motion abnormalities.   Normal diastolic filling pattern.   No evidence of left ventricular mass or thrombus noted.       2/28/23 Cath Single-vessel disease with focal mid to distal LAD stenosis and a large   LAD vessel.   Tortuous coronary arteries.   Normal LV systolic function with LVH noted.   Hypertensive heart disease.   Successful PCI to mid to distal LAD (2.5 x 14 mm resolute integrity)   utilizing drug-eluting stent.       RCRI = 0.9   METs 4    Current Medications: valsartan, tizanidine, lyrica, protonix, lamictal, hydroxyzine, norco, lexapro, plavix, atorvastatin, aspirin    Is the patient currently taking an anticoagulant? If so, what is the diagnosis the patient has been given to warrant the need for the anticoagulant? Plavix for LEONARD 2/28/23    Additional Notes: requesting cardiac clearance and to hold plavix and aspirin

## 2024-11-08 DIAGNOSIS — F33.41 RECURRENT MAJOR DEPRESSIVE DISORDER, IN PARTIAL REMISSION (HCC): ICD-10-CM

## 2024-11-08 RX ORDER — ESCITALOPRAM OXALATE 20 MG/1
20 TABLET ORAL DAILY
Qty: 90 TABLET | Refills: 0 | Status: SHIPPED | OUTPATIENT
Start: 2024-11-08

## 2024-11-08 NOTE — TELEPHONE ENCOUNTER
Pharmacy requests a refill on Reagan Diallo's medication.    Last Office Visit: 5/15/2024  Next Office Visit: Visit date not found     Requested Prescriptions     Pending Prescriptions Disp Refills    escitalopram (LEXAPRO) 20 MG tablet [Pharmacy Med Name: Escitalopram Oxalate 20 MG Oral Tablet] 90 tablet 0     Sig: Take 1 tablet by mouth once daily       Jena Velez LPN

## 2024-11-18 ENCOUNTER — TELEPHONE (OUTPATIENT)
Dept: GASTROENTEROLOGY | Age: 59
End: 2024-11-18

## 2024-11-18 NOTE — TELEPHONE ENCOUNTER
Patient: Reagan Diallo    YOB: 1965      Clearance was received on November 18, 2024.    for Endoscopy / Colonoscopy scheduled for: EGD/CLN    Patient may discontinue the use of Plavix  for 5  days prior to the procedure.    IS Lovenox required:  no    PATIENT NOTIFIED ON:  11.18.24      Clearance scanned into media

## 2024-11-25 ENCOUNTER — ANESTHESIA EVENT (OUTPATIENT)
Dept: ENDOSCOPY | Age: 59
End: 2024-11-25
Payer: MEDICARE

## 2024-11-25 ENCOUNTER — ANCILLARY PROCEDURE (OUTPATIENT)
Dept: ENDOSCOPY | Age: 59
End: 2024-11-25
Attending: INTERNAL MEDICINE
Payer: MEDICARE

## 2024-11-25 ENCOUNTER — HOSPITAL ENCOUNTER (OUTPATIENT)
Age: 59
Setting detail: OUTPATIENT SURGERY
Discharge: HOME | End: 2024-11-25
Attending: INTERNAL MEDICINE | Admitting: INTERNAL MEDICINE
Payer: MEDICARE

## 2024-11-25 ENCOUNTER — ANESTHESIA (OUTPATIENT)
Dept: ENDOSCOPY | Age: 59
End: 2024-11-25
Payer: MEDICARE

## 2024-11-25 VITALS
TEMPERATURE: 96.9 F | HEIGHT: 71 IN | OXYGEN SATURATION: 95 % | DIASTOLIC BLOOD PRESSURE: 73 MMHG | HEART RATE: 71 BPM | RESPIRATION RATE: 18 BRPM | BODY MASS INDEX: 25.9 KG/M2 | WEIGHT: 185 LBS | SYSTOLIC BLOOD PRESSURE: 119 MMHG

## 2024-11-25 DIAGNOSIS — Z12.11 SCREEN FOR COLON CANCER: ICD-10-CM

## 2024-11-25 DIAGNOSIS — Z86.0100 HX OF COLONIC POLYP: ICD-10-CM

## 2024-11-25 DIAGNOSIS — Z87.19 HX OF ESOPHAGEAL VARICES: ICD-10-CM

## 2024-11-25 PROCEDURE — 2580000003 HC RX 258: Performed by: NURSE ANESTHETIST, CERTIFIED REGISTERED

## 2024-11-25 PROCEDURE — 3609027000 HC COLONOSCOPY: Performed by: INTERNAL MEDICINE

## 2024-11-25 PROCEDURE — 7100000011 HC PHASE II RECOVERY - ADDTL 15 MIN: Performed by: INTERNAL MEDICINE

## 2024-11-25 PROCEDURE — 2709999900 HC NON-CHARGEABLE SUPPLY: Performed by: INTERNAL MEDICINE

## 2024-11-25 PROCEDURE — 3700000001 HC ADD 15 MINUTES (ANESTHESIA): Performed by: INTERNAL MEDICINE

## 2024-11-25 PROCEDURE — 3609012400 HC EGD TRANSORAL BIOPSY SINGLE/MULTIPLE: Performed by: INTERNAL MEDICINE

## 2024-11-25 PROCEDURE — 88305 TISSUE EXAM BY PATHOLOGIST: CPT

## 2024-11-25 PROCEDURE — 2580000003 HC RX 258: Performed by: INTERNAL MEDICINE

## 2024-11-25 PROCEDURE — 3700000000 HC ANESTHESIA ATTENDED CARE: Performed by: INTERNAL MEDICINE

## 2024-11-25 PROCEDURE — 7100000010 HC PHASE II RECOVERY - FIRST 15 MIN: Performed by: INTERNAL MEDICINE

## 2024-11-25 PROCEDURE — 43239 EGD BIOPSY SINGLE/MULTIPLE: CPT | Performed by: INTERNAL MEDICINE

## 2024-11-25 PROCEDURE — 88342 IMHCHEM/IMCYTCHM 1ST ANTB: CPT

## 2024-11-25 PROCEDURE — G0121 COLON CA SCRN NOT HI RSK IND: HCPCS | Performed by: INTERNAL MEDICINE

## 2024-11-25 PROCEDURE — 6360000002 HC RX W HCPCS: Performed by: NURSE ANESTHETIST, CERTIFIED REGISTERED

## 2024-11-25 RX ORDER — LIDOCAINE HYDROCHLORIDE 10 MG/ML
INJECTION, SOLUTION INFILTRATION; PERINEURAL
Status: DISCONTINUED | OUTPATIENT
Start: 2024-11-25 | End: 2024-11-25 | Stop reason: SDUPTHER

## 2024-11-25 RX ORDER — SODIUM CHLORIDE, SODIUM LACTATE, POTASSIUM CHLORIDE, CALCIUM CHLORIDE 600; 310; 30; 20 MG/100ML; MG/100ML; MG/100ML; MG/100ML
INJECTION, SOLUTION INTRAVENOUS
Status: DISCONTINUED | OUTPATIENT
Start: 2024-11-25 | End: 2024-11-25 | Stop reason: SDUPTHER

## 2024-11-25 RX ORDER — SODIUM CHLORIDE, SODIUM LACTATE, POTASSIUM CHLORIDE, CALCIUM CHLORIDE 600; 310; 30; 20 MG/100ML; MG/100ML; MG/100ML; MG/100ML
INJECTION, SOLUTION INTRAVENOUS CONTINUOUS
Status: DISCONTINUED | OUTPATIENT
Start: 2024-11-25 | End: 2024-11-25 | Stop reason: HOSPADM

## 2024-11-25 RX ORDER — PROPOFOL 10 MG/ML
INJECTION, EMULSION INTRAVENOUS
Status: DISCONTINUED | OUTPATIENT
Start: 2024-11-25 | End: 2024-11-25 | Stop reason: SDUPTHER

## 2024-11-25 RX ORDER — GLYCOPYRROLATE 0.2 MG/ML
INJECTION INTRAMUSCULAR; INTRAVENOUS
Status: DISCONTINUED | OUTPATIENT
Start: 2024-11-25 | End: 2024-11-25 | Stop reason: SDUPTHER

## 2024-11-25 RX ADMIN — SODIUM CHLORIDE, SODIUM LACTATE, POTASSIUM CHLORIDE, AND CALCIUM CHLORIDE: 600; 310; 30; 20 INJECTION, SOLUTION INTRAVENOUS at 10:57

## 2024-11-25 RX ADMIN — GLYCOPYRROLATE 0.2 MG: 0.2 INJECTION, SOLUTION INTRAMUSCULAR; INTRAVENOUS at 10:59

## 2024-11-25 RX ADMIN — PROPOFOL 320 MG: 10 INJECTION, EMULSION INTRAVENOUS at 11:07

## 2024-11-25 RX ADMIN — LIDOCAINE HYDROCHLORIDE 100 MG: 10 INJECTION, SOLUTION INFILTRATION; PERINEURAL at 10:59

## 2024-11-25 RX ADMIN — SODIUM CHLORIDE, POTASSIUM CHLORIDE, SODIUM LACTATE AND CALCIUM CHLORIDE: 600; 310; 30; 20 INJECTION, SOLUTION INTRAVENOUS at 10:51

## 2024-11-25 ASSESSMENT — PAIN - FUNCTIONAL ASSESSMENT
PAIN_FUNCTIONAL_ASSESSMENT: NONE - DENIES PAIN
PAIN_FUNCTIONAL_ASSESSMENT: 0-10
PAIN_FUNCTIONAL_ASSESSMENT: NONE - DENIES PAIN

## 2024-11-25 ASSESSMENT — LIFESTYLE VARIABLES: SMOKING_STATUS: 0

## 2024-11-25 NOTE — ANESTHESIA PRE PROCEDURE
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) I73.9   • DDD (degenerative disc disease), lumbar M51.369   • Compression fracture of T12 vertebra (HCC) S22.080A   • Compression fracture of L1 lumbar vertebra (HCC) S32.010A   • Lumbar radiculopathy M54.16   • Hypersomnia due to medical condition G47.14       Past Medical History:        Diagnosis Date   • Anxiety    • Arthritis    • Chronic back pain    • Depression    • MD (muscular dystrophy) ([facility])    • Neuropathy 2020   • Scoliosis        Past Surgical History:        Procedure Laterality Date   • CHOLECYSTECTOMY, LAPAROSCOPIC N/A 2021    LAPAROSCOPIC CHOLECYSTECTOMY performed by Oksana Herrera, DO at [facility] OR   • COLONOSCOPY  2016    Dr Case, normal, 10 yr recall   • COLONOSCOPY N/A 2021    Dr Singh,int hemorrhids, RANDOM COLON BX NEGATIVE, Sub Prep Fair, 3 year recall   • FOOT SURGERY Left    • UPPER GASTROINTESTINAL ENDOSCOPY N/A 2021    Dr Singh, duodenal villous blunting otherwise normal EGD; serum celiac panel NEG for celiac sprue   • UPPER GASTROINTESTINAL ENDOSCOPY N/A 08/10/2021    Dr Singh- mild gastritis, 2 small duodenal ulcers and 1 large cratered 3-4cm in posterior duodenal bulb, NEG h pylori   • UPPER GASTROINTESTINAL ENDOSCOPY N/A 2023    Dr Singh, (-)Hpylori, (-)Obvious esoph varices, 1 year recall       Social History:    Social History     Tobacco Use   • Smoking status: Former     Current packs/day: 0.00     Types: Cigarettes     Quit date: 1991     Years since quittin.3   • Smokeless tobacco: Current   • Tobacco comments:     Nicotine pouch   Substance Use Topics   • Alcohol use: Yes     Comment: occasional beet

## 2024-11-25 NOTE — H&P
[]Comments:  Other:   []WNL  []Comments:    Informed Consent:  The risks and benefits of the procedure have been discussed with either the patient or if they cannot consent, their representative.    Assessment:  Patient examined and appropriate for planned sedation and procedure.     Plan:  Proceed with planned sedation and procedure as above.         Stephani Singh MD

## 2024-11-25 NOTE — OP NOTE
Patient: Reagan Diallo : 1965  Med Rec#: [id number] Acc#: [id number]   Primary Care Provider Manjula Acevedo APRN    Date of Procedure:  2024    Endoscopist: Stephani Singh MD, MD    Referring Provider: Manjula Acevedo APRN,     Operation Performed: Colonoscopy up to the cecum    Indications: For both EGD and colonoscopy exams today:     1. Cirrhosis of liver without ascites, unspecified hepatic cirrhosis type (HCC)    2. Chest pain, unspecified type    3.  Needs colon cancer screening    MELD 6 points (10/28/2024)     Last EGD 2023 - (-)Hpylori, (-)Obvious esoph varices, 1 year recall   Last Colonoscopy 2021 - int hemorrhids, RANDOM COLON BX NEGATIVE, Sub Prep Fair, 3 year recall     Hx CAD with stent on ASA and Plavix     Anesthesia:  Sedation was administered by anesthesia who monitored the patient during the procedure.    I met with Reagan Diallo prior to procedure. We discussed the procedure itself, and I have discussed the risks of endoscopy (including-- but not limited to-- pain, discomfort, bleeding potentially requiring second endoscopic procedure and/or blood transfusion, organ perforation requiring operative repair, damage to organs near the colon, infection, aspiration, cardiopulmonary/allergic reaction), benefits, indications to endoscopy. Additionally, we discussed options other than colonoscopy. The patient expressed understanding. All questions answered. The patient decided to proceed with the procedure.  Signed informed consent was placed on the chart.    Blood Loss: minimal    Withdrawal time: More than 9 minutes  Bowel Prep: Fair  with small amounts of thick solid and semisolid stool and a moderate amount of thick, opaque liquid scattered in patchy segments throughout the colon obscuring the underlying mucosa.Lesions including polyps may have been missed.     Complications: no immediate complications    DESCRIPTION OF PROCEDURE:     A time out was performed. After

## 2024-11-25 NOTE — DISCHARGE INSTRUCTIONS
1.  Await path results, the patient will be contacted in 7-10 days with biopsy results.   2.  Avoid NSAIDs strictly; may use Tylenol 500 mg p.o. every 4 hours as needed  3.  Follow a strict 2 g sodium diet  4.  Will benefit from weight loss towards his ideal BMI by following a low-fat low-carb lower calorie but protein and fiber rich diet  5.  Repeat EGD in 2 years to check for varices  6. Repeat colonoscopy: pending pathology -based on colonoscopy findings today, prep quality-in 3 years with a strict 2-day clinic with diet and a 2-day prep using Plenvu or a similar solution aided by Reglan 5 mg or Zofran 4 mg p.o. every 4 hours as needed; sooner if his personal or family history as pertaining to colorectal cancer risk changes requiring an earlier exam or if the patient were to develop lower GI symptoms such as bleeding, abdominal pain, change in bowel habits or stool caliber or if the patient has anemia or unexplained weight loss in the future.   7. - Resume previous meds including for GERD along with anti-GERD precautions and diet  - GI clinic f/u with Ms. Diaz in 6 to 8 weeks  - Keep scheduled f/u appts with other MDs      Upper GI Endoscopy and Colonoscopy: What to Expect at Home  Your Recovery    After an upper GI endoscopy, you may have a sore throat for a day or two after the test.    After a colonoscopy you may be bloated or have gas pains. You may need to pass gas. If a biopsy was done or a polyp was removed, you may have streaks of blood in your stool (feces) for a few days. Problems such as heavy rectal bleeding may not occur until several weeks after the test. This isn't common. But it can happen after polyps are removed.    How can you care for yourself at home?  Activity   Rest as much as you need to after you go home.  You should be able to go back to your usual activities the day after the test.  You should not drive or operate equipment for the remainder of today.    Diet   Follow your doctor's

## 2024-11-25 NOTE — OP NOTE
Endoscopic Procedure Note    Patient: Reagan Diallo : 1965  Med Rec#: [id number] Acc#: [id number]     Primary Care Provider Manjula Acevedo APRN    Endoscopist: Stephani Singh MD, MD    Date of Procedure:  2024    Procedure:   EGD with    Cold biopsies    Indications:   For both EGD and colonoscopy exams today:     1. Cirrhosis of liver without ascites, unspecified hepatic cirrhosis type (HCC)    2. Chest pain, unspecified type    3.  Needs colon cancer screening    MELD 6 points (10/28/2024)     Last EGD 2023 - (-)Hpylori, (-)Obvious esoph varices, 1 year recall   Last Colonoscopy 2021 - int hemorrhids, RANDOM COLON BX NEGATIVE, Sub Prep Fair, 3 year recall     Hx CAD with stent on ASA and Plavix     Anesthesia:  Sedation was administered by anesthesia who monitored the patient during the procedure.    Estimated Blood Loss: minimal    Procedure:   After reviewing the patient's chart and obtaining informed consent, the patient was placed in the left lateral decubitus position.  A forward-viewing Olympus endoscope was lubricated and inserted through the mouth into the oropharynx. Under direct visualization, the upper esophagus was intubated. The scope was advanced to the level of the third portion of duodenum. Scope was slowly withdrawn with careful inspection of the mucosal surfaces. The scope was retroflexed for inspection of the gastric fundus and incisura. Findings and maneuvers are listed in impression below. The patient tolerated the procedure well. The scope was removed. There were no immediate complications.    Findings/IMPRESSION:  Esophagus: normal and a normal EG junction at 40 cm.  No obvious esophageal varices were noted.    NO erosions or ulcers or nodules or strictures or webs or rings or mass lesions or extrinsic compression or diverticula noted.     No clear-cut lesions to explain the patient's atypical chest pain were discovered on this exam.    There is no obvious

## 2024-11-25 NOTE — ANESTHESIA POSTPROCEDURE EVALUATION
Department of Anesthesiology  Postprocedure Note    Patient: Reagan Diallo  MRN: [id number]  YOB: 1965  Date of evaluation: 11/25/2024    Procedure Summary       Date: 11/25/24 Room / Location: [place] [address] / [facility]    Anesthesia Start: 1057 Anesthesia Stop: 1130    Procedures:       ESOPHAGOGASTRODUODENOSCOPY BIOPSY (Abdomen)      COLORECTAL CANCER SCREENING, NOT HIGH RISK Diagnosis:       Hx of esophageal varices      Hx of colonic polyp      Screen for colon cancer      (Hx of esophageal varices [Z87.19])      (Hx of colonic polyp [Z86.0100])      (Screen for colon cancer [Z12.11])    Surgeons: Stephani Singh MD Responsible Provider: Nito Vasquez APRN - CRNA    Anesthesia Type: general ASA Status: 3            Anesthesia Type: No value filed.    Manuel Phase I: Manuel Score: 10    Manuel Phase II:      Anesthesia Post Evaluation    Patient location during evaluation: PACU  Patient participation: complete - patient participated  Level of consciousness: awake and alert  Pain score: 0  Airway patency: patent  Nausea & Vomiting: no nausea and no vomiting  Cardiovascular status: hemodynamically stable  Respiratory status: spontaneous ventilation and nonlabored ventilation  Hydration status: stable  Multimodal analgesia pain management approach    No notable events documented.

## 2024-12-05 ENCOUNTER — OFFICE VISIT (OUTPATIENT)
Dept: GASTROENTEROLOGY | Age: 59
End: 2024-12-05
Payer: MEDICARE

## 2024-12-05 VITALS
SYSTOLIC BLOOD PRESSURE: 139 MMHG | HEART RATE: 79 BPM | WEIGHT: 180 LBS | BODY MASS INDEX: 25.2 KG/M2 | HEIGHT: 71 IN | OXYGEN SATURATION: 98 % | DIASTOLIC BLOOD PRESSURE: 83 MMHG

## 2024-12-05 DIAGNOSIS — K74.60 CIRRHOSIS OF LIVER WITHOUT ASCITES, UNSPECIFIED HEPATIC CIRRHOSIS TYPE (HCC): Primary | ICD-10-CM

## 2024-12-05 PROCEDURE — 99213 OFFICE O/P EST LOW 20 MIN: CPT | Performed by: NURSE PRACTITIONER

## 2024-12-05 PROCEDURE — 4004F PT TOBACCO SCREEN RCVD TLK: CPT | Performed by: NURSE PRACTITIONER

## 2024-12-05 PROCEDURE — G8484 FLU IMMUNIZE NO ADMIN: HCPCS | Performed by: NURSE PRACTITIONER

## 2024-12-05 PROCEDURE — G8419 CALC BMI OUT NRM PARAM NOF/U: HCPCS | Performed by: NURSE PRACTITIONER

## 2024-12-05 PROCEDURE — 3017F COLORECTAL CA SCREEN DOC REV: CPT | Performed by: NURSE PRACTITIONER

## 2024-12-05 PROCEDURE — G8427 DOCREV CUR MEDS BY ELIG CLIN: HCPCS | Performed by: NURSE PRACTITIONER

## 2024-12-05 ASSESSMENT — ENCOUNTER SYMPTOMS
TROUBLE SWALLOWING: 0
VOMITING: 0
CHOKING: 0
ABDOMINAL DISTENTION: 0
SHORTNESS OF BREATH: 0
BLOOD IN STOOL: 0
NAUSEA: 0
ANAL BLEEDING: 0
DIARRHEA: 0
CONSTIPATION: 0
RECTAL PAIN: 0
ABDOMINAL PAIN: 0
COUGH: 0

## 2024-12-05 NOTE — PROGRESS NOTES
Subjective:     Patient ID: Reagan Diallo is a 59 y.o. male  PCP: Manjula Acevedo APRN  Referring Provider: No ref. provider found    HPI  Patient presents to the office today with the following complaints: Follow-up      Pt seen today for follow up after EGD and Colonoscopy on 11/25/2024.  Hx cirrhosis.  No esophageal varices noted on EGD.  He is UTD on cirrhosis management labs and imaging.  All scope and pathology reports were reviewed and discussed with patient.  All questions answered, pt verbalized understanding.          Last EGD 11/25/2024 - Gastritis but without any H. pylori, sugg of chem gastritis, no gastric varices or clear cut hypertensive gastropathy like changes or esophageal lesions to explains atypical chest pain, 2 year recall   Last Colonoscopy 11/25/2024 - int hem Gr 1, 3 year recall   Denies any family hx colon cancer/colon polyps       Assessment:     1. Cirrhosis of liver without ascites, unspecified hepatic cirrhosis type (HCC)              Plan:   - Keep scheduled Cirrhosis follow up   - Call with any questions or concerns   - CBC, CMP, Pt/INR, AFP, US liver every 6 months       Orders  No orders of the defined types were placed in this encounter.    Medications  No orders of the defined types were placed in this encounter.        Patient History:     Past Medical History:   Diagnosis Date    Anxiety     Arthritis     Chronic back pain     Depression     MD (muscular dystrophy) (HCC)     Neuropathy 12/02/2020    Scoliosis        Past Surgical History:   Procedure Laterality Date    CHOLECYSTECTOMY, LAPAROSCOPIC N/A 04/27/2021    LAPAROSCOPIC CHOLECYSTECTOMY performed by Oksana Herrera DO at [facility] OR    COLONOSCOPY  01/08/2016    Dr Case, normal, 10 yr recall    COLONOSCOPY N/A 06/30/2021    Dr Singh,int hemorrhids, RANDOM COLON BX NEGATIVE, Sub Prep Fair, 3 year recall    COLONOSCOPY N/A 11/25/2024    Dr Singh, int hem Gr 1, 3 year recall    FOOT SURGERY Left     UPPER

## 2025-02-07 DIAGNOSIS — F33.41 RECURRENT MAJOR DEPRESSIVE DISORDER, IN PARTIAL REMISSION (HCC): ICD-10-CM

## 2025-02-07 RX ORDER — ESCITALOPRAM OXALATE 20 MG/1
20 TABLET ORAL DAILY
Qty: 90 TABLET | Refills: 0 | Status: SHIPPED | OUTPATIENT
Start: 2025-02-07

## 2025-02-07 NOTE — TELEPHONE ENCOUNTER
Reagan called requesting a refill of the below medication which has been pended for you:     Requested Prescriptions     Pending Prescriptions Disp Refills    escitalopram (LEXAPRO) 20 MG tablet [Pharmacy Med Name: Escitalopram Oxalate 20 MG Oral Tablet] 90 tablet 0     Sig: Take 1 tablet by mouth once daily       Last Appointment Date: 5/15/2024  Next Appointment Date: Visit date not found    Allergies   Allergen Reactions    Shellfish-Derived Products Anaphylaxis    Norvasc [Amlodipine]      Lower leg edema

## 2025-02-17 ENCOUNTER — OFFICE VISIT (OUTPATIENT)
Dept: CARDIOLOGY CLINIC | Age: 60
End: 2025-02-17
Payer: MEDICARE

## 2025-02-17 VITALS
SYSTOLIC BLOOD PRESSURE: 132 MMHG | OXYGEN SATURATION: 94 % | HEIGHT: 71 IN | DIASTOLIC BLOOD PRESSURE: 88 MMHG | WEIGHT: 183 LBS | BODY MASS INDEX: 25.62 KG/M2 | HEART RATE: 84 BPM

## 2025-02-17 DIAGNOSIS — I25.10 CORONARY ARTERY DISEASE INVOLVING NATIVE CORONARY ARTERY OF NATIVE HEART WITHOUT ANGINA PECTORIS: Primary | ICD-10-CM

## 2025-02-17 DIAGNOSIS — I10 ESSENTIAL HYPERTENSION: ICD-10-CM

## 2025-02-17 DIAGNOSIS — E78.5 DYSLIPIDEMIA: ICD-10-CM

## 2025-02-17 PROCEDURE — 3017F COLORECTAL CA SCREEN DOC REV: CPT | Performed by: NURSE PRACTITIONER

## 2025-02-17 PROCEDURE — 93000 ELECTROCARDIOGRAM COMPLETE: CPT | Performed by: NURSE PRACTITIONER

## 2025-02-17 PROCEDURE — G8427 DOCREV CUR MEDS BY ELIG CLIN: HCPCS | Performed by: NURSE PRACTITIONER

## 2025-02-17 PROCEDURE — 4004F PT TOBACCO SCREEN RCVD TLK: CPT | Performed by: NURSE PRACTITIONER

## 2025-02-17 PROCEDURE — G8419 CALC BMI OUT NRM PARAM NOF/U: HCPCS | Performed by: NURSE PRACTITIONER

## 2025-02-17 PROCEDURE — 3079F DIAST BP 80-89 MM HG: CPT | Performed by: NURSE PRACTITIONER

## 2025-02-17 PROCEDURE — 99214 OFFICE O/P EST MOD 30 MIN: CPT | Performed by: NURSE PRACTITIONER

## 2025-02-17 PROCEDURE — 3075F SYST BP GE 130 - 139MM HG: CPT | Performed by: NURSE PRACTITIONER

## 2025-02-17 ASSESSMENT — ENCOUNTER SYMPTOMS
SHORTNESS OF BREATH: 0
COUGH: 0
WHEEZING: 0
SORE THROAT: 0
CHEST TIGHTNESS: 0

## 2025-02-17 NOTE — PROGRESS NOTES
once daily 60 tablet 5    lamoTRIgine (LAMICTAL) 100 MG tablet Take 1 tablet by mouth once daily 90 tablet 3    HYDROcodone-acetaminophen (NORCO) 5-325 MG per tablet Take 1 tablet by mouth every 6 hours as needed for Pain.      aspirin 325 MG EC tablet Take one tablet of 325 mg Aspirin daily. 90 tablet 3    Elastic Bandages & Supports (MEDICAL COMPRESSION STOCKINGS) MISC 1 each by Does not apply route daily as needed (swelling) 1 each 0    ketoconazole (NIZORAL) 2 % shampoo Apply topically daily as needed. 120 mL 1    triamcinolone (KENALOG) 0.025 % cream Apply topically 2 times daily. 80 g 1    diclofenac sodium (VOLTAREN) 1 % GEL Apply 2 g topically 4 times daily 2 Tube 1    vitamin D (ERGOCALCIFEROL) 1.25 MG (69482 UT) CAPS capsule Take 1 capsule by mouth once a week (Patient not taking: Reported on 2/17/2025)      testosterone cypionate (DEPOTESTOTERONE CYPIONATE) 100 MG/ML injection Inject 1 mL into the muscle once. (Patient not taking: Reported on 2/17/2025)      potassium chloride (KLOR-CON M) 10 MEQ extended release tablet Take 1 tablet by mouth once daily (Patient not taking: Reported on 2/17/2025) 30 tablet 0     No current facility-administered medications for this visit.     Allergies: Shellfish-derived products and Norvasc [amlodipine]  Past Medical History:   Diagnosis Date    Anxiety     Arthritis     Chronic back pain     Depression     MD (muscular dystrophy) ([facility])     Neuropathy 12/02/2020    Scoliosis      Past Surgical History:   Procedure Laterality Date    CHOLECYSTECTOMY, LAPAROSCOPIC N/A 04/27/2021    LAPAROSCOPIC CHOLECYSTECTOMY performed by Oksana Herrera DO at [facility] OR    COLONOSCOPY  01/08/2016    Dr Case, normal, 10 yr recall    COLONOSCOPY N/A 06/30/2021    Dr Singh,int hemorrhids, RANDOM COLON BX NEGATIVE, Sub Prep Fair, 3 year recall    COLONOSCOPY N/A 11/25/2024    Dr Singh, int hem Gr 1, 3 year recall    FOOT SURGERY Left     UPPER GASTROINTESTINAL ENDOSCOPY N/A

## 2025-03-03 ENCOUNTER — TELEPHONE (OUTPATIENT)
Dept: GASTROENTEROLOGY | Age: 60
End: 2025-03-03

## 2025-03-03 DIAGNOSIS — K74.60 CIRRHOSIS OF LIVER WITHOUT ASCITES, UNSPECIFIED HEPATIC CIRRHOSIS TYPE (HCC): Primary | ICD-10-CM

## 2025-03-03 NOTE — TELEPHONE ENCOUNTER
----- Message from KATHERINE EASTON MA sent at 10/29/2024  1:32 PM CDT -----  Regarding: Liver us and labs  Repeat liver us and labs in 6 months Due April 2025 per MELANIE CRUZ

## 2025-03-03 NOTE — TELEPHONE ENCOUNTER
3-3-2025  Tried to call patient and did not answer. Left voicemail saying that it was time for us liver and lab work to be done. Left scheduling department number and said if had any questions to give us a call back

## 2025-03-05 NOTE — TELEPHONE ENCOUNTER
3-5-2025  Called patient to let him know that it is time for us liver and lab work to be done. Transferred patient to scheduling department to get us liver schedule. Patient voiced understanding

## 2025-03-07 ENCOUNTER — HOSPITAL ENCOUNTER (OUTPATIENT)
Dept: GENERAL RADIOLOGY | Age: 60
Discharge: HOME | End: 2025-03-07
Payer: MEDICARE

## 2025-03-07 DIAGNOSIS — K74.60 CIRRHOSIS OF LIVER WITHOUT ASCITES, UNSPECIFIED HEPATIC CIRRHOSIS TYPE (HCC): ICD-10-CM

## 2025-03-07 PROCEDURE — 76705 ECHO EXAM OF ABDOMEN: CPT

## 2025-03-10 DIAGNOSIS — K74.60 CIRRHOSIS OF LIVER WITHOUT ASCITES, UNSPECIFIED HEPATIC CIRRHOSIS TYPE (HCC): ICD-10-CM

## 2025-03-10 LAB
AFP SERPL-MCNC: 2.9 NG/ML (ref 0–8.3)
ALBUMIN SERPL-MCNC: 4.5 G/DL (ref 3.5–5.2)
ALP SERPL-CCNC: 75 U/L (ref 40–129)
ALT SERPL-CCNC: 26 U/L (ref 10–50)
ANION GAP SERPL CALCULATED.3IONS-SCNC: 9 MMOL/L (ref 8–16)
AST SERPL-CCNC: 26 U/L (ref 10–50)
BASOPHILS # BLD: 0 K/UL (ref 0–0.2)
BASOPHILS NFR BLD: 0.5 % (ref 0–1)
BILIRUB SERPL-MCNC: 0.4 MG/DL (ref 0.2–1.2)
BUN SERPL-MCNC: 13 MG/DL (ref 8–23)
CALCIUM SERPL-MCNC: 9 MG/DL (ref 8.8–10.2)
CHLORIDE SERPL-SCNC: 104 MMOL/L (ref 98–107)
CO2 SERPL-SCNC: 27 MMOL/L (ref 22–29)
CREAT SERPL-MCNC: 0.5 MG/DL (ref 0.7–1.2)
EOSINOPHIL # BLD: 0.1 K/UL (ref 0–0.6)
EOSINOPHIL NFR BLD: 2.1 % (ref 0–5)
ERYTHROCYTE [DISTWIDTH] IN BLOOD BY AUTOMATED COUNT: 14 % (ref 11.5–14.5)
GLUCOSE SERPL-MCNC: 82 MG/DL (ref 70–99)
HCT VFR BLD AUTO: 41.1 % (ref 42–52)
HGB BLD-MCNC: 13.3 G/DL (ref 14–18)
IMM GRANULOCYTES # BLD: 0 K/UL
INR PPP: 1 (ref 0.88–1.18)
LYMPHOCYTES # BLD: 1.7 K/UL (ref 1.1–4.5)
LYMPHOCYTES NFR BLD: 30.6 % (ref 20–40)
MCH RBC QN AUTO: 31.1 PG (ref 27–31)
MCHC RBC AUTO-ENTMCNC: 32.4 G/DL (ref 33–37)
MCV RBC AUTO: 96 FL (ref 80–94)
MONOCYTES # BLD: 0.6 K/UL (ref 0–0.9)
MONOCYTES NFR BLD: 9.8 % (ref 0–10)
NEUTROPHILS # BLD: 3.2 K/UL (ref 1.5–7.5)
NEUTS SEG NFR BLD: 56.8 % (ref 50–65)
PLATELET # BLD AUTO: 295 K/UL (ref 130–400)
PMV BLD AUTO: 9.4 FL (ref 9.4–12.4)
POTASSIUM SERPL-SCNC: 3.6 MMOL/L (ref 3.5–5.1)
PROT SERPL-MCNC: 7 G/DL (ref 6.4–8.3)
PROTHROMBIN TIME: 12.9 SEC (ref 12–14.6)
RBC # BLD AUTO: 4.28 M/UL (ref 4.7–6.1)
SODIUM SERPL-SCNC: 140 MMOL/L (ref 136–145)
WBC # BLD AUTO: 5.6 K/UL (ref 4.8–10.8)

## 2025-04-02 ENCOUNTER — RESULTS FOLLOW-UP (OUTPATIENT)
Dept: GASTROENTEROLOGY | Age: 60
End: 2025-04-02

## 2025-04-03 ENCOUNTER — OFFICE VISIT (OUTPATIENT)
Age: 60
End: 2025-04-03

## 2025-04-03 VITALS
WEIGHT: 181 LBS | HEIGHT: 71 IN | HEART RATE: 78 BPM | SYSTOLIC BLOOD PRESSURE: 132 MMHG | OXYGEN SATURATION: 98 % | TEMPERATURE: 97.4 F | BODY MASS INDEX: 25.34 KG/M2 | DIASTOLIC BLOOD PRESSURE: 80 MMHG

## 2025-04-03 DIAGNOSIS — K29.90 GASTRITIS AND DUODENITIS: ICD-10-CM

## 2025-04-03 DIAGNOSIS — Z00.00 MEDICARE ANNUAL WELLNESS VISIT, SUBSEQUENT: Primary | ICD-10-CM

## 2025-04-03 DIAGNOSIS — E55.9 VITAMIN D DEFICIENCY: ICD-10-CM

## 2025-04-03 DIAGNOSIS — R73.09 ELEVATED GLUCOSE: ICD-10-CM

## 2025-04-03 DIAGNOSIS — F41.9 ANXIETY: ICD-10-CM

## 2025-04-03 DIAGNOSIS — I10 PRIMARY HYPERTENSION: ICD-10-CM

## 2025-04-03 DIAGNOSIS — Z12.5 SPECIAL SCREENING EXAMINATION FOR NEOPLASM OF PROSTATE: ICD-10-CM

## 2025-04-03 DIAGNOSIS — E78.2 MIXED HYPERLIPIDEMIA: ICD-10-CM

## 2025-04-03 DIAGNOSIS — F33.41 RECURRENT MAJOR DEPRESSIVE DISORDER, IN PARTIAL REMISSION: ICD-10-CM

## 2025-04-03 DIAGNOSIS — G71.00 MUSCULAR DYSTROPHY (HCC): ICD-10-CM

## 2025-04-03 DIAGNOSIS — I48.0 PAROXYSMAL ATRIAL FIBRILLATION (HCC): ICD-10-CM

## 2025-04-03 DIAGNOSIS — L21.9 SEBORRHEIC DERMATITIS: ICD-10-CM

## 2025-04-03 DIAGNOSIS — K74.60 CIRRHOSIS OF LIVER WITHOUT ASCITES, UNSPECIFIED HEPATIC CIRRHOSIS TYPE (HCC): ICD-10-CM

## 2025-04-03 RX ORDER — ESCITALOPRAM OXALATE 20 MG/1
20 TABLET ORAL DAILY
Qty: 90 TABLET | Refills: 3 | Status: SHIPPED | OUTPATIENT
Start: 2025-04-03

## 2025-04-03 RX ORDER — PANTOPRAZOLE SODIUM 40 MG/1
40 TABLET, DELAYED RELEASE ORAL DAILY
Qty: 90 TABLET | Refills: 3 | Status: SHIPPED | OUTPATIENT
Start: 2025-04-03

## 2025-04-03 RX ORDER — LAMOTRIGINE 100 MG/1
100 TABLET ORAL DAILY
Qty: 90 TABLET | Refills: 3 | Status: SHIPPED | OUTPATIENT
Start: 2025-04-03

## 2025-04-03 RX ORDER — ATORVASTATIN CALCIUM 40 MG/1
40 TABLET, FILM COATED ORAL DAILY
Qty: 90 TABLET | Refills: 3 | Status: SHIPPED | OUTPATIENT
Start: 2025-04-03

## 2025-04-03 RX ORDER — KETOCONAZOLE 20 MG/ML
SHAMPOO, SUSPENSION TOPICAL
Qty: 120 ML | Refills: 1 | Status: SHIPPED | OUTPATIENT
Start: 2025-04-03

## 2025-04-03 SDOH — ECONOMIC STABILITY: FOOD INSECURITY: WITHIN THE PAST 12 MONTHS, YOU WORRIED THAT YOUR FOOD WOULD RUN OUT BEFORE YOU GOT MONEY TO BUY MORE.: NEVER TRUE

## 2025-04-03 SDOH — ECONOMIC STABILITY: FOOD INSECURITY: WITHIN THE PAST 12 MONTHS, THE FOOD YOU BOUGHT JUST DIDN'T LAST AND YOU DIDN'T HAVE MONEY TO GET MORE.: NEVER TRUE

## 2025-04-03 ASSESSMENT — PATIENT HEALTH QUESTIONNAIRE - PHQ9
6. FEELING BAD ABOUT YOURSELF - OR THAT YOU ARE A FAILURE OR HAVE LET YOURSELF OR YOUR FAMILY DOWN: MORE THAN HALF THE DAYS
3. TROUBLE FALLING OR STAYING ASLEEP: NEARLY EVERY DAY
SUM OF ALL RESPONSES TO PHQ QUESTIONS 1-9: 11
SUM OF ALL RESPONSES TO PHQ QUESTIONS 1-9: 11
10. IF YOU CHECKED OFF ANY PROBLEMS, HOW DIFFICULT HAVE THESE PROBLEMS MADE IT FOR YOU TO DO YOUR WORK, TAKE CARE OF THINGS AT HOME, OR GET ALONG WITH OTHER PEOPLE: VERY DIFFICULT
2. FEELING DOWN, DEPRESSED OR HOPELESS: SEVERAL DAYS
7. TROUBLE CONCENTRATING ON THINGS, SUCH AS READING THE NEWSPAPER OR WATCHING TELEVISION: NOT AT ALL
SUM OF ALL RESPONSES TO PHQ QUESTIONS 1-9: 11
5. POOR APPETITE OR OVEREATING: SEVERAL DAYS
SUM OF ALL RESPONSES TO PHQ QUESTIONS 1-9: 11
8. MOVING OR SPEAKING SO SLOWLY THAT OTHER PEOPLE COULD HAVE NOTICED. OR THE OPPOSITE, BEING SO FIGETY OR RESTLESS THAT YOU HAVE BEEN MOVING AROUND A LOT MORE THAN USUAL: SEVERAL DAYS
1. LITTLE INTEREST OR PLEASURE IN DOING THINGS: NOT AT ALL
4. FEELING TIRED OR HAVING LITTLE ENERGY: NEARLY EVERY DAY
9. THOUGHTS THAT YOU WOULD BE BETTER OFF DEAD, OR OF HURTING YOURSELF: NOT AT ALL

## 2025-04-03 ASSESSMENT — ENCOUNTER SYMPTOMS
GASTROINTESTINAL NEGATIVE: 1
BACK PAIN: 1
EYES NEGATIVE: 1
RESPIRATORY NEGATIVE: 1

## 2025-04-03 NOTE — PROGRESS NOTES
Medicare Annual Wellness Visit    Reagan COMBS [place] is here for Medicare AWV    Assessment & Plan  1. Health maintenance: Stable. Alpha-fetoprotein levels have shown improvement since the last evaluation in 10/2024.  - Order comprehensive panel of laboratory tests including thyroid function, cholesterol levels, PSA, and A1c  - Advise fasting for a minimum of 6 hours prior to these tests    2. Medication management for anxiety and depression  - Renew prescriptions for Lamictal, Lexapro, and shampoo  - Send medications to [facility] due to insurance issues at [facility]    3. Seasonal affective disorder: Acute.  - Encourage engagement in volunteer work or other activities that provide a sense of purpose  - Consider adjustment in medication regimen if symptoms persist or worsen    4. Ear fluid accumulation.  - Recommend over-the-counter Benadryl or Flonase nasal spray to alleviate intermittent hearing loss due to fluid accumulation in the ears    Follow-up  - Follow up in 6 months or sooner if necessary    Medicare annual wellness visit, subsequent  Recurrent major depressive disorder, in partial remission  -     escitalopram (LEXAPRO) 20 MG tablet; Take 1 tablet by mouth daily, Disp-90 tablet, R-3Normal  -     lamoTRIgine (LAMICTAL) 100 MG tablet; Take 1 tablet by mouth daily, Disp-90 tablet, R-3Normal  Anxiety  -     lamoTRIgine (LAMICTAL) 100 MG tablet; Take 1 tablet by mouth daily, Disp-90 tablet, R-3Normal  Gastritis and duodenitis  -     pantoprazole (PROTONIX) 40 MG tablet; Take 1 tablet by mouth daily, Disp-90 tablet, R-3Normal  Seborrheic dermatitis  -     ketoconazole (NIZORAL) 2 % shampoo; Apply topically daily as needed., Disp-120 mL, R-1, Normal  Paroxysmal atrial fibrillation (HCC)  Comments:  cardiology following  Cirrhosis of liver without ascites, unspecified hepatic cirrhosis type (HCC)  Comments:  GI following  Muscular dystrophy (HCC)  -     CBC; Future  -     Comprehensive Metabolic Panel; Future  -

## 2025-04-03 NOTE — PATIENT INSTRUCTIONS
strong side first. Have a phone or medical alert device in the bathroom with you.   Where can you learn more?  Go to https://www.[employer].net/patientEd and enter G117 to learn more about \"Preventing Falls: Care Instructions.\"  Current as of: July 31, 2024  Content Version: 14.4  © 3758-1081 [employer].   Care instructions adapted under license by [employer]. If you have questions about a medical condition or this instruction, always ask your healthcare professional. [employer], "[employer]", disclaims any warranty or liability for your use of this information.         Eating Healthy Foods: Care Instructions  With every meal, you can make healthy food choices. Try to eat a variety of fruits, vegetables, whole grains, lean proteins, and low-fat dairy products. This can help you get the right balance of nutrients, including vitamins and minerals. Small changes add up over time. You can start by adding one healthy food to your meals each day.    Try to make half your plate fruits and vegetables, one-fourth whole grains, and one-fourth lean proteins. Try including dairy with your meals.   Eat more fruits and vegetables. Try to have them with most meals and snacks.   Foods for healthy eating        Fruits   These can be fresh, frozen, canned, or dried.  Try to choose whole fruit rather than fruit juice.  Eat a variety of colors.        Vegetables   These can be fresh, frozen, canned, or dried.  Beans, peas, and lentils count too.        Whole grains   Choose whole-grain breads, cereals, and noodles.  Try brown rice.        Lean proteins   These can include lean meat, poultry, fish, and eggs.  You can also have tofu, beans, peas, lentils, nuts, and seeds.        Dairy   Try milk, yogurt, and cheese.  Choose low-fat or fat-free when you can.  If you need to, use lactose-free milk or fortified plant-based milk products, such as soy milk.        Water   Drink water when you're thirsty.  Limit sugar-sweetened

## 2025-04-14 ENCOUNTER — RESULTS FOLLOW-UP (OUTPATIENT)
Dept: GASTROENTEROLOGY | Age: 60
End: 2025-04-14

## 2025-04-14 RX ORDER — HYDROXYZINE HYDROCHLORIDE 25 MG/1
25 TABLET, FILM COATED ORAL NIGHTLY
Qty: 90 TABLET | Refills: 3 | Status: SHIPPED | OUTPATIENT
Start: 2025-04-14

## 2025-04-14 NOTE — TELEPHONE ENCOUNTER
Received fax from pharmacy requesting refill on pts medication(s). Pt was last seen in office on 5/15/2024  and has a follow up scheduled for Visit date not found. Will send request to  Majnula Acevedo  for authorization.     Requested Prescriptions     Pending Prescriptions Disp Refills    hydrOXYzine HCl (ATARAX) 25 MG tablet [Pharmacy Med Name: hydrOXYzine HCl 25 MG Oral Tablet] 90 tablet 3     Sig: Take 1 tablet by mouth nightly

## 2025-06-10 ENCOUNTER — OFFICE VISIT (OUTPATIENT)
Age: 60
End: 2025-06-10
Payer: MEDICARE

## 2025-06-10 ENCOUNTER — APPOINTMENT (OUTPATIENT)
Dept: GENERAL RADIOLOGY | Age: 60
End: 2025-06-10
Payer: MEDICARE

## 2025-06-10 ENCOUNTER — APPOINTMENT (OUTPATIENT)
Dept: CT IMAGING | Age: 60
End: 2025-06-10
Payer: MEDICARE

## 2025-06-10 ENCOUNTER — HOSPITAL ENCOUNTER (INPATIENT)
Age: 60
LOS: 3 days | Discharge: HOME | End: 2025-06-13
Attending: STUDENT IN AN ORGANIZED HEALTH CARE EDUCATION/TRAINING PROGRAM
Payer: MEDICARE

## 2025-06-10 VITALS
HEIGHT: 71 IN | DIASTOLIC BLOOD PRESSURE: 88 MMHG | BODY MASS INDEX: 24.36 KG/M2 | SYSTOLIC BLOOD PRESSURE: 140 MMHG | WEIGHT: 174 LBS | OXYGEN SATURATION: 97 % | HEART RATE: 152 BPM

## 2025-06-10 DIAGNOSIS — I48.91 ATRIAL FIBRILLATION, RAPID (HCC): ICD-10-CM

## 2025-06-10 DIAGNOSIS — I48.91 ATRIAL FIBRILLATION, UNSPECIFIED TYPE (HCC): ICD-10-CM

## 2025-06-10 DIAGNOSIS — I48.91 ATRIAL FIBRILLATION WITH RVR (HCC): Primary | ICD-10-CM

## 2025-06-10 DIAGNOSIS — R07.9 CHEST PAIN, UNSPECIFIED TYPE: ICD-10-CM

## 2025-06-10 DIAGNOSIS — I21.4 NSTEMI, INITIAL EPISODE OF CARE (HCC): Primary | ICD-10-CM

## 2025-06-10 DIAGNOSIS — I25.10 CORONARY ARTERY DISEASE INVOLVING NATIVE CORONARY ARTERY OF NATIVE HEART WITHOUT ANGINA PECTORIS: ICD-10-CM

## 2025-06-10 DIAGNOSIS — I48.91 NEW ONSET ATRIAL FIBRILLATION (HCC): ICD-10-CM

## 2025-06-10 PROBLEM — E87.5 HYPERKALEMIA: Status: ACTIVE | Noted: 2025-06-10

## 2025-06-10 LAB
ALBUMIN SERPL-MCNC: 4.4 G/DL (ref 3.5–5.2)
ALP SERPL-CCNC: 127 U/L (ref 40–129)
ALT SERPL-CCNC: 20 U/L (ref 10–50)
ANION GAP SERPL CALCULATED.3IONS-SCNC: 14 MMOL/L (ref 8–16)
ANTI-XA UNFRAC HEPARIN: <0.1 IU/ML
APTT PPP: 25.8 SEC (ref 26–36.2)
AST SERPL-CCNC: 23 U/L (ref 10–50)
BASOPHILS # BLD: 0.1 K/UL (ref 0–0.2)
BASOPHILS NFR BLD: 0.4 % (ref 0–1)
BILIRUB SERPL-MCNC: 0.4 MG/DL (ref 0.2–1.2)
BNP BLD-MCNC: 164 PG/ML (ref 0–124)
BUN SERPL-MCNC: 36 MG/DL (ref 8–23)
CALCIUM SERPL-MCNC: 10.1 MG/DL (ref 8.8–10.2)
CHLORIDE SERPL-SCNC: 105 MMOL/L (ref 98–107)
CHOLEST SERPL-MCNC: 191 MG/DL (ref 0–199)
CO2 SERPL-SCNC: 21 MMOL/L (ref 22–29)
CREAT SERPL-MCNC: 1.1 MG/DL (ref 0.7–1.2)
D DIMER PPP FEU-MCNC: 0.27 UG/ML FEU (ref 0–0.48)
EOSINOPHIL # BLD: 0.1 K/UL (ref 0–0.6)
EOSINOPHIL NFR BLD: 0.6 % (ref 0–5)
ERYTHROCYTE [DISTWIDTH] IN BLOOD BY AUTOMATED COUNT: 12 % (ref 11.5–14.5)
GLUCOSE SERPL-MCNC: 102 MG/DL (ref 70–99)
HBA1C MFR BLD: 5.6 % (ref 4–5.6)
HCT VFR BLD AUTO: 45.7 % (ref 42–52)
HDLC SERPL-MCNC: 57 MG/DL (ref 40–60)
HGB BLD-MCNC: 14.7 G/DL (ref 14–18)
IMM GRANULOCYTES # BLD: 0.1 K/UL
INR PPP: 1.05 (ref 0.88–1.18)
LDLC SERPL CALC-MCNC: 102 MG/DL
LYMPHOCYTES # BLD: 2.5 K/UL (ref 1.1–4.5)
LYMPHOCYTES NFR BLD: 21.2 % (ref 20–40)
MAGNESIUM SERPL-MCNC: 2.3 MG/DL (ref 1.6–2.4)
MCH RBC QN AUTO: 31.4 PG (ref 27–31)
MCHC RBC AUTO-ENTMCNC: 32.2 G/DL (ref 33–37)
MCV RBC AUTO: 97.6 FL (ref 80–94)
MONOCYTES # BLD: 1.1 K/UL (ref 0–0.9)
MONOCYTES NFR BLD: 8.9 % (ref 0–10)
NEUTROPHILS # BLD: 8.1 K/UL (ref 1.5–7.5)
NEUTS SEG NFR BLD: 68.5 % (ref 50–65)
PHOSPHATE SERPL-MCNC: 3.7 MG/DL (ref 2.5–4.5)
PLATELET # BLD AUTO: 425 K/UL (ref 130–400)
PMV BLD AUTO: 8.8 FL (ref 9.4–12.4)
POTASSIUM SERPL-SCNC: 5.3 MMOL/L (ref 3.5–5.1)
PROT SERPL-MCNC: 7.7 G/DL (ref 6.4–8.3)
PROTHROMBIN TIME: 13.6 SEC (ref 12–14.6)
RBC # BLD AUTO: 4.68 M/UL (ref 4.7–6.1)
SODIUM SERPL-SCNC: 140 MMOL/L (ref 136–145)
T4 FREE SERPL-MCNC: 1.24 NG/DL (ref 0.93–1.7)
TRIGL SERPL-MCNC: 158 MG/DL (ref 0–149)
TROPONIN, HIGH SENSITIVITY: 136 NG/L (ref 0–22)
TROPONIN, HIGH SENSITIVITY: 89 NG/L (ref 0–22)
TSH SERPL DL<=0.005 MIU/L-ACNC: 4.9 UIU/ML (ref 0.27–4.2)
WBC # BLD AUTO: 11.9 K/UL (ref 4.8–10.8)

## 2025-06-10 PROCEDURE — 94760 N-INVAS EAR/PLS OXIMETRY 1: CPT

## 2025-06-10 PROCEDURE — 84439 ASSAY OF FREE THYROXINE: CPT

## 2025-06-10 PROCEDURE — 85610 PROTHROMBIN TIME: CPT

## 2025-06-10 PROCEDURE — 84484 ASSAY OF TROPONIN QUANT: CPT

## 2025-06-10 PROCEDURE — 83880 ASSAY OF NATRIURETIC PEPTIDE: CPT

## 2025-06-10 PROCEDURE — 4004F PT TOBACCO SCREEN RCVD TLK: CPT | Performed by: NURSE PRACTITIONER

## 2025-06-10 PROCEDURE — 1200000000 HC SEMI PRIVATE

## 2025-06-10 PROCEDURE — 71045 X-RAY EXAM CHEST 1 VIEW: CPT

## 2025-06-10 PROCEDURE — 96374 THER/PROPH/DIAG INJ IV PUSH: CPT

## 2025-06-10 PROCEDURE — 6360000002 HC RX W HCPCS: Performed by: STUDENT IN AN ORGANIZED HEALTH CARE EDUCATION/TRAINING PROGRAM

## 2025-06-10 PROCEDURE — 2580000003 HC RX 258: Performed by: HOSPITALIST

## 2025-06-10 PROCEDURE — 99285 EMERGENCY DEPT VISIT HI MDM: CPT

## 2025-06-10 PROCEDURE — 99215 OFFICE O/P EST HI 40 MIN: CPT | Performed by: NURSE PRACTITIONER

## 2025-06-10 PROCEDURE — 85730 THROMBOPLASTIN TIME PARTIAL: CPT

## 2025-06-10 PROCEDURE — G8420 CALC BMI NORM PARAMETERS: HCPCS | Performed by: NURSE PRACTITIONER

## 2025-06-10 PROCEDURE — 83036 HEMOGLOBIN GLYCOSYLATED A1C: CPT

## 2025-06-10 PROCEDURE — 93005 ELECTROCARDIOGRAM TRACING: CPT | Performed by: STUDENT IN AN ORGANIZED HEALTH CARE EDUCATION/TRAINING PROGRAM

## 2025-06-10 PROCEDURE — 80061 LIPID PANEL: CPT

## 2025-06-10 PROCEDURE — 84443 ASSAY THYROID STIM HORMONE: CPT

## 2025-06-10 PROCEDURE — 83735 ASSAY OF MAGNESIUM: CPT

## 2025-06-10 PROCEDURE — 2580000003 HC RX 258: Performed by: STUDENT IN AN ORGANIZED HEALTH CARE EDUCATION/TRAINING PROGRAM

## 2025-06-10 PROCEDURE — 85520 HEPARIN ASSAY: CPT

## 2025-06-10 PROCEDURE — 6360000004 HC RX CONTRAST MEDICATION: Performed by: STUDENT IN AN ORGANIZED HEALTH CARE EDUCATION/TRAINING PROGRAM

## 2025-06-10 PROCEDURE — 80053 COMPREHEN METABOLIC PANEL: CPT

## 2025-06-10 PROCEDURE — 6370000000 HC RX 637 (ALT 250 FOR IP): Performed by: HOSPITALIST

## 2025-06-10 PROCEDURE — 36415 COLL VENOUS BLD VENIPUNCTURE: CPT

## 2025-06-10 PROCEDURE — 71275 CT ANGIOGRAPHY CHEST: CPT

## 2025-06-10 PROCEDURE — 6360000002 HC RX W HCPCS: Performed by: HOSPITALIST

## 2025-06-10 PROCEDURE — 84100 ASSAY OF PHOSPHORUS: CPT

## 2025-06-10 PROCEDURE — G8427 DOCREV CUR MEDS BY ELIG CLIN: HCPCS | Performed by: NURSE PRACTITIONER

## 2025-06-10 PROCEDURE — 85025 COMPLETE CBC W/AUTO DIFF WBC: CPT

## 2025-06-10 PROCEDURE — 93005 ELECTROCARDIOGRAM TRACING: CPT

## 2025-06-10 PROCEDURE — 85379 FIBRIN DEGRADATION QUANT: CPT

## 2025-06-10 RX ORDER — SODIUM CHLORIDE 0.9 % (FLUSH) 0.9 %
5-40 SYRINGE (ML) INJECTION PRN
Status: DISCONTINUED | OUTPATIENT
Start: 2025-06-10 | End: 2025-06-13 | Stop reason: HOSPADM

## 2025-06-10 RX ORDER — MECOBALAMIN 5000 MCG
10 TABLET,DISINTEGRATING ORAL NIGHTLY PRN
Status: DISCONTINUED | OUTPATIENT
Start: 2025-06-10 | End: 2025-06-13 | Stop reason: HOSPADM

## 2025-06-10 RX ORDER — CALCIUM CARBONATE 500 MG/1
1000 TABLET, CHEWABLE ORAL 3 TIMES DAILY PRN
Status: DISCONTINUED | OUTPATIENT
Start: 2025-06-10 | End: 2025-06-13 | Stop reason: HOSPADM

## 2025-06-10 RX ORDER — ATORVASTATIN CALCIUM 80 MG/1
80 TABLET, FILM COATED ORAL NIGHTLY
Status: DISCONTINUED | OUTPATIENT
Start: 2025-06-10 | End: 2025-06-13 | Stop reason: HOSPADM

## 2025-06-10 RX ORDER — KETOCONAZOLE 20 MG/ML
SHAMPOO, SUSPENSION TOPICAL PRN
Status: DISCONTINUED | OUTPATIENT
Start: 2025-06-10 | End: 2025-06-13 | Stop reason: HOSPADM

## 2025-06-10 RX ORDER — VALSARTAN 80 MG/1
40 TABLET ORAL 2 TIMES DAILY
Status: DISCONTINUED | OUTPATIENT
Start: 2025-06-10 | End: 2025-06-13 | Stop reason: HOSPADM

## 2025-06-10 RX ORDER — HEPARIN SODIUM 1000 [USP'U]/ML
2000 INJECTION, SOLUTION INTRAVENOUS; SUBCUTANEOUS PRN
Status: DISCONTINUED | OUTPATIENT
Start: 2025-06-10 | End: 2025-06-12

## 2025-06-10 RX ORDER — LAMOTRIGINE 100 MG/1
100 TABLET ORAL DAILY
Status: DISCONTINUED | OUTPATIENT
Start: 2025-06-11 | End: 2025-06-13 | Stop reason: HOSPADM

## 2025-06-10 RX ORDER — SODIUM CHLORIDE 0.9 % (FLUSH) 0.9 %
5-40 SYRINGE (ML) INJECTION EVERY 12 HOURS SCHEDULED
Status: DISCONTINUED | OUTPATIENT
Start: 2025-06-10 | End: 2025-06-13 | Stop reason: HOSPADM

## 2025-06-10 RX ORDER — POTASSIUM CHLORIDE 7.45 MG/ML
10 INJECTION INTRAVENOUS PRN
Status: DISCONTINUED | OUTPATIENT
Start: 2025-06-10 | End: 2025-06-13 | Stop reason: HOSPADM

## 2025-06-10 RX ORDER — ASPIRIN 81 MG/1
81 TABLET, CHEWABLE ORAL DAILY
Status: DISCONTINUED | OUTPATIENT
Start: 2025-06-11 | End: 2025-06-12

## 2025-06-10 RX ORDER — HEPARIN SODIUM 10000 [USP'U]/100ML
5-30 INJECTION, SOLUTION INTRAVENOUS CONTINUOUS
Status: DISCONTINUED | OUTPATIENT
Start: 2025-06-10 | End: 2025-06-12

## 2025-06-10 RX ORDER — HYDROCODONE BITARTRATE AND ACETAMINOPHEN 5; 325 MG/1; MG/1
1 TABLET ORAL EVERY 8 HOURS PRN
Status: DISCONTINUED | OUTPATIENT
Start: 2025-06-10 | End: 2025-06-13 | Stop reason: HOSPADM

## 2025-06-10 RX ORDER — POTASSIUM CHLORIDE 1500 MG/1
40 TABLET, EXTENDED RELEASE ORAL PRN
Status: DISCONTINUED | OUTPATIENT
Start: 2025-06-10 | End: 2025-06-13 | Stop reason: HOSPADM

## 2025-06-10 RX ORDER — IOPAMIDOL 755 MG/ML
70 INJECTION, SOLUTION INTRAVASCULAR
Status: COMPLETED | OUTPATIENT
Start: 2025-06-10 | End: 2025-06-10

## 2025-06-10 RX ORDER — 0.9 % SODIUM CHLORIDE 0.9 %
500 INTRAVENOUS SOLUTION INTRAVENOUS ONCE
Status: COMPLETED | OUTPATIENT
Start: 2025-06-10 | End: 2025-06-10

## 2025-06-10 RX ORDER — PANTOPRAZOLE SODIUM 40 MG/1
40 TABLET, DELAYED RELEASE ORAL
Status: DISCONTINUED | OUTPATIENT
Start: 2025-06-11 | End: 2025-06-13 | Stop reason: HOSPADM

## 2025-06-10 RX ORDER — POLYETHYLENE GLYCOL 3350 17 G/17G
17 POWDER, FOR SOLUTION ORAL 2 TIMES DAILY PRN
Status: DISCONTINUED | OUTPATIENT
Start: 2025-06-10 | End: 2025-06-13 | Stop reason: HOSPADM

## 2025-06-10 RX ORDER — NALOXONE HYDROCHLORIDE 0.4 MG/ML
0.4 INJECTION, SOLUTION INTRAMUSCULAR; INTRAVENOUS; SUBCUTANEOUS PRN
Status: DISCONTINUED | OUTPATIENT
Start: 2025-06-10 | End: 2025-06-13 | Stop reason: HOSPADM

## 2025-06-10 RX ORDER — AMIODARONE HYDROCHLORIDE 150 MG/3ML
150 INJECTION, SOLUTION INTRAVENOUS ONCE
Status: COMPLETED | OUTPATIENT
Start: 2025-06-10 | End: 2025-06-10

## 2025-06-10 RX ORDER — ONDANSETRON 2 MG/ML
4 INJECTION INTRAMUSCULAR; INTRAVENOUS EVERY 6 HOURS PRN
Status: DISCONTINUED | OUTPATIENT
Start: 2025-06-10 | End: 2025-06-13 | Stop reason: HOSPADM

## 2025-06-10 RX ORDER — SODIUM CHLORIDE 9 MG/ML
INJECTION, SOLUTION INTRAVENOUS PRN
Status: DISCONTINUED | OUTPATIENT
Start: 2025-06-10 | End: 2025-06-13 | Stop reason: HOSPADM

## 2025-06-10 RX ORDER — MAGNESIUM SULFATE IN WATER 40 MG/ML
2000 INJECTION, SOLUTION INTRAVENOUS PRN
Status: DISCONTINUED | OUTPATIENT
Start: 2025-06-10 | End: 2025-06-13 | Stop reason: HOSPADM

## 2025-06-10 RX ORDER — ACETAMINOPHEN 325 MG/1
650 TABLET ORAL EVERY 4 HOURS PRN
Status: DISCONTINUED | OUTPATIENT
Start: 2025-06-10 | End: 2025-06-13 | Stop reason: HOSPADM

## 2025-06-10 RX ORDER — ESCITALOPRAM OXALATE 10 MG/1
20 TABLET ORAL DAILY
Status: DISCONTINUED | OUTPATIENT
Start: 2025-06-11 | End: 2025-06-13 | Stop reason: HOSPADM

## 2025-06-10 RX ORDER — MECOBALAMIN 5000 MCG
10 TABLET,DISINTEGRATING ORAL NIGHTLY
Status: DISCONTINUED | OUTPATIENT
Start: 2025-06-10 | End: 2025-06-13 | Stop reason: HOSPADM

## 2025-06-10 RX ORDER — NITROGLYCERIN 0.4 MG/1
0.4 TABLET SUBLINGUAL EVERY 5 MIN PRN
Status: DISCONTINUED | OUTPATIENT
Start: 2025-06-10 | End: 2025-06-13 | Stop reason: HOSPADM

## 2025-06-10 RX ORDER — 0.9 % SODIUM CHLORIDE 0.9 %
1000 INTRAVENOUS SOLUTION INTRAVENOUS ONCE
Status: COMPLETED | OUTPATIENT
Start: 2025-06-10 | End: 2025-06-10

## 2025-06-10 RX ORDER — HEPARIN SODIUM 1000 [USP'U]/ML
4000 INJECTION, SOLUTION INTRAVENOUS; SUBCUTANEOUS ONCE
Status: COMPLETED | OUTPATIENT
Start: 2025-06-10 | End: 2025-06-10

## 2025-06-10 RX ORDER — CLOPIDOGREL BISULFATE 75 MG/1
75 TABLET ORAL DAILY
Status: DISCONTINUED | OUTPATIENT
Start: 2025-06-11 | End: 2025-06-12

## 2025-06-10 RX ORDER — HEPARIN SODIUM 1000 [USP'U]/ML
4000 INJECTION, SOLUTION INTRAVENOUS; SUBCUTANEOUS PRN
Status: DISCONTINUED | OUTPATIENT
Start: 2025-06-10 | End: 2025-06-12

## 2025-06-10 RX ORDER — ONDANSETRON 4 MG/1
4 TABLET, ORALLY DISINTEGRATING ORAL EVERY 8 HOURS PRN
Status: DISCONTINUED | OUTPATIENT
Start: 2025-06-10 | End: 2025-06-13 | Stop reason: HOSPADM

## 2025-06-10 RX ADMIN — HYDROCODONE BITARTRATE AND ACETAMINOPHEN 1 TABLET: 5; 325 TABLET ORAL at 19:47

## 2025-06-10 RX ADMIN — HEPARIN SODIUM 12 UNITS/KG/HR: 10000 INJECTION, SOLUTION INTRAVENOUS at 19:52

## 2025-06-10 RX ADMIN — SODIUM CHLORIDE 1000 ML: 0.9 INJECTION, SOLUTION INTRAVENOUS at 17:09

## 2025-06-10 RX ADMIN — HEPARIN SODIUM 4000 UNITS: 1000 INJECTION INTRAVENOUS; SUBCUTANEOUS at 19:49

## 2025-06-10 RX ADMIN — AMIODARONE HYDROCHLORIDE 150 MG: 50 INJECTION, SOLUTION INTRAVENOUS at 17:36

## 2025-06-10 RX ADMIN — ATORVASTATIN CALCIUM 80 MG: 80 TABLET, FILM COATED ORAL at 22:47

## 2025-06-10 RX ADMIN — SODIUM CHLORIDE 500 ML: 0.9 INJECTION, SOLUTION INTRAVENOUS at 19:15

## 2025-06-10 RX ADMIN — Medication 10 MG: at 22:47

## 2025-06-10 RX ADMIN — IOPAMIDOL 70 ML: 755 INJECTION, SOLUTION INTRAVENOUS at 18:16

## 2025-06-10 SDOH — ECONOMIC STABILITY: INCOME INSECURITY: HOW HARD IS IT FOR YOU TO PAY FOR THE VERY BASICS LIKE FOOD, HOUSING, MEDICAL CARE, AND HEATING?: PATIENT DECLINED

## 2025-06-10 SDOH — ECONOMIC STABILITY: FOOD INSECURITY: WITHIN THE PAST 12 MONTHS, YOU WORRIED THAT YOUR FOOD WOULD RUN OUT BEFORE YOU GOT MONEY TO BUY MORE.: NEVER TRUE

## 2025-06-10 SDOH — ECONOMIC STABILITY: INCOME INSECURITY: IN THE PAST 12 MONTHS, HAS THE ELECTRIC, GAS, OIL, OR WATER COMPANY THREATENED TO SHUT OFF SERVICE IN YOUR HOME?: NO

## 2025-06-10 ASSESSMENT — PATIENT HEALTH QUESTIONNAIRE - PHQ9
SUM OF ALL RESPONSES TO PHQ QUESTIONS 1-9: 0
SUM OF ALL RESPONSES TO PHQ QUESTIONS 1-9: 0
2. FEELING DOWN, DEPRESSED OR HOPELESS: NOT AT ALL
1. LITTLE INTEREST OR PLEASURE IN DOING THINGS: NOT AT ALL
SUM OF ALL RESPONSES TO PHQ QUESTIONS 1-9: 0
SUM OF ALL RESPONSES TO PHQ QUESTIONS 1-9: 0

## 2025-06-10 ASSESSMENT — PAIN SCALES - GENERAL
PAINLEVEL_OUTOF10: 4
PAINLEVEL_OUTOF10: 2

## 2025-06-10 ASSESSMENT — PAIN DESCRIPTION - LOCATION: LOCATION: ABDOMEN;CHEST

## 2025-06-10 ASSESSMENT — ENCOUNTER SYMPTOMS
SHORTNESS OF BREATH: 1
VOMITING: 0
SHORTNESS OF BREATH: 1
NAUSEA: 0

## 2025-06-10 ASSESSMENT — PAIN DESCRIPTION - DESCRIPTORS: DESCRIPTORS: STABBING

## 2025-06-10 NOTE — ED NOTES
Pt requesting to go to the bathroom. Pt told this RN that he wants to go to the bathroom to go. This RN informed the pt that at this time he can not go to the bathroom but that I can bring a bedside commode into his room. Pt states again that he wants to go to the bathroom. RN informed pt again that he is not stable enough at this time to go to the bathroom. RN offered bedside commode again. Pt declined at this time. Pt informed that if he changes his mind he can call out and we will help him.

## 2025-06-10 NOTE — ED PROVIDER NOTES
[facility] EMERGENCY DEPARTMENT  eMERGENCY dEPARTMENT eNCOUnter      Pt Name: Regaan Diallo  MRN: [id number]  Birthdate 1965  Date of evaluation: 6/10/2025  Provider: Collin Pelayo MD    Chief Complaint:  Chief Complaint   Patient presents with    Tachycardia     Afib RVR     HPI    Reagan Diallo is a 60 y.o. male who presents to the emergency department from his doctor's office.  He says he feels fatigued and has not wanted he had a better do anything for the past several days.  For the past 4 days he has had intermittent sharp stabbing chest pains that will occur in his left chest, middle chest, and come and go.  When they are occurring he cannot move or do anything, and then when they resolve he feels better.  He has not noted elevated heart rate.  He feels generally short of breath.  He denies ripping or tearing pain or pain radiating to his back other than the first time he had the pain it was sharp and he also felt that in his back.  No syncope.  No fevers.  No abdominal pain or vomiting.  No leg swelling no History of heart failure      Review of Systems   Constitutional:  Negative for fever.   Respiratory:  Positive for shortness of breath.    Cardiovascular:  Positive for chest pain. Negative for leg swelling.   Gastrointestinal:  Negative for nausea and vomiting.   Genitourinary:  Negative for difficulty urinating and dysuria.   Neurological:  Negative for syncope and weakness.       Past Medical History:   Diagnosis Date    Anxiety     Arthritis     Chronic back pain     Depression     MD (muscular dystrophy) ([facility])     Neuropathy 12/02/2020    Scoliosis        Past Surgical History:   Procedure Laterality Date    CHOLECYSTECTOMY, LAPAROSCOPIC N/A 04/27/2021    LAPAROSCOPIC CHOLECYSTECTOMY performed by Oksana Herrera,  at [facility] OR    COLONOSCOPY  01/08/2016    Dr Case, normal, 10 yr recall    COLONOSCOPY N/A 06/30/2021    Dr Singh,int hemorrhids, RANDOM COLON BX NEGATIVE, Sub Prep  of the mA and/or kV according to size, and the use of iterative reconstruction technique.        ______________________________________    Electronically signed by: JIAN HART M.D.   Date:     06/10/2025   Time:    19:13       XR CHEST PORTABLE   Final Result   1.  No acute findings.               ______________________________________    Electronically signed by: JIAN HART M.D.   Date:     06/10/2025   Time:    18:08           Labs:  Labs Reviewed   CBC WITH AUTO DIFFERENTIAL - Abnormal; Notable for the following components:       Result Value    WBC 11.9 (*)     RBC 4.68 (*)     MCV 97.6 (*)     MCH 31.4 (*)     MCHC 32.2 (*)     Platelets 425 (*)     MPV 8.8 (*)     Neutrophils % 68.5 (*)     Neutrophils Absolute 8.1 (*)     Monocytes Absolute 1.10 (*)     All other components within normal limits   COMPREHENSIVE METABOLIC PANEL - Abnormal; Notable for the following components:    Potassium 5.3 (*)     CO2 21 (*)     Glucose 102 (*)     BUN 36 (*)     All other components within normal limits   TROPONIN - Abnormal; Notable for the following components:    Troponin, High Sensitivity 136 (*)     All other components within normal limits    Narrative:     CALL  [employer] tel. ,  Chemistry results called to and read back by MARS REDD RN [facility],  06/10/2025 17:43, by HANDY   TROPONIN - Abnormal; Notable for the following components:    Troponin, High Sensitivity 89 (*)     All other components within normal limits    Narrative:     CALL  [employer] tel. ,  Chemistry results called to and read back by PAVEL SIMMS RN ED, 06/10/2025  19:25, by MURPA   MAGNESIUM    Narrative:     CALL  [employer] tel. ,  Chemistry results called to and read back by MARS REDD RN [facility],  06/10/2025 17:43, by HANDY   APTT   PROTIME-INR   ANTI-XA, HEPARIN   ANTI-XA, HEPARIN   HEMOGLOBIN A1C   ANTI-XA, HEPARIN   D-DIMER, QUANTITATIVE   BRAIN NATRIURETIC PEPTIDE   LIPID PANEL   PHOSPHORUS   TSH   T4, FREE  patient that he may have to be cardioverted if his hypotension recurs or sustains [AS]   1733 Spoke with Dr. Lozada who generally agrees; he recommends a try an amio bolus.  Fluids are continuing.  Will broaden his workup with CT PE given that his heart rate is not so elevated that it would likely cause hypotension [AS]   1901 ECG with afib RVR no ischemic change. [AS]      ED Course User Index  [AS] Collin Pelayo MD        Given the above workup patient admitted for further workup and management. Pt monitored for the remainder of their stay in the ED and dispositioned without acute event.    Electronically signed by:  Collin Pelayo MD 6/10/2025 7:39 PM  Note: Dragon medical dictation software was used in the creation of this note.          Final Impression: See below.    Procedures    1. NSTEMI, initial episode of care (HCC)    2. New onset atrial fibrillation (HCC)    3. Atrial fibrillation, rapid (HCC)      DISPOSITION Admitted 06/10/2025 07:09:51 PM             No follow-up provider specified.    DISCHARGE MEDICATIONS:  New Prescriptions    No medications on file          (Please note that portions of this note were completed with a voice recognition program.  Efforts were made to edit thedictations but occasionally words are mis-transcribed.)    Collin Pelayo MD (electronically signed)Emergency Physician          Collin Pelayo MD  06/10/25 1939

## 2025-06-10 NOTE — PROGRESS NOTES
cervical adenopathy.   Skin:     General: Skin is warm and dry.      Capillary Refill: Capillary refill takes less than 2 seconds.   Neurological:      General: No focal deficit present.      Mental Status: He is alert and oriented to person, place, and time. Mental status is at baseline.      Coordination: Coordination is intact.   Psychiatric:         Mood and Affect: Mood normal.         Speech: Speech normal.         Behavior: Behavior normal.         Thought Content: Thought content normal.         Cognition and Memory: Cognition and memory normal.         Judgment: Judgment normal.             The patient (or guardian, if applicable) and other individuals in attendance with the patient were advised that Artificial Intelligence will be utilized during this visit to record, process the conversation to generate a clinical note and to support improvement of the AI technology. The patient (or guardian, if applicable) and other individuals in attendance at the appointment consented to the use of AI, including the recording.      An electronic signature was used to authenticate this note.    --ANTHONY Hernandez

## 2025-06-11 ENCOUNTER — APPOINTMENT (OUTPATIENT)
Age: 60
End: 2025-06-11
Attending: HOSPITALIST
Payer: MEDICARE

## 2025-06-11 PROBLEM — I10 HYPERTENSION: Status: ACTIVE | Noted: 2025-06-11

## 2025-06-11 PROBLEM — K21.9 GERD (GASTROESOPHAGEAL REFLUX DISEASE): Status: ACTIVE | Noted: 2025-06-11

## 2025-06-11 PROBLEM — E78.5 DYSLIPIDEMIA: Status: ACTIVE | Noted: 2025-06-11

## 2025-06-11 LAB
ANION GAP SERPL CALCULATED.3IONS-SCNC: 12 MMOL/L (ref 8–16)
ANTI-XA UNFRAC HEPARIN: 0.28 IU/ML
ANTI-XA UNFRAC HEPARIN: 0.41 IU/ML
ANTI-XA UNFRAC HEPARIN: 0.53 IU/ML
ANTI-XA UNFRAC HEPARIN: 0.6 IU/ML
APTT PPP: 43 SEC (ref 26–36.2)
BASOPHILS # BLD: 0 K/UL (ref 0–0.2)
BASOPHILS NFR BLD: 0.3 % (ref 0–1)
BUN SERPL-MCNC: 33 MG/DL (ref 8–23)
CALCIUM SERPL-MCNC: 9.6 MG/DL (ref 8.8–10.2)
CHLORIDE SERPL-SCNC: 106 MMOL/L (ref 98–107)
CO2 SERPL-SCNC: 21 MMOL/L (ref 22–29)
CREAT SERPL-MCNC: 0.7 MG/DL (ref 0.7–1.2)
ECHO AO ASC DIAM: 2.9 CM
ECHO AO ASCENDING AORTA INDEX: 1.46 CM/M2
ECHO AO ROOT DIAM: 1.6 CM
ECHO AO ROOT INDEX: 0.8 CM/M2
ECHO AO SINUS VALSALVA DIAM: 2.6 CM
ECHO AO SINUS VALSALVA INDEX: 1.31 CM/M2
ECHO AO ST JNCT DIAM: 2.4 CM
ECHO AV AREA PEAK VELOCITY: 2.9 CM2
ECHO AV AREA VTI: 3 CM2
ECHO AV AREA/BSA PEAK VELOCITY: 1.5 CM2/M2
ECHO AV AREA/BSA VTI: 1.5 CM2/M2
ECHO AV MEAN GRADIENT: 1 MMHG
ECHO AV MEAN GRADIENT: 1 MMHG
ECHO AV MEAN VELOCITY: 0.5 M/S
ECHO AV PEAK GRADIENT: 3 MMHG
ECHO AV PEAK VELOCITY: 0.8 M/S
ECHO AV VELOCITY RATIO: 0.88
ECHO AV VTI: 16.3 CM
ECHO BSA: 1.99 M2
ECHO EST RA PRESSURE: 3 MMHG
ECHO IVC PROX: 1.7 CM
ECHO LA AREA 2C: 12.2 CM2
ECHO LA AREA 4C: 11.4 CM2
ECHO LA DIAMETER INDEX: 1.36 CM/M2
ECHO LA DIAMETER: 2.7 CM
ECHO LA MAJOR AXIS: 3.7 CM
ECHO LA MINOR AXIS: 4 CM
ECHO LA TO AORTIC ROOT RATIO: 1.69
ECHO LA VOL BP: 31 ML (ref 18–58)
ECHO LA VOL MOD A2C: 30 ML (ref 18–58)
ECHO LA VOL MOD A4C: 29 ML (ref 18–58)
ECHO LA VOL/BSA BIPLANE: 16 ML/M2 (ref 16–34)
ECHO LA VOLUME INDEX MOD A2C: 15 ML/M2 (ref 16–34)
ECHO LA VOLUME INDEX MOD A4C: 15 ML/M2 (ref 16–34)
ECHO LV E' LATERAL VELOCITY: 9.79 CM/S
ECHO LV E' SEPTAL VELOCITY: 9.14 CM/S
ECHO LV EDV A2C: 75 ML
ECHO LV EDV A4C: 100 ML
ECHO LV EDV INDEX A4C: 50 ML/M2
ECHO LV EDV NDEX A2C: 38 ML/M2
ECHO LV EF PHYSICIAN: 65 %
ECHO LV EJECTION FRACTION A2C: 66 %
ECHO LV EJECTION FRACTION A4C: 64 %
ECHO LV EJECTION FRACTION BIPLANE: 64 % (ref 55–100)
ECHO LV ESV A2C: 26 ML
ECHO LV ESV A4C: 36 ML
ECHO LV ESV INDEX A2C: 13 ML/M2
ECHO LV ESV INDEX A4C: 18 ML/M2
ECHO LV FRACTIONAL SHORTENING: 34 % (ref 28–44)
ECHO LV INTERNAL DIMENSION DIASTOLE INDEX: 2.21 CM/M2
ECHO LV INTERNAL DIMENSION DIASTOLIC: 4.4 CM (ref 4.2–5.9)
ECHO LV INTERNAL DIMENSION SYSTOLIC INDEX: 1.46 CM/M2
ECHO LV INTERNAL DIMENSION SYSTOLIC: 2.9 CM
ECHO LV IVSD: 1 CM (ref 0.6–1)
ECHO LV MASS 2D: 147.8 G (ref 88–224)
ECHO LV MASS INDEX 2D: 74.3 G/M2 (ref 49–115)
ECHO LV POSTERIOR WALL DIASTOLIC: 1 CM (ref 0.6–1)
ECHO LV RELATIVE WALL THICKNESS RATIO: 0.45
ECHO LVOT AREA: 3.1 CM2
ECHO LVOT AV VTI INDEX: 0.94
ECHO LVOT DIAM: 2 CM
ECHO LVOT MEAN GRADIENT: 1 MMHG
ECHO LVOT MEAN GRADIENT: 1 MMHG
ECHO LVOT PEAK GRADIENT: 2 MMHG
ECHO LVOT PEAK VELOCITY: 0.7 M/S
ECHO LVOT STROKE VOLUME INDEX: 24.3 ML/M2
ECHO LVOT SV: 48.4 ML
ECHO LVOT VTI: 15.4 CM
ECHO MV A VELOCITY: 0.46 M/S
ECHO MV AREA VTI: 3 CM2
ECHO MV E DECELERATION TIME (DT): 201 MS
ECHO MV E VELOCITY: 0.6 M/S
ECHO MV E/A RATIO: 1.3
ECHO MV E/E' LATERAL: 6.13
ECHO MV E/E' RATIO (AVERAGED): 6.35
ECHO MV E/E' SEPTAL: 6.56
ECHO MV LVOT VTI INDEX: 1.06
ECHO MV MAX VELOCITY: 0.6 M/S
ECHO MV MEAN GRADIENT: 1 MMHG
ECHO MV MEAN VELOCITY: 0.4 M/S
ECHO MV PEAK GRADIENT: 1 MMHG
ECHO MV VTI: 16.3 CM
ECHO RA AREA 4C: 8.4 CM2
ECHO RA END SYSTOLIC VOLUME APICAL 4 CHAMBER INDEX BSA: 7 ML/M2
ECHO RA VOLUME: 13 ML
ECHO RIGHT VENTRICULAR SYSTOLIC PRESSURE (RVSP): 13 MMHG
ECHO RV BASAL DIMENSION: 3 CM
ECHO RV INTERNAL DIMENSION: 2.4 CM
ECHO RV LONGITUDINAL DIMENSION: 6.5 CM
ECHO RV MID DIMENSION: 2.1 CM
ECHO RV TAPSE: 1.2 CM (ref 1.7–?)
ECHO TV REGURGITANT MAX VELOCITY: 1.62 M/S
ECHO TV REGURGITANT PEAK GRADIENT: 10 MMHG
EKG P AXIS: 17 DEGREES
EKG P AXIS: 48 DEGREES
EKG P AXIS: NORMAL DEGREES
EKG P-R INTERVAL: 146 MS
EKG P-R INTERVAL: 150 MS
EKG P-R INTERVAL: NORMAL MS
EKG Q-T INTERVAL: 264 MS
EKG Q-T INTERVAL: 378 MS
EKG Q-T INTERVAL: 410 MS
EKG QRS DURATION: 100 MS
EKG QRS DURATION: 92 MS
EKG QRS DURATION: 92 MS
EKG QTC CALCULATION (BAZETT): 407 MS
EKG QTC CALCULATION (BAZETT): 416 MS
EKG QTC CALCULATION (BAZETT): 418 MS
EKG T AXIS: 35 DEGREES
EKG T AXIS: 60 DEGREES
EKG T AXIS: 64 DEGREES
EOSINOPHIL # BLD: 0.1 K/UL (ref 0–0.6)
EOSINOPHIL NFR BLD: 0.7 % (ref 0–5)
ERYTHROCYTE [DISTWIDTH] IN BLOOD BY AUTOMATED COUNT: 12.1 % (ref 11.5–14.5)
GLUCOSE SERPL-MCNC: 92 MG/DL (ref 70–99)
HCT VFR BLD AUTO: 40.9 % (ref 42–52)
HGB BLD-MCNC: 13.6 G/DL (ref 14–18)
IMM GRANULOCYTES # BLD: 0 K/UL
LYMPHOCYTES # BLD: 2.7 K/UL (ref 1.1–4.5)
LYMPHOCYTES NFR BLD: 36.5 % (ref 20–40)
MCH RBC QN AUTO: 31.5 PG (ref 27–31)
MCHC RBC AUTO-ENTMCNC: 33.3 G/DL (ref 33–37)
MCV RBC AUTO: 94.7 FL (ref 80–94)
MONOCYTES # BLD: 0.6 K/UL (ref 0–0.9)
MONOCYTES NFR BLD: 8.1 % (ref 0–10)
NEUTROPHILS # BLD: 4 K/UL (ref 1.5–7.5)
NEUTS SEG NFR BLD: 54.1 % (ref 50–65)
PLATELET # BLD AUTO: 335 K/UL (ref 130–400)
PMV BLD AUTO: 8.8 FL (ref 9.4–12.4)
POTASSIUM SERPL-SCNC: 4 MMOL/L (ref 3.5–5)
RBC # BLD AUTO: 4.32 M/UL (ref 4.7–6.1)
SODIUM SERPL-SCNC: 139 MMOL/L (ref 136–145)
TROPONIN, HIGH SENSITIVITY: 71 NG/L (ref 0–22)
WBC # BLD AUTO: 7.4 K/UL (ref 4.8–10.8)

## 2025-06-11 PROCEDURE — 99222 1ST HOSP IP/OBS MODERATE 55: CPT | Performed by: INTERNAL MEDICINE

## 2025-06-11 PROCEDURE — 6370000000 HC RX 637 (ALT 250 FOR IP): Performed by: HOSPITALIST

## 2025-06-11 PROCEDURE — 6360000002 HC RX W HCPCS: Performed by: HOSPITALIST

## 2025-06-11 PROCEDURE — 93010 ELECTROCARDIOGRAM REPORT: CPT | Performed by: INTERNAL MEDICINE

## 2025-06-11 PROCEDURE — 94760 N-INVAS EAR/PLS OXIMETRY 1: CPT

## 2025-06-11 PROCEDURE — 36415 COLL VENOUS BLD VENIPUNCTURE: CPT

## 2025-06-11 PROCEDURE — 80048 BASIC METABOLIC PNL TOTAL CA: CPT

## 2025-06-11 PROCEDURE — 84484 ASSAY OF TROPONIN QUANT: CPT

## 2025-06-11 PROCEDURE — 2500000003 HC RX 250 WO HCPCS: Performed by: HOSPITALIST

## 2025-06-11 PROCEDURE — C8929 TTE W OR WO FOL WCON,DOPPLER: HCPCS

## 2025-06-11 PROCEDURE — 85520 HEPARIN ASSAY: CPT

## 2025-06-11 PROCEDURE — 6370000000 HC RX 637 (ALT 250 FOR IP): Performed by: INTERNAL MEDICINE

## 2025-06-11 PROCEDURE — 6360000004 HC RX CONTRAST MEDICATION: Performed by: HOSPITALIST

## 2025-06-11 PROCEDURE — 93306 TTE W/DOPPLER COMPLETE: CPT | Performed by: INTERNAL MEDICINE

## 2025-06-11 PROCEDURE — 85025 COMPLETE CBC W/AUTO DIFF WBC: CPT

## 2025-06-11 PROCEDURE — 93005 ELECTROCARDIOGRAM TRACING: CPT | Performed by: STUDENT IN AN ORGANIZED HEALTH CARE EDUCATION/TRAINING PROGRAM

## 2025-06-11 PROCEDURE — 85730 THROMBOPLASTIN TIME PARTIAL: CPT

## 2025-06-11 PROCEDURE — 1200000000 HC SEMI PRIVATE

## 2025-06-11 RX ORDER — METOPROLOL TARTRATE 25 MG/1
25 TABLET, FILM COATED ORAL 2 TIMES DAILY
Status: DISCONTINUED | OUTPATIENT
Start: 2025-06-11 | End: 2025-06-13

## 2025-06-11 RX ADMIN — LAMOTRIGINE 100 MG: 100 TABLET ORAL at 08:17

## 2025-06-11 RX ADMIN — SULFUR HEXAFLUORIDE 2 ML: 60.7; .19; .19 INJECTION, POWDER, LYOPHILIZED, FOR SUSPENSION INTRAVENOUS; INTRAVESICAL at 07:42

## 2025-06-11 RX ADMIN — ESCITALOPRAM OXALATE 20 MG: 10 TABLET ORAL at 08:15

## 2025-06-11 RX ADMIN — VALSARTAN 40 MG: 80 TABLET ORAL at 01:07

## 2025-06-11 RX ADMIN — HEPARIN SODIUM 14 UNITS/KG/HR: 10000 INJECTION, SOLUTION INTRAVENOUS at 22:31

## 2025-06-11 RX ADMIN — HEPARIN SODIUM 14 UNITS/KG/HR: 10000 INJECTION, SOLUTION INTRAVENOUS at 09:50

## 2025-06-11 RX ADMIN — HYDROCODONE BITARTRATE AND ACETAMINOPHEN 1 TABLET: 5; 325 TABLET ORAL at 20:18

## 2025-06-11 RX ADMIN — HYDROCODONE BITARTRATE AND ACETAMINOPHEN 1 TABLET: 5; 325 TABLET ORAL at 08:15

## 2025-06-11 RX ADMIN — HEPARIN SODIUM 2000 UNITS: 1000 INJECTION INTRAVENOUS; SUBCUTANEOUS at 09:49

## 2025-06-11 RX ADMIN — METOPROLOL TARTRATE 25 MG: 25 TABLET, FILM COATED ORAL at 13:19

## 2025-06-11 RX ADMIN — SODIUM CHLORIDE, PRESERVATIVE FREE 10 ML: 5 INJECTION INTRAVENOUS at 08:15

## 2025-06-11 RX ADMIN — CLOPIDOGREL BISULFATE 75 MG: 75 TABLET, FILM COATED ORAL at 08:15

## 2025-06-11 RX ADMIN — ASPIRIN 81 MG: 81 TABLET, CHEWABLE ORAL at 08:15

## 2025-06-11 RX ADMIN — SODIUM CHLORIDE, PRESERVATIVE FREE 10 ML: 5 INJECTION INTRAVENOUS at 01:07

## 2025-06-11 RX ADMIN — ATORVASTATIN CALCIUM 80 MG: 80 TABLET, FILM COATED ORAL at 20:18

## 2025-06-11 RX ADMIN — SODIUM CHLORIDE, PRESERVATIVE FREE 10 ML: 5 INJECTION INTRAVENOUS at 21:00

## 2025-06-11 RX ADMIN — Medication 10 MG: at 20:18

## 2025-06-11 RX ADMIN — PANTOPRAZOLE SODIUM 40 MG: 40 TABLET, DELAYED RELEASE ORAL at 06:18

## 2025-06-11 ASSESSMENT — ENCOUNTER SYMPTOMS
BLOOD IN STOOL: 0
NAUSEA: 1
PHOTOPHOBIA: 1
CHEST TIGHTNESS: 1
WHEEZING: 0
SHORTNESS OF BREATH: 0
COLOR CHANGE: 0
CONSTIPATION: 0
NAUSEA: 0
VOMITING: 0
SHORTNESS OF BREATH: 1
ABDOMINAL DISTENTION: 0
ABDOMINAL PAIN: 0
DIARRHEA: 0
COUGH: 0

## 2025-06-11 ASSESSMENT — PAIN DESCRIPTION - ORIENTATION
ORIENTATION: LOWER
ORIENTATION: LOWER

## 2025-06-11 ASSESSMENT — PAIN SCALES - WONG BAKER
WONGBAKER_NUMERICALRESPONSE: NO HURT
WONGBAKER_NUMERICALRESPONSE: NO HURT

## 2025-06-11 ASSESSMENT — PAIN DESCRIPTION - DESCRIPTORS
DESCRIPTORS: SHARP
DESCRIPTORS: ACHING

## 2025-06-11 ASSESSMENT — PAIN SCALES - GENERAL
PAINLEVEL_OUTOF10: 4
PAINLEVEL_OUTOF10: 7
PAINLEVEL_OUTOF10: 4

## 2025-06-11 ASSESSMENT — PAIN DESCRIPTION - LOCATION
LOCATION: BACK;ABDOMEN
LOCATION: BACK

## 2025-06-11 NOTE — ED NOTES
ED TO INPATIENT SBAR HANDOFF    Patient Name: Reagan Diallo   : 1965  60 y.o.   Family/Caregiver Present: No  Code Status Order: Prior    C-SSRS: Risk of Suicide: No Risk  Sitter No  Restraints:         Situation  Chief Complaint:   Chief Complaint   Patient presents with    Tachycardia     Afib RVR     Patient Diagnosis: New onset atrial fibrillation (HCC) [I48.91]     Brief Description of Patient's Condition: pt came from his primary care office today after going in with c/o dizziness and fatigue, the primary care did an EKG that showed afib RVR, pt has no prior history of afib, pt reports that he has not felt well for the past 4 days, pts initial BP was low with an elevated HR, pts BP and HR have improved at this time   Mental Status: oriented, alert, coherent, logical, thought processes intact, and able to concentrate and follow conversation  Arrived from: home    Imaging:   CTA PULMONARY W CONTRAST   Final Result   1.  Normal CT pulmonary angiogram with no evidence of pulmonary artery embolism.   2.  Probable scarring in the right lower lobe.        All CT scans are performed using dose optimization techniques as appropriate to the performed exam and include    at least one of the following: Automated exposure control, adjustment of the mA and/or kV according to size, and the use of iterative reconstruction technique.        ______________________________________    Electronically signed by: JIAN HART M.D.   Date:     06/10/2025   Time:    19:13       XR CHEST PORTABLE   Final Result   1.  No acute findings.               ______________________________________    Electronically signed by: JIAN HART M.D.   Date:     06/10/2025   Time:    18:08         COVID-19 Results:   Internal Administration   First Dose COVID-19, PFIZER PURPLE top, DILUTE for use, (age 12 y+), 30mcg/0.3mL  2021   Second Dose COVID-19, PFIZER PURPLE top, DILUTE for use, (age 12 y+), 30mcg/0.3mL   2022       Last

## 2025-06-11 NOTE — PLAN OF CARE
SUBJECTIVE:    Case D/W ED Doc in detail:  New onset A Fib with RVR  Had had slight hyportension with RVR, was provided IVF and as per Cardio a small bolus of Amio to slow the rate  Has had symptoms of A Fib with RVR david 4 days sop needs AC for same or at minimum a negative PORTIA prior to cardioversion if elective  Tn at 136, delta pending but needs Heparin GGT regardless so will start NSTEMI Tx while repeat Tn pending      OBJECTIVE:    /80   Pulse (!) 110   Temp 97.9 °F (36.6 °C)   Resp 15   Ht 1.803 m (5' 11\")   Wt 79.4 kg (175 lb)   SpO2 94%   BMI 24.41 kg/m²       ASSESSMENTS & PLANS:    New-Onset Atrial Fibrillation with Rapid Ventricular Response: symptoms x 4 days  Hypotensive while in RVR, now just rate controlled at 110bpm  NSTEMI:   Admit  to Cardiac Tele Hall under Hospitalist Team as inpatient status patient  Cardiology consultant already on case, initial reccs appreciated  NPO except sips with meds from MN   Trend Tn, will repeat in AM  EKG already done, will get repeat in AM, EKG PRN  TTE (Dilated Atria?, Valvular Causes?) - would consider PORTIA if TTE negative so as to allow for an elective cardioversion sooner  Troponin trend  NPO after MN, cardiac diet prior to that withOUT caffeine  ASA as per protocol (324-325mg chewed STAT unless on 81ASA daily in which case 162mg chewed STAT if not yet given, THEN from following AM 81mg Daily.)  Statin as per protocol (High intensity Statin, if already on high intensity Stain of Simvasttain 80 continue it, if Lipitor 40+ then Lipitor 80, if Rosuvastatin then Rosuvastatin 40mg PO QHS)  Mag, Phos, Lipid Panel, TSH with FT4, HbA1c, ProBNP - added omn to ED labs  Telemetry  K goal of WNL and >= 4.0  Mag goal of WNL and >= 2.0  CBC and BMP with Reflex for following AMs  AC needed, will use Heparin GGT as per A FIB & ACS  Cardiac Provocative and Invasive Testing will be deferred to Cardiology  Continue valsartan 40mg PO BID as per home, with holding

## 2025-06-11 NOTE — PROGRESS NOTES
[facility]      Progress Note    Patient:  Reagan Diallo  YOB: 1965  Date of Service: 6/11/2025  MRN: [id number]   Acct: [id number]   Primary Care Physician: Manjula Acevedo APRN  Advance Directive: Full Code  Admit Date: 6/10/2025       Hospital Day: 1    Portions of this note have been copied forward, however, updated to reflect the most current clinical status of this patient.     CHIEF COMPLAINT Tachycardia     SUBJECTIVE:  Mr. Diallo was resting in bed this morning.  Denies shortness of breath or chest pain this morning.      CUMULATIVE HOSPITAL COURSE:   The patient is a 60-year-old male with past medical history of anxiety, depression, neuropathy, muscular dystrophy and chronic back pain who presented to [facility] ED for evaluation of tachycardia.  Reported having intermittent tachycardia and sudden stabbing pain in his left side under the breast.  Reported having lightheadedness and shortness of breath on morning of admission.  Was seen by PCP and found to be in A-fib.  EMS was called and patient was transported to [facility] ED. Workup in ED revealed Initial EKG in ED showed atrial fibrillation with RVR , patient converted to sinus rhythm while in ED. Repeat EKG showed sinus rhythm with PACs HR 82; CXR with no acute findings; CTA pulmonary with normal CT pulmonary angiogram no evidence of pulmonary artery embolism, probable scarring in right lower lobe; WBC 11.9, H&H 14.7/45.7, TSH 4.9, free T41.24, troponin 136>>89, creatinine 1.1, BUN 36, GFR 76, potassium 5.3, magnesium 2.3, CMP otherwise unremarkable.  Patient was admitted to hospital medicine for further evaluation with cardiology consultation. Awaiting cardiology evaluation for further management and/or ischemic workup as needed. ECHO completed, results pending.       Review of Systems   Constitutional:  Negative for chills, diaphoresis, fatigue and fever.   HENT:  Negative for congestion and ear pain.    Eyes:  Negative for visual        Further Orders per Clinical course/attending.     Electronically signed by ANTHONY Rincon CNP on 6/11/2025 at 10:48 AM       EMR Dragon/Transcription disclaimer:   Much of this encounter note is an electronic transcription/translation of spoken language to printed text. The electronic translation of spoken language may permit erroneous, or at times, nonsensical words or phrases to be inadvertently transcribed; although attempts have made to review the note for such errors, some may still exist.

## 2025-06-11 NOTE — PROGRESS NOTES
4 Eyes Skin Assessment     NAME:  Reagan Diallo  YOB: 1965  MEDICAL RECORD NUMBER:  [id number]    The patient is being assessed for  Admission    I agree that at least one RN has performed a thorough Head to Toe Skin Assessment on the patient. ALL assessment sites listed below have been assessed.      Areas assessed by both nurses:    Head, Face, Ears, Shoulders, Back, Chest, Arms, Elbows, Hands, Sacrum. Buttock, Coccyx, Ischium, Legs. Feet and Heels, and Under Medical Devices         Does the Patient have a Wound? No noted wound(s)       Tomas Prevention initiated by RN: No  Wound Care Orders initiated by RN: No    Pressure Injury (Stage 3,4, Unstageable, DTI, NWPT, and Complex wounds) if present, place Wound referral order by RN under : No    New Ostomies, if present place, Ostomy referral order under : No     Nurse 1 eSignature: Electronically signed by Sandy Rosales RN on 6/10/25 at 11:18 PM CDT    **SHARE this note so that the co-signing nurse can place an eSignature**    Nurse 2 eSignature: Electronically signed by Phyllis Mohr RN on 6/11/25 at 12:48 AM CDT

## 2025-06-11 NOTE — H&P
[facility]      Hospitalist - History & Physical      PCP: Manjula Acevedo APRN    Date of Admission: 6/10/2025    Date of Service: 6/10/2025    Chief Complaint:  Tachycardia    History Of Present Illness:   The patient is a 60 y.o. male with anxiety, depression, neuropathy, muscular dystrophy comes to ED complaining of tachycardia.  Patient states that for the last few days he was having intermittent, sudden stabbing pain in his left side under the breast.  Patient states that when he woke up today, he was lightheaded and was having chest pain and shortness of breath.  Patient describes the pain as a constant pressure in the left side of his chest.  Patient states that it did get somewhat better with rest.  He also had generalized weakness, nausea and photophobia.  He does note a decreased appetite over the last few days as well.  Patient states that he had a PCP appointment and while he was in the shower getting ready he was very dizzy and weak and unable to stand.  He reports that he was very warm and diaphoretic.  He did make it to his PCP appointment was found to be in A-fib.  EMS was called and patient was transported to the ED.  Patient denies any fevers, vomiting, abdominal pain.    In ED: Initial EKG in ED showed atrial fibrillation with RVR , patient converted to sinus rhythm around 2158.  Repeat EKG showed sinus rhythm with PACs HR 82; CXR with no acute findings; CTA pulmonary with normal CT pulmonary angiogram no evidence of pulmonary artery embolism, probable scarring in right lower lobe; WBC 11.9, H&H 14.7/45.7, TSH 4.9, free T41.24, troponin 136>>89, creatinine 1.1, BUN 36, GFR 76, potassium 5.3, magnesium 2.3, CMP otherwise unremarkable.  Patient will be admitted inpatient to hospitalist with consult to cardiology.    Past Medical History:        Diagnosis Date    Anxiety     Arthritis     Chronic back pain     Depression     MD (muscular dystrophy) ([facility])     Neuropathy 12/02/2020     Patient on home PPI      Signed:  ANTHONY De La Torre CNP, 6/10/2025 7:56 PM    EMR Dragon/Transcription disclaimer:   Much of this encounter note is an electronic transcription/translation of spoken language to printed text. The electronic translation of spoken language may permit erroneous, or at times, nonsensical words or phrases to be inadvertently transcribed; although attempts have made to review the note for such errors, some may still exist.

## 2025-06-12 ENCOUNTER — APPOINTMENT (OUTPATIENT)
Dept: NUCLEAR MEDICINE | Age: 60
End: 2025-06-12
Attending: INTERNAL MEDICINE
Payer: MEDICARE

## 2025-06-12 LAB
ANION GAP SERPL CALCULATED.3IONS-SCNC: 9 MMOL/L (ref 8–16)
ANTI-XA UNFRAC HEPARIN: 0.57 IU/ML
ANTI-XA UNFRAC HEPARIN: 0.63 IU/ML
APTT PPP: 73.1 SEC (ref 26–36.2)
BASOPHILS # BLD: 0 K/UL (ref 0–0.2)
BASOPHILS NFR BLD: 0.5 % (ref 0–1)
BUN SERPL-MCNC: 28 MG/DL (ref 8–23)
CALCIUM SERPL-MCNC: 9.3 MG/DL (ref 8.8–10.2)
CHLORIDE SERPL-SCNC: 104 MMOL/L (ref 98–107)
CO2 SERPL-SCNC: 25 MMOL/L (ref 22–29)
CREAT SERPL-MCNC: 0.6 MG/DL (ref 0.7–1.2)
EOSINOPHIL # BLD: 0.1 K/UL (ref 0–0.6)
EOSINOPHIL NFR BLD: 1.7 % (ref 0–5)
ERYTHROCYTE [DISTWIDTH] IN BLOOD BY AUTOMATED COUNT: 12 % (ref 11.5–14.5)
GLUCOSE SERPL-MCNC: 72 MG/DL (ref 70–99)
HCT VFR BLD AUTO: 38.4 % (ref 42–52)
HGB BLD-MCNC: 12.5 G/DL (ref 14–18)
IMM GRANULOCYTES # BLD: 0 K/UL
LYMPHOCYTES # BLD: 2.4 K/UL (ref 1.1–4.5)
LYMPHOCYTES NFR BLD: 41.6 % (ref 20–40)
MCH RBC QN AUTO: 31.5 PG (ref 27–31)
MCHC RBC AUTO-ENTMCNC: 32.6 G/DL (ref 33–37)
MCV RBC AUTO: 96.7 FL (ref 80–94)
MONOCYTES # BLD: 0.5 K/UL (ref 0–0.9)
MONOCYTES NFR BLD: 7.8 % (ref 0–10)
NEUTROPHILS # BLD: 2.8 K/UL (ref 1.5–7.5)
NEUTS SEG NFR BLD: 48.2 % (ref 50–65)
NUC STRESS EJECTION FRACTION: 58 %
PLATELET # BLD AUTO: 320 K/UL (ref 130–400)
PMV BLD AUTO: 9 FL (ref 9.4–12.4)
POTASSIUM SERPL-SCNC: 4.2 MMOL/L (ref 3.5–5)
RBC # BLD AUTO: 3.97 M/UL (ref 4.7–6.1)
SODIUM SERPL-SCNC: 138 MMOL/L (ref 136–145)
STRESS BASELINE DIAS BP: 64 MMHG
STRESS BASELINE HR: 55 BPM
STRESS BASELINE SYS BP: 99 MMHG
STRESS ESTIMATED WORKLOAD: 1 METS
STRESS EXERCISE DUR MIN: 5 MIN
STRESS EXERCISE DUR SEC: 0 SEC
STRESS PEAK DIAS BP: 63 MMHG
STRESS PEAK SYS BP: 103 MMHG
STRESS PERCENT HR ACHIEVED: 52 %
STRESS POST PEAK HR: 83 BPM
STRESS RATE PRESSURE PRODUCT: 8549 BPM*MMHG
STRESS TARGET HR: 159 BPM
WBC # BLD AUTO: 5.8 K/UL (ref 4.8–10.8)

## 2025-06-12 PROCEDURE — 78452 HT MUSCLE IMAGE SPECT MULT: CPT | Performed by: INTERNAL MEDICINE

## 2025-06-12 PROCEDURE — 94760 N-INVAS EAR/PLS OXIMETRY 1: CPT

## 2025-06-12 PROCEDURE — 85520 HEPARIN ASSAY: CPT

## 2025-06-12 PROCEDURE — 85025 COMPLETE CBC W/AUTO DIFF WBC: CPT

## 2025-06-12 PROCEDURE — 6370000000 HC RX 637 (ALT 250 FOR IP): Performed by: INTERNAL MEDICINE

## 2025-06-12 PROCEDURE — 6360000002 HC RX W HCPCS: Performed by: INTERNAL MEDICINE

## 2025-06-12 PROCEDURE — 1200000000 HC SEMI PRIVATE

## 2025-06-12 PROCEDURE — 80048 BASIC METABOLIC PNL TOTAL CA: CPT

## 2025-06-12 PROCEDURE — 6360000002 HC RX W HCPCS: Performed by: HOSPITALIST

## 2025-06-12 PROCEDURE — A9502 TC99M TETROFOSMIN: HCPCS | Performed by: INTERNAL MEDICINE

## 2025-06-12 PROCEDURE — 99232 SBSQ HOSP IP/OBS MODERATE 35: CPT | Performed by: INTERNAL MEDICINE

## 2025-06-12 PROCEDURE — 3430000000 HC RX DIAGNOSTIC RADIOPHARMACEUTICAL: Performed by: INTERNAL MEDICINE

## 2025-06-12 PROCEDURE — 85730 THROMBOPLASTIN TIME PARTIAL: CPT

## 2025-06-12 PROCEDURE — 93017 CV STRESS TEST TRACING ONLY: CPT

## 2025-06-12 PROCEDURE — 6370000000 HC RX 637 (ALT 250 FOR IP): Performed by: HOSPITALIST

## 2025-06-12 PROCEDURE — 93018 CV STRESS TEST I&R ONLY: CPT | Performed by: INTERNAL MEDICINE

## 2025-06-12 PROCEDURE — 93016 CV STRESS TEST SUPVJ ONLY: CPT | Performed by: INTERNAL MEDICINE

## 2025-06-12 PROCEDURE — 36415 COLL VENOUS BLD VENIPUNCTURE: CPT

## 2025-06-12 PROCEDURE — 2500000003 HC RX 250 WO HCPCS: Performed by: HOSPITALIST

## 2025-06-12 RX ORDER — REGADENOSON 0.08 MG/ML
0.4 INJECTION, SOLUTION INTRAVENOUS
Status: COMPLETED | OUTPATIENT
Start: 2025-06-12 | End: 2025-06-12

## 2025-06-12 RX ADMIN — METOPROLOL TARTRATE 25 MG: 25 TABLET, FILM COATED ORAL at 07:52

## 2025-06-12 RX ADMIN — APIXABAN 5 MG: 5 TABLET, FILM COATED ORAL at 23:12

## 2025-06-12 RX ADMIN — ASPIRIN 81 MG: 81 TABLET, CHEWABLE ORAL at 07:52

## 2025-06-12 RX ADMIN — Medication 10 MG: at 20:50

## 2025-06-12 RX ADMIN — REGADENOSON 0.4 MG: 0.08 INJECTION, SOLUTION INTRAVENOUS at 09:41

## 2025-06-12 RX ADMIN — HYDROCODONE BITARTRATE AND ACETAMINOPHEN 1 TABLET: 5; 325 TABLET ORAL at 23:08

## 2025-06-12 RX ADMIN — TETROFOSMIN 8 MILLICURIE: 0.23 INJECTION, POWDER, LYOPHILIZED, FOR SOLUTION INTRAVENOUS at 11:21

## 2025-06-12 RX ADMIN — ATORVASTATIN CALCIUM 80 MG: 80 TABLET, FILM COATED ORAL at 20:50

## 2025-06-12 RX ADMIN — METOPROLOL TARTRATE 25 MG: 25 TABLET, FILM COATED ORAL at 20:50

## 2025-06-12 RX ADMIN — TETROFOSMIN 24 MILLICURIE: 0.23 INJECTION, POWDER, LYOPHILIZED, FOR SOLUTION INTRAVENOUS at 11:21

## 2025-06-12 RX ADMIN — PANTOPRAZOLE SODIUM 40 MG: 40 TABLET, DELAYED RELEASE ORAL at 05:43

## 2025-06-12 RX ADMIN — HYDROCODONE BITARTRATE AND ACETAMINOPHEN 1 TABLET: 5; 325 TABLET ORAL at 15:35

## 2025-06-12 RX ADMIN — LAMOTRIGINE 100 MG: 100 TABLET ORAL at 07:52

## 2025-06-12 RX ADMIN — SODIUM CHLORIDE, PRESERVATIVE FREE 10 ML: 5 INJECTION INTRAVENOUS at 07:51

## 2025-06-12 RX ADMIN — HYDROCODONE BITARTRATE AND ACETAMINOPHEN 1 TABLET: 5; 325 TABLET ORAL at 07:52

## 2025-06-12 RX ADMIN — HEPARIN SODIUM 14 UNITS/KG/HR: 10000 INJECTION, SOLUTION INTRAVENOUS at 20:53

## 2025-06-12 RX ADMIN — ESCITALOPRAM OXALATE 20 MG: 10 TABLET ORAL at 07:52

## 2025-06-12 RX ADMIN — CLOPIDOGREL BISULFATE 75 MG: 75 TABLET, FILM COATED ORAL at 07:52

## 2025-06-12 ASSESSMENT — PAIN DESCRIPTION - FREQUENCY: FREQUENCY: CONTINUOUS

## 2025-06-12 ASSESSMENT — PAIN DESCRIPTION - ORIENTATION
ORIENTATION: LOWER

## 2025-06-12 ASSESSMENT — PAIN DESCRIPTION - DESCRIPTORS: DESCRIPTORS: SHARP

## 2025-06-12 ASSESSMENT — PAIN SCALES - GENERAL
PAINLEVEL_OUTOF10: 1
PAINLEVEL_OUTOF10: 5
PAINLEVEL_OUTOF10: 1
PAINLEVEL_OUTOF10: 5
PAINLEVEL_OUTOF10: 4

## 2025-06-12 ASSESSMENT — ENCOUNTER SYMPTOMS
COUGH: 0
DIARRHEA: 0
NAUSEA: 0
COLOR CHANGE: 0
BLOOD IN STOOL: 0
WHEEZING: 0
SHORTNESS OF BREATH: 0
CONSTIPATION: 0
ABDOMINAL DISTENTION: 0
ABDOMINAL PAIN: 0
VOMITING: 0

## 2025-06-12 ASSESSMENT — PAIN DESCRIPTION - LOCATION
LOCATION: BACK

## 2025-06-12 ASSESSMENT — PAIN SCALES - WONG BAKER
WONGBAKER_NUMERICALRESPONSE: NO HURT
WONGBAKER_NUMERICALRESPONSE: NO HURT

## 2025-06-12 ASSESSMENT — PAIN DESCRIPTION - ONSET: ONSET: ON-GOING

## 2025-06-12 ASSESSMENT — PAIN - FUNCTIONAL ASSESSMENT: PAIN_FUNCTIONAL_ASSESSMENT: ACTIVITIES ARE NOT PREVENTED

## 2025-06-12 ASSESSMENT — PAIN DESCRIPTION - PAIN TYPE: TYPE: CHRONIC PAIN

## 2025-06-12 NOTE — PLAN OF CARE
Problem: Safety - Adult  Goal: Free from fall injury  6/12/2025 0802 by Melva Serna RN  Outcome: Progressing  6/12/2025 0330 by Jac Badillo RN  Outcome: Progressing     Problem: ABCDS Injury Assessment  Goal: Absence of physical injury  6/12/2025 0802 by Melva Serna RN  Outcome: Progressing  6/12/2025 0330 by Jac Badillo RN  Outcome: Progressing

## 2025-06-12 NOTE — CONSULTS
[facility]  Cardiology Consult    History obtained from:   [x] Patient  [] Other (specify):     Patient:  Reagan Diallo  [id number]  Admit Date: 6/10/2025       Hospital Day: 1  Referring Provider: Laura Rowe MD    Admission Problem List: Present on Admission:   New onset atrial fibrillation (HCC)   NSTEMI, initial episode of care ([facility])   Major depressive disorder with single episode, in full remission   GERD (gastroesophageal reflux disease)   Dyslipidemia   Hypertension       Chief Complaint:   Chief Complaint   Patient presents with    Tachycardia     Afib RVR         HPI     Mr. Diallo is a 60 y.o. male with a history of CAD s/p prior LAD PCI 2023, GERD, history of dark stools, recent GI illness with loose stools who presents to the hospital afib with RVR. When he was in the ED last night he had a bout of hypotension for which he was treated with amiodarone bolus with resolution of his symptoms.      When seen this afternoon, he had no complaints. He denies any chest pain, shortness of breath when seen. At baseline, he has a relatively sedentary lifestyle.     At presentation, he had elevated troponin though he had no chest pain. His troponin has since downtrended.     CTA PE negative.     Review of Systems:  Review of Systems   All other systems reviewed and are negative.      Past Medical History:  Past Medical History:   Diagnosis Date    Anxiety     Arthritis     Chronic back pain     Depression     MD (muscular dystrophy) ([facility])     Neuropathy 12/02/2020    Scoliosis         Past Surgical History:  Past Surgical History:   Procedure Laterality Date    CHOLECYSTECTOMY, LAPAROSCOPIC N/A 04/27/2021    LAPAROSCOPIC CHOLECYSTECTOMY performed by Oksana Herrera, DO at [facility] OR    COLONOSCOPY  01/08/2016    Dr Case, normal, 10 yr recall    COLONOSCOPY N/A 06/30/2021    Dr Singh,int hemorrhids, RANDOM COLON BX NEGATIVE, Sub Prep Fair, 3 year recall    COLONOSCOPY N/A    Musculoskeletal:      Right lower leg: No edema.      Left lower leg: No edema.   Skin:     General: Skin is warm and dry.   Neurological:      General: No focal deficit present.      Mental Status: He is alert.   Psychiatric:         Mood and Affect: Mood normal.           Labs     Recent Labs     06/10/25  1655 06/11/25  0201   WBC 11.9* 7.4   HGB 14.7 13.6*   * 335       Recent Labs     06/10/25  1655 06/11/25  0201    139   K 5.3* 4.0    106   CO2 21* 21*   BUN 36* 33*   CREATININE 1.1 0.7   LABGLOM 76 >90   MG 2.3  --    CALCIUM 10.1 9.6   PHOS 3.7  --        CK, CKMB, Troponin: @LABRCNT (CKTOTAL:3, CKMB:3, TROPONINI:3)@    Last 3 BNP:  No results for input(s): \"BNP\" in the last 72 hours.      Imaging     Cardiac Imaging/Testing    Echo  06/10/25    ECHO (TTE) COMPLETE (PRN CONTRAST/BUBBLE/STRAIN/3D) 06/11/2025  2:27 PM (Final)    Interpretation Summary    Left Ventricle: EF by visual approximation is 65%. Left ventricle size is normal. Normal wall thickness. Normal wall motion. Normal diastolic function.    Right Ventricle: Right ventricle size is normal. Normal systolic function.    Image quality is technically difficult. Contrast used: Lumason.    Signed by: Jose Joshi MD on 6/11/2025  2:27 PM      Stress Test  No results found for this or any previous visit.      Cath  No results found for this or any previous visit.        ______________________________________________________________________________    CT Result (most recent):  CTA PULMONARY W CONTRAST 06/10/2025    Narrative  EXAM: CHEST CTA WITH CONTRAST (PULMONARY ARTERY)    HISTORY: Chest pain and shortness of breath.    TECHNIQUE: CTA acquisition of the chest from the thoracic inlet to the upper abdomen following IV contrast administration timed to filling of the pulmonary artery.  3D/MIP/VR images were utilized.  CT Dose Reduction Techniques Employed: Yes.    IV Contrast: Administered.    COMPARISON:  None.    FINDINGS:    Pulmonary Embolism:  Diagnostic quality: Adequate.  Central (Main/Lobar/Interlobar): No embolus.  Peripheral (Segmental/Subsegmental): No embolus.  Right ventricle/Left ventricle ratio: Normal.    Lines, Tubes, Devices: None.  Lung Parenchyma and Airways: Curvilinear density in the distal trachea just above the toro may represent mucous (axial 107).  Course streaky markings in the right lower lobe are likely chronic and due to scarring. No suspicious pulmonary nodule.  Pleural Space: No pleural effusion or thickening. No pneumothorax.  Mediastinum and Claudette: Thyroid gland is normal.  No lymphadenopathy.  Heart and Pericardium: There is no pericardial effusion or thickening. The heart is not enlarged.  Vessels: The thoracic aorta is of normal caliber.  Bones: There is no fracture or lytic lesion.  Soft Tissues: Chest wall soft tissues are unremarkable.  Upper Abdomen:  Cholecystectomy. 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    Impression  1.  Normal CT pulmonary angiogram with no evidence of pulmonary artery embolism.  2.  Probable scarring in the right lower lobe.    All CT scans are performed using dose optimization techniques as appropriate to the performed exam and include  at least one of the following: Automated exposure control, adjustment of the mA and/or kV according to size, and the use of iterative reconstruction technique.    ______________________________________  Electronically signed by: JIAN HART M.D.  Date:     06/10/2025  Time:    19:13        Assessment and Plan     Assessment:  New onset afib with RVR  Elevated troponin  Preserved LV/RV EF with no significant valvular pathology  History of CAD with LAD PCI 2023  Reported dark stools?      Recommendations:  Currently rate controlled  Will plan for lexiscan in AM  Antiplatelet/anticoagulation therapy to be determined depending on lexiscan      Brianna Lozada MD  6/11/2025 8:34 PM

## 2025-06-12 NOTE — PROGRESS NOTES
[facility]      Progress Note    Patient:  Reagan Diallo  YOB: 1965  Date of Service: 6/12/2025  MRN: [id number]   Acct: [id number]   Primary Care Physician: Manjula Acevedo APRN  Advance Directive: Full Code  Admit Date: 6/10/2025       Hospital Day: 2    Portions of this note have been copied forward, however, updated to reflect the most current clinical status of this patient.     CHIEF COMPLAINT Tachycardia     SUBJECTIVE:  Mr. Diallo was resting in bed this morning. Denies SOB or chest pain currently. Reportedly had dark loose stool yesterday.     CUMULATIVE HOSPITAL COURSE:   The patient is a 60-year-old male with past medical history of anxiety, depression, neuropathy, muscular dystrophy and chronic back pain who presented to [facility] ED for evaluation of tachycardia.  Reported having intermittent tachycardia and sudden stabbing pain in his left side under the breast.  Reported having lightheadedness and shortness of breath on morning of admission.  Was seen by PCP and found to be in A-fib.  EMS was called and patient was transported to [facility] ED. Workup in ED revealed Initial EKG in ED showed atrial fibrillation with RVR , patient converted to sinus rhythm while in ED. Repeat EKG showed sinus rhythm with PACs HR 82; CXR with no acute findings; CTA pulmonary with normal CT pulmonary angiogram no evidence of pulmonary artery embolism, probable scarring in right lower lobe; WBC 11.9, H&H 14.7/45.7, TSH 4.9, free T41.24, troponin 136>>89, creatinine 1.1, BUN 36, GFR 76, potassium 5.3, magnesium 2.3, CMP otherwise unremarkable.  Patient was admitted to hospital medicine for further evaluation with cardiology consultation. ECHO indicated normal LV size with preserved LVEF of 65%, normal diastolic function, normal RV size with normal systolic function. Cardiology recommended Carlita scan and antiplatelet/anticoagulation therapy to be determined depending on Lexiscan.             Review of Systems    Constitutional:  Negative for chills, diaphoresis, fatigue and fever.   HENT:  Negative for congestion and ear pain.    Eyes:  Negative for visual disturbance.   Respiratory:  Negative for cough, shortness of breath and wheezing.    Cardiovascular:  Negative for chest pain, palpitations and leg swelling.   Gastrointestinal:  Negative for abdominal distention, abdominal pain, blood in stool, constipation, diarrhea, nausea and vomiting.   Endocrine: Negative for cold intolerance and heat intolerance.   Genitourinary:  Negative for difficulty urinating, flank pain, frequency and urgency.   Musculoskeletal:  Negative for arthralgias and myalgias.   Skin:  Negative for color change and wound.   Neurological:  Negative for dizziness, syncope, weakness, light-headedness, numbness and headaches.   Hematological:  Does not bruise/bleed easily.   Psychiatric/Behavioral:  Negative for agitation, confusion and dysphoric mood.         Objective:   VITALS:  /65   Pulse 61   Temp 97.5 °F (36.4 °C) (Temporal)   Resp 16   Ht 1.803 m (5' 11\")   Wt 79.7 kg (175 lb 11.3 oz)   SpO2 98%   BMI 24.51 kg/m²   24HR INTAKE/OUTPUT:    Intake/Output Summary (Last 24 hours) at 6/12/2025 1456  Last data filed at 6/11/2025 2017  Gross per 24 hour   Intake --   Output 300 ml   Net -300 ml         Physical Exam  Constitutional:       General: He is not in acute distress.     Appearance: Normal appearance. He is not ill-appearing.   HENT:      Head: Normocephalic and atraumatic.      Right Ear: External ear normal.      Left Ear: External ear normal.      Nose: Nose normal.      Mouth/Throat:      Mouth: Mucous membranes are moist.   Eyes:      Extraocular Movements: Extraocular movements intact.      Conjunctiva/sclera: Conjunctivae normal.      Pupils: Pupils are equal, round, and reactive to light.   Cardiovascular:      Rate and Rhythm: Normal rate and regular rhythm.      Pulses: Normal pulses.      Heart sounds: Normal heart  sounds.      Comments: NSR 68 per monitor  Pulmonary:      Effort: Pulmonary effort is normal. No respiratory distress.      Breath sounds: Normal breath sounds. No wheezing, rhonchi or rales.   Abdominal:      General: Bowel sounds are normal. There is no distension.      Palpations: Abdomen is soft.      Tenderness: There is no abdominal tenderness.   Musculoskeletal:         General: No swelling, tenderness or deformity. Normal range of motion.      Cervical back: Normal range of motion and neck supple. No muscular tenderness.      Right lower leg: No edema.      Left lower leg: No edema.   Skin:     General: Skin is warm and dry.      Findings: No bruising or lesion.   Neurological:      Mental Status: He is alert and oriented to person, place, and time.   Psychiatric:         Mood and Affect: Mood normal.         Behavior: Behavior normal.         Thought Content: Thought content normal.            Medications:      heparin (PORCINE) Infusion 14 Units/kg/hr (06/12/25 0546)    sodium chloride        metoprolol tartrate  25 mg Oral BID    atorvastatin  80 mg Oral Nightly    clopidogrel  75 mg Oral Daily    escitalopram  20 mg Oral Daily    lamoTRIgine  100 mg Oral Daily    pantoprazole  40 mg Oral QAM AC    [Held by provider] valsartan  40 mg Oral BID    melatonin  10 mg Oral Nightly    sodium chloride flush  5-40 mL IntraVENous 2 times per day    aspirin  81 mg Oral Daily     heparin (porcine), heparin (porcine), ketoconazole, melatonin, polyethylene glycol, calcium carbonate, HYDROcodone-acetaminophen, naloxone, sodium chloride flush, sodium chloride, potassium chloride **OR** potassium alternative oral replacement **OR** potassium chloride, magnesium sulfate, acetaminophen **OR** acetaminophen, ondansetron **OR** ondansetron, sulfur hexafluoride microspheres, sodium phosphate 12.69 mmol in sodium chloride 0.9 % 250 mL IVPB **OR** sodium phosphate 25.41 mmol in sodium chloride 0.9 % 250 mL IVPB,

## 2025-06-13 ENCOUNTER — APPOINTMENT (OUTPATIENT)
Age: 60
End: 2025-06-13
Attending: INTERNAL MEDICINE
Payer: MEDICARE

## 2025-06-13 VITALS
HEIGHT: 71 IN | HEART RATE: 62 BPM | RESPIRATION RATE: 16 BRPM | OXYGEN SATURATION: 97 % | BODY MASS INDEX: 24.6 KG/M2 | WEIGHT: 175.71 LBS | SYSTOLIC BLOOD PRESSURE: 116 MMHG | DIASTOLIC BLOOD PRESSURE: 70 MMHG | TEMPERATURE: 97.9 F

## 2025-06-13 LAB
ANION GAP SERPL CALCULATED.3IONS-SCNC: 10 MMOL/L (ref 8–16)
BASOPHILS # BLD: 0 K/UL (ref 0–0.2)
BASOPHILS NFR BLD: 0.4 % (ref 0–1)
BUN SERPL-MCNC: 22 MG/DL (ref 8–23)
CALCIUM SERPL-MCNC: 8.8 MG/DL (ref 8.8–10.2)
CHLORIDE SERPL-SCNC: 104 MMOL/L (ref 98–107)
CO2 SERPL-SCNC: 24 MMOL/L (ref 22–29)
CREAT SERPL-MCNC: 0.7 MG/DL (ref 0.7–1.2)
ECHO BSA: 1.99 M2
EOSINOPHIL # BLD: 0.1 K/UL (ref 0–0.6)
EOSINOPHIL NFR BLD: 1.3 % (ref 0–5)
ERYTHROCYTE [DISTWIDTH] IN BLOOD BY AUTOMATED COUNT: 11.8 % (ref 11.5–14.5)
GLUCOSE SERPL-MCNC: 97 MG/DL (ref 70–99)
HCT VFR BLD AUTO: 36.2 % (ref 42–52)
HGB BLD-MCNC: 12.1 G/DL (ref 14–18)
IMM GRANULOCYTES # BLD: 0 K/UL
LYMPHOCYTES # BLD: 2.6 K/UL (ref 1.1–4.5)
LYMPHOCYTES NFR BLD: 37.4 % (ref 20–40)
MCH RBC QN AUTO: 31.8 PG (ref 27–31)
MCHC RBC AUTO-ENTMCNC: 33.4 G/DL (ref 33–37)
MCV RBC AUTO: 95 FL (ref 80–94)
MONOCYTES # BLD: 0.6 K/UL (ref 0–0.9)
MONOCYTES NFR BLD: 9.1 % (ref 0–10)
NEUTROPHILS # BLD: 3.5 K/UL (ref 1.5–7.5)
NEUTS SEG NFR BLD: 51.5 % (ref 50–65)
PLATELET # BLD AUTO: 310 K/UL (ref 130–400)
PMV BLD AUTO: 9 FL (ref 9.4–12.4)
POTASSIUM SERPL-SCNC: 4.6 MMOL/L (ref 3.5–5)
RBC # BLD AUTO: 3.81 M/UL (ref 4.7–6.1)
SODIUM SERPL-SCNC: 138 MMOL/L (ref 136–145)
WBC # BLD AUTO: 6.8 K/UL (ref 4.8–10.8)

## 2025-06-13 PROCEDURE — 36415 COLL VENOUS BLD VENIPUNCTURE: CPT

## 2025-06-13 PROCEDURE — 80048 BASIC METABOLIC PNL TOTAL CA: CPT

## 2025-06-13 PROCEDURE — 85025 COMPLETE CBC W/AUTO DIFF WBC: CPT

## 2025-06-13 PROCEDURE — 93246 EXT ECG>7D<15D RECORDING: CPT

## 2025-06-13 PROCEDURE — 6370000000 HC RX 637 (ALT 250 FOR IP): Performed by: HOSPITALIST

## 2025-06-13 PROCEDURE — 93228 REMOTE 30 DAY ECG REV/REPORT: CPT | Performed by: INTERNAL MEDICINE

## 2025-06-13 PROCEDURE — 93248 EXT ECG>7D<15D REV&INTERPJ: CPT | Performed by: INTERNAL MEDICINE

## 2025-06-13 PROCEDURE — 94760 N-INVAS EAR/PLS OXIMETRY 1: CPT

## 2025-06-13 PROCEDURE — 6370000000 HC RX 637 (ALT 250 FOR IP): Performed by: INTERNAL MEDICINE

## 2025-06-13 PROCEDURE — 99232 SBSQ HOSP IP/OBS MODERATE 35: CPT | Performed by: INTERNAL MEDICINE

## 2025-06-13 PROCEDURE — 2500000003 HC RX 250 WO HCPCS: Performed by: HOSPITALIST

## 2025-06-13 RX ORDER — METOPROLOL TARTRATE 25 MG/1
25 TABLET, FILM COATED ORAL DAILY
Status: DISCONTINUED | OUTPATIENT
Start: 2025-06-14 | End: 2025-06-13 | Stop reason: HOSPADM

## 2025-06-13 RX ORDER — METOPROLOL TARTRATE 25 MG/1
25 TABLET, FILM COATED ORAL DAILY
Qty: 30 TABLET | Refills: 0 | Status: SHIPPED | OUTPATIENT
Start: 2025-06-14 | End: 2025-06-18 | Stop reason: SDUPTHER

## 2025-06-13 RX ADMIN — SODIUM CHLORIDE, PRESERVATIVE FREE 10 ML: 5 INJECTION INTRAVENOUS at 09:23

## 2025-06-13 RX ADMIN — PANTOPRAZOLE SODIUM 40 MG: 40 TABLET, DELAYED RELEASE ORAL at 05:16

## 2025-06-13 RX ADMIN — HYDROCODONE BITARTRATE AND ACETAMINOPHEN 1 TABLET: 5; 325 TABLET ORAL at 09:22

## 2025-06-13 RX ADMIN — ESCITALOPRAM OXALATE 20 MG: 10 TABLET ORAL at 09:22

## 2025-06-13 RX ADMIN — LAMOTRIGINE 100 MG: 100 TABLET ORAL at 09:22

## 2025-06-13 RX ADMIN — APIXABAN 5 MG: 5 TABLET, FILM COATED ORAL at 09:22

## 2025-06-13 ASSESSMENT — PAIN SCALES - GENERAL: PAINLEVEL_OUTOF10: 5

## 2025-06-13 ASSESSMENT — PAIN DESCRIPTION - LOCATION: LOCATION: BACK

## 2025-06-13 NOTE — PLAN OF CARE
Problem: Safety - Adult  Goal: Free from fall injury  6/13/2025 1045 by Mirtha Saavedra RN  Outcome: Progressing  6/13/2025 0141 by Silvia Dixon RN  Outcome: Progressing     Problem: ABCDS Injury Assessment  Goal: Absence of physical injury  6/13/2025 1045 by Mirtha Saavedra RN  Outcome: Progressing  6/13/2025 0141 by Silvia Dixon RN  Outcome: Progressing     Problem: Pain  Goal: Verbalizes/displays adequate comfort level or baseline comfort level  Outcome: Progressing

## 2025-06-13 NOTE — DISCHARGE SUMMARY
[facility]    Discharge Summary      Reagan Diallo  :  1965  MRN:  [id number]    Admit date:  6/10/2025  Discharge date:    2025    Discharging Physician:  Dr. Laura Rowe     Advance Directive: Full Code    Consults: cardiology    Primary Care Physician:  Manjula Acevedo APRN    Discharge Diagnoses:  Principal Problem:    New onset atrial fibrillation (HCC)  Active Problems:    Major depressive disorder with single episode, in full remission    NSTEMI, initial episode of care (HCC)    GERD (gastroesophageal reflux disease)    Dyslipidemia    Hypertension  Resolved Problems:    * No resolved hospital problems. *      Portions of this note have been copied forward, however, changed to reflect the most current clinical status of this patient.    Hospital Course:   The patient is a 60-year-old male with past medical history of anxiety, depression, neuropathy, muscular dystrophy and chronic back pain who presented to [facility] ED for evaluation of tachycardia.  Reported having intermittent tachycardia and sudden stabbing pain in his left side under the breast.  Reported having lightheadedness and shortness of breath on morning of admission.  Was seen by PCP and found to be in A-fib.  EMS was called and patient was transported to [facility] ED. Workup in ED revealed Initial EKG in ED showed atrial fibrillation with RVR , patient converted to sinus rhythm while in ED. Repeat EKG showed sinus rhythm with PACs HR 82; CXR with no acute findings; CTA pulmonary with normal CT pulmonary angiogram no evidence of pulmonary artery embolism, probable scarring in right lower lobe; WBC 11.9, H&H 14.7/45.7, TSH 4.9, free T41.24, troponin 136>>89, creatinine 1.1, BUN 36, GFR 76, potassium 5.3, magnesium 2.3, CMP otherwise unremarkable.  Patient was admitted to hospital medicine for further evaluation with cardiology consultation. ECHO indicated normal LV size with preserved LVEF of 65%, normal diastolic function,    CO2 21* 25 24   BUN 33* 28* 22   CREATININE 0.7 0.6* 0.7   GLUCOSE 92 72 97     INR:   Recent Labs     06/10/25  1915   INR 1.05       Physical Exam:   Vital Signs: /66   Pulse 59   Temp 97.3 °F (36.3 °C) (Temporal)   Resp 16   Ht 1.803 m (5' 11\")   Wt 79.7 kg (175 lb 11.3 oz)   SpO2 92%   BMI 24.51 kg/m²   General appearance:.Alert and Cooperative   HEENT: Normocephalic.  Chest: Lung sounds clear bilaterally without wheezes or rhonchi.  Cardiac: RRR, S1, S2 normal. No murmurs, gallops, or rubs auscultated.   Abdomen: soft, non-tender; non-distended normal bowel sounds no masses, no organomegaly.  Extremities: No clubbing or cyanosis. No peripheral edema. Peripheral pulses palpable.  Neurologic: Grossly intact.        Discharge Medications:          Medication List        START taking these medications      apixaban 5 MG Tabs tablet  Commonly known as: ELIQUIS  Take 1 tablet by mouth 2 times daily     metoprolol tartrate 25 MG tablet  Commonly known as: LOPRESSOR  Take 1 tablet by mouth daily  Start taking on: June 14, 2025            CONTINUE taking these medications      escitalopram 20 MG tablet  Commonly known as: LEXAPRO  Take 1 tablet by mouth daily     HYDROcodone-acetaminophen 5-325 MG per tablet  Commonly known as: NORCO     hydrOXYzine HCl 25 MG tablet  Commonly known as: ATARAX  Take 1 tablet by mouth nightly     ketoconazole 2 % shampoo  Commonly known as: NIZORAL  Apply topically daily as needed.     lamoTRIgine 100 MG tablet  Commonly known as: LAMICTAL  Take 1 tablet by mouth daily     pantoprazole 40 MG tablet  Commonly known as: PROTONIX  Take 1 tablet by mouth daily            STOP taking these medications      aspirin 325 MG EC tablet     clopidogrel 75 MG tablet  Commonly known as: PLAVIX     diclofenac sodium 1 % Gel  Commonly known as: VOLTAREN     valsartan 80 MG tablet  Commonly known as: DIOVAN            ASK your doctor about these medications      atorvastatin 40 MG  errors, some may still exist.

## 2025-06-13 NOTE — PROGRESS NOTES
Cardiology Progress Note   Brianna Lozada MD      Patient:  Reagan Diallo  [id number]  Admit Date: 6/10/2025       Hospital Day: 2  Referring Provider: Laura Rowe MD    Admission Problem List: Present on Admission:   New onset atrial fibrillation (HCC)   NSTEMI, initial episode of care (HCC)   Major depressive disorder with single episode, in full remission   GERD (gastroesophageal reflux disease)   Dyslipidemia   Hypertension        Subjective     Mr. Diallo is doing well  Denies further chest pain  Denies further black stool          Objective      /82   Pulse 75   Temp 97.5 °F (36.4 °C) (Temporal)   Resp 16   Ht 1.803 m (5' 11\")   Wt 79.7 kg (175 lb 11.3 oz)   SpO2 98%   BMI 24.51 kg/m²     No intake or output data in the 24 hours ending 06/12/25 9668      Physical Exam:      Physical Exam  Vitals and nursing note reviewed.   Constitutional:       Appearance: Normal appearance.   HENT:      Head: Normocephalic and atraumatic.   Eyes:      Extraocular Movements: Extraocular movements intact.   Cardiovascular:      Rate and Rhythm: Normal rate and regular rhythm.   Pulmonary:      Effort: Pulmonary effort is normal.      Breath sounds: Normal breath sounds.   Abdominal:      Palpations: Abdomen is soft.   Musculoskeletal:      Right lower leg: No edema.      Left lower leg: No edema.   Skin:     General: Skin is warm and dry.   Neurological:      General: No focal deficit present.      Mental Status: He is alert.   Psychiatric:         Mood and Affect: Mood normal.           Lab Data:  CBC:   Recent Labs     06/10/25  1655 06/11/25  0201 06/12/25  0619   WBC 11.9* 7.4 5.8   HGB 14.7 13.6* 12.5*   HCT 45.7 40.9* 38.4*   MCV 97.6* 94.7* 96.7*   * 335 320     BMP:   Recent Labs     06/10/25  1655 06/11/25  0201 06/12/25  0619    139 138   K 5.3* 4.0 4.2    106 104   CO2 21* 21* 25   PHOS 3.7  --   --    BUN 36* 33* 28*   CREATININE 1.1 0.7 0.6*     LIVER PROFILE:   Recent  No pneumothorax. Mediastinum and Claudette: Thyroid gland is normal.  No lymphadenopathy. Heart and Pericardium: There is no pericardial effusion or thickening. The heart is not enlarged. Vessels: The thoracic aorta is of normal caliber. Bones: There is no fracture or lytic lesion. Soft Tissues: Chest wall soft tissues are unremarkable. Upper Abdomen:  Cholecystectomy. 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      1.  Normal CT pulmonary angiogram with no evidence of pulmonary artery embolism. 2.  Probable scarring in the right lower lobe.  All CT scans are performed using dose optimization techniques as appropriate to the performed exam and include at least one of the following: Automated exposure control, adjustment of the mA and/or kV according to size, and the use of iterative reconstruction technique.  ______________________________________ Electronically signed by: JIAN HART M.D. Date:     06/10/2025 Time:    19:13     XR CHEST PORTABLE  Result Date: 6/10/2025  EXAM: CHEST RADIOGRAPH  TECHNIQUE: Single frontal chest radiograph.  HISTORY: Chest pain.  COMPARISON: 2/14/23.  FINDINGS: Lungs/Pleura: The lungs are clear. There is no pleural effusion. There is no pneumothorax. Mild scarring in the right lower lobe. Stable elevation of the right hemidiaphragm. Heart: The heart size is normal. Bones: Unremarkable. Other: None.      1.  No acute findings.    ______________________________________ Electronically signed by: JIAN HART M.D. Date:     06/10/2025 Time:    18:08         Assessment and Plan     Assessment:  Paroxysmal afib with RVR  Elevated troponin  Preserved LV/RV EF with no significant valvular pathology  History of CAD with LAD PCI 2023  Reported dark stools?    Plan:  No further chest pain and Lexiscan does not demonstrate significant ischemia  Start OAC  Stop heparin  Discontinue DAPT  BB  Can be discharged from a cardiac standpoint     Brianna Lozada MD  6/12/2025 10:41 PM

## 2025-06-13 NOTE — PROGRESS NOTES
Physician Progress Note      PATIENT:               MEME KELLEY  CSN #:                  [id number]  :                       1965  ADMIT DATE:       6/10/2025 5:49 PM  DISCH DATE:  RESPONDING  PROVIDER #:        Laura Rowe MD          QUERY TEXT:    NSTEMI is documented in the medical record per PCP notes. Please provide   additional clinical indicators supportive of your documentation. Or please   document if the diagnosis of NSTEMI has been ruled out after study.    The clinical indicators include:  Pt admitted for New onset atrial fibrillation with intermittent sharp stabbing   chest pains that will occur in his left chest, middle chest, and come and go.  -\"New onset atrial fibrillation...Continue Heparin gtt for now ...NSTEMI,   initial episode of care...intermittent sharp stabbing chest pains that will   occur in his left chest, middle chest, and come and go \" (H/P)  -\"NSTEMI, initial episode of care - troponin trended downwards  - Lexiscan   completed, results pending . Troponin 136 -->89 -->71  \"(ICU )  -\" GERD, history of dark stools... in the ED last night he had a bout of   hypotension for which he was treated with amiodarone bolus with resolution of   his symptoms...At presentation, he had elevated troponin though he had no   chest pain. His troponin has since downtrended...Assessment:New onset afib   with RVR...Elevated troponin...Preserved LV/RV EF with no significant valvular   pathology...History of CAD with LAD PCI \" (CARDIO )  -Treated with Heparin  Options provided:  -- NSTEMI was ruled out after study. Chest pain is due to new onset Afib  -- NSTEMI  was ruled out after study. Chest pain is due to CAD.  -- NSTEMI was ruled out after study. Chest pain is due to., Please specify the   cause of chest pain  -- NSTEMI present as evidenced by., Please specify evidence to support NSTEMI  -- Other - I will add my own diagnosis  -- Disagree - Not applicable / Not valid  --  Disagree - Clinically unable to determine / Unknown  -- Refer to Clinical Documentation Reviewer    PROVIDER RESPONSE TEXT:    NSTEMI was ruled out after study. Chest pain is due to new onset Afib.    Query created by: Sinai Shaikh on 6/12/2025 4:57 PM      Electronically signed by:  Laura Rowe MD 6/13/2025 8:27 AM

## 2025-06-13 NOTE — CARE COORDINATION
06/13/25 0915   IMM Letter   IMM Letter given to Patient/Family/Significant other/Guardian/POA/by: TAYLER Ramirez   IMM Letter date given: 06/13/25   IMM Letter time given: 0840     Second IMM given to patient and explained with patient verbalizing understanding.  All questions and concerns addressed   Patient declined waiting 4 hr period prior to discharge.   Signed letter placed in pt soft chart   Electronically signed by TAYLER Ramirez on 6/13/2025 at 9:15 AM

## 2025-06-14 NOTE — PROGRESS NOTES
Cardiology Progress Note   Brianna Lozada MD      Patient:  Reagan Diallo  [id number]  Admit Date: 6/10/2025       Hospital Day: 3  Referring Provider: No att. providers found    Admission Problem List: Present on Admission:   New onset atrial fibrillation (HCC)   NSTEMI, initial episode of care (HCC)   Major depressive disorder with single episode, in full remission   GERD (gastroesophageal reflux disease)   Dyslipidemia   Hypertension        Subjective     Mr. Diallo is doing well  Denies further chest pain      Objective      /70   Pulse 62   Temp 97.9 °F (36.6 °C) (Temporal)   Resp 16   Ht 1.803 m (5' 11\")   Wt 79.7 kg (175 lb 11.3 oz)   SpO2 97%   BMI 24.51 kg/m²     No intake or output data in the 24 hours ending 06/14/25 0636      Physical Exam:      Physical Exam  Vitals and nursing note reviewed.   Constitutional:       Appearance: Normal appearance.   HENT:      Head: Normocephalic and atraumatic.   Eyes:      Extraocular Movements: Extraocular movements intact.   Cardiovascular:      Rate and Rhythm: Normal rate and regular rhythm.   Pulmonary:      Effort: Pulmonary effort is normal.      Breath sounds: Normal breath sounds.   Abdominal:      Palpations: Abdomen is soft.   Musculoskeletal:      Right lower leg: No edema.      Left lower leg: No edema.   Skin:     General: Skin is warm and dry.   Neurological:      General: No focal deficit present.      Mental Status: He is alert.   Psychiatric:         Mood and Affect: Mood normal.           Lab Data:  CBC:   Recent Labs     06/12/25  0619 06/13/25  0206   WBC 5.8 6.8   HGB 12.5* 12.1*   HCT 38.4* 36.2*   MCV 96.7* 95.0*    310     BMP:   Recent Labs     06/12/25  0619 06/13/25  0206    138   K 4.2 4.6    104   CO2 25 24   BUN 28* 22   CREATININE 0.6* 0.7     LIVER PROFILE:   No results for input(s): \"AST\", \"ALT\", \"LIPASE\", \"AMYLASE\", \"BILIDIR\", \"BILITOT\", \"ALKPHOS\" in the last 72 hours.    Invalid input(s):  58%.   ECG: Resting ECG demonstrates sinus bradycardia.   Stress Test: A pharmacological stress test was performed using regadenoson (Lexiscan). The patient reported dyspnea during the stress test. Symptoms began during stress and ended during recovery. The patient reached the end of the protocol. Blood pressure demonstrated a hypotensive response to stress.   Stress ECG: There were no arrhythmias during stress. There were no noted arrhythmias during recovery.   Resting ECG: ECG is abnormal. The ECG shows sinus bradycardia.     Echo (TTE) complete (PRN contrast/bubble/strain/3D)  Result Date: 6/11/2025    Left Ventricle: EF by visual approximation is 65%. Left ventricle size is normal. Normal wall thickness. Normal wall motion. Normal diastolic function.   Right Ventricle: Right ventricle size is normal. Normal systolic function.   Image quality is technically difficult. Contrast used: Lumason.     CTA PULMONARY W CONTRAST  Result Date: 6/10/2025  EXAM: CHEST CTA WITH CONTRAST (PULMONARY ARTERY)  HISTORY: Chest pain and shortness of breath.  TECHNIQUE: CTA acquisition of the chest from the thoracic inlet to the upper abdomen following IV contrast administration timed to filling of the pulmonary artery.  3D/MIP/VR images were utilized. CT Dose Reduction Techniques Employed: Yes.  IV Contrast: Administered.  COMPARISON: None.  FINDINGS:  Pulmonary Embolism:  Diagnostic quality: Adequate.  Central (Main/Lobar/Interlobar): No embolus.  Peripheral (Segmental/Subsegmental): No embolus.  Right ventricle/Left ventricle ratio: Normal.  Lines, Tubes, Devices: None. Lung Parenchyma and Airways: Curvilinear density in the distal trachea just above the toro may represent mucous (axial 107).  Course streaky markings in the right lower lobe are likely chronic and due to scarring. No suspicious pulmonary nodule. Pleural Space: No pleural effusion or thickening. No pneumothorax. Mediastinum and Claudette: Thyroid gland is normal.  No  lymphadenopathy. Heart and Pericardium: There is no pericardial effusion or thickening. The heart is not enlarged. Vessels: The thoracic aorta is of normal caliber. Bones: There is no fracture or lytic lesion. Soft Tissues: Chest wall soft tissues are unremarkable. Upper Abdomen:  Cholecystectomy. 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      1.  Normal CT pulmonary angiogram with no evidence of pulmonary artery embolism. 2.  Probable scarring in the right lower lobe.  All CT scans are performed using dose optimization techniques as appropriate to the performed exam and include at least one of the following: Automated exposure control, adjustment of the mA and/or kV according to size, and the use of iterative reconstruction technique.  ______________________________________ Electronically signed by: JIAN HART M.D. Date:     06/10/2025 Time:    19:13     XR CHEST PORTABLE  Result Date: 6/10/2025  EXAM: CHEST RADIOGRAPH  TECHNIQUE: Single frontal chest radiograph.  HISTORY: Chest pain.  COMPARISON: 2/14/23.  FINDINGS: Lungs/Pleura: The lungs are clear. There is no pleural effusion. There is no pneumothorax. Mild scarring in the right lower lobe. Stable elevation of the right hemidiaphragm. Heart: The heart size is normal. Bones: Unremarkable. Other: None.      1.  No acute findings.    ______________________________________ Electronically signed by: JIAN HART M.D. Date:     06/10/2025 Time:    18:08         Assessment and Plan     Assessment:  Paroxysmal afib with RVR  Elevated troponin  Preserved LV/RV EF with no significant valvular pathology  History of CAD with LAD PCI 2023  Reported dark stools?    Plan:  No further chest pain and Lexiscan does not demonstrate significant ischemia  Continue OAC and BB  Can be discharged from a cardiac standpoint   Will follow up in the office in a few weeks    Brianna Lozada MD  6/14/2025 6:36 AM

## 2025-06-16 ENCOUNTER — TELEPHONE (OUTPATIENT)
Age: 60
End: 2025-06-16

## 2025-06-16 ENCOUNTER — TELEPHONE (OUTPATIENT)
Dept: CARDIOLOGY CLINIC | Age: 60
End: 2025-06-16

## 2025-06-16 NOTE — TELEPHONE ENCOUNTER
Patient left message that he was in the hospital and was told to call for Dr. Lozada. Per Dr. Lozada's note needs follow up in office in a few weeks. Please call patient to schedule appt.     Assessment and Plan     Assessment:  Paroxysmal afib with RVR  Elevated troponin  Preserved LV/RV EF with no significant valvular pathology  History of CAD with LAD PCI 2023  Reported dark stools?     Plan:  No further chest pain and Lexiscan does not demonstrate significant ischemia  Continue OAC and BB  Can be discharged from a cardiac standpoint   Will follow up in the office in a few weeks     Brianna Lozada MD  6/14/2025 6:36 AM

## 2025-06-16 NOTE — TELEPHONE ENCOUNTER
Care Transitions Initial Follow Up Call    Outreach made within 2 business days of discharge: Yes    Patient: Reagan Diallo Patient : 1965   MRN: [id number]  Reason for Admission: NSTEMI, afib  Discharge Date: 25       Spoke with: Called pt, no option to leave a voicemail.     Discharge department/facility: [facility]     Follow Up  Future Appointments   Date Time Provider Department Center   2025 11:00 AM Manjula Acevedo APRN [facility] DEP   2025  1:30 PM Natalia Mayorga APRN - NP [facility]   10/3/2025 11:45 AM Manjula Acevedo APRN [facility] DEP       Pantera Abbott MA

## 2025-06-17 ENCOUNTER — TELEPHONE (OUTPATIENT)
Age: 60
End: 2025-06-17

## 2025-06-17 ENCOUNTER — TELEPHONE (OUTPATIENT)
Dept: CARDIOLOGY CLINIC | Age: 60
End: 2025-06-17

## 2025-06-17 NOTE — TELEPHONE ENCOUNTER
Called Pt to schedule HFU w/Dr. Lozada; THONY.  Appt gerard'nilesh for Friday, 7/18/25, at 11:30 am.

## 2025-06-17 NOTE — TELEPHONE ENCOUNTER
Care Transitions Initial Follow Up Call    Outreach made within 2 business days of discharge: Yes    Patient: Reagan Diallo Patient : 1965   MRN: [id number]  Reason for Admission: Stroke  Discharge Date: 25       Spoke with: patient     Discharge department/facility: [facility] Interactive Patient Contact:  Was patient able to fill all prescriptions: Yes  Was patient instructed to bring all medications to the follow-up visit: Yes  Is patient taking all medications as directed in the discharge summary? Yes  Does patient understand their discharge instructions: Yes  Does patient have questions or concerns that need addressed prior to 7-14 day follow up office visit: no    Additional needs identified to be addressed with provider  Pt states he has been out of a couple of his medications for a few weeks now. He told me that they were Lexapro and Pantoprazole. I explained to pt that these were sent to the pharmacy on 4/3/25 for a year. I asked if I would call the pharmacy for him and he said he would wait until after he sees Manjula tomorrow to make sure she still wants him to take it.          Scheduled appointment with PCP within 7-14 days    Follow Up  Future Appointments   Date Time Provider Department Center   2025 11:00 AM Manjula Acevedo APRN [facility] DEP   2025 11:30 AM Brianna Lozada MD N [facility]io [facility]   2025  1:30 PM Natalia Mayorga APRN - NP [facility]   10/3/2025 11:45 AM Manjula Acevedo APRN [facility] DEP       Radha Reveles LPN

## 2025-06-17 NOTE — TELEPHONE ENCOUNTER
Care Transitions Initial Follow Up Call    Outreach made within 2 business days of discharge: Yes    Patient: Reagan Diallo Patient : 1965   MRN: [id number]  Reason for Admission: NSTEMI, afib  Discharge Date: 25       Spoke with: Called pt, left voicemail.     Discharge department/facility: [facility]     Follow Up  Future Appointments   Date Time Provider Department Center   2025 11:00 AM Manjula Acevedo APRN [facility] DEP   2025 11:30 AM Brianna Lozada MD N [facility]   2025  1:30 PM Natalia Mayorga APRN - NP [facility]   10/3/2025 11:45 AM Manjula Acevedo APRN [facility] DEP       Pantera Abbott MA

## 2025-06-18 ENCOUNTER — OFFICE VISIT (OUTPATIENT)
Age: 60
End: 2025-06-18

## 2025-06-18 VITALS
DIASTOLIC BLOOD PRESSURE: 70 MMHG | BODY MASS INDEX: 26.18 KG/M2 | WEIGHT: 187 LBS | OXYGEN SATURATION: 98 % | HEIGHT: 71 IN | HEART RATE: 72 BPM | SYSTOLIC BLOOD PRESSURE: 118 MMHG | TEMPERATURE: 97 F

## 2025-06-18 DIAGNOSIS — E78.2 MIXED HYPERLIPIDEMIA: ICD-10-CM

## 2025-06-18 DIAGNOSIS — Z09 HOSPITAL DISCHARGE FOLLOW-UP: Primary | ICD-10-CM

## 2025-06-18 DIAGNOSIS — I48.0 AF (PAROXYSMAL ATRIAL FIBRILLATION) (HCC): ICD-10-CM

## 2025-06-18 DIAGNOSIS — F33.41 RECURRENT MAJOR DEPRESSIVE DISORDER, IN PARTIAL REMISSION: ICD-10-CM

## 2025-06-18 RX ORDER — ESCITALOPRAM OXALATE 20 MG/1
20 TABLET ORAL DAILY
Qty: 90 TABLET | Refills: 3 | Status: SHIPPED | OUTPATIENT
Start: 2025-06-18

## 2025-06-18 RX ORDER — METOPROLOL TARTRATE 25 MG/1
25 TABLET, FILM COATED ORAL DAILY
Qty: 30 TABLET | Refills: 3 | Status: SHIPPED | OUTPATIENT
Start: 2025-06-18

## 2025-06-18 RX ORDER — ARIPIPRAZOLE 5 MG/1
5 TABLET ORAL DAILY
Qty: 30 TABLET | Refills: 3 | Status: SHIPPED | OUTPATIENT
Start: 2025-06-18

## 2025-06-18 RX ORDER — PANTOPRAZOLE SODIUM 40 MG/1
40 TABLET, DELAYED RELEASE ORAL DAILY
Qty: 90 TABLET | Refills: 3 | Status: SHIPPED | OUTPATIENT
Start: 2025-06-18

## 2025-06-18 ASSESSMENT — ENCOUNTER SYMPTOMS
GASTROINTESTINAL NEGATIVE: 1
RESPIRATORY NEGATIVE: 1
EYES NEGATIVE: 1

## 2025-06-18 NOTE — PROGRESS NOTES
Date of Office Visit:  2025  Date of Hospital Admission: 6/10/25  Date of Hospital Discharge: 25  Readmission Risk Score (high >=14%. Medium >=10%):Readmission Risk Score: 10.8      Care management risk score Rising risk (score 2-5) and Complex Care (Scores >=6): No Risk Score On File     Non face to face  following discharge, date last encounter closed (first attempt may have been earlier): 2025     Call initiated 2 business days of discharge: Yes     Reagan Diallo (:  1965) is a 60 y.o. male, Established patient, here for evaluation of the following chief complaint(s):  Follow-Up from Hospital (AFIB- has appt 25 with cardiology )         Assessment & Plan  1. Atrial fibrillation with rapid ventricular response (A-fib, RVR): Acute. Elevated troponin levels. Non-STEMI. Normal left ventricular size with preserved ejection fraction of 65. No blockage on Lexiscan.    - Continue Eliquis 5 mg twice daily.  - Continue Lopressor 25 mg once daily.  - Refills for Eliquis and Lopressor provided.  - Monitor with Zio patch.    2. Depression: Chronic.  - Add Abilify to regimen to boost mood and stabilize it.  - Continue Lamictal.  - Monitor for improvement over 4 to 6 weeks.  - Discontinue Abilify if no improvement after 4 to 6 weeks.    Follow-up  - Follow-up appointment with Dr. Lozada on 2025 at 11:30 AM to discuss Holter monitor results and further management.  - Follow-up in 6 weeks.    Results  - Labs:    - Troponin level: Elevated  - Imaging:    - 2D echocardiogram: Normal left ventricular size with preserved ejection fraction of 65%  - Diagnostic Testing:    - Lexiscan: Negative or low risk of cardiac events    Hospital discharge follow-up  -     [place] DISCHARGE MEDS RECONCILED W/ CURRENT OUTPATIENT MED LIST  Recurrent major depressive disorder, in partial remission  -     escitalopram (LEXAPRO) 20 MG tablet; Take 1 tablet by mouth daily, Disp-90 tablet, R-3Normal  AF (paroxysmal

## 2025-07-01 LAB — ECHO BSA: 1.99 M2

## 2025-07-02 NOTE — PROGRESS NOTES
Physician Progress Note      PATIENT:               MEME KELLEY  CSN #:                  [id number]  :                       1965  ADMIT DATE:       6/10/2025 5:49 PM  DISCH DATE:        2025 5:43 PM  RESPONDING  PROVIDER #:        Brianna Lozada MD          QUERY TEXT:    Based on your medical judgment, please clarify these findings and document if   any of the following are being evaluated and/or treated:    The clinical indicators include:  --60 y.o. male admitted with new onset of atrial fibrillation. History of HTN,   MI.  --Eliquis 5 mg BID. (MAR 6/11).  Options provided:  -- Secondary hypercoagulable state in a patient with atrial fibrillation  -- Other - I will add my own diagnosis  -- Disagree - Not applicable / Not valid  -- Disagree - Clinically unable to determine / Unknown  -- Refer to Clinical Documentation Reviewer    PROVIDER RESPONSE TEXT:    This patient has secondary hypercoagulable state in a patient with atrial   fibrillation.    Query created by: Jordyn Gabriel on 2025 2:15 PM      Electronically signed by:  Brianna Lozada MD 2025 8:49 AM

## 2025-07-14 ENCOUNTER — TELEPHONE (OUTPATIENT)
Dept: CARDIOLOGY CLINIC | Age: 60
End: 2025-07-14

## 2025-07-14 NOTE — TELEPHONE ENCOUNTER
Date: TBD    Cardiologist: Natalia CRUZ    Procedure: Pain management procedure    Surgeon: [facility]    Last Office Visit: 2/17/25    Reason for office visit and medical concerns addressed at this office visit: CAD, HTN, Dyslipidemia    Testing Performed and Date of Service:  6/11/25- Echo   Left Ventricle: EF by visual approximation is 65%. Left ventricle size is normal. Normal wall thickness. Normal wall motion. Normal diastolic function.    Right Ventricle: Right ventricle size is normal. Normal systolic function.      6/12/25 Lexiscan   Stress Combined Conclusion: Normal pharmacological myocardial perfusion study. Findings suggest a low risk of cardiac events.    6/13/25 Holter monitor  The monitor was prescribed for 14 days, time analyzed 2d 12h  There is Sinus Rhythm, with the min HR 56 bpm, the max  bpm and the avg  HR 81 bpm  No AF/AFL  There were 2,615 PACs (0.88%) with 3 runs of SVT, longest run 6 bts fastest rate 153.  There were 172 PVCs (<0.1%)  There were 4 patient events without symptoms reported.  Rhythm sinus rhythm also PSVT also PACs.  Bradycardia burden: 8.79%  Tachycardia burden: 17.62%  No ventricular tachycardia reported  No pauses reported  No atrioventricular block was reported         Does the patient have a stent? If so, what type?  LEONARD 2/28/23.     Current Medications: Lexapro, protonix, abilify, Eliquis, Lopressor, Hydroxyzine, Lamictal, lipitor, Norco     Is the patient currently taking an anticoagulant? If so, what is the diagnosis the patient has been given to warrant the need for the anticoagulant? Eliquis, afib      Additional Notes: Requesting to hold Eliquis 3 days prior to procedure     *Has HFU/NP appt 7/18/25 with Dr. Lozada for new onset paroxysmal afib with RVR.  Changed Plavix to Eliquis during 6/10-6/13/25 hospitalization.

## 2025-07-18 ENCOUNTER — OFFICE VISIT (OUTPATIENT)
Dept: CARDIOLOGY CLINIC | Age: 60
End: 2025-07-18
Payer: MEDICARE

## 2025-07-18 VITALS
WEIGHT: 187 LBS | HEART RATE: 74 BPM | DIASTOLIC BLOOD PRESSURE: 98 MMHG | BODY MASS INDEX: 26.18 KG/M2 | HEIGHT: 71 IN | SYSTOLIC BLOOD PRESSURE: 148 MMHG

## 2025-07-18 DIAGNOSIS — I48.0 PAROXYSMAL ATRIAL FIBRILLATION (HCC): Primary | ICD-10-CM

## 2025-07-18 PROCEDURE — 3080F DIAST BP >= 90 MM HG: CPT | Performed by: INTERNAL MEDICINE

## 2025-07-18 PROCEDURE — 99215 OFFICE O/P EST HI 40 MIN: CPT | Performed by: INTERNAL MEDICINE

## 2025-07-18 PROCEDURE — 3077F SYST BP >= 140 MM HG: CPT | Performed by: INTERNAL MEDICINE

## 2025-07-18 RX ORDER — METOPROLOL TARTRATE 50 MG
50 TABLET ORAL DAILY
Qty: 90 TABLET | Refills: 3 | Status: SHIPPED | OUTPATIENT
Start: 2025-07-18

## 2025-07-18 NOTE — PROGRESS NOTES
[facility]  Cardiology Office Note  [address] [facility] [place]  [address]  Phone: [phone]  Fax: [phone]                            Date:  7/18/2025  Patient: Reagan Diallo  Age:  60 y.o., 1965    Referral: No ref. provider found      PROBLEM LIST:    Patient Active Problem List    Diagnosis Date Noted    Claudication 01/17/2023     Priority: Medium    Poor memory 06/02/2022     Priority: Medium    Recurrent major depressive disorder, in partial remission 06/18/2025    GERD (gastroesophageal reflux disease) 06/11/2025    Dyslipidemia 06/11/2025    Hypertension 06/11/2025    New onset atrial fibrillation (HCC) 06/10/2025    NSTEMI, initial episode of care ([facility]) 06/10/2025    Hyperkalemia, 5.3 PoA 06/10/2025    Paroxysmal atrial fibrillation (HCC) 04/03/2025    Hypersomnia due to medical condition 07/20/2023    DDD (degenerative disc disease), lumbar 04/06/2023    Compression fracture of T12 vertebra (HCC) 04/06/2023    Compression fracture of L1 lumbar vertebra (HCC) 04/06/2023    Lumbar radiculopathy 04/06/2023    Cirrhosis of liver without ascites, unspecified hepatic cirrhosis type ([facility]) 03/15/2022    Vitamin K deficiency coagulation disorder 11/17/2021    Coagulation defect 09/23/2021    Obstruction of left ureter 09/23/2021    Duodenal ulcer 09/03/2021    Upper abdominal pain 05/28/2021    Nausea 05/28/2021    Diarrhea 05/28/2021    S/P cholecystectomy 05/28/2021    Gastritis and duodenitis 05/27/2021    Sludge in gallbladder 04/27/2021    Chronic cholecystitis 04/27/2021    Chronic pain of right knee 12/02/2020    Neuropathy 12/02/2020    Chronic midline low back pain with left-sided sciatica 07/07/2019    Lumbar spondylosis 07/07/2019    Major depressive disorder with single episode, in full remission 09/20/2018    MD (muscular dystrophy) ([facility]) 06/13/2018    History of hepatitis C virus infection 03/21/2017    History of hepatitis C 06/29/2016    History

## 2025-07-30 ENCOUNTER — OFFICE VISIT (OUTPATIENT)
Age: 60
End: 2025-07-30
Payer: MEDICARE

## 2025-07-30 VITALS
HEART RATE: 80 BPM | HEIGHT: 71 IN | TEMPERATURE: 97.8 F | WEIGHT: 178 LBS | BODY MASS INDEX: 24.92 KG/M2 | OXYGEN SATURATION: 97 % | SYSTOLIC BLOOD PRESSURE: 130 MMHG | DIASTOLIC BLOOD PRESSURE: 70 MMHG

## 2025-07-30 DIAGNOSIS — F51.01 PRIMARY INSOMNIA: ICD-10-CM

## 2025-07-30 DIAGNOSIS — F51.8 ABNORMAL DREAMS: ICD-10-CM

## 2025-07-30 DIAGNOSIS — G71.00 MUSCULAR DYSTROPHY (HCC): Primary | ICD-10-CM

## 2025-07-30 DIAGNOSIS — I10 PRIMARY HYPERTENSION: ICD-10-CM

## 2025-07-30 PROCEDURE — G8420 CALC BMI NORM PARAMETERS: HCPCS | Performed by: NURSE PRACTITIONER

## 2025-07-30 PROCEDURE — 4004F PT TOBACCO SCREEN RCVD TLK: CPT | Performed by: NURSE PRACTITIONER

## 2025-07-30 PROCEDURE — G8427 DOCREV CUR MEDS BY ELIG CLIN: HCPCS | Performed by: NURSE PRACTITIONER

## 2025-07-30 PROCEDURE — 3078F DIAST BP <80 MM HG: CPT | Performed by: NURSE PRACTITIONER

## 2025-07-30 PROCEDURE — 3075F SYST BP GE 130 - 139MM HG: CPT | Performed by: NURSE PRACTITIONER

## 2025-07-30 PROCEDURE — 99214 OFFICE O/P EST MOD 30 MIN: CPT | Performed by: NURSE PRACTITIONER

## 2025-07-30 RX ORDER — PRAZOSIN HYDROCHLORIDE 1 MG/1
1 CAPSULE ORAL NIGHTLY
Qty: 30 CAPSULE | Refills: 3 | Status: SHIPPED | OUTPATIENT
Start: 2025-07-30

## 2025-07-30 RX ORDER — METOPROLOL TARTRATE 50 MG
50 TABLET ORAL 2 TIMES DAILY
Qty: 180 TABLET | Refills: 2 | Status: SHIPPED | OUTPATIENT
Start: 2025-07-30

## 2025-07-30 RX ORDER — METOPROLOL TARTRATE 50 MG
50 TABLET ORAL 2 TIMES DAILY
Qty: 180 TABLET | Refills: 2
Start: 2025-07-30 | End: 2025-07-30 | Stop reason: SDUPTHER

## 2025-07-31 ASSESSMENT — ENCOUNTER SYMPTOMS
RESPIRATORY NEGATIVE: 1
BACK PAIN: 1
EYES NEGATIVE: 1
GASTROINTESTINAL NEGATIVE: 1

## 2025-07-31 NOTE — PROGRESS NOTES
Movements: Extraocular movements intact.      Conjunctiva/sclera: Conjunctivae normal.      Pupils: Pupils are equal, round, and reactive to light.   Neck:      Thyroid: No thyromegaly.   Cardiovascular:      Rate and Rhythm: Normal rate and regular rhythm.      Pulses: Normal pulses.           Dorsalis pedis pulses are 2+ on the right side and 2+ on the left side.        Posterior tibial pulses are 2+ on the right side and 2+ on the left side.      Heart sounds: Normal heart sounds.   Pulmonary:      Effort: Pulmonary effort is normal.      Breath sounds: Normal breath sounds and air entry.   Abdominal:      General: Bowel sounds are normal.      Palpations: Abdomen is soft.   Musculoskeletal:      Cervical back: Full passive range of motion without pain, normal range of motion and neck supple.      Thoracic back: Deformity present. No tenderness. Decreased range of motion.      Lumbar back: Deformity present. No tenderness. Decreased range of motion.        Back:       Right hip: Tenderness present. Decreased range of motion.      Left hip: Tenderness present. Decreased range of motion.      Right lower leg: Tenderness present.      Left lower leg: Tenderness present.   Lymphadenopathy:      Cervical: No cervical adenopathy.   Skin:     General: Skin is warm and dry.      Capillary Refill: Capillary refill takes less than 2 seconds.   Neurological:      General: No focal deficit present.      Mental Status: He is alert and oriented to person, place, and time. Mental status is at baseline.      Coordination: Coordination is intact.   Psychiatric:         Mood and Affect: Mood normal. Affect is flat.         Speech: Speech normal.         Behavior: Behavior normal.         Thought Content: Thought content normal.         Cognition and Memory: Cognition and memory normal.         Judgment: Judgment normal.             The patient (or guardian, if applicable) and other individuals in attendance with the patient were

## 2025-08-05 ENCOUNTER — HOSPITAL ENCOUNTER (OUTPATIENT)
Dept: CT IMAGING | Age: 60
Discharge: HOME | End: 2025-08-05
Attending: INTERNAL MEDICINE
Payer: MEDICARE

## 2025-08-05 DIAGNOSIS — I48.0 PAROXYSMAL ATRIAL FIBRILLATION (HCC): ICD-10-CM

## 2025-08-05 LAB
CREAT SERPL-MCNC: 0.6 MG/DL (ref 0.3–1.3)
PERFORMED ON: NORMAL

## 2025-08-05 PROCEDURE — 82565 ASSAY OF CREATININE: CPT

## 2025-08-05 PROCEDURE — 75574 CT ANGIO HRT W/3D IMAGE: CPT

## 2025-08-05 PROCEDURE — 6360000004 HC RX CONTRAST MEDICATION: Performed by: INTERNAL MEDICINE

## 2025-08-05 RX ORDER — IOPAMIDOL 755 MG/ML
100 INJECTION, SOLUTION INTRAVASCULAR
Status: COMPLETED | OUTPATIENT
Start: 2025-08-05 | End: 2025-08-05

## 2025-08-05 RX ADMIN — IOPAMIDOL 100 ML: 755 INJECTION, SOLUTION INTRAVENOUS at 14:06

## 2025-08-07 DIAGNOSIS — I48.0 AF (PAROXYSMAL ATRIAL FIBRILLATION) (HCC): Primary | ICD-10-CM

## 2025-08-07 DIAGNOSIS — E78.2 MIXED HYPERLIPIDEMIA: ICD-10-CM

## 2025-08-07 DIAGNOSIS — F51.8 ABNORMAL DREAMS: ICD-10-CM

## 2025-08-07 RX ORDER — ATORVASTATIN CALCIUM 40 MG/1
40 TABLET, FILM COATED ORAL DAILY
Qty: 90 TABLET | Refills: 3 | Status: SHIPPED | OUTPATIENT
Start: 2025-08-07

## 2025-08-15 ENCOUNTER — TELEPHONE (OUTPATIENT)
Dept: CARDIOLOGY CLINIC | Age: 60
End: 2025-08-15

## 2025-09-03 ENCOUNTER — TELEPHONE (OUTPATIENT)
Dept: GASTROENTEROLOGY | Age: 60
End: 2025-09-03

## 2025-09-03 DIAGNOSIS — K74.60 CIRRHOSIS OF LIVER WITHOUT ASCITES, UNSPECIFIED HEPATIC CIRRHOSIS TYPE (HCC): Primary | ICD-10-CM

## (undated) DEVICE — AIRWAY CIRCUIT: Brand: DEROYAL

## (undated) DEVICE — BAG SPEC REM 224ML W4XL6IN DIA10MM 1 HND GYN DISP ENDOPCH

## (undated) DEVICE — TROCAR ENDOSCP L100MM DIA5MM BLDELSS STBL SL THRD OPT VW

## (undated) DEVICE — SUTURE SZ 0 27IN 5/8 CIR UR-6  TAPER PT VIOLET ABSRB VICRYL J603H

## (undated) DEVICE — ENDO KIT,LOURDES HOSPITAL: Brand: MEDLINE INDUSTRIES, INC.

## (undated) DEVICE — FORCEPS BX 240CM 2.4MM L NDL RAD JAW 4 M00513334

## (undated) DEVICE — CHLORAPREP 26ML ORANGE

## (undated) DEVICE — SUTURE MCRYL SZ 4-0 L18IN ABSRB UD L19MM PS-2 3/8 CIR PRIM Y496G

## (undated) DEVICE — SUTURE VCRL SZ 3-0 L27IN ABSRB UD L26MM SH 1/2 CIR J416H

## (undated) DEVICE — STERILE POLYISOPRENE POWDER-FREE SURGICAL GLOVES: Brand: PROTEXIS

## (undated) DEVICE — MAJOR BSIN SETUP PK

## (undated) DEVICE — APPLIER CLP M/L SHFT DIA5MM 15 LIG LIGAMAX 5

## (undated) DEVICE — ADHESIVE SKIN CLSR 0.7ML TOP DERMBND ADV

## (undated) DEVICE — SUTURE VCRL SZ 0 L27IN ABSRB VLT L36MM UR-5 5/8 CIR J376H

## (undated) DEVICE — TROCAR ENDOSCP L100MM DIA11MM STBL SL BLDELSS DISP ENDOPATH

## (undated) DEVICE — MASK ANES AD M SZ 5 PREM TAIL VLV INFL PRT UNSCENTED HK RNG

## (undated) DEVICE — DRAPE,LAP,CHOLE,W/TROUGHS,STERILE: Brand: MEDLINE

## (undated) DEVICE — NEEDLE INSUF L120MM ULT VERES ENDOPATH

## (undated) DEVICE — PLATE ES AD W 9FT CRD 2

## (undated) DEVICE — FORCEPS BX L240CM JAW DIA2.4MM ORNG L CAP W/ NDL DISP RAD

## (undated) DEVICE — INSUFFLATION TUBING,LAPAROSCOPIC: Brand: DEROYAL